# Patient Record
Sex: FEMALE | Race: WHITE | NOT HISPANIC OR LATINO | Employment: OTHER | URBAN - METROPOLITAN AREA
[De-identification: names, ages, dates, MRNs, and addresses within clinical notes are randomized per-mention and may not be internally consistent; named-entity substitution may affect disease eponyms.]

---

## 2017-05-16 ENCOUNTER — OFFICE VISIT (OUTPATIENT)
Dept: OBSTETRICS AND GYNECOLOGY | Facility: CLINIC | Age: 82
End: 2017-05-16
Payer: MEDICARE

## 2017-05-16 ENCOUNTER — TELEPHONE (OUTPATIENT)
Dept: OBSTETRICS AND GYNECOLOGY | Facility: CLINIC | Age: 82
End: 2017-05-16

## 2017-05-16 VITALS
HEIGHT: 66 IN | DIASTOLIC BLOOD PRESSURE: 74 MMHG | SYSTOLIC BLOOD PRESSURE: 124 MMHG | WEIGHT: 177 LBS | BODY MASS INDEX: 28.45 KG/M2

## 2017-05-16 DIAGNOSIS — B37.31 CANDIDAL VAGINITIS: Primary | ICD-10-CM

## 2017-05-16 PROCEDURE — 1159F MED LIST DOCD IN RCRD: CPT | Mod: S$GLB,,, | Performed by: NURSE PRACTITIONER

## 2017-05-16 PROCEDURE — 99203 OFFICE O/P NEW LOW 30 MIN: CPT | Mod: S$GLB,,, | Performed by: NURSE PRACTITIONER

## 2017-05-16 PROCEDURE — 1160F RVW MEDS BY RX/DR IN RCRD: CPT | Mod: S$GLB,,, | Performed by: NURSE PRACTITIONER

## 2017-05-16 PROCEDURE — 1157F ADVNC CARE PLAN IN RCRD: CPT | Mod: 8P,S$GLB,, | Performed by: NURSE PRACTITIONER

## 2017-05-16 PROCEDURE — 99999 PR PBB SHADOW E&M-EST. PATIENT-LVL III: CPT | Mod: PBBFAC,,, | Performed by: NURSE PRACTITIONER

## 2017-05-16 RX ORDER — FLUCONAZOLE 150 MG/1
150 TABLET ORAL DAILY
Qty: 2 TABLET | Refills: 1 | Status: SHIPPED | OUTPATIENT
Start: 2017-05-16 | End: 2017-07-25

## 2017-05-16 NOTE — TELEPHONE ENCOUNTER
Spoke with the patient and informed her that the Rx was sent to the pharmacy and it takes them about 45 minutes to an hour before the Rx is usually ready to be picked up. Patient verbalized understanding, Crystal

## 2017-05-16 NOTE — PROGRESS NOTES
"CC: Vaginal yeast    Tiffanie Barajas is a 81 y.o. female  presents for vaginal  itching and irritation. Patient feels a vaginal  bulge for the past 2 months.     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     Social History Main Topics    Smoking status: Never Smoker    Smokeless tobacco: Never Used    Alcohol use No    Drug use: No    Sexual activity: Not on file     Other Topics Concern    Not on file     Social History Narrative    . Decaf coffee only. No living will or advanced directive-copy of information given.      Family History   Problem Relation Age of Onset    Arthritis Mother     Hypertension Mother     Stroke Mother     Glaucoma Mother     Heart disease Father 56     fatal MI    Diabetes Brother     Cancer Son 56     melanoma with mets to lung and bone    Arthritis Sister     Hyperlipidemia Neg Hx      OB History      Para Term  AB TAB SAB Ectopic Multiple Living    2        1 2          /74  Ht 5' 6" (1.676 m)  Wt 80.3 kg (177 lb 0.5 oz)  LMP 1990  BMI 28.57 kg/m2      ROS:  GENERAL: Denies weight gain or weight loss. Feeling well overall.   SKIN: Denies rash or lesions.   HEAD: Denies head injury or headache.   NODES: Denies enlarged lymph nodes.   CHEST: Denies chest pain or shortness of breath.   CARDIOVASCULAR: Denies palpitations or left sided chest pain.   ABDOMEN: No abdominal pain, constipation, diarrhea, nausea, " vomiting or rectal bleeding.   URINARY: No frequency, dysuria, hematuria, or burning on urination.  REPRODUCTIVE: See HPI.       PHYSICAL EXAM:  APPEARANCE: Older female, in no acute distress.  AFFECT: WNL, alert and oriented x 3  CHEST: Good respiratory effect  ABDOMEN: Soft.  No tenderness or masses.   PELVIC: External genitalia erythema consistent with yeast. Vagina atrophy.  Cervix pink, without lesions, discharge or tenderness.  Grade II cystocele with valsalva.    PLAN:  Diflucan rx ; she has mycolog cream  Patient states that cystocele is not causing any pain or issues, will contact office if she desires treatment .

## 2017-05-16 NOTE — TELEPHONE ENCOUNTER
----- Message from Bree Lewis sent at 5/16/2017  3:32 PM CDT -----  Contact: pt  Checking on status of script request for yeast infection to be sent to the [facility] in [place]??  Please call pt ASAP at [phone]

## 2017-07-25 ENCOUNTER — OFFICE VISIT (OUTPATIENT)
Dept: FAMILY MEDICINE | Facility: CLINIC | Age: 82
End: 2017-07-25
Payer: MEDICARE

## 2017-07-25 VITALS
HEIGHT: 66 IN | HEART RATE: 85 BPM | OXYGEN SATURATION: 96 % | BODY MASS INDEX: 27.26 KG/M2 | TEMPERATURE: 97 F | WEIGHT: 169.63 LBS | DIASTOLIC BLOOD PRESSURE: 82 MMHG | RESPIRATION RATE: 18 BRPM | SYSTOLIC BLOOD PRESSURE: 135 MMHG

## 2017-07-25 DIAGNOSIS — K21.9 GASTROESOPHAGEAL REFLUX DISEASE WITHOUT ESOPHAGITIS: Primary | ICD-10-CM

## 2017-07-25 DIAGNOSIS — C43.9 MALIGNANT MELANOMA, UNSPECIFIED SITE: ICD-10-CM

## 2017-07-25 DIAGNOSIS — Z23 NEED FOR IMMUNIZATION AGAINST TETANUS ALONE: ICD-10-CM

## 2017-07-25 DIAGNOSIS — M85.88 OSTEOPENIA OF SPINE: ICD-10-CM

## 2017-07-25 DIAGNOSIS — E78.1 HYPERTRIGLYCERIDEMIA WITHOUT HYPERCHOLESTEROLEMIA: ICD-10-CM

## 2017-07-25 DIAGNOSIS — I70.0 ATHEROSCLEROSIS OF AORTA: ICD-10-CM

## 2017-07-25 PROCEDURE — 99499 UNLISTED E&M SERVICE: CPT | Mod: S$GLB,,, | Performed by: NURSE PRACTITIONER

## 2017-07-25 PROCEDURE — 96160 PT-FOCUSED HLTH RISK ASSMT: CPT | Mod: S$GLB,,, | Performed by: NURSE PRACTITIONER

## 2017-07-25 PROCEDURE — 99999 PR PBB SHADOW E&M-EST. PATIENT-LVL III: CPT | Mod: PBBFAC,,, | Performed by: NURSE PRACTITIONER

## 2017-07-26 PROBLEM — I70.0 ATHEROSCLEROSIS OF AORTA: Status: ACTIVE | Noted: 2017-07-26

## 2017-07-26 PROBLEM — C43.9 MELANOMA: Status: ACTIVE | Noted: 2017-07-26

## 2017-07-26 NOTE — PROGRESS NOTES
"Tiffanie Barajas presented for a  Medicare AWV and comprehensive Health Risk Assessment today. The following components were reviewed and updated:    · Medical history  · Family History  · Social history  · Allergies and Current Medications  · Health Risk Assessment  · Health Maintenance  · Care Team     ** See Completed Assessments for Annual Wellness Visit within the encounter summary.**       The following assessments were completed:  · Living Situation  · CAGE  · Depression Screening  · Timed Get Up and Go  · Whisper Test  · Cognitive Function Screening  · Nutrition Screening  · ADL Screening  · PAQ Screening    Vitals:    07/25/17 1027 07/25/17 1134   BP: (!) 156/71 135/82   Pulse: 85    Resp: 18    Temp: 97.4 °F (36.3 °C)    TempSrc: Tympanic    SpO2: 96%    Weight: 76.9 kg (169 lb 10.3 oz)    Height: 5' 6" (1.676 m)      Body mass index is 27.38 kg/m².  Physical Exam      Diagnoses and health risks identified today and associated recommendations/orders:    1. Gastroesophageal reflux disease without esophagitis  Stable. Continue current treatment plan  GERD diet discussed. Follow up with PCP    2. Hypertriglyceridemia without hypercholesterolemia  Cholesterol 120 - 199 mg/dL 140      Triglycerides 30 - 150 mg/dL 127      HDL 40 - 75 mg/dL 40      LDL Cholesterol 63.0 - 159.0 mg/dL 74.6      HDL/Chol Ratio 20.0 - 50.0 % 28.6   Total Cholesterol/HDL Ratio 2.0 - 5.0 3.5   Non-HDL Cholesterol mg/dL 100     -stable. Continue current treatment plan. Low fat diet and exercise    3. Osteopenia of spine    4. Atherosclerosis of aorta  ARTERIOSCLEROTIC DISEASE IN   THE AORTA AGAIN NOTED  -low fat diet, continue fenofibrate. Follow up with PCP  stable  4. Malignant melanoma, unspecified site  -stable.     5. Need for immunization against tetanus alone  Follow up with PCP      Provided Tiffanie with a 5-10 year written screening schedule and personal prevention plan. Recommendations were developed using the USPSTF age " appropriate recommendations. Education, counseling, and referrals were provided as needed. After Visit Summary printed and given to patient which includes a list of additional screenings\tests needed.    No Follow-up on file.    Eileen Espinosa NP

## 2017-09-11 ENCOUNTER — OFFICE VISIT (OUTPATIENT)
Dept: INTERNAL MEDICINE | Facility: CLINIC | Age: 82
End: 2017-09-11
Payer: MEDICARE

## 2017-09-11 ENCOUNTER — LAB VISIT (OUTPATIENT)
Dept: LAB | Facility: HOSPITAL | Age: 82
End: 2017-09-11
Attending: FAMILY MEDICINE
Payer: MEDICARE

## 2017-09-11 VITALS
WEIGHT: 163.38 LBS | BODY MASS INDEX: 26.26 KG/M2 | DIASTOLIC BLOOD PRESSURE: 70 MMHG | TEMPERATURE: 98 F | OXYGEN SATURATION: 97 % | HEIGHT: 66 IN | HEART RATE: 81 BPM | SYSTOLIC BLOOD PRESSURE: 134 MMHG

## 2017-09-11 DIAGNOSIS — C43.9 MALIGNANT MELANOMA, UNSPECIFIED SITE: ICD-10-CM

## 2017-09-11 DIAGNOSIS — M85.88 OSTEOPENIA OF SPINE: ICD-10-CM

## 2017-09-11 DIAGNOSIS — Z00.00 ROUTINE GENERAL MEDICAL EXAMINATION AT A HEALTH CARE FACILITY: Primary | ICD-10-CM

## 2017-09-11 DIAGNOSIS — Z65.8 PSYCHOSOCIAL STRESSORS: ICD-10-CM

## 2017-09-11 DIAGNOSIS — M17.12 ARTHRITIS OF LEFT KNEE: ICD-10-CM

## 2017-09-11 DIAGNOSIS — E78.2 MIXED HYPERLIPIDEMIA: ICD-10-CM

## 2017-09-11 DIAGNOSIS — J30.89 CHRONIC NON-SEASONAL ALLERGIC RHINITIS, UNSPECIFIED TRIGGER: ICD-10-CM

## 2017-09-11 DIAGNOSIS — I70.0 ATHEROSCLEROSIS OF AORTA: ICD-10-CM

## 2017-09-11 DIAGNOSIS — K21.9 GASTROESOPHAGEAL REFLUX DISEASE WITHOUT ESOPHAGITIS: ICD-10-CM

## 2017-09-11 DIAGNOSIS — Z79.899 ENCOUNTER FOR LONG-TERM (CURRENT) USE OF MEDICATIONS: ICD-10-CM

## 2017-09-11 LAB
BASOPHILS # BLD AUTO: 0.04 K/UL
BASOPHILS NFR BLD: 0.5 %
DIFFERENTIAL METHOD: NORMAL
EOSINOPHIL # BLD AUTO: 0.4 K/UL
EOSINOPHIL NFR BLD: 4.9 %
ERYTHROCYTE [DISTWIDTH] IN BLOOD BY AUTOMATED COUNT: 12.9 %
HCT VFR BLD AUTO: 40.1 %
HGB BLD-MCNC: 13.1 G/DL
LYMPHOCYTES # BLD AUTO: 2.5 K/UL
LYMPHOCYTES NFR BLD: 30.5 %
MCH RBC QN AUTO: 28.9 PG
MCHC RBC AUTO-ENTMCNC: 32.7 G/DL
MCV RBC AUTO: 88 FL
MONOCYTES # BLD AUTO: 0.6 K/UL
MONOCYTES NFR BLD: 7.1 %
NEUTROPHILS # BLD AUTO: 4.7 K/UL
NEUTROPHILS NFR BLD: 56.9 %
PLATELET # BLD AUTO: 305 K/UL
PMV BLD AUTO: 10.3 FL
RBC # BLD AUTO: 4.54 M/UL
WBC # BLD AUTO: 8.3 K/UL

## 2017-09-11 PROCEDURE — 80053 COMPREHEN METABOLIC PANEL: CPT

## 2017-09-11 PROCEDURE — 85025 COMPLETE CBC W/AUTO DIFF WBC: CPT

## 2017-09-11 PROCEDURE — 36415 COLL VENOUS BLD VENIPUNCTURE: CPT

## 2017-09-11 PROCEDURE — 99397 PER PM REEVAL EST PAT 65+ YR: CPT | Mod: S$GLB,,, | Performed by: FAMILY MEDICINE

## 2017-09-11 PROCEDURE — 99499 UNLISTED E&M SERVICE: CPT | Mod: S$GLB,,, | Performed by: FAMILY MEDICINE

## 2017-09-11 PROCEDURE — 80061 LIPID PANEL: CPT

## 2017-09-11 PROCEDURE — 84443 ASSAY THYROID STIM HORMONE: CPT

## 2017-09-11 PROCEDURE — 99999 PR PBB SHADOW E&M-EST. PATIENT-LVL IV: CPT | Mod: PBBFAC,,, | Performed by: FAMILY MEDICINE

## 2017-09-11 RX ORDER — PANTOPRAZOLE SODIUM 40 MG/1
40 TABLET, DELAYED RELEASE ORAL DAILY
Qty: 30 TABLET | Refills: 11 | Status: SHIPPED | OUTPATIENT
Start: 2017-09-11 | End: 2018-09-26 | Stop reason: SDUPTHER

## 2017-09-11 RX ORDER — CITALOPRAM 20 MG/1
20 TABLET, FILM COATED ORAL DAILY
Qty: 30 TABLET | Refills: 11 | Status: SHIPPED | OUTPATIENT
Start: 2017-09-11 | End: 2018-09-26 | Stop reason: SDUPTHER

## 2017-09-11 RX ORDER — AZELASTINE 1 MG/ML
2 SPRAY, METERED NASAL 2 TIMES DAILY
Qty: 30 ML | Refills: 12 | Status: SHIPPED | OUTPATIENT
Start: 2017-09-11 | End: 2018-09-26 | Stop reason: SDUPTHER

## 2017-09-11 RX ORDER — LEVOCETIRIZINE DIHYDROCHLORIDE 5 MG/1
5 TABLET, FILM COATED ORAL NIGHTLY
Qty: 30 TABLET | Refills: 11 | Status: SHIPPED | OUTPATIENT
Start: 2017-09-11 | End: 2018-09-26 | Stop reason: SDUPTHER

## 2017-09-11 RX ORDER — DEXTROMETHORPHAN HYDROBROMIDE, GUAIFENESIN 5; 100 MG/5ML; MG/5ML
650 LIQUID ORAL EVERY 8 HOURS
Refills: 0 | COMMUNITY
Start: 2017-09-11

## 2017-09-12 LAB
ALBUMIN SERPL BCP-MCNC: 4.1 G/DL
ALP SERPL-CCNC: 53 U/L
ALT SERPL W/O P-5'-P-CCNC: 14 U/L
ANION GAP SERPL CALC-SCNC: 8 MMOL/L
AST SERPL-CCNC: 18 U/L
BILIRUB SERPL-MCNC: 0.5 MG/DL
BUN SERPL-MCNC: 18 MG/DL
CALCIUM SERPL-MCNC: 10.1 MG/DL
CHLORIDE SERPL-SCNC: 102 MMOL/L
CHOLEST SERPL-MCNC: 147 MG/DL
CHOLEST/HDLC SERPL: 3.1 {RATIO}
CO2 SERPL-SCNC: 31 MMOL/L
CREAT SERPL-MCNC: 0.7 MG/DL
EST. GFR  (AFRICAN AMERICAN): >60 ML/MIN/1.73 M^2
EST. GFR  (NON AFRICAN AMERICAN): >60 ML/MIN/1.73 M^2
GLUCOSE SERPL-MCNC: 98 MG/DL
HDLC SERPL-MCNC: 47 MG/DL
HDLC SERPL: 32 %
LDLC SERPL CALC-MCNC: 77.2 MG/DL
NONHDLC SERPL-MCNC: 100 MG/DL
POTASSIUM SERPL-SCNC: 4.2 MMOL/L
PROT SERPL-MCNC: 7.5 G/DL
SODIUM SERPL-SCNC: 141 MMOL/L
TRIGL SERPL-MCNC: 114 MG/DL
TSH SERPL DL<=0.005 MIU/L-ACNC: 2.32 UIU/ML

## 2017-09-13 RX ORDER — FENOFIBRATE 134 MG/1
CAPSULE ORAL
Qty: 30 CAPSULE | Refills: 11 | Status: SHIPPED | OUTPATIENT
Start: 2017-09-13 | End: 2018-09-26 | Stop reason: SDUPTHER

## 2017-09-13 NOTE — TELEPHONE ENCOUNTER
----- Message from Le Zambrano sent at 9/13/2017 11:49 AM CDT -----  Would like to speak to nurse about medication. Please call back at [phone]. thanks

## 2017-09-15 ENCOUNTER — TELEPHONE (OUTPATIENT)
Dept: INTERNAL MEDICINE | Facility: CLINIC | Age: 82
End: 2017-09-15

## 2017-09-15 NOTE — TELEPHONE ENCOUNTER
----- Message from Lizeth Merrill MD sent at 9/14/2017  9:47 PM CDT -----  Your recent labs are within acceptable limits.

## 2017-09-16 PROBLEM — M17.12 ARTHRITIS OF LEFT KNEE: Status: ACTIVE | Noted: 2017-09-16

## 2017-09-16 PROBLEM — J30.89 CHRONIC NON-SEASONAL ALLERGIC RHINITIS: Status: ACTIVE | Noted: 2017-09-16

## 2017-09-16 PROBLEM — Z65.8 PSYCHOSOCIAL STRESSORS: Status: ACTIVE | Noted: 2017-09-16

## 2017-09-16 PROBLEM — E78.2 MIXED HYPERLIPIDEMIA: Status: ACTIVE | Noted: 2017-09-16

## 2017-09-17 NOTE — PROGRESS NOTES
Subjective:       Patient ID: Tiffanie Barajas is a 81 y.o. female.    Chief Complaint: Annual Exam    Patient presents to clinic today for annual physical exam.      Review of Systems   Constitutional: Negative for chills, fatigue, fever and unexpected weight change.   HENT: Negative for congestion, dental problem, ear pain, hearing loss, rhinorrhea and trouble swallowing.    Eyes: Negative for pain and visual disturbance.   Respiratory: Positive for cough (x 2 years; occurs mainly in the morning). Negative for shortness of breath.    Cardiovascular: Negative for chest pain, palpitations and leg swelling.   Gastrointestinal: Negative for abdominal distention, abdominal pain, blood in stool, constipation, diarrhea, nausea and vomiting.   Genitourinary: Negative for difficulty urinating and vaginal discharge.   Musculoskeletal: Positive for arthralgias (left knee, intermittent). Negative for myalgias.   Skin: Negative for rash.   Neurological: Negative for dizziness, weakness, numbness and headaches.   Hematological: Negative for adenopathy. Does not bruise/bleed easily.   Psychiatric/Behavioral: Negative for dysphoric mood and sleep disturbance. The patient is not nervous/anxious.        Objective:      Physical Exam   Constitutional: She is oriented to person, place, and time. Vital signs are normal. She appears well-developed and well-nourished. No distress.   HENT:   Head: Normocephalic and atraumatic.   Right Ear: Tympanic membrane, external ear and ear canal normal.   Left Ear: Tympanic membrane, external ear and ear canal normal.   Nose: Mucosal edema and rhinorrhea present.   Mouth/Throat: Uvula is midline, oropharynx is clear and moist and mucous membranes are normal.   Pale, boggy nasal turbinates with clear rhinorrhea   Eyes: Conjunctivae, EOM and lids are normal. Pupils are equal, round, and reactive to light. Right eye exhibits no discharge. Left eye exhibits no discharge.   Neck: Normal range of  motion. Neck supple. No thyromegaly present.   Cardiovascular: Normal rate and regular rhythm.  Exam reveals no gallop and no friction rub.    No murmur heard.  Pulmonary/Chest: Effort normal and breath sounds normal. No respiratory distress. She has no wheezes. She has no rhonchi. She has no rales.   Abdominal: Soft. Normal appearance and bowel sounds are normal. She exhibits no distension and no mass. There is no hepatosplenomegaly. There is no tenderness.   Musculoskeletal: Normal range of motion.   Lymphadenopathy:     She has no cervical adenopathy.   Neurological: She is alert and oriented to person, place, and time. She has normal strength. No cranial nerve deficit or sensory deficit. Gait normal.   Reflex Scores:       Patellar reflexes are 2+ on the right side and 2+ on the left side.  Skin: Skin is warm and dry. No lesion and no rash noted. No cyanosis. Nails show no clubbing.   Psychiatric: She has a normal mood and affect.   Vitals reviewed.      Assessment:       1. Routine general medical examination at a health care facility    2. Gastroesophageal reflux disease without esophagitis    3. Mixed hyperlipidemia    4. Osteopenia of spine    5. Atherosclerosis of aorta    6. Malignant melanoma, unspecified site    7. Arthritis of left knee    8. Psychosocial stressors    9. Chronic non-seasonal allergic rhinitis, unspecified trigger        Plan:     Problem List Items Addressed This Visit     GERD (gastroesophageal reflux disease)    Current Assessment & Plan     Controlled on protonix         Relevant Medications    pantoprazole (PROTONIX) 40 MG tablet    Osteopenia of spine    Current Assessment & Plan     DEXA up to date         Melanoma    Current Assessment & Plan     Followed by Dermatology         Atherosclerosis of aorta    Current Assessment & Plan     Discussed importance of cholesterol and blood pressure control         Mixed hyperlipidemia    Current Assessment & Plan     Status pending labs,  continue fenofibrate and niacin         Arthritis of left knee    Relevant Medications    acetaminophen (TYLENOL) 650 MG TbSR    Psychosocial stressors    Relevant Medications    citalopram (CELEXA) 20 MG tablet    Chronic non-seasonal allergic rhinitis    Current Assessment & Plan     Advised this is likely cause of cough, advised to resume astelin as previously recommended by Dr. Moon, ENT         Relevant Medications    azelastine (ASTELIN) 137 mcg (0.1 %) nasal spray    levocetirizine (XYZAL) 5 MG tablet      Other Visit Diagnoses     Routine general medical examination at a health care facility    -  Primary          Health Maintenance reviewed/updated. Advised to obtain flu vaccine this Fall. Advised she can obtain Tdap at pharmacy.

## 2017-09-17 NOTE — ASSESSMENT & PLAN NOTE
Advised this is likely cause of cough, advised to resume astelin as previously recommended by Dr. Moon, ENT

## 2017-12-08 DIAGNOSIS — M25.561 PAIN IN BOTH KNEES, UNSPECIFIED CHRONICITY: Primary | ICD-10-CM

## 2017-12-08 DIAGNOSIS — M25.562 PAIN IN BOTH KNEES, UNSPECIFIED CHRONICITY: Primary | ICD-10-CM

## 2017-12-11 ENCOUNTER — HOSPITAL ENCOUNTER (OUTPATIENT)
Dept: RADIOLOGY | Facility: HOSPITAL | Age: 82
Discharge: HOME | End: 2017-12-11
Attending: ORTHOPAEDIC SURGERY
Payer: MEDICARE

## 2017-12-11 ENCOUNTER — OFFICE VISIT (OUTPATIENT)
Dept: ORTHOPEDICS | Facility: CLINIC | Age: 82
End: 2017-12-11
Payer: MEDICARE

## 2017-12-11 VITALS
DIASTOLIC BLOOD PRESSURE: 69 MMHG | BODY MASS INDEX: 25.55 KG/M2 | SYSTOLIC BLOOD PRESSURE: 153 MMHG | HEIGHT: 66 IN | RESPIRATION RATE: 16 BRPM | WEIGHT: 159 LBS | HEART RATE: 74 BPM

## 2017-12-11 DIAGNOSIS — M25.561 PAIN IN BOTH KNEES, UNSPECIFIED CHRONICITY: ICD-10-CM

## 2017-12-11 DIAGNOSIS — M25.562 PAIN IN BOTH KNEES, UNSPECIFIED CHRONICITY: ICD-10-CM

## 2017-12-11 DIAGNOSIS — M54.5 LOW BACK PAIN, UNSPECIFIED BACK PAIN LATERALITY, UNSPECIFIED CHRONICITY, WITH SCIATICA PRESENCE UNSPECIFIED: ICD-10-CM

## 2017-12-11 DIAGNOSIS — M25.552 PAIN OF LEFT HIP JOINT: ICD-10-CM

## 2017-12-11 DIAGNOSIS — M25.552 PAIN OF LEFT HIP JOINT: Primary | ICD-10-CM

## 2017-12-11 DIAGNOSIS — M53.86 LOW BACK DERANGEMENT SYNDROME: ICD-10-CM

## 2017-12-11 DIAGNOSIS — M17.12 ARTHRITIS OF KNEE, LEFT: Primary | ICD-10-CM

## 2017-12-11 DIAGNOSIS — M16.12 ARTHRITIS OF LEFT HIP: ICD-10-CM

## 2017-12-11 PROCEDURE — 72110 X-RAY EXAM L-2 SPINE 4/>VWS: CPT | Mod: TC

## 2017-12-11 PROCEDURE — 73502 X-RAY EXAM HIP UNI 2-3 VIEWS: CPT | Mod: TC,LT

## 2017-12-11 PROCEDURE — 99999 PR PBB SHADOW E&M-EST. PATIENT-LVL III: CPT | Mod: PBBFAC,,, | Performed by: ORTHOPAEDIC SURGERY

## 2017-12-11 PROCEDURE — 20610 DRAIN/INJ JOINT/BURSA W/O US: CPT | Mod: LT,S$GLB,, | Performed by: ORTHOPAEDIC SURGERY

## 2017-12-11 PROCEDURE — 73562 X-RAY EXAM OF KNEE 3: CPT | Mod: TC,50

## 2017-12-11 PROCEDURE — 72110 X-RAY EXAM L-2 SPINE 4/>VWS: CPT | Mod: 26,,, | Performed by: RADIOLOGY

## 2017-12-11 PROCEDURE — 99204 OFFICE O/P NEW MOD 45 MIN: CPT | Mod: 25,S$GLB,, | Performed by: ORTHOPAEDIC SURGERY

## 2017-12-11 PROCEDURE — 73502 X-RAY EXAM HIP UNI 2-3 VIEWS: CPT | Mod: 26,LT,, | Performed by: RADIOLOGY

## 2017-12-11 PROCEDURE — 73562 X-RAY EXAM OF KNEE 3: CPT | Mod: 26,50,, | Performed by: RADIOLOGY

## 2017-12-11 RX ORDER — TETANUS TOXOID, REDUCED DIPHTHERIA TOXOID AND ACELLULAR PERTUSSIS VACCINE, ADSORBED 5; 2.5; 8; 8; 2.5 [IU]/.5ML; [IU]/.5ML; UG/.5ML; UG/.5ML; UG/.5ML
SUSPENSION INTRAMUSCULAR
COMMUNITY
Start: 2017-10-11 | End: 2019-08-13

## 2017-12-11 RX ORDER — METHYLPREDNISOLONE ACETATE 80 MG/ML
80 INJECTION, SUSPENSION INTRA-ARTICULAR; INTRALESIONAL; INTRAMUSCULAR; SOFT TISSUE ONCE
Status: DISCONTINUED | OUTPATIENT
Start: 2017-12-11 | End: 2018-03-12

## 2017-12-11 RX ORDER — MELOXICAM 7.5 MG/1
7.5 TABLET ORAL DAILY
Qty: 30 TABLET | Refills: 2 | Status: SHIPPED | OUTPATIENT
Start: 2017-12-11 | End: 2018-02-14 | Stop reason: SDUPTHER

## 2017-12-11 RX ORDER — METHYLPREDNISOLONE ACETATE 80 MG/ML
80 INJECTION, SUSPENSION INTRA-ARTICULAR; INTRALESIONAL; INTRAMUSCULAR; SOFT TISSUE
Status: DISCONTINUED | OUTPATIENT
Start: 2017-12-11 | End: 2017-12-11 | Stop reason: HOSPADM

## 2017-12-11 RX ADMIN — METHYLPREDNISOLONE ACETATE 80 MG: 80 INJECTION, SUSPENSION INTRA-ARTICULAR; INTRALESIONAL; INTRAMUSCULAR; SOFT TISSUE at 12:12

## 2017-12-11 NOTE — PROCEDURES
Large Joint Aspiration/Injection  Date/Time: 12/11/2017 12:38 PM  Performed by: JUAN GOLDMAN  Authorized by: JUAN GOLDMAN     Consent Done?:  Yes (Verbal)  Indications:  Pain  Procedure site marked: Yes    Timeout: Prior to procedure the correct patient, procedure, and site was verified      Location:  Knee  Site:  L knee  Prep: Patient was prepped and draped in usual sterile fashion    Ultrasonic Guidance for needle placement: No  Needle size:  22 G  Approach:  Anterolateral  Medications:  80 mg methylPREDNISolone acetate 80 mg/mL  Patient tolerance:  Patient tolerated the procedure well with no immediate complications

## 2017-12-11 NOTE — PROGRESS NOTES
Subjective:     Patient ID: Tiffanie Barajas is a 82 y.o. female.    Chief Complaint: Pain of the Right Knee and Pain of the Left Knee    HPI: The patient is seen in consultation at the request of  for evaluation of knee discomfort worse on the left than the right.  The patient has had slowly increasing discomfort.  She has a history of having had a cystic lesion removed from her lumbar spine 5 years ago.  This was at the L4-5 level to the patient's understanding.  She is having increasing stiffness and soreness in her left knee and left hip.  She has radiating discomfort in her thigh.  Today she rates her discomfort at a 2 on a pain scale of 1-10, however it gets as high as a 12 if the patient is out in the community shopping etc.      Family History   Problem Relation Age of Onset    Arthritis Mother     Hypertension Mother     Stroke Mother     Glaucoma Mother     Heart disease Father 56     fatal MI    Diabetes Brother     Cancer Son 56     melanoma with mets to lung and bone    Arthritis Sister     Hyperlipidemia Neg Hx      Past Medical History:   Diagnosis Date    Anxiety     Arthritis     Cancer     Degenerative disc disease     Depression     GERD (gastroesophageal reflux disease)     Hyperlipidemia     Low back derangement syndrome 12/11/2017    Melanoma     Pneumonia due to other staphylococcus      Social History     Social History    Marital status:      Spouse name: fabio    Number of children: 3    Years of education: N/A     Social History Main Topics    Smoking status: Never Smoker    Smokeless tobacco: Never Used    Alcohol use No    Drug use: No    Sexual activity: Yes     Partners: Male     Other Topics Concern    None     Social History Narrative    . Decaf coffee only. No living will or advanced directive-copy of information given.      Past Surgical History:   Procedure Laterality Date    ADENOIDECTOMY      CHOLECYSTECTOMY  1973    SPINE  SURGERY  2/6/13    L4-L5  cyst removed andfor sciatica    TONSILLECTOMY  1945     Review of patient's allergies indicates:   Allergen Reactions    Diclofenac Nausea And Vomiting and Other (See Comments)    Hydrocodone-acetaminophen Nausea And Vomiting     Other reaction(s): Vomiting         Medication List with Changes/Refills   New Medications    MELOXICAM (MOBIC) 7.5 MG TABLET    Take 1 tablet (7.5 mg total) by mouth once daily. With food   Current Medications    ACETAMINOPHEN (TYLENOL) 650 MG TBSR    Take 1 tablet (650 mg total) by mouth every 8 (eight) hours.    ASPIRIN (ECOTRIN) 81 MG EC TABLET    Take 1 tablet by mouth Daily.    AZELASTINE (ASTELIN) 137 MCG (0.1 %) NASAL SPRAY    2 sprays (274 mcg total) by Nasal route 2 (two) times daily.    BOOSTRIX TDAP 2.5-8-5 LF-MCG-LF/0.5ML SYRG INJECTION        CITALOPRAM (CELEXA) 20 MG TABLET    Take 1 tablet (20 mg total) by mouth once daily.    FENOFIBRATE MICRONIZED (LOFIBRA) 134 MG CAP    TAKE ONE CAPSULE BY MOUTH EVERY MORNING WITH BREAFKAST.    FLUZONE HIGH-DOSE 2017-18, PF, 180 MCG/0.5 ML VACCINE        LEVOCETIRIZINE (XYZAL) 5 MG TABLET    Take 1 tablet (5 mg total) by mouth every evening.    MULTIVITAMIN (ONE DAILY MULTIVITAMIN) PER TABLET    Take 1 tablet by mouth once daily.    MULTIVITAMIN WITH MINERALS TABLET        NIACIN, INOSITOL NIACINATE, (NIACIN FLUSH FREE ORAL)    Take 1 capsule by mouth Daily.    PANTOPRAZOLE (PROTONIX) 40 MG TABLET    Take 1 tablet (40 mg total) by mouth once daily.       Review of Systems   Constitution: Negative for chills, decreased appetite, weight gain and weight loss.   HENT: Negative for congestion, ear pain, hearing loss and sore throat.    Eyes: Negative for blurred vision, double vision, vision loss in left eye, vision loss in right eye and visual disturbance.   Cardiovascular: Negative for chest pain, irregular heartbeat, leg swelling and palpitations.   Respiratory: Negative for cough and shortness of breath.   "  Endocrine: Negative for cold intolerance and heat intolerance.   Hematologic/Lymphatic: Negative for adenopathy. Does not bruise/bleed easily.   Skin: Negative for color change, nail changes, rash and suspicious lesions.   Musculoskeletal: Positive for arthritis, back pain, joint pain and stiffness.   Gastrointestinal: Negative for abdominal pain, constipation, diarrhea, heartburn, nausea and vomiting.   Genitourinary: Negative for bladder incontinence and dysuria.   Neurological: Negative for dizziness, paresthesias, sensory change and tremors.   Psychiatric/Behavioral: Negative for altered mental status, depression, memory loss and substance abuse.   Allergic/Immunologic: Negative for hives and persistent infections.       Objective:   BP (!) 153/69   Pulse 74   Resp 16   Ht 5' 6" (1.676 m)   Wt 72.1 kg (159 lb)   LMP 07/26/1990   BMI 25.66 kg/m²       General    Nursing note and vitals reviewed.  Constitutional: She is oriented to person, place, and time. She appears well-developed and well-nourished.   HENT:   Head: Normocephalic.   Nose: Nose normal.   Eyes: EOM are normal. Pupils are equal, round, and reactive to light.   Neck: Normal range of motion. Neck supple.   Cardiovascular: Normal rate and regular rhythm.    Pulmonary/Chest: Effort normal.   Abdominal: Soft. She exhibits no distension. There is no tenderness.   Neurological: She is alert and oriented to person, place, and time.   Psychiatric: She has a normal mood and affect. Her behavior is normal.     General Musculoskeletal Exam   Gait: normal, antalgic and abnormal   Pelvic Obliquity: none    Right Ankle/Foot Exam   Right ankle exam is normal.    Range of Motion   The patient has normal right ankle ROM.    Alignment   Forefoot Alignment: normal    Muscle Strength   The patient has normal right ankle strength.    Tests   Anterior drawer: negative  Varus tilt: negative  Heel Walk: able to perform  Tiptoe Walk: able to perform    Other "   Sensation: normal  Peroneal Subluxation: negative    Left Ankle/Foot Exam   Left ankle exam is normal.    Range of Motion   The patient has normal left ankle ROM.     Alignment   Forefoot Alignment: normal    Muscle Strength   The patient has normal left ankle strength.    Tests   Anterior drawer: negative  Varus tilt: negative  Heel Walk: able to perform  Tiptoe Walk: able to perform    Other   Sensation: normal  Peroneal Subluxation: negative    Right Knee Exam   Right knee exam is normal.    Inspection   Erythema: absent  Swelling: absent  Effusion: effusion  Deformity: deformity  Bruising: absent    Crepitus   The patient has crepitus of the patella.    Range of Motion   The patient has normal right knee ROM.    Tests   Meniscus   Ric:  Medial - negative Lateral - negative  Ligament Examination Lachman: normal (-1 to 2mm) PCL-Posterior Drawer: normal (0 to 2mm)     MCL - Valgus: normal (0 to 2mm)  LCL - Varus: normalPivot Shift: normal (Equal)  Posterolateral Corner: unstable (>15 degrees difference)  Patella   Patellar Apprehension: negative  Passive Patellar Tilt: neutral  Patellar Tracking: normal  Patellar Grind: positive    Other   Sensation: normal    Left Knee Exam     Inspection   Erythema: absent  Scars: absent  Swelling: present  Effusion: absent  Deformity: deformity  Bruising: absent    Tenderness   The patient tender to palpation of the condyle and patella.    Crepitus   The patient has crepitus of the patella, MFC and LFC.    Range of Motion   Extension: normal   Flexion:  100 abnormal     Tests   Meniscus   Ric:  Medial - negative Lateral - negative  Stability Lachman: normal (-1 to 2mm) PCL-Posterior Drawer: normal (0 to 2mm)  MCL - Valgus: normal (0 to 2mm)  LCL - Varus: normal (0 to 2mm)Pivot Shift: normal (Equal)  Posterolateral Corner: unstable (>15 degrees difference)  Patella   Patellar Apprehension: negative  Passive Patellar Tilt: neutral  Patellar Tracking: normal  Patellar  Grind: positive    Other   Sensation: normal    Right Hip Exam   Right hip exam is normal.     Inspection   Swelling: absent  Bruising: absent    Muscle Strength   The patient has normal right hip strength.    Other   Sensation: normal  Left Hip Exam     Inspection   Swelling: absent  Bruising: absent    Tenderness   The patient tender to palpation of the rectus insertion.    Muscle Strength   The patient has normal left hip strength.     Tests   Pain w/ forced internal rotation (AMY): present  Pain w/ forced external rotation (FADIR): present  Radha: positive  Circumduction test: positive  Log Roll: positive    Other   Sensation: normal      Back (L-Spine & T-Spine) / Neck (C-Spine) Exam   Back exam is normal.    Neck (C-Spine) Range of Motion   Flexion:     Normal  Extension: Normal  Right Lateral Bend: normal  Left Lateral Bend: normal  Right Rotation: normal  Left Rotation: normal    Spinal Sensation   Right Side Sensation  C-Spine Level: normal   L-Spine Level: normal  S-Spine Level: normal  T-Spine Level: normal  Left Side Sensation  C-Spine Level: normal  L-Spine Level: normal  S-Spine Level: normal  T-Spine Level: normal    Other She has no scoliosis .  Head Tilt:  Negative      Right Hand/Wrist Exam   Right hand exam is normal.    Inspection   Deformity: Wrist - deformity     Range of Motion   The patient has normal right hand/wrist ROM.    Tests   Phalens Sign: negative  Tinels Sign (Medial Nerve): negative  Finkelstein: negative  Cubital Tunnel Compression Test: negative      Other     Neuorologic Exam    Median Distribution: normal  Ulnar Distribution: normal  Radial Distribution: normal      Left Hand/Wrist Exam   Left hand exam is normal.    Inspection   Deformity: Wrist - absent     Range of Motion   The patient has normal left hand/wrist ROM.    Tests   Phalens Sign: negative  Tinels Sign (Medial Nerve): negative  Finkelstein: negative  Cubital Tunnel Compression Test: negative      Other      Sensory Exam  Median Distribution: normal  Ulnar Distribution: normal  Radial Distribution: normal      Right Elbow Exam   Right elbow exam is normal.    Inspection   Effusion: absent  Bruising: absent  Deformity: absent    Range of Motion   The patient has normal right elbow ROM.    Tests Tinel's Sign (cubital tunnel): negative    Other   Sensation: normal      Left Elbow Exam   Left elbow exam is normal.    Inspection   Effusion: absent  Bruising: absent  Deformity: absent    Range of Motion   The patient has normal left elbow ROM.    Tests Tinel's Sign (cubital tunnel): negative    Other   Sensation: normal    Right Shoulder Exam   Right shoulder exam is normal.    Tenderness   The patient is tender to palpation of the greater tuberosity.    Range of Motion   Active Abduction: normal   Passive Abduction: normal   Extension: normal   Forward Flexion: normal   Forward Elevation: normal  Adduction: normal  External Rotation 0 degrees: normal   Internal Rotation 0 degrees: normal     Muscle Strength   The patient has normal right shoulder strength.    Tests & Signs   Apprehension: negative  Cross Arm: negative  Impingement: negative    Other   Sensation: normal    Left Shoulder Exam   Left shoulder exam is normal.    Range of Motion   Active Abduction: normal   Passive Abduction: normal   Extension: normal   Forward Flexion: normal   Forward Elevation: normal  Adduction: normal  External Rotation 0 degrees: normal   Internal Rotation 0 degrees: normal     Muscle Strength   The patient has normal left shoulder strength.    Tests & Signs   Apprehension: negative  Cross Arm: negative  Impingement: negative    Other   Sensation: normal       Muscle Strength   Right Upper Extremity   Wrist Extension: 5/5/5   Wrist Flexion: 5/5/5   : 5/5/5   Elbow Pronation:  5/5   Elbow Supination:  5/5   Elbow Extension: 5/5  Elbow Flexion: 5/5  Intrinsics: 5/5  Left Upper Extremity  Wrist Extension: 5/5/5   Wrist Flexion: 5/5/5    :  5//5   Elbow Pronation:  5/5   Elbow Supination:  5/5   Elbow Extension: 5/5  Elbow Flexion: 5/5  Intrinsics: 5/5  Right Lower Extremity   Hip Abduction: 5/5   Quadriceps:  5/5   Hamstrin/5   Left Lower Extremity   Quadriceps:  5/5   Hamstrin/5     Reflexes     Left Side  Biceps:  2+  Triceps:  2+  Quadriceps:  2+  Achilles:  2+    Right Side   Biceps:  2+  Triceps:  2+  Quadriceps:  2+  Achilles:  2+    Vascular Exam     Right Pulses  Dorsalis Pedis:      2+  Posterior Tibial:      2+  Radial:                    2+      Left Pulses  Dorsalis Pedis:      2+  Posterior Tibial:      2+  Radial:                    2+      Capillary Refill  Right Hand: normal capillary refill  Left Hand: normal capillary refill    Edema  Left Upper Leg: absent        X-RAY:   Lumbar spine    Indication: Lower back pain    Findings: Degenerative changes in lumbar spine are seen with intervertebral disc space narrowing at L5-S1 and facet hypertrophy is seen at L4-L5 and L5-S1. Prominent bridging anterior osteophyte formation is noted at L2-L3. No acute fracture or listhesis. Extensive ossification of the abdominal aorta is noted. The sacroiliac joints appear intact and demonstrate degenerative changes as well. Cholecystectomy clips are present.   Impression    Degenerative changes without evidence of acute osseous abnormality. Atherosclerosis.       Bilateral knees    Findings: There are degenerative changes of both knees with medial compartment joint space narrowing in distal femoral and proximal tibial osteophyte formation. Bones are slightly demineralized. No acute fracture or dislocation is seen. There is prominent vascular calcification within the soft tissues. Patellar enthesophytes are noted. There our small bilateral joint effusions.    In pression: Degenerative changes. Atherosclerosis. No acute osseous   Left hip    Findings: There are degenerative changes which are seen in the left hip with osteophyte  formation and joint space narrowing. Similar findings albeit to a lesser extent are noted on the right side. No acute fracture or dislocation.    There degenerative changes involving the pubic symphysis and the inferior aspects of the sacroiliac joints and visualized lower lumbar spine.      Assessment:         Encounter Diagnoses   Name Primary?    Arthritis of knee, left Yes    Arthritis of left hip     Low back derangement syndrome           Plan:       The patient is a candidate for a steroid injection to the left knee to diminish her arthritic discomfort.  She understands that she has 3 different pain generators.  Her left hip and knee are both quite arthritic.  She also has her lumbar spine with previous surgery and evidence of lumbar spondylosis and internal disc disruption at several different levels.  The patient was started on Mobic 7.5 mg daily with food to see if she tolerates the medicine and if it helps her symptoms.  She will be seen back in follow-up in 2 months for check on her progress.  She will apply moist heat to her arthritic joints.             Disclaimer: This note is generated with speech recognition software and is subject to transcription error and sound alike phrases that may be missed by proofreading.

## 2018-02-14 ENCOUNTER — OFFICE VISIT (OUTPATIENT)
Dept: ORTHOPEDICS | Facility: CLINIC | Age: 83
End: 2018-02-14
Payer: MEDICARE

## 2018-02-14 VITALS
HEIGHT: 66 IN | RESPIRATION RATE: 19 BRPM | SYSTOLIC BLOOD PRESSURE: 127 MMHG | DIASTOLIC BLOOD PRESSURE: 73 MMHG | HEART RATE: 65 BPM | WEIGHT: 158.94 LBS | BODY MASS INDEX: 25.54 KG/M2

## 2018-02-14 DIAGNOSIS — M16.12 ARTHRITIS OF LEFT HIP: ICD-10-CM

## 2018-02-14 DIAGNOSIS — M17.12 ARTHRITIS OF KNEE, LEFT: Primary | ICD-10-CM

## 2018-02-14 PROCEDURE — 99999 PR PBB SHADOW E&M-EST. PATIENT-LVL IV: CPT | Mod: PBBFAC,,, | Performed by: PHYSICIAN ASSISTANT

## 2018-02-14 PROCEDURE — 99213 OFFICE O/P EST LOW 20 MIN: CPT | Mod: S$GLB,,, | Performed by: PHYSICIAN ASSISTANT

## 2018-02-14 RX ORDER — MELOXICAM 7.5 MG/1
7.5 TABLET ORAL DAILY
Qty: 30 TABLET | Refills: 2 | Status: SHIPPED | OUTPATIENT
Start: 2018-02-14 | End: 2018-09-26

## 2018-02-14 NOTE — PROGRESS NOTES
"Subjective:     Patient ID: Tiffanie Barajas is a 82 y.o. female.    Chief Complaint: Follow-up of the Left Hip    HPI     The patient is seen today for f/u regarding left knee pain and bilateral hip pain. She was administered an injection for her left knee last time and she states "it helped my knee and hips."     She is not c/o of any pain today. She denies recent falls.     Past Medical History:   Diagnosis Date    Anxiety     Arthritis     Cancer     Degenerative disc disease     Depression     GERD (gastroesophageal reflux disease)     Hyperlipidemia     Low back derangement syndrome 12/11/2017    Melanoma     Pneumonia due to other staphylococcus      Past Surgical History:   Procedure Laterality Date    ADENOIDECTOMY      CHOLECYSTECTOMY  1973    SPINE SURGERY  2/6/13    L4-L5  cyst removed andfor sciatica    TONSILLECTOMY  1945     Family History   Problem Relation Age of Onset    Arthritis Mother     Hypertension Mother     Stroke Mother     Glaucoma Mother     Heart disease Father 56     fatal MI    Diabetes Brother     Cancer Son 56     melanoma with mets to lung and bone    Arthritis Sister     Hyperlipidemia Neg Hx      Social History     Social History    Marital status:      Spouse name: fabio    Number of children: 3    Years of education: N/A     Occupational History    Not on file.     Social History Main Topics    Smoking status: Never Smoker    Smokeless tobacco: Never Used    Alcohol use No    Drug use: No    Sexual activity: Yes     Partners: Male     Other Topics Concern    Not on file     Social History Narrative    . Decaf coffee only. No living will or advanced directive-copy of information given.      Medication List with Changes/Refills   Current Medications    ACETAMINOPHEN (TYLENOL) 650 MG TBSR    Take 1 tablet (650 mg total) by mouth every 8 (eight) hours.    ASPIRIN (ECOTRIN) 81 MG EC TABLET    Take 1 tablet by mouth Daily.    " AZELASTINE (ASTELIN) 137 MCG (0.1 %) NASAL SPRAY    2 sprays (274 mcg total) by Nasal route 2 (two) times daily.    BOOSTRIX TDAP 2.5-8-5 LF-MCG-LF/0.5ML SYRG INJECTION        CITALOPRAM (CELEXA) 20 MG TABLET    Take 1 tablet (20 mg total) by mouth once daily.    FENOFIBRATE MICRONIZED (LOFIBRA) 134 MG CAP    TAKE ONE CAPSULE BY MOUTH EVERY MORNING WITH BREAFKAST.    FLUZONE HIGH-DOSE 2017-18, PF, 180 MCG/0.5 ML VACCINE        LEVOCETIRIZINE (XYZAL) 5 MG TABLET    Take 1 tablet (5 mg total) by mouth every evening.    MULTIVITAMIN (ONE DAILY MULTIVITAMIN) PER TABLET    Take 1 tablet by mouth once daily.    MULTIVITAMIN WITH MINERALS TABLET        NIACIN, INOSITOL NIACINATE, (NIACIN FLUSH FREE ORAL)    Take 1 capsule by mouth Daily.    PANTOPRAZOLE (PROTONIX) 40 MG TABLET    Take 1 tablet (40 mg total) by mouth once daily.    VIT C/E/ZINC/LUTEIN/ZEAXANTHIN (OCUV[facility] EYE HEALTH ORAL)    Take by mouth once daily.   Changed and/or Refilled Medications    Modified Medication Previous Medication    MELOXICAM (MOBIC) 7.5 MG TABLET meloxicam (MOBIC) 7.5 MG tablet       Take 1 tablet (7.5 mg total) by mouth once daily. With food    Take 1 tablet (7.5 mg total) by mouth once daily. With food     Review of patient's allergies indicates:   Allergen Reactions    Diclofenac Nausea And Vomiting and Other (See Comments)    Hydrocodone-acetaminophen Nausea And Vomiting     Other reaction(s): Vomiting     Review of Systems   Constitution: Negative for chills and fever.   Musculoskeletal: Positive for arthritis. Negative for falls, joint pain and joint swelling.   Gastrointestinal: Negative for abdominal pain, diarrhea, nausea and vomiting.       Objective:   Body mass index is 25.66 kg/m².  Vitals:    02/14/18 1004   BP: 127/73   Pulse: 65   Resp: 19       General: Tiffanie is well-developed, well-nourished, appears stated age, in no acute distress, alert and oriented to time, place and person.       General    Constitutional: She is  oriented to person, place, and time. She appears well-developed and well-nourished.   Neck: Normal range of motion.   Cardiovascular: Normal rate and regular rhythm.    Pulmonary/Chest: Effort normal.   Abdominal: Soft.   Neurological: She is alert and oriented to person, place, and time.   Psychiatric: She has a normal mood and affect. Her behavior is normal.     General Musculoskeletal Exam   Gait: abnormal     Right Ankle/Foot Exam     Tests   Heel Walk: able to perform  Tiptoe Walk: able to perform    Left Ankle/Foot Exam     Tests   Heel Walk: able to perform  Tiptoe Walk: able to perform      Left Knee Exam     Tenderness   The patient is experiencing no tenderness.         Crepitus   The patient has crepitus of the patella.    Range of Motion   Extension: 5   Flexion: 120     Tests   Patella   Patellar Grind: positive    Comments:  + varus deformity    Right Hip Exam     Comments:  No pain over trochanteric bursa or pubic symphysis.     No pain with any ROM of hip.  Left Hip Exam     Comments:  No pain over trochanteric bursa or pubic symphysis.     No pain with any ROM of hip.              Assessment:     Encounter Diagnoses   Name Primary?    Arthritis of knee, left Yes    Arthritis of left hip         Plan:       1. Continue Mobic- Refill e-prescribed    2. RTC when symptoms worsen or feels need to be seen

## 2018-02-26 ENCOUNTER — PATIENT OUTREACH (OUTPATIENT)
Dept: ADMINISTRATIVE | Facility: HOSPITAL | Age: 83
End: 2018-02-26

## 2018-03-12 ENCOUNTER — LAB VISIT (OUTPATIENT)
Dept: LAB | Facility: HOSPITAL | Age: 83
End: 2018-03-12
Attending: FAMILY MEDICINE
Payer: MEDICARE

## 2018-03-12 ENCOUNTER — OFFICE VISIT (OUTPATIENT)
Dept: INTERNAL MEDICINE | Facility: CLINIC | Age: 83
End: 2018-03-12
Payer: MEDICARE

## 2018-03-12 VITALS
SYSTOLIC BLOOD PRESSURE: 132 MMHG | TEMPERATURE: 98 F | WEIGHT: 160.5 LBS | HEART RATE: 69 BPM | DIASTOLIC BLOOD PRESSURE: 70 MMHG | HEIGHT: 66 IN | OXYGEN SATURATION: 96 % | RESPIRATION RATE: 18 BRPM | BODY MASS INDEX: 25.79 KG/M2

## 2018-03-12 DIAGNOSIS — K21.9 GASTROESOPHAGEAL REFLUX DISEASE WITHOUT ESOPHAGITIS: ICD-10-CM

## 2018-03-12 DIAGNOSIS — I70.0 ATHEROSCLEROSIS OF AORTA: Primary | ICD-10-CM

## 2018-03-12 DIAGNOSIS — E78.2 MIXED HYPERLIPIDEMIA: ICD-10-CM

## 2018-03-12 DIAGNOSIS — Z65.8 PSYCHOSOCIAL STRESSORS: ICD-10-CM

## 2018-03-12 DIAGNOSIS — C43.9 MALIGNANT MELANOMA, UNSPECIFIED SITE: ICD-10-CM

## 2018-03-12 LAB
ALBUMIN SERPL BCP-MCNC: 4.5 G/DL
ALP SERPL-CCNC: 46 U/L
ALT SERPL W/O P-5'-P-CCNC: 14 U/L
ANION GAP SERPL CALC-SCNC: 8 MMOL/L
AST SERPL-CCNC: 18 U/L
BILIRUB SERPL-MCNC: 0.4 MG/DL
BUN SERPL-MCNC: 23 MG/DL
CALCIUM SERPL-MCNC: 10.1 MG/DL
CHLORIDE SERPL-SCNC: 103 MMOL/L
CHOLEST SERPL-MCNC: 146 MG/DL
CHOLEST/HDLC SERPL: 2.7 {RATIO}
CO2 SERPL-SCNC: 29 MMOL/L
CREAT SERPL-MCNC: 0.7 MG/DL
EST. GFR  (AFRICAN AMERICAN): >60 ML/MIN/1.73 M^2
EST. GFR  (NON AFRICAN AMERICAN): >60 ML/MIN/1.73 M^2
GLUCOSE SERPL-MCNC: 88 MG/DL
HDLC SERPL-MCNC: 55 MG/DL
HDLC SERPL: 37.7 %
LDLC SERPL CALC-MCNC: 77.2 MG/DL
NONHDLC SERPL-MCNC: 91 MG/DL
POTASSIUM SERPL-SCNC: 4.5 MMOL/L
PROT SERPL-MCNC: 7.6 G/DL
SODIUM SERPL-SCNC: 140 MMOL/L
TRIGL SERPL-MCNC: 69 MG/DL

## 2018-03-12 PROCEDURE — 99214 OFFICE O/P EST MOD 30 MIN: CPT | Mod: S$GLB,,, | Performed by: FAMILY MEDICINE

## 2018-03-12 PROCEDURE — 99999 PR PBB SHADOW E&M-EST. PATIENT-LVL IV: CPT | Mod: PBBFAC,,, | Performed by: FAMILY MEDICINE

## 2018-03-12 PROCEDURE — 80061 LIPID PANEL: CPT

## 2018-03-12 PROCEDURE — 80053 COMPREHEN METABOLIC PANEL: CPT

## 2018-03-12 PROCEDURE — 99499 UNLISTED E&M SERVICE: CPT | Mod: S$GLB,,, | Performed by: FAMILY MEDICINE

## 2018-03-12 PROCEDURE — 36415 COLL VENOUS BLD VENIPUNCTURE: CPT

## 2018-03-16 NOTE — PROGRESS NOTES
Subjective:       Patient ID: Tiffanie Barajas is a 82 y.o. female.    Chief Complaint: Follow-up (6M)    Patient presents to clinic today for followup of chronic conditions. Patient is otherwise without concerns today.        Review of Systems   Constitutional: Negative for chills, fatigue, fever and unexpected weight change.   Eyes: Negative for visual disturbance.   Respiratory: Negative for shortness of breath.    Cardiovascular: Negative for chest pain.   Musculoskeletal: Negative for myalgias.   Neurological: Negative for headaches.       Objective:      Physical Exam   Constitutional: She is oriented to person, place, and time. She appears well-developed and well-nourished. No distress.   HENT:   Head: Normocephalic and atraumatic.   Eyes: Conjunctivae and EOM are normal. Pupils are equal, round, and reactive to light. No scleral icterus.   Pulmonary/Chest: Effort normal.   Neurological: She is alert and oriented to person, place, and time. No cranial nerve deficit. Gait normal.   Psychiatric: She has a normal mood and affect.   Vitals reviewed.      Assessment:       1. Atherosclerosis of aorta    2. Mixed hyperlipidemia    3. Gastroesophageal reflux disease without esophagitis    4. Psychosocial stressors    5. Malignant melanoma, unspecified site        Plan:     Problem List Items Addressed This Visit     GERD (gastroesophageal reflux disease)    Current Assessment & Plan     Stable on protonix         Melanoma    Overview     Followed by outside Dermatology         Atherosclerosis of aorta - Primary    Mixed hyperlipidemia    Current Assessment & Plan     Status pending labs, continue continue fenofibrate         Psychosocial stressors    Current Assessment & Plan     Stable on citalopram

## 2018-09-10 ENCOUNTER — LAB VISIT (OUTPATIENT)
Dept: LAB | Facility: HOSPITAL | Age: 83
End: 2018-09-10
Attending: FAMILY MEDICINE
Payer: MEDICARE

## 2018-09-10 DIAGNOSIS — E78.2 MIXED HYPERLIPIDEMIA: ICD-10-CM

## 2018-09-10 DIAGNOSIS — Z79.899 ENCOUNTER FOR LONG-TERM (CURRENT) USE OF MEDICATIONS: ICD-10-CM

## 2018-09-10 LAB
ALBUMIN SERPL BCP-MCNC: 4.3 G/DL
ALP SERPL-CCNC: 48 U/L
ALT SERPL W/O P-5'-P-CCNC: 12 U/L
ANION GAP SERPL CALC-SCNC: 10 MMOL/L
AST SERPL-CCNC: 16 U/L
BASOPHILS # BLD AUTO: 0.06 K/UL
BASOPHILS NFR BLD: 0.8 %
BILIRUB SERPL-MCNC: 0.5 MG/DL
BUN SERPL-MCNC: 22 MG/DL
CALCIUM SERPL-MCNC: 10.1 MG/DL
CHLORIDE SERPL-SCNC: 104 MMOL/L
CHOLEST SERPL-MCNC: 131 MG/DL
CHOLEST/HDLC SERPL: 3.1 {RATIO}
CO2 SERPL-SCNC: 28 MMOL/L
CREAT SERPL-MCNC: 0.7 MG/DL
DIFFERENTIAL METHOD: ABNORMAL
EOSINOPHIL # BLD AUTO: 0.8 K/UL
EOSINOPHIL NFR BLD: 10.4 %
ERYTHROCYTE [DISTWIDTH] IN BLOOD BY AUTOMATED COUNT: 12.9 %
EST. GFR  (AFRICAN AMERICAN): >60 ML/MIN/1.73 M^2
EST. GFR  (NON AFRICAN AMERICAN): >60 ML/MIN/1.73 M^2
GLUCOSE SERPL-MCNC: 97 MG/DL
HCT VFR BLD AUTO: 39.5 %
HDLC SERPL-MCNC: 42 MG/DL
HDLC SERPL: 32.1 %
HGB BLD-MCNC: 12.6 G/DL
IMM GRANULOCYTES # BLD AUTO: 0.02 K/UL
IMM GRANULOCYTES NFR BLD AUTO: 0.3 %
LDLC SERPL CALC-MCNC: 70 MG/DL
LYMPHOCYTES # BLD AUTO: 2.5 K/UL
LYMPHOCYTES NFR BLD: 32.1 %
MCH RBC QN AUTO: 29 PG
MCHC RBC AUTO-ENTMCNC: 31.9 G/DL
MCV RBC AUTO: 91 FL
MONOCYTES # BLD AUTO: 0.5 K/UL
MONOCYTES NFR BLD: 6.5 %
NEUTROPHILS # BLD AUTO: 3.9 K/UL
NEUTROPHILS NFR BLD: 49.9 %
NONHDLC SERPL-MCNC: 89 MG/DL
NRBC BLD-RTO: 0 /100 WBC
PLATELET # BLD AUTO: 308 K/UL
PMV BLD AUTO: 11 FL
POTASSIUM SERPL-SCNC: 4.3 MMOL/L
PROT SERPL-MCNC: 7.4 G/DL
RBC # BLD AUTO: 4.34 M/UL
SODIUM SERPL-SCNC: 142 MMOL/L
TRIGL SERPL-MCNC: 95 MG/DL
TSH SERPL DL<=0.005 MIU/L-ACNC: 3.94 UIU/ML
WBC # BLD AUTO: 7.7 K/UL

## 2018-09-10 PROCEDURE — 85025 COMPLETE CBC W/AUTO DIFF WBC: CPT

## 2018-09-10 PROCEDURE — 84443 ASSAY THYROID STIM HORMONE: CPT

## 2018-09-10 PROCEDURE — 80053 COMPREHEN METABOLIC PANEL: CPT

## 2018-09-10 PROCEDURE — 80061 LIPID PANEL: CPT

## 2018-09-10 PROCEDURE — 36415 COLL VENOUS BLD VENIPUNCTURE: CPT

## 2018-09-17 ENCOUNTER — HOSPITAL ENCOUNTER (EMERGENCY)
Facility: HOSPITAL | Age: 83
Discharge: HOME | End: 2018-09-17
Attending: EMERGENCY MEDICINE
Payer: MEDICARE

## 2018-09-17 ENCOUNTER — OFFICE VISIT (OUTPATIENT)
Dept: INTERNAL MEDICINE | Facility: CLINIC | Age: 83
End: 2018-09-17
Payer: MEDICARE

## 2018-09-17 VITALS
WEIGHT: 158.94 LBS | OXYGEN SATURATION: 96 % | TEMPERATURE: 99 F | HEART RATE: 71 BPM | BODY MASS INDEX: 25.66 KG/M2 | RESPIRATION RATE: 18 BRPM | SYSTOLIC BLOOD PRESSURE: 156 MMHG | DIASTOLIC BLOOD PRESSURE: 71 MMHG

## 2018-09-17 VITALS
TEMPERATURE: 97 F | SYSTOLIC BLOOD PRESSURE: 164 MMHG | BODY MASS INDEX: 25.69 KG/M2 | WEIGHT: 159.19 LBS | DIASTOLIC BLOOD PRESSURE: 76 MMHG | HEART RATE: 77 BPM | OXYGEN SATURATION: 96 %

## 2018-09-17 DIAGNOSIS — I10 ESSENTIAL HYPERTENSION: ICD-10-CM
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
## 2018-09-17 DIAGNOSIS — S06.0X0A CONCUSSION WITHOUT LOSS OF CONSCIOUSNESS, INITIAL ENCOUNTER: ICD-10-CM

## 2018-09-17 DIAGNOSIS — S00.83XD CONTUSION OF FACE, SUBSEQUENT ENCOUNTER: ICD-10-CM

## 2018-09-17 DIAGNOSIS — T14.8XXA HEMATOMA: Primary | ICD-10-CM

## 2018-09-17 DIAGNOSIS — S00.93XA TRAUMATIC HEMATOMA OF HEAD, INITIAL ENCOUNTER: ICD-10-CM

## 2018-09-17 DIAGNOSIS — S00.83XA FACIAL CONTUSION, INITIAL ENCOUNTER: ICD-10-CM
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
## 2018-09-17 LAB
ALBUMIN SERPL BCP-MCNC: 4.1 G/DL
ALP SERPL-CCNC: 43 U/L
ALT SERPL W/O P-5'-P-CCNC: 10 U/L
ANION GAP SERPL CALC-SCNC: 9 MMOL/L
APTT BLDCRRT: 28.5 SEC
AST SERPL-CCNC: 14 U/L
BASOPHILS # BLD AUTO: 0.03 K/UL
BASOPHILS NFR BLD: 0.4 %
BILIRUB SERPL-MCNC: 0.6 MG/DL
BILIRUB UR QL STRIP: NEGATIVE
BUN SERPL-MCNC: 21 MG/DL
CALCIUM SERPL-MCNC: 9.9 MG/DL
CHLORIDE SERPL-SCNC: 104 MMOL/L
CLARITY UR: CLEAR
CO2 SERPL-SCNC: 27 MMOL/L
COLOR UR: YELLOW
CREAT SERPL-MCNC: 0.6 MG/DL
DIFFERENTIAL METHOD: ABNORMAL
EOSINOPHIL # BLD AUTO: 0.4 K/UL
EOSINOPHIL NFR BLD: 4.7 %
ERYTHROCYTE [DISTWIDTH] IN BLOOD BY AUTOMATED COUNT: 13.2 %
EST. GFR  (AFRICAN AMERICAN): >60 ML/MIN/1.73 M^2
EST. GFR  (NON AFRICAN AMERICAN): >60 ML/MIN/1.73 M^2
GLUCOSE SERPL-MCNC: 101 MG/DL
GLUCOSE UR QL STRIP: NEGATIVE
HCT VFR BLD AUTO: 36.1 %
HGB BLD-MCNC: 12.2 G/DL
HGB UR QL STRIP: ABNORMAL
INR PPP: 1.1
KETONES UR QL STRIP: NEGATIVE
LEUKOCYTE ESTERASE UR QL STRIP: ABNORMAL
LYMPHOCYTES # BLD AUTO: 2.3 K/UL
LYMPHOCYTES NFR BLD: 27.1 %
MCH RBC QN AUTO: 29.5 PG
MCHC RBC AUTO-ENTMCNC: 33.8 G/DL
MCV RBC AUTO: 87 FL
MICROSCOPIC COMMENT: NORMAL
MONOCYTES # BLD AUTO: 0.6 K/UL
MONOCYTES NFR BLD: 7.5 %
NEUTROPHILS # BLD AUTO: 5 K/UL
NEUTROPHILS NFR BLD: 60.3 %
NITRITE UR QL STRIP: NEGATIVE
PH UR STRIP: >8 [PH] (ref 5–8)
PLATELET # BLD AUTO: 295 K/UL
PMV BLD AUTO: 9.9 FL
POTASSIUM SERPL-SCNC: 3.9 MMOL/L
PROT SERPL-MCNC: 7.1 G/DL
PROT UR QL STRIP: NEGATIVE
PROTHROMBIN TIME: 11 SEC
RBC # BLD AUTO: 4.14 M/UL
RBC #/AREA URNS HPF: 1 /HPF (ref 0–4)
SODIUM SERPL-SCNC: 140 MMOL/L
SP GR UR STRIP: 1.01 (ref 1–1.03)
URN SPEC COLLECT METH UR: ABNORMAL
UROBILINOGEN UR STRIP-ACNC: NEGATIVE EU/DL
WBC # BLD AUTO: 8.31 K/UL
WBC #/AREA URNS HPF: 2 /HPF (ref 0–5)

## 2018-09-17 PROCEDURE — 80053 COMPREHEN METABOLIC PANEL: CPT

## 2018-09-17 PROCEDURE — 85610 PROTHROMBIN TIME: CPT

## 2018-09-17 PROCEDURE — 1101F PT FALLS ASSESS-DOCD LE1/YR: CPT | Mod: CPTII,,, | Performed by: FAMILY MEDICINE

## 2018-09-17 PROCEDURE — 85025 COMPLETE CBC W/AUTO DIFF WBC: CPT

## 2018-09-17 PROCEDURE — 81000 URINALYSIS NONAUTO W/SCOPE: CPT

## 2018-09-17 PROCEDURE — 99213 OFFICE O/P EST LOW 20 MIN: CPT | Mod: PBBFAC,25 | Performed by: FAMILY MEDICINE

## 2018-09-17 PROCEDURE — 99999 PR PBB SHADOW E&M-EST. PATIENT-LVL III: CPT | Mod: PBBFAC,,, | Performed by: FAMILY MEDICINE

## 2018-09-17 PROCEDURE — 99214 OFFICE O/P EST MOD 30 MIN: CPT | Mod: S$PBB,,, | Performed by: FAMILY MEDICINE

## 2018-09-17 PROCEDURE — 85730 THROMBOPLASTIN TIME PARTIAL: CPT

## 2018-09-17 PROCEDURE — 99284 EMERGENCY DEPT VISIT MOD MDM: CPT | Mod: 25,27

## 2018-09-17 RX ORDER — CHLORHEXIDINE GLUCONATE ORAL RINSE 1.2 MG/ML
SOLUTION DENTAL
COMMUNITY
Start: 2018-08-15 | End: 2018-09-26

## 2018-09-17 NOTE — PROGRESS NOTES
"Subjective:       Patient ID: Tiffanie Barajas is a 82 y.o. female.    Chief Complaint: Fall    Patient presented to clinic today for annual. It was noted that she had hematoma on left forehead with significant facial bruising on rooming by staff. Patient's  reports she was walking the dog and was pulled abruptly and fell hitting her head on concrete last Wednesday. He reports she got right up and refused to seek medical attention. Patient is unable to relate story of what happened, they report she did not remember what happened. Patient reports feeling "shaky" since fall.  reports she has not remembered some things.       Review of Systems   Constitutional: Negative for chills, fatigue, fever and unexpected weight change.   Eyes: Negative for visual disturbance.   Respiratory: Negative for shortness of breath.    Cardiovascular: Negative for chest pain.   Musculoskeletal: Negative for myalgias.   Skin: Positive for wound.   Neurological: Negative for headaches.   Psychiatric/Behavioral: Positive for confusion (patient doesn't remember events surrounding fall last Wednesday).       Objective:      Physical Exam   Constitutional: She is oriented to person, place, and time. She appears well-developed and well-nourished. No distress.   HENT:   Head: Head is with contusion.       Significant bruising around left eye; bruising noted to left lower face as well   Eyes: Conjunctivae and EOM are normal. Pupils are equal, round, and reactive to light. No scleral icterus.   Pulmonary/Chest: Effort normal.   Neurological: She is alert and oriented to person, place, and time. No cranial nerve deficit. Gait normal.   Psychiatric: Her mood appears anxious.   Vitals reviewed.      Assessment:       1. Fall, initial encounter    2. Traumatic hematoma of head, initial encounter    3. Facial contusion, initial encounter        Plan:     Problem List Items Addressed This Visit     None      Visit Diagnoses     Fall, " initial encounter    -  Primary    Traumatic hematoma of head, initial encounter        Facial contusion, initial encounter            After discussion,  agrees to transport her to ER for further evaluation. Discussed concern for potential intracranial bleed due to age, trauma and being on aspirin. They express understanding and agreement with plan. Spoke with Dr. Villalobos regarding patient.   We will reschedule their physicals next week.

## 2018-09-17 NOTE — ED NOTES
Pt reports that she was walking her dog x 5 days ago and her dog went one way and she went the other landing onto asphalt. Pt went to her PCP Dr. Merrill today and was told to come to ER for CT of her face.

## 2018-09-17 NOTE — ED PROVIDER NOTES
"SCRIBE #1 NOTE: I, Corinne Mack, am scribing for, and in the presence of, Alicja Villalobos DO. I have scribed the entire note.      History      Chief Complaint   Patient presents with    Fall     4 days ago; struck head, denies blood thinners; instructed to come today by Dr. Merrill       Review of patient's allergies indicates:   Allergen Reactions    Diclofenac Nausea And Vomiting and Other (See Comments)    Hydrocodone-acetaminophen Nausea And Vomiting     Other reaction(s): Vomiting        HPI   HPI    9/17/2018, 11:04 AM   History obtained from the patient      History of Present Illness: Tiffanie Barajas is a 82 y.o. female patient with PMHx of anxiety and arthritis who was sent to the Emergency Department by Dr. Merrill ([facility]) for further evaluation of pt head injury secondary to a fall which onset suddenly 4 days ago. Pt was walking her dog, who favors the pt's . Pt's dog saw the pt's  and ran towards the pt's , jerking the pt forward. Pt "slammed face forward into the concrete". Pt was unable to answer questions appropriately for about 2 hours afterwards and states that she "does not remember" the events of her fall and immediately after. Pt's  helped her up and told the pt he was taking her to the hospital, which the pt refused at that time. Pt states she does not recall any of this. Pt has been able to perform her normal activities, outside of housework. Pt is able to ambulate on her own, cook, dress herself, bathe herself, and act independently, but states that she has been feeling a "little shaky". Pt c/o worsening facial bruising. Symptoms are constant and moderate in severity. Palpation worsens the pt's facial pain. No mitigating factors reported. Associated sxs include generalized weakness. Patient denies any diaphoresis, drooling, nosebleeds, CP, SOB, N/V, abd pain, back pain, neck pain, dysuria, hematuria, flank pain, HA, dizziness, numbness, speech " difficulty, facial asymmetry, seizures, and all other sxs at this time. No prior Tx reported. No further complaints or concerns at this time. Pt is UTD on her Tetanus vaccination (10/11/2017).        Arrival mode: Personal vehicle     PCP: Lizeth Merrill MD       Past Medical History:  Past Medical History:   Diagnosis Date    Anxiety     Arthritis     Cancer     Degenerative disc disease     Depression     GERD (gastroesophageal reflux disease)     Hyperlipidemia     Low back derangement syndrome 12/11/2017    Melanoma     Pneumonia due to other staphylococcus        Past Surgical History:  Past Surgical History:   Procedure Laterality Date    ADENOIDECTOMY      CHOLECYSTECTOMY  1973    SPINE SURGERY  2/6/13    L4-L5  cyst removed andfor sciatica    TONSILLECTOMY  1945         Family History:  Family History   Problem Relation Age of Onset    Arthritis Mother     Hypertension Mother     Stroke Mother     Glaucoma Mother     Heart disease Father 56        fatal MI    Diabetes Brother     Cancer Son 56        melanoma with mets to lung and bone    Arthritis Sister     Hyperlipidemia Neg Hx        Social History:  Social History     Tobacco Use    Smoking status: Never Smoker    Smokeless tobacco: Never Used   Substance and Sexual Activity    Alcohol use: No     Alcohol/week: 0.0 oz    Drug use: No    Sexual activity: Yes     Partners: Male       ROS   Review of Systems   Constitutional: Negative for chills, diaphoresis, fatigue and fever.   HENT: Negative for drooling, facial swelling and nosebleeds.         (+) head injury   Respiratory: Negative for cough and shortness of breath.    Cardiovascular: Negative for chest pain and leg swelling.   Gastrointestinal: Negative for abdominal pain, diarrhea, nausea and vomiting.   Genitourinary: Negative for dysuria, flank pain, frequency and hematuria.   Musculoskeletal: Negative for arthralgias, back pain, neck pain and neck stiffness.         (+) fall   Skin: Negative for rash and wound.        (+) multiple bruises to face   Neurological: Positive for weakness (generalized). Negative for dizziness, tremors, seizures, facial asymmetry, speech difficulty, light-headedness, numbness and headaches.   All other systems reviewed and are negative.    Physical Exam      Initial Vitals [09/17/18 1035]   BP Pulse Resp Temp SpO2   (!) 167/75 74 16 98.5 °F (36.9 °C) 95 %      MAP       --          Physical Exam  Nursing Notes and Vital Signs Reviewed.  Constitutional: Patient is in no acute distress. Awake and alert. Appropriate for age.   Head: Bruising in various stages to the L side of the face. Hematoma noted to the L forehead. No facial swelling. See photo below for further details. No facial instability or step-offs. No bony tenderness around the orbit.  Eyes: PERRL. EOM normal. Conjunctivae normal.   HENT: Moist mucous membranes. No epistaxis. Patent airway. No hemotympanum. No rhinorrhea. No septal hematoma.  No otorrhea.  No rhinorrhea.  Neck: No midline bony tenderness, deformities, or step-offs   Cardiovascular: Regular rate and rhythm. Heart sounds are normal. Intact distal pulses   Pulmonary/Chest: No respiratory distress. Breath sounds are normal. No decreased breath sounds. Chest wall is stable.   Abdominal: Soft and non-distended. Non-tender.   Back: No abrasions or ecchymosis. No midline bony tenderness to the T-spine or L-spine. No deformities or step-offs.   Musculoskeletal: Full range of motion in bilateral extremities. No obvious deformities.   Skin: No cyanosis. No lacerations. No abrasions there is a hematoma located to the left frontal region.  Neurological: Awake and alert. Appropriate for age. GCS 15. Normal speech. Motor strength is normal at 5/5 bilaterally. Pt is able to lift her arms above her head with no difficulty. Non-focal neurological examination.          ED Course    Procedures  ED Vital Signs:  Vitals:    09/17/18 1035 09/17/18  1252   BP: (!) 167/75 (!) 156/71   Pulse: 74 71   Resp: 16 18   Temp: 98.5 °F (36.9 °C) 98.6 °F (37 °C)   TempSrc: Oral Oral   SpO2: 95% 96%   Weight: 72.1 kg (158 lb 15.2 oz)        Abnormal Lab Results:  Labs Reviewed   COMPREHENSIVE METABOLIC PANEL - Abnormal; Notable for the following components:       Result Value    Alkaline Phosphatase 43 (*)     All other components within normal limits   CBC W/ AUTO DIFFERENTIAL - Abnormal; Notable for the following components:    Hematocrit 36.1 (*)     All other components within normal limits   URINALYSIS - Abnormal; Notable for the following components:    pH, UA >8.0 (*)     Occult Blood UA Trace (*)     Leukocytes, UA 3+ (*)     All other components within normal limits   PROTIME-INR   APTT   URINALYSIS MICROSCOPIC        All Lab Results:  Results for orders placed or performed during the hospital encounter of 09/17/18   Comprehensive metabolic panel   Result Value Ref Range    Sodium 140 136 - 145 mmol/L    Potassium 3.9 3.5 - 5.1 mmol/L    Chloride 104 95 - 110 mmol/L    CO2 27 23 - 29 mmol/L    Glucose 101 70 - 110 mg/dL    BUN, Bld 21 8 - 23 mg/dL    Creatinine 0.6 0.5 - 1.4 mg/dL    Calcium 9.9 8.7 - 10.5 mg/dL    Total Protein 7.1 6.0 - 8.4 g/dL    Albumin 4.1 3.5 - 5.2 g/dL    Total Bilirubin 0.6 0.1 - 1.0 mg/dL    Alkaline Phosphatase 43 (L) 55 - 135 U/L    AST 14 10 - 40 U/L    ALT 10 10 - 44 U/L    Anion Gap 9 8 - 16 mmol/L    eGFR if African American >60 >60 mL/min/1.73 m^2    eGFR if non African American >60 >60 mL/min/1.73 m^2   Protime-INR   Result Value Ref Range    Prothrombin Time 11.0 9.0 - 12.5 sec    INR 1.1 0.8 - 1.2   APTT   Result Value Ref Range    aPTT 28.5 21.0 - 32.0 sec   CBC auto differential   Result Value Ref Range    WBC 8.31 3.90 - 12.70 K/uL    RBC 4.14 4.00 - 5.40 M/uL    Hemoglobin 12.2 12.0 - 16.0 g/dL    Hematocrit 36.1 (L) 37.0 - 48.5 %    MCV 87 82 - 98 fL    MCH 29.5 27.0 - 31.0 pg    MCHC 33.8 32.0 - 36.0 g/dL    RDW 13.2 11.5  - 14.5 %    Platelets 295 150 - 350 K/uL    MPV 9.9 9.2 - 12.9 fL    Gran # (ANC) 5.0 1.8 - 7.7 K/uL    Lymph # 2.3 1.0 - 4.8 K/uL    Mono # 0.6 0.3 - 1.0 K/uL    Eos # 0.4 0.0 - 0.5 K/uL    Baso # 0.03 0.00 - 0.20 K/uL    Gran% 60.3 38.0 - 73.0 %    Lymph% 27.1 18.0 - 48.0 %    Mono% 7.5 4.0 - 15.0 %    Eosinophil% 4.7 0.0 - 8.0 %    Basophil% 0.4 0.0 - 1.9 %    Differential Method Automated    Urinalysis   Result Value Ref Range    Specimen UA Urine, Clean Catch     Color, UA Yellow Yellow, Straw, Christine    Appearance, UA Clear Clear    pH, UA >8.0 (A) 5.0 - 8.0    Specific Gravity, UA 1.015 1.005 - 1.030    Protein, UA Negative Negative    Glucose, UA Negative Negative    Ketones, UA Negative Negative    Bilirubin (UA) Negative Negative    Occult Blood UA Trace (A) Negative    Nitrite, UA Negative Negative    Urobilinogen, UA Negative <2.0 EU/dL    Leukocytes, UA 3+ (A) Negative   Urinalysis Microscopic   Result Value Ref Range    RBC, UA 1 0 - 4 /hpf    WBC, UA 2 0 - 5 /hpf    Microscopic Comment SEE COMMENT        Imaging Results:  Imaging Results          CT Head Without Contrast (Final result)  Result time 09/17/18 12:37:01    Final result by BONITA Agee Sr., MD (09/17/18 12:37:01)                 Impression:      1. There is a small hematoma overlying the anterior aspect of the left side of the skull. There is no calvarial fracture or intracranial hemorrhage.  2. There are mild bilateral chronic appearing periventricular ischemic white matter changes. There is no evidence of an acute ischemic event.  All CT scans at this facility use dose modulation, iterative reconstruction, and/or weight base dosing when appropriate to reduce radiation dose when appropriate to reduce radiation dose to as low as reasonably achievable.      Electronically signed by: Ranjit Agee MD  Date:    09/17/2018  Time:    12:37             Narrative:    EXAMINATION:  CT HEAD WITHOUT CONTRAST    CLINICAL HISTORY:  Unspecified  fall, initial encounterfall;    TECHNIQUE:  Standard brain CT protocol without IV contrast was performed.    COMPARISON:  An MRI examination of the brain performed on 10/24/2016.    FINDINGS:  There is a small hematoma overlying the anterior aspect of the left side of the skull.  There is no calvarial fracture or intracranial hemorrhage.  There are mild bilateral chronic appearing periventricular ischemic white matter changes.  There is no evidence of an acute ischemic event.  The visualized portion of the paranasal sinuses is clear.                               8:58 AM September 18, 2018 UA reviewed.  Results for MORGAN BRADEN (MRN [id number]) as of 9/18/2018 08:57   Ref. Range 9/17/2018 12:51   Specimen UA Unknown Urine, Clean Catch   Color, UA Latest Ref Range: Yellow, Straw, Christine  Yellow   pH, UA Latest Ref Range: 5.0 - 8.0  >8.0 (A)   Specific Counce, UA Latest Ref Range: 1.005 - 1.030  1.015   Appearance, UA Latest Ref Range: Clear  Clear   Protein, UA Latest Ref Range: Negative  Negative   Glucose, UA Latest Ref Range: Negative  Negative   Ketones, UA Latest Ref Range: Negative  Negative   Occult Blood UA Latest Ref Range: Negative  Trace (A)   Nitrite, UA Latest Ref Range: Negative  Negative   Urobilinogen, UA Latest Ref Range: <2.0 EU/dL Negative   Bilirubin (UA) Latest Ref Range: Negative  Negative   Leukocytes, UA Latest Ref Range: Negative  3+ (A)   RBC, UA Latest Ref Range: 0 - 4 /hpf 1   WBC, UA Latest Ref Range: 0 - 5 /hpf 2   Microscopic Comment Unknown SEE COMMENT            The Emergency Provider reviewed the vital signs and test results, which are outlined above.    ED Discussion     12:46 PM: Reassessed pt at this time. Pt is awake, alert, and in no distress. Discussed with pt all pertinent ED information and results. Discussed pt dx and plan of tx. Gave pt all f/u and return to the ED instructions. All questions and concerns were addressed at this time. Pt expresses understanding of  information and instructions, and is comfortable with plan to discharge. Pt is stable for discharge.    I discussed with patient and/or family/caretaker that evaluation in the ED does not suggest any emergent or life threatening medical conditions requiring immediate intervention beyond what was provided in the ED, and I believe patient is safe for discharge.  Regardless, an unremarkable evaluation in the ED does not preclude the development or presence of a serious of life threatening condition. As such, patient was instructed to return immediately for any worsening or change in current symptoms.    Trauma precautions were discussed with patient and/or family/caretaker; I do not specifically detect any abdominal, thoracic, CNS, orthopedic, or other emergent or life threatening condition and that patient is safe to be discharged.  It was also discussed that despite an unrevealing examination and negative radiographic examination for serious or life threatening injury, these conditions may still exist.  As such, patient should return to ED immediately should they experience, severe or worsening pain, shortness of breath, abdominal pain, headache, vomiting, or any other concern.  It was also discussed that not infrequently, injuries may not be diagnosed during the initial ED visit (such as fractures) and that if the patient discovers a new area of concern, a new area of injury that was not evaluated in the ED, they should return for evaluation as they may have an injury that requires treatment.      ED Medication(s):  Medications - No data to display       Medication List      ASK your doctor about these medications    acetaminophen 650 MG Tbsr  Commonly known as:  TYLENOL  Take 1 tablet (650 mg total) by mouth every 8 (eight) hours.     aspirin 81 MG EC tablet  Commonly known as:  ECOTRIN     azelastine 137 mcg (0.1 %) nasal spray  Commonly known as:  ASTELIN  2 sprays (274 mcg total) by Nasal route 2 (two) times  daily.     BOOSTRIX TDAP 2.5-8-5 Lf-mcg-Lf/0.5mL Syrg injection  Generic drug:  diphth,pertus(acell),tetanus     chlorhexidine 0.12 % solution  Commonly known as:  PERIDEX     citalopram 20 MG tablet  Commonly known as:  CELEXA  Take 1 tablet (20 mg total) by mouth once daily.     fenofibrate micronized 134 MG Cap  Commonly known as:  LOFIBRA  TAKE ONE CAPSULE BY MOUTH EVERY MORNING WITH BREAFKAST.     FLUZONE HIGH-DOSE 2018-19 (PF) 180 mcg/0.5 mL vaccine  Generic drug:  influenza  ADMINISTERED BY [facility]     levocetirizine 5 MG tablet  Commonly known as:  XYZAL  Take 1 tablet (5 mg total) by mouth every evening.     meloxicam 7.5 MG tablet  Commonly known as:  MOBIC  Take 1 tablet (7.5 mg total) by mouth once daily. With food     * multivitamin with minerals tablet     * multivitamin with minerals tablet     NIACIN FLUSH FREE ORAL     OCUVITE EYE HEALTH ORAL     ONE DAILY MULTIVITAMIN per tablet  Generic drug:  multivitamin     pantoprazole 40 MG tablet  Commonly known as:  PROTONIX  Take 1 tablet (40 mg total) by mouth once daily.         * This list has 2 medication(s) that are the same as other medications prescribed for you. Read the directions carefully, and ask your doctor or other care provider to review them with you.                Follow-up Information     Lizeth Merrill MD.    Specialty:  Family Medicine  Why:  Return to emergency department for:  Confusion, nausea, vomiting, severe headache, or other concerns.  Contact information:  [address] [place] [address] [address] [place] [id number] [phone]                     Medical Decision Making    Medical Decision Making:   Initial Assessment:   Patient is a 82-year-old female who had a fall 5 days prior.  Patient appears to have had post incident amnesia.  Patient since the fall has been feeling shaky.  She reports feeling very timid and afraid of falling again.  Differential Diagnosis:   Subdural hematoma, concussion, scalp hematoma, skull scalp fracture  electrolyte abnormality, anemia, UTI  Clinical Tests:   Lab Tests: Ordered and Reviewed  Radiological Study: Ordered and Reviewed  ED Management:  Patient underwent history and physical examination. Patient because of her age and post fall amnesia, patient underwent head CT.  Head CT was negative for subdural hematoma, epidural hematoma, subarachnoid hemorrhage. Laboratory findings reassuring.  Discussed with patient the importance of returning to the emergency room should she fall in the future.  All questions answered.  Other:   I discussed test(s) with the performing physician.       <> Summary of the Findings: I spoke on the phone with her treating physician who referred her to the emergency room for head CT.  History of Hypertension: The patient has elevated blood pressure (higher than 120/80) while being treated in the ED but has a history of hypertension.    MIPS Measure #415:  Emergency Department Utilization of CT for Minor Blunt Head Trauma for Patients age 18 Years  and  Older.    Measure exclusions:  The patient has a ventricular shunt?   No  The patient has a brain tumor? No  The patient has multi-system trauma?  No      History of Hypertension: The patient has elevated blood pressure (higher than 120/80) while being treated in the ED but has a history of hypertension.    The patient is pregnant? N/A  The patient is taking anti platelet medication excluding aspirin?  No  Age 65 years and older?  Yes  Patient is 82 y.o.    A head CT was ordered? Yes:   The CT was ordered by the emergency provider?  Yes    A head CT was ordered by emergency care provider, and some the indications for ordering the head CT included:    Signs and symptoms   Patient's Kena coma score was less than 15?  No   Focal neurologic deficit?  No   Severe headache?  No   Vomiting? No   Physical signs of basilar skull fracture? No   Coagulopathy? No    Thrombocytopenia? No  Patient is suspected of taking in anticoagulant medication?  No   Dangerous mechanism of injury?  No    Patient had Loss of Consciousness:  No OR Posttraumatic Amnesia? Yes  and:   Headache?  Yes   Is 60 years of age or older?   Yes : Patient is 82 y.o.   Drug or alcohol intoxication?  No   Short term memory deficits? Yes  Evidence of trauma above the clavicles, any visible or detected trauma to the head or neck including lacerations, abrasions, bruising, swelling, or fracture?  No    Posttraumatic seizure? No    Other indications:  Head CT was ordered as patient has memory problems after the fall, also has age greater than 65 years old.                 Scribe Attestation:   Scribe #1: I performed the above scribed service and the documentation accurately describes the services I performed. I attest to the accuracy of the note.    Attending:   Physician Attestation Statement for Scribe #1: I, Alicja Villalobos DO, personally performed the services described in this documentation, as scribed by Corinne Mack, in my presence, and it is both accurate and complete.          Clinical Impression       ICD-10-CM ICD-9-CM   1. Hematoma T14.8XXA 924.9   2. Fall W19.XXXA E888.9   3. Essential hypertension I10 401.9   4. Contusion of face, subsequent encounter S00.83XD V58.89   5. Concussion without loss of consciousness, initial encounter S06.0X0A 850.0       Disposition:   Disposition: Discharged  Condition: Stable           Alicja Villalobos DO  09/18/18 0904

## 2018-09-18 ENCOUNTER — PATIENT OUTREACH (OUTPATIENT)
Dept: ADMINISTRATIVE | Facility: HOSPITAL | Age: 83
End: 2018-09-18

## 2018-09-26 ENCOUNTER — OFFICE VISIT (OUTPATIENT)
Dept: INTERNAL MEDICINE | Facility: CLINIC | Age: 83
End: 2018-09-26
Payer: MEDICARE

## 2018-09-26 VITALS
HEIGHT: 66 IN | WEIGHT: 157.44 LBS | HEART RATE: 77 BPM | OXYGEN SATURATION: 98 % | BODY MASS INDEX: 25.3 KG/M2 | TEMPERATURE: 98 F | DIASTOLIC BLOOD PRESSURE: 66 MMHG | SYSTOLIC BLOOD PRESSURE: 118 MMHG

## 2018-09-26 DIAGNOSIS — F32.A DEPRESSION, UNSPECIFIED DEPRESSION TYPE: ICD-10-CM

## 2018-09-26 DIAGNOSIS — M85.88 OSTEOPENIA OF SPINE: ICD-10-CM

## 2018-09-26 DIAGNOSIS — Z00.00 ROUTINE GENERAL MEDICAL EXAMINATION AT A HEALTH CARE FACILITY: Primary | ICD-10-CM

## 2018-09-26 DIAGNOSIS — R31.9 HEMATURIA, UNSPECIFIED TYPE: ICD-10-CM

## 2018-09-26 DIAGNOSIS — K21.9 GASTROESOPHAGEAL REFLUX DISEASE WITHOUT ESOPHAGITIS: ICD-10-CM

## 2018-09-26 DIAGNOSIS — J30.89 CHRONIC NON-SEASONAL ALLERGIC RHINITIS: ICD-10-CM

## 2018-09-26 DIAGNOSIS — E78.2 MIXED HYPERLIPIDEMIA: ICD-10-CM

## 2018-09-26 PROCEDURE — 99397 PER PM REEVAL EST PAT 65+ YR: CPT | Mod: S$PBB,,, | Performed by: FAMILY MEDICINE

## 2018-09-26 PROCEDURE — 99213 OFFICE O/P EST LOW 20 MIN: CPT | Mod: PBBFAC | Performed by: FAMILY MEDICINE

## 2018-09-26 PROCEDURE — 99999 PR PBB SHADOW E&M-EST. PATIENT-LVL III: CPT | Mod: PBBFAC,,, | Performed by: FAMILY MEDICINE

## 2018-09-26 RX ORDER — PANTOPRAZOLE SODIUM 40 MG/1
40 TABLET, DELAYED RELEASE ORAL DAILY
Qty: 30 TABLET | Refills: 11 | Status: SHIPPED | OUTPATIENT
Start: 2018-09-26 | End: 2019-10-09 | Stop reason: SDUPTHER

## 2018-09-26 RX ORDER — AZELASTINE 1 MG/ML
2 SPRAY, METERED NASAL 2 TIMES DAILY
Qty: 30 ML | Refills: 11 | Status: SHIPPED | OUTPATIENT
Start: 2018-09-26 | End: 2021-04-06

## 2018-09-26 RX ORDER — CITALOPRAM 20 MG/1
20 TABLET, FILM COATED ORAL DAILY
Qty: 30 TABLET | Refills: 11 | Status: SHIPPED | OUTPATIENT
Start: 2018-09-26 | End: 2019-10-09 | Stop reason: SDUPTHER

## 2018-09-26 RX ORDER — FENOFIBRATE 134 MG/1
CAPSULE ORAL
Qty: 30 CAPSULE | Refills: 11 | Status: SHIPPED | OUTPATIENT
Start: 2018-09-26 | End: 2019-09-23 | Stop reason: SDUPTHER

## 2018-09-26 RX ORDER — LEVOCETIRIZINE DIHYDROCHLORIDE 5 MG/1
5 TABLET, FILM COATED ORAL NIGHTLY
Qty: 30 TABLET | Refills: 11 | Status: SHIPPED | OUTPATIENT
Start: 2018-09-26 | End: 2019-08-13

## 2018-09-26 NOTE — PROGRESS NOTES
Subjective:       Patient ID: Tiffanie Barajas is a 83 y.o. female.    Chief Complaint: Annual Exam    Patient presents to clinic today for annual physical exam. Patient reports pain across her back after eating. She states zantac has helped.      Review of Systems   Constitutional: Negative for chills, fatigue, fever and unexpected weight change.   HENT: Negative for congestion, dental problem, ear pain, hearing loss, rhinorrhea and trouble swallowing.    Eyes: Negative for pain and visual disturbance.   Respiratory: Positive for cough. Negative for shortness of breath.    Cardiovascular: Negative for chest pain, palpitations and leg swelling.   Gastrointestinal: Negative for abdominal distention, abdominal pain, blood in stool, constipation, diarrhea, nausea and vomiting.   Genitourinary: Negative for difficulty urinating and vaginal discharge.   Musculoskeletal: Positive for back pain. Negative for arthralgias and myalgias.   Skin: Negative for rash.   Neurological: Negative for dizziness, weakness, numbness and headaches.   Hematological: Negative for adenopathy. Does not bruise/bleed easily.   Psychiatric/Behavioral: Negative for dysphoric mood and sleep disturbance. The patient is not nervous/anxious.        Objective:      Physical Exam   Constitutional: She is oriented to person, place, and time. Vital signs are normal. She appears well-developed and well-nourished. No distress.   HENT:   Head: Normocephalic and atraumatic.   Right Ear: Tympanic membrane, external ear and ear canal normal.   Left Ear: Tympanic membrane, external ear and ear canal normal.   Nose: Nose normal. No mucosal edema or rhinorrhea.   Mouth/Throat: Uvula is midline, oropharynx is clear and moist and mucous membranes are normal.   Eyes: Conjunctivae, EOM and lids are normal. Pupils are equal, round, and reactive to light.   Neck: Normal range of motion. Neck supple. No thyromegaly present.   Cardiovascular: Normal rate and regular  rhythm. Exam reveals no gallop and no friction rub.   No murmur heard.  Pulmonary/Chest: Effort normal and breath sounds normal. She has no wheezes. She has no rhonchi. She has no rales.   Abdominal: Soft. Normal appearance and bowel sounds are normal. She exhibits no distension and no mass. There is no hepatosplenomegaly. There is no tenderness.   Musculoskeletal: Normal range of motion.   Lymphadenopathy:     She has no cervical adenopathy.   Neurological: She is alert and oriented to person, place, and time. She has normal strength. No cranial nerve deficit or sensory deficit. Gait normal.   Reflex Scores:       Patellar reflexes are 2+ on the right side and 2+ on the left side.  Skin: Skin is warm and dry. No lesion and no rash noted. No cyanosis. Nails show no clubbing.   Psychiatric: She has a normal mood and affect.   Vitals reviewed.      Assessment:       1. Routine general medical examination at a health care facility    2. Mixed hyperlipidemia    3. Chronic non-seasonal allergic rhinitis    4. Gastroesophageal reflux disease without esophagitis    5. Depression, unspecified depression type    6. Osteopenia of spine    7. Hematuria, unspecified type        Plan:     Problem List Items Addressed This Visit     GERD (gastroesophageal reflux disease)    Current Assessment & Plan     Advised protonix daily; avoid acidic foods; may continue zantac prn; follow up if worse/persistent         Relevant Medications    pantoprazole (PROTONIX) 40 MG tablet    Depression    Current Assessment & Plan     Stable on celexa         Relevant Medications    citalopram (CELEXA) 20 MG tablet    Osteopenia of spine    Current Assessment & Plan     DEXA up to date         Mixed hyperlipidemia    Current Assessment & Plan     Controlled, continue fenofibrate         Relevant Medications    fenofibrate micronized (LOFIBRA) 134 MG Cap    Chronic non-seasonal allergic rhinitis    Relevant Medications    levocetirizine (XYZAL) 5 MG  tablet    azelastine (ASTELIN) 137 mcg (0.1 %) nasal spray      Other Visit Diagnoses     Routine general medical examination at a health care facility    -  Primary    Hematuria, unspecified type        Relevant Orders    Urinalysis          Health Maintenance reviewed and is up to date.

## 2018-09-30 NOTE — ASSESSMENT & PLAN NOTE
Advised protonix daily; avoid acidic foods; may continue zantac prn; follow up if worse/persistent

## 2018-10-01 ENCOUNTER — TELEPHONE (OUTPATIENT)
Dept: INTERNAL MEDICINE | Facility: CLINIC | Age: 83
End: 2018-10-01

## 2018-10-01 NOTE — TELEPHONE ENCOUNTER
----- Message from Lizeth Merrill MD sent at 9/30/2018  4:52 PM CDT -----  Please contact patient to schedule follow up [facility].

## 2018-10-02 ENCOUNTER — LAB VISIT (OUTPATIENT)
Dept: LAB | Facility: HOSPITAL | Age: 83
End: 2018-10-02
Attending: FAMILY MEDICINE
Payer: MEDICARE

## 2018-10-02 DIAGNOSIS — R31.9 HEMATURIA, UNSPECIFIED TYPE: ICD-10-CM

## 2018-10-02 LAB
BACTERIA #/AREA URNS AUTO: ABNORMAL /HPF
BILIRUB UR QL STRIP: NEGATIVE
CLARITY UR REFRACT.AUTO: CLEAR
COLOR UR AUTO: YELLOW
GLUCOSE UR QL STRIP: NEGATIVE
HGB UR QL STRIP: NEGATIVE
KETONES UR QL STRIP: NEGATIVE
LEUKOCYTE ESTERASE UR QL STRIP: ABNORMAL
MICROSCOPIC COMMENT: ABNORMAL
NITRITE UR QL STRIP: NEGATIVE
PH UR STRIP: 5 [PH] (ref 5–8)
PROT UR QL STRIP: NEGATIVE
RBC #/AREA URNS AUTO: 1 /HPF (ref 0–4)
SP GR UR STRIP: 1.01 (ref 1–1.03)
SQUAMOUS #/AREA URNS AUTO: 3 /HPF
URN SPEC COLLECT METH UR: ABNORMAL
UROBILINOGEN UR STRIP-ACNC: NEGATIVE EU/DL
WBC #/AREA URNS AUTO: 11 /HPF (ref 0–5)

## 2018-10-02 PROCEDURE — 81001 URINALYSIS AUTO W/SCOPE: CPT

## 2019-06-04 ENCOUNTER — PES CALL (OUTPATIENT)
Dept: ADMINISTRATIVE | Facility: CLINIC | Age: 84
End: 2019-06-04

## 2019-08-05 ENCOUNTER — TELEPHONE (OUTPATIENT)
Dept: INTERNAL MEDICINE | Facility: CLINIC | Age: 84
End: 2019-08-05

## 2019-08-05 NOTE — TELEPHONE ENCOUNTER
Pt called today stating she is having chest pains that goes down to her arms, that started last night.   Pt notified she needs to go to ER for evaluation.   States she will have someone bring her to [facility].

## 2019-08-13 ENCOUNTER — OFFICE VISIT (OUTPATIENT)
Dept: INTERNAL MEDICINE | Facility: CLINIC | Age: 84
End: 2019-08-13
Payer: MEDICARE

## 2019-08-13 VITALS
BODY MASS INDEX: 25.9 KG/M2 | OXYGEN SATURATION: 95 % | HEART RATE: 72 BPM | HEIGHT: 66 IN | SYSTOLIC BLOOD PRESSURE: 134 MMHG | WEIGHT: 161.19 LBS | DIASTOLIC BLOOD PRESSURE: 72 MMHG | TEMPERATURE: 98 F

## 2019-08-13 DIAGNOSIS — I25.119 ATHEROSCLEROSIS OF NATIVE CORONARY ARTERY OF NATIVE HEART WITH ANGINA PECTORIS: Primary | ICD-10-CM

## 2019-08-13 PROCEDURE — 99999 PR PBB SHADOW E&M-EST. PATIENT-LVL III: CPT | Mod: PBBFAC,HCNC,, | Performed by: FAMILY MEDICINE

## 2019-08-13 PROCEDURE — 1100F PR PT FALLS ASSESS DOC 2+ FALLS/FALL W/INJURY/YR: ICD-10-PCS | Mod: HCNC,CPTII,S$GLB, | Performed by: FAMILY MEDICINE

## 2019-08-13 PROCEDURE — 3288F FALL RISK ASSESSMENT DOCD: CPT | Mod: HCNC,CPTII,S$GLB, | Performed by: FAMILY MEDICINE

## 2019-08-13 PROCEDURE — 99214 PR OFFICE/OUTPT VISIT, EST, LEVL IV, 30-39 MIN: ICD-10-PCS | Mod: HCNC,S$GLB,, | Performed by: FAMILY MEDICINE

## 2019-08-13 PROCEDURE — 3078F DIAST BP <80 MM HG: CPT | Mod: HCNC,CPTII,S$GLB, | Performed by: FAMILY MEDICINE

## 2019-08-13 PROCEDURE — 99999 PR PBB SHADOW E&M-EST. PATIENT-LVL III: ICD-10-PCS | Mod: PBBFAC,HCNC,, | Performed by: FAMILY MEDICINE

## 2019-08-13 PROCEDURE — 99214 OFFICE O/P EST MOD 30 MIN: CPT | Mod: HCNC,S$GLB,, | Performed by: FAMILY MEDICINE

## 2019-08-13 PROCEDURE — 3075F PR MOST RECENT SYSTOLIC BLOOD PRESS GE 130-139MM HG: ICD-10-PCS | Mod: HCNC,CPTII,S$GLB, | Performed by: FAMILY MEDICINE

## 2019-08-13 PROCEDURE — 3075F SYST BP GE 130 - 139MM HG: CPT | Mod: HCNC,CPTII,S$GLB, | Performed by: FAMILY MEDICINE

## 2019-08-13 PROCEDURE — 3288F PR FALLS RISK ASSESSMENT DOCUMENTED: ICD-10-PCS | Mod: HCNC,CPTII,S$GLB, | Performed by: FAMILY MEDICINE

## 2019-08-13 PROCEDURE — 1100F PTFALLS ASSESS-DOCD GE2>/YR: CPT | Mod: HCNC,CPTII,S$GLB, | Performed by: FAMILY MEDICINE

## 2019-08-13 PROCEDURE — 99499 UNLISTED E&M SERVICE: CPT | Mod: HCNC,S$GLB,, | Performed by: FAMILY MEDICINE

## 2019-08-13 PROCEDURE — 3078F PR MOST RECENT DIASTOLIC BLOOD PRESSURE < 80 MM HG: ICD-10-PCS | Mod: HCNC,CPTII,S$GLB, | Performed by: FAMILY MEDICINE

## 2019-08-13 PROCEDURE — 99499 RISK ADDL DX/OHS AUDIT: ICD-10-PCS | Mod: HCNC,S$GLB,, | Performed by: FAMILY MEDICINE

## 2019-08-13 RX ORDER — GLUCOSAMINE/CHONDR SU A SOD 167-133 MG
500 CAPSULE ORAL DAILY
COMMUNITY
End: 2019-10-09 | Stop reason: ALTCHOICE

## 2019-08-13 RX ORDER — ATORVASTATIN CALCIUM 40 MG/1
40 TABLET, FILM COATED ORAL NIGHTLY
Refills: 11 | COMMUNITY
Start: 2019-08-08 | End: 2021-07-07 | Stop reason: SDUPTHER

## 2019-08-13 RX ORDER — CLOPIDOGREL BISULFATE 75 MG/1
75 TABLET ORAL DAILY
Refills: 11 | COMMUNITY
Start: 2019-08-08

## 2019-08-13 RX ORDER — NITROGLYCERIN 0.4 MG/1
TABLET SUBLINGUAL
Refills: 12 | COMMUNITY
Start: 2019-08-08

## 2019-08-13 NOTE — PROGRESS NOTES
Subjective:       Patient ID: Tiffanie Barajas is a 83 y.o. female.    Chief Complaint: Hospital Follow Up    Patient presents to clinic today for hospital follow-up.  Her  is present with her.  Patient was recently admitted to [facility] on August 5th for chest pain. She had left heart catheterization by Dr. Turner.  Lipitor 40 mg and Plavix 75 mg daily were added to her regimen.  She was continued on 81 mg aspirin as well.  She was advised to stop meloxicam, however patient reports she does not believe she has been taking that and feels that was an old medication.  Patient reports she has felt somewhat anxious with worry, but is otherwise doing okay.  She denies current chest pain.  She has an appointment for follow-up with Dr. Turner on Tuesday of next week.  Patient is otherwise without concerns today.    Review of Systems   Constitutional: Negative for chills, fatigue, fever and unexpected weight change.   Eyes: Negative for visual disturbance.   Respiratory: Negative for shortness of breath.    Cardiovascular: Negative for chest pain.   Musculoskeletal: Negative for myalgias.   Neurological: Negative for headaches.   Psychiatric/Behavioral: The patient is nervous/anxious.        Objective:      Physical Exam   Constitutional: She is oriented to person, place, and time. She appears well-developed and well-nourished. No distress.   HENT:   Head: Normocephalic and atraumatic.   Eyes: Pupils are equal, round, and reactive to light. Conjunctivae and EOM are normal. No scleral icterus.   Cardiovascular: Normal rate and regular rhythm. Exam reveals no gallop and no friction rub.   No murmur heard.  Pulmonary/Chest: Effort normal and breath sounds normal.   Neurological: She is alert and oriented to person, place, and time. No cranial nerve deficit. Gait normal.   Psychiatric: She has a normal mood and affect.   Vitals reviewed.      Assessment:       1. Atherosclerosis of native coronary artery of  native heart with angina pectoris        Plan:     Problem List Items Addressed This Visit     Atherosclerosis of native coronary artery of native heart with angina pectoris - Primary    Overview     08/08/2019:  Procedure:   1. 30 minutes conscious sedation  2. Left heart cath with selective left and right coronary arteriography left ventriculography  3. Fractional Flow Reserve LAD   Indication: Abnormal nuclear stress test  Approach: Right radial 6 [place] citizen retrograde.  Equipment:   Diagnostic: A 6 [place] citizen slender sheath a 5 [place] citizen pigtail catheter 5 [place] citizen Kishore catheter  Fractional Flow Reserve LAD: 6 Fr. ERAD Left Regular Catheter, [employer] Comet Flow wire  Complications: None.  Hemostasis: Vas band.  Procedure:     Patient was brought to the cardiac catheterization laboratory the right radial area was prepped and draped in a sterile fashion.  The area was anesthetized then the slender sheath was placed retrograde.  The patient was given a radial cocktail than left ventriculogram selective left and right coronary arteriograms performed in multiple views using wire exchange technique.  After the diagnostic angiogram, the ERAD guide was placed in the left main coronary artery to facilitate FFR.  The comet guidewire was placed in the distal LAD then a 3 minute adenosine infusion was performed.  The minimum FFR was 0.71.  At the conclusion of the procedure the sheath was pulled and a vas band was applied with complete arterial hemostasis.  The patient tolerated procedure well with no procedural complications.  Findings:  Hemodynamics: Condition nasal cannula oxygen at rest.  Left Ventricle LV Systolic: 120 mmHg ; LV Diastolic: -5 mmHg ; LV End Diastolic: 7 mmHg  Aortic Measurements AO Systolic: 114 mmHg ; AO Diastolic: 37 mmHg ; AO Mean Arterial: 68 mmHg  Aortic Valve Aortic Valve Mean Gradient: 2 mmHg ; Aortic Valve Period: 20.8 s/min ; Aortic Valve Press Diff: 6 mmHg  Other flowsheet entries - LPV HR: 67  bpm   Cine angiograms:  Left ventriculogram: Left ventricle is normal in size overall systolic function regional wall motion ejection fraction 60%.  No significant mitral regurgitation.    Coronary arteriograms:  Left main coronary artery: Left main coronary artery has no significant plaquing noted.  Left anterior descending coronary artery: Left anterior descending coronary artery extends over the left ventricular apex.  There is proximal/ ostial lesion that appeared ulcerated.  It was 70% stenosis.  The LAD had fairly diffuse disease to the mid vessel diagonal coronary artery.  At the diagonal, there was an 80% stenosis to the distal LAD.  Ramus intermedius coronary artery: The ramus intermedius coronary artery is small extends the anterolateral wall and has luminal irregularities, but no detectable disease.  Circumflex coronary: Circumflex coronary artery is a nondominant vessel with one large obtuse marginal coronary vessel extending to the lateral wall.  There are luminal irregularities no greater than 30%.  Right coronary: The right coronary artery is a large dominant vessel arising from the right coronary coronary cusp in its usual position. There is an ostial 60-70% stenosis then the vessel has a 99+% mid vessel stenosis.  The distal vessel is reconstituted via collaterals from the left system and has a large posterior descending and postero-lateral complex.        Impression:  1. Normal left ventricular systolic function with normal left ventricular filling pressures.  2. Chronically stenosed, occluded RCA with significant LAD disease by FFR.  With an FFR value of 0.71.  Plan:  Medical therapy for CAD with discussion of PCI of LAD/RCA vs Aorto-coronary bypass vs medical therapy.    Last Assessment & Plan:   History & Physical     Discharge Summary     Follow-up   Begin dual antiplatelet therapy as needed nitroglycerin and anticipate follow-up in the office.         Current Assessment & Plan     Continue  current medications. Keep appointment with Dr. Turner on Tuesday 8/20/19. Advised to seek immediate medical attention for chest pain, SOB or other concerning symptoms. Patient and her  expressed understanding.               Discharge summary reviewed from [facility].

## 2019-08-13 NOTE — ASSESSMENT & PLAN NOTE
Continue current medications. Keep appointment with Dr. Turner on Tuesday 8/20/19. Advised to seek immediate medical attention for chest pain, SOB or other concerning symptoms. Patient and her  expressed understanding.

## 2019-09-23 DIAGNOSIS — E78.2 MIXED HYPERLIPIDEMIA: ICD-10-CM

## 2019-09-24 RX ORDER — FENOFIBRATE 134 MG/1
CAPSULE ORAL
Qty: 30 CAPSULE | Refills: 0 | Status: SHIPPED | OUTPATIENT
Start: 2019-09-24 | End: 2019-10-09 | Stop reason: ALTCHOICE

## 2019-09-30 ENCOUNTER — LAB VISIT (OUTPATIENT)
Dept: LAB | Facility: HOSPITAL | Age: 84
End: 2019-09-30
Payer: MEDICARE

## 2019-09-30 DIAGNOSIS — Z79.899 ENCOUNTER FOR LONG-TERM (CURRENT) USE OF MEDICATIONS: ICD-10-CM

## 2019-09-30 DIAGNOSIS — E78.2 MIXED HYPERLIPIDEMIA: ICD-10-CM

## 2019-09-30 LAB
ALBUMIN SERPL BCP-MCNC: 4.2 G/DL (ref 3.5–5.2)
ALP SERPL-CCNC: 55 U/L (ref 55–135)
ALT SERPL W/O P-5'-P-CCNC: 12 U/L (ref 10–44)
ANION GAP SERPL CALC-SCNC: 12 MMOL/L (ref 8–16)
AST SERPL-CCNC: 17 U/L (ref 10–40)
BASOPHILS # BLD AUTO: 0.06 K/UL (ref 0–0.2)
BASOPHILS NFR BLD: 0.7 % (ref 0–1.9)
BILIRUB SERPL-MCNC: 0.4 MG/DL (ref 0.1–1)
BUN SERPL-MCNC: 13 MG/DL (ref 8–23)
CALCIUM SERPL-MCNC: 9.9 MG/DL (ref 8.7–10.5)
CHLORIDE SERPL-SCNC: 103 MMOL/L (ref 95–110)
CHOLEST SERPL-MCNC: 85 MG/DL (ref 120–199)
CHOLEST/HDLC SERPL: 2.1 {RATIO} (ref 2–5)
CO2 SERPL-SCNC: 26 MMOL/L (ref 23–29)
CREAT SERPL-MCNC: 0.7 MG/DL (ref 0.5–1.4)
DIFFERENTIAL METHOD: ABNORMAL
EOSINOPHIL # BLD AUTO: 0.5 K/UL (ref 0–0.5)
EOSINOPHIL NFR BLD: 6.4 % (ref 0–8)
ERYTHROCYTE [DISTWIDTH] IN BLOOD BY AUTOMATED COUNT: 13.1 % (ref 11.5–14.5)
EST. GFR  (AFRICAN AMERICAN): >60 ML/MIN/1.73 M^2
EST. GFR  (NON AFRICAN AMERICAN): >60 ML/MIN/1.73 M^2
GLUCOSE SERPL-MCNC: 98 MG/DL (ref 70–110)
HCT VFR BLD AUTO: 39.2 % (ref 37–48.5)
HDLC SERPL-MCNC: 41 MG/DL (ref 40–75)
HDLC SERPL: 48.2 % (ref 20–50)
HGB BLD-MCNC: 12 G/DL (ref 12–16)
IMM GRANULOCYTES # BLD AUTO: 0.01 K/UL (ref 0–0.04)
IMM GRANULOCYTES NFR BLD AUTO: 0.1 % (ref 0–0.5)
LDLC SERPL CALC-MCNC: 33.6 MG/DL (ref 63–159)
LYMPHOCYTES # BLD AUTO: 1.9 K/UL (ref 1–4.8)
LYMPHOCYTES NFR BLD: 23.1 % (ref 18–48)
MCH RBC QN AUTO: 28.8 PG (ref 27–31)
MCHC RBC AUTO-ENTMCNC: 30.6 G/DL (ref 32–36)
MCV RBC AUTO: 94 FL (ref 82–98)
MONOCYTES # BLD AUTO: 0.7 K/UL (ref 0.3–1)
MONOCYTES NFR BLD: 7.9 % (ref 4–15)
NEUTROPHILS # BLD AUTO: 5.1 K/UL (ref 1.8–7.7)
NEUTROPHILS NFR BLD: 61.8 % (ref 38–73)
NONHDLC SERPL-MCNC: 44 MG/DL
NRBC BLD-RTO: 0 /100 WBC
PLATELET # BLD AUTO: 291 K/UL (ref 150–350)
PMV BLD AUTO: 10.6 FL (ref 9.2–12.9)
POTASSIUM SERPL-SCNC: 4.1 MMOL/L (ref 3.5–5.1)
PROT SERPL-MCNC: 7 G/DL (ref 6–8.4)
RBC # BLD AUTO: 4.17 M/UL (ref 4–5.4)
SODIUM SERPL-SCNC: 141 MMOL/L (ref 136–145)
TRIGL SERPL-MCNC: 52 MG/DL (ref 30–150)
TSH SERPL DL<=0.005 MIU/L-ACNC: 3.37 UIU/ML (ref 0.4–4)
WBC # BLD AUTO: 8.18 K/UL (ref 3.9–12.7)

## 2019-09-30 PROCEDURE — 80061 LIPID PANEL: CPT | Mod: HCNC

## 2019-09-30 PROCEDURE — 84443 ASSAY THYROID STIM HORMONE: CPT | Mod: HCNC

## 2019-09-30 PROCEDURE — 36415 COLL VENOUS BLD VENIPUNCTURE: CPT | Mod: HCNC

## 2019-09-30 PROCEDURE — 85025 COMPLETE CBC W/AUTO DIFF WBC: CPT | Mod: HCNC

## 2019-09-30 PROCEDURE — 80053 COMPREHEN METABOLIC PANEL: CPT | Mod: HCNC

## 2019-10-09 ENCOUNTER — OFFICE VISIT (OUTPATIENT)
Dept: INTERNAL MEDICINE | Facility: CLINIC | Age: 84
End: 2019-10-09
Payer: MEDICARE

## 2019-10-09 VITALS
OXYGEN SATURATION: 96 % | SYSTOLIC BLOOD PRESSURE: 131 MMHG | HEART RATE: 73 BPM | TEMPERATURE: 99 F | BODY MASS INDEX: 25.19 KG/M2 | WEIGHT: 156.06 LBS | DIASTOLIC BLOOD PRESSURE: 62 MMHG

## 2019-10-09 DIAGNOSIS — E78.2 MIXED HYPERLIPIDEMIA: ICD-10-CM

## 2019-10-09 DIAGNOSIS — Z00.00 ROUTINE GENERAL MEDICAL EXAMINATION AT A HEALTH CARE FACILITY: Primary | ICD-10-CM

## 2019-10-09 DIAGNOSIS — K21.9 GASTROESOPHAGEAL REFLUX DISEASE WITHOUT ESOPHAGITIS: ICD-10-CM

## 2019-10-09 DIAGNOSIS — K59.09 CONSTIPATION, CHRONIC: ICD-10-CM

## 2019-10-09 DIAGNOSIS — F32.A DEPRESSION, UNSPECIFIED DEPRESSION TYPE: ICD-10-CM

## 2019-10-09 DIAGNOSIS — Z85.820 HISTORY OF MELANOMA: ICD-10-CM

## 2019-10-09 PROCEDURE — 99999 PR PBB SHADOW E&M-EST. PATIENT-LVL IV: CPT | Mod: PBBFAC,HCNC,, | Performed by: FAMILY MEDICINE

## 2019-10-09 PROCEDURE — 3075F SYST BP GE 130 - 139MM HG: CPT | Mod: HCNC,CPTII,S$GLB, | Performed by: FAMILY MEDICINE

## 2019-10-09 PROCEDURE — 99999 PR PBB SHADOW E&M-EST. PATIENT-LVL IV: ICD-10-PCS | Mod: PBBFAC,HCNC,, | Performed by: FAMILY MEDICINE

## 2019-10-09 PROCEDURE — 3078F PR MOST RECENT DIASTOLIC BLOOD PRESSURE < 80 MM HG: ICD-10-PCS | Mod: HCNC,CPTII,S$GLB, | Performed by: FAMILY MEDICINE

## 2019-10-09 PROCEDURE — G0008 FLU VACCINE - HIGH DOSE (65+) PRESERVATIVE FREE IM: ICD-10-PCS | Mod: HCNC,S$GLB,, | Performed by: FAMILY MEDICINE

## 2019-10-09 PROCEDURE — 99397 PR PREVENTIVE VISIT,EST,65 & OVER: ICD-10-PCS | Mod: 25,HCNC,S$GLB, | Performed by: FAMILY MEDICINE

## 2019-10-09 PROCEDURE — 3078F DIAST BP <80 MM HG: CPT | Mod: HCNC,CPTII,S$GLB, | Performed by: FAMILY MEDICINE

## 2019-10-09 PROCEDURE — 90662 IIV NO PRSV INCREASED AG IM: CPT | Mod: HCNC,S$GLB,, | Performed by: FAMILY MEDICINE

## 2019-10-09 PROCEDURE — G0008 ADMIN INFLUENZA VIRUS VAC: HCPCS | Mod: HCNC,S$GLB,, | Performed by: FAMILY MEDICINE

## 2019-10-09 PROCEDURE — 99397 PER PM REEVAL EST PAT 65+ YR: CPT | Mod: 25,HCNC,S$GLB, | Performed by: FAMILY MEDICINE

## 2019-10-09 PROCEDURE — 90662 FLU VACCINE - HIGH DOSE (65+) PRESERVATIVE FREE IM: ICD-10-PCS | Mod: HCNC,S$GLB,, | Performed by: FAMILY MEDICINE

## 2019-10-09 PROCEDURE — 3075F PR MOST RECENT SYSTOLIC BLOOD PRESS GE 130-139MM HG: ICD-10-PCS | Mod: HCNC,CPTII,S$GLB, | Performed by: FAMILY MEDICINE

## 2019-10-09 RX ORDER — POLYETHYLENE GLYCOL 3350 17 G/17G
17 POWDER, FOR SOLUTION ORAL DAILY PRN
Refills: 0 | COMMUNITY
Start: 2019-10-09 | End: 2020-10-06

## 2019-10-09 RX ORDER — CITALOPRAM 20 MG/1
20 TABLET, FILM COATED ORAL DAILY
Qty: 30 TABLET | Refills: 11 | Status: SHIPPED | OUTPATIENT
Start: 2019-10-09 | End: 2020-10-06 | Stop reason: SDUPTHER

## 2019-10-09 RX ORDER — PANTOPRAZOLE SODIUM 40 MG/1
40 TABLET, DELAYED RELEASE ORAL DAILY
Qty: 30 TABLET | Refills: 11 | Status: SHIPPED | OUTPATIENT
Start: 2019-10-09 | End: 2020-10-06 | Stop reason: SDUPTHER

## 2019-10-10 NOTE — PROGRESS NOTES
Subjective:       Patient ID: Tiffanie Barajas is a 84 y.o. female.    Chief Complaint: Annual Exam    Patient presents to clinic today for annual physical exam.    Review of Systems   Constitutional: Negative for activity change and unexpected weight change.   HENT: Negative for hearing loss, rhinorrhea and trouble swallowing.    Eyes: Negative for discharge and visual disturbance.   Respiratory: Negative for chest tightness and wheezing.    Cardiovascular: Negative for chest pain and palpitations.   Gastrointestinal: Positive for constipation. Negative for blood in stool, diarrhea and vomiting.   Endocrine: Negative for polydipsia and polyuria.   Genitourinary: Negative for difficulty urinating, dysuria, hematuria and menstrual problem.   Musculoskeletal: Negative for arthralgias, joint swelling and neck pain.   Neurological: Negative for weakness and headaches.   Psychiatric/Behavioral: Negative for confusion and dysphoric mood.       Objective:      Physical Exam   Constitutional: She is oriented to person, place, and time. Vital signs are normal. She appears well-developed and well-nourished. No distress.   HENT:   Head: Normocephalic and atraumatic.   Right Ear: Tympanic membrane, external ear and ear canal normal.   Left Ear: Tympanic membrane, external ear and ear canal normal.   Nose: Nose normal. No mucosal edema or rhinorrhea.   Mouth/Throat: Uvula is midline, oropharynx is clear and moist and mucous membranes are normal.   Eyes: Pupils are equal, round, and reactive to light. Conjunctivae, EOM and lids are normal.   Neck: Normal range of motion. Neck supple. No thyromegaly present.   Cardiovascular: Normal rate and regular rhythm. Exam reveals no gallop and no friction rub.   No murmur heard.  Pulmonary/Chest: Effort normal and breath sounds normal. She has no wheezes. She has no rhonchi. She has no rales.   Abdominal: Soft. Normal appearance and bowel sounds are normal. She exhibits no distension and  no mass. There is no hepatosplenomegaly. There is no tenderness.   Musculoskeletal: Normal range of motion.   Lymphadenopathy:     She has no cervical adenopathy.   Neurological: She is alert and oriented to person, place, and time. She has normal strength. No cranial nerve deficit or sensory deficit. Gait normal.   Reflex Scores:       Patellar reflexes are 2+ on the right side and 2+ on the left side.  Skin: Skin is warm and dry. No lesion and no rash noted. No cyanosis. Nails show no clubbing.   Psychiatric: She has a normal mood and affect.   Vitals reviewed.      Assessment:       1. Routine general medical examination at a health care facility    2. Constipation, chronic    3. Depression, unspecified depression type    4. Gastroesophageal reflux disease without esophagitis    5. Mixed hyperlipidemia    6. History of melanoma        Plan:     Problem List Items Addressed This Visit     Constipation, chronic    Current Assessment & Plan     Advised miralax daily prn; advised to drink adequate fluids         Relevant Medications    polyethylene glycol (GLYCOLAX) 17 gram/dose powder    Depression    Current Assessment & Plan     Controlled, continue celexa         Relevant Medications    citalopram (CELEXA) 20 MG tablet    GERD (gastroesophageal reflux disease)    Current Assessment & Plan     Controlled on protonix         Relevant Medications    pantoprazole (PROTONIX) 40 MG tablet    History of melanoma    Overview     Followed by outside Dermatology         Mixed hyperlipidemia    Current Assessment & Plan     Controlled, continue lipitor; reports Dr. Turner stopped niacin and fenofibrate           Other Visit Diagnoses     Routine general medical examination at a health care facility    -  Primary          Health Maintenance reviewed/updated. Flu vaccine today. Discussed shingrix vaccine.

## 2019-11-05 ENCOUNTER — TELEPHONE (OUTPATIENT)
Dept: INTERNAL MEDICINE | Facility: CLINIC | Age: 84
End: 2019-11-05

## 2019-11-05 ENCOUNTER — OFFICE VISIT (OUTPATIENT)
Dept: INTERNAL MEDICINE | Facility: CLINIC | Age: 84
End: 2019-11-05
Payer: MEDICARE

## 2019-11-05 VITALS
TEMPERATURE: 98 F | SYSTOLIC BLOOD PRESSURE: 140 MMHG | BODY MASS INDEX: 24.48 KG/M2 | HEIGHT: 66 IN | HEART RATE: 74 BPM | DIASTOLIC BLOOD PRESSURE: 70 MMHG | OXYGEN SATURATION: 97 % | WEIGHT: 152.31 LBS

## 2019-11-05 DIAGNOSIS — N30.00 ACUTE CYSTITIS WITHOUT HEMATURIA: ICD-10-CM

## 2019-11-05 DIAGNOSIS — N81.10 BLADDER PROLAPSE, FEMALE, ACQUIRED: Primary | ICD-10-CM

## 2019-11-05 DIAGNOSIS — R82.998 OTHER ABNORMAL FINDINGS IN URINE: ICD-10-CM

## 2019-11-05 DIAGNOSIS — M54.50 ACUTE BILATERAL LOW BACK PAIN WITHOUT SCIATICA: ICD-10-CM

## 2019-11-05 LAB
BACTERIA #/AREA URNS HPF: ABNORMAL /HPF
BILIRUB UR QL STRIP: NEGATIVE
CLARITY UR: ABNORMAL
COLOR UR: YELLOW
GLUCOSE UR QL STRIP: NEGATIVE
HGB UR QL STRIP: NEGATIVE
KETONES UR QL STRIP: NEGATIVE
LEUKOCYTE ESTERASE UR QL STRIP: ABNORMAL
MICROSCOPIC COMMENT: ABNORMAL
NITRITE UR QL STRIP: NEGATIVE
NON-SQ EPI CELLS #/AREA URNS HPF: <1 /HPF
PH UR STRIP: 8 [PH] (ref 5–8)
PROT UR QL STRIP: NEGATIVE
RBC #/AREA URNS HPF: 1 /HPF (ref 0–4)
SP GR UR STRIP: 1.01 (ref 1–1.03)
SQUAMOUS #/AREA URNS HPF: 21 /HPF
URN SPEC COLLECT METH UR: ABNORMAL
WBC #/AREA URNS HPF: 15 /HPF (ref 0–5)

## 2019-11-05 PROCEDURE — 3078F PR MOST RECENT DIASTOLIC BLOOD PRESSURE < 80 MM HG: ICD-10-PCS | Mod: HCNC,CPTII,S$GLB, | Performed by: NURSE PRACTITIONER

## 2019-11-05 PROCEDURE — 1101F PR PT FALLS ASSESS DOC 0-1 FALLS W/OUT INJ PAST YR: ICD-10-PCS | Mod: HCNC,CPTII,S$GLB, | Performed by: NURSE PRACTITIONER

## 2019-11-05 PROCEDURE — 81000 URINALYSIS NONAUTO W/SCOPE: CPT | Mod: HCNC

## 2019-11-05 PROCEDURE — 3077F SYST BP >= 140 MM HG: CPT | Mod: HCNC,CPTII,S$GLB, | Performed by: NURSE PRACTITIONER

## 2019-11-05 PROCEDURE — 99999 PR PBB SHADOW E&M-EST. PATIENT-LVL IV: CPT | Mod: PBBFAC,HCNC,, | Performed by: NURSE PRACTITIONER

## 2019-11-05 PROCEDURE — 99999 PR PBB SHADOW E&M-EST. PATIENT-LVL IV: ICD-10-PCS | Mod: PBBFAC,HCNC,, | Performed by: NURSE PRACTITIONER

## 2019-11-05 PROCEDURE — 3077F PR MOST RECENT SYSTOLIC BLOOD PRESSURE >= 140 MM HG: ICD-10-PCS | Mod: HCNC,CPTII,S$GLB, | Performed by: NURSE PRACTITIONER

## 2019-11-05 PROCEDURE — 99214 PR OFFICE/OUTPT VISIT, EST, LEVL IV, 30-39 MIN: ICD-10-PCS | Mod: HCNC,S$GLB,, | Performed by: NURSE PRACTITIONER

## 2019-11-05 PROCEDURE — 99214 OFFICE O/P EST MOD 30 MIN: CPT | Mod: HCNC,S$GLB,, | Performed by: NURSE PRACTITIONER

## 2019-11-05 PROCEDURE — 87086 URINE CULTURE/COLONY COUNT: CPT | Mod: HCNC

## 2019-11-05 PROCEDURE — 3078F DIAST BP <80 MM HG: CPT | Mod: HCNC,CPTII,S$GLB, | Performed by: NURSE PRACTITIONER

## 2019-11-05 PROCEDURE — 1101F PT FALLS ASSESS-DOCD LE1/YR: CPT | Mod: HCNC,CPTII,S$GLB, | Performed by: NURSE PRACTITIONER

## 2019-11-05 RX ORDER — SULFAMETHOXAZOLE AND TRIMETHOPRIM 800; 160 MG/1; MG/1
1 TABLET ORAL 2 TIMES DAILY
Qty: 14 TABLET | Refills: 0 | Status: SHIPPED | OUTPATIENT
Start: 2019-11-05 | End: 2020-10-06

## 2019-11-05 NOTE — PROGRESS NOTES
Tiffanie Stacyne 84 y.o. female     Chief Complaint:  Chief Complaint   Patient presents with    bladder dropped     PCP - [facility]       History of Present Illness:  Pt is new to provider, but established in practice and presents c/o feeling her bladder fall into her vagina about 2 weeks ago     c/o lower back pain.  Denies fever/chills   No diffuiclty urinating   occasional constipation   Has never happened in the past   Never seen by urology   Denies pelvic pain   3 children, vaginally delivered     No urinary odor/blood   Thought she was seeing an OB/GYN today     Exam:  Review of Systems   Constitutional: Negative for chills, diaphoresis, fever, malaise/fatigue and weight loss.   Respiratory: Negative for cough, shortness of breath and wheezing.    Cardiovascular: Positive for leg swelling (chronic L>R). Negative for chest pain and palpitations.   Gastrointestinal: Negative for abdominal pain, constipation, diarrhea, nausea and vomiting.   Genitourinary: Positive for flank pain. Negative for dysuria, frequency, hematuria and urgency.   Musculoskeletal: Positive for back pain (lumbar) and joint pain (chronic knees). Negative for myalgias.   Neurological: Negative for dizziness and headaches.      Physical Exam   Constitutional: She is oriented to person, place, and time. Vital signs are normal. She appears well-developed and well-nourished. She is cooperative.  Non-toxic appearance. She does not have a sickly appearance. She does not appear ill. No distress.   HENT:   Head: Normocephalic and atraumatic.   Eyes: Pupils are equal, round, and reactive to light. Conjunctivae and EOM are normal. Right eye exhibits no discharge. Left eye exhibits no discharge. No scleral icterus.   Neck: Normal range of motion. No tracheal deviation present.   Cardiovascular: Normal rate and regular rhythm.   Pulmonary/Chest: Effort normal and breath sounds normal. No stridor. No respiratory distress. She has no wheezes. She has no  rales. She exhibits no tenderness.   Abdominal: Soft. Bowel sounds are normal. She exhibits no distension and no mass. There is no tenderness. There is no rebound and no guarding.   Genitourinary:   Genitourinary Comments: Bladder prolapse   Musculoskeletal: Normal range of motion. She exhibits no edema.   Neurological: She is alert and oriented to person, place, and time.   Skin: Skin is warm, dry and intact. No rash noted. She is not diaphoretic. No erythema. No pallor.   Psychiatric: She has a normal mood and affect. Her speech is normal and behavior is normal. Judgment and thought content normal. Cognition and memory are normal.   Nursing note and vitals reviewed.      Most Recent Laboratory Results Reviewed ({Yes)  Lab Results   Component Value Date    WBC 8.18 09/30/2019    HGB 12.0 09/30/2019    HCT 39.2 09/30/2019     09/30/2019    CHOL 85 (L) 09/30/2019    TRIG 52 09/30/2019    HDL 41 09/30/2019    ALT 12 09/30/2019    AST 17 09/30/2019     09/30/2019    K 4.1 09/30/2019     09/30/2019    CREATININE 0.7 09/30/2019    BUN 13 09/30/2019    CO2 26 09/30/2019    TSH 3.365 09/30/2019    INR 1.1 09/17/2018    GLUF 106 11/12/2014    HGBA1C 5.4 11/12/2014       Assessment     ICD-10-CM ICD-9-CM   1. Bladder prolapse, female, acquired N81.10 618.01   2. Acute bilateral low back pain without sciatica M54.5 724.2     338.19   3. Other abnormal findings in urine  R82.998 791.9   4. Acute cystitis without hematuria N30.00 595.0        Plan   Bladder prolapse, female, acquired  -     Ambulatory Referral to Urogynecology    Acute bilateral low back pain without sciatica  -     URINALYSIS  -     Urine culture    Other abnormal findings in urine   -     Urine culture    Acute cystitis without hematuria  - bactrim DS x 7 days     Other orders  -     Urinalysis Microscopic         Follow up in about 3 months (around 2/5/2020), or if symptoms worsen or fail to improve, for PCP follow up.  Future Appointments      Date Provider Specialty Appt Notes    4/22/2020 Lizeth Merrill MD Internal Medicine 6month fu/hkb

## 2019-11-05 NOTE — TELEPHONE ENCOUNTER
Patient notified of results and recommendations with verbalized understanding. Working on urology referral.

## 2019-11-05 NOTE — TELEPHONE ENCOUNTER
----- Message from Darleen Leo NP sent at 11/5/2019  3:43 PM CST -----  Please notify pt that she has a UTI and we have sent in bactrim

## 2019-11-07 LAB
BACTERIA UR CULT: NORMAL
BACTERIA UR CULT: NORMAL

## 2019-11-20 ENCOUNTER — OFFICE VISIT (OUTPATIENT)
Dept: OBSTETRICS AND GYNECOLOGY | Facility: CLINIC | Age: 84
End: 2019-11-20
Payer: MEDICARE

## 2019-11-20 VITALS
HEIGHT: 66 IN | DIASTOLIC BLOOD PRESSURE: 68 MMHG | BODY MASS INDEX: 24.55 KG/M2 | WEIGHT: 152.75 LBS | SYSTOLIC BLOOD PRESSURE: 140 MMHG

## 2019-11-20 DIAGNOSIS — N81.10 BADEN-WALKER GRADE 2 CYSTOCELE: ICD-10-CM

## 2019-11-20 PROCEDURE — 3078F PR MOST RECENT DIASTOLIC BLOOD PRESSURE < 80 MM HG: ICD-10-PCS | Mod: HCNC,CPTII,S$GLB, | Performed by: OBSTETRICS & GYNECOLOGY

## 2019-11-20 PROCEDURE — 1126F AMNT PAIN NOTED NONE PRSNT: CPT | Mod: HCNC,S$GLB,, | Performed by: OBSTETRICS & GYNECOLOGY

## 2019-11-20 PROCEDURE — 1159F PR MEDICATION LIST DOCUMENTED IN MEDICAL RECORD: ICD-10-PCS | Mod: HCNC,S$GLB,, | Performed by: OBSTETRICS & GYNECOLOGY

## 2019-11-20 PROCEDURE — 1159F MED LIST DOCD IN RCRD: CPT | Mod: HCNC,S$GLB,, | Performed by: OBSTETRICS & GYNECOLOGY

## 2019-11-20 PROCEDURE — 3077F PR MOST RECENT SYSTOLIC BLOOD PRESSURE >= 140 MM HG: ICD-10-PCS | Mod: HCNC,CPTII,S$GLB, | Performed by: OBSTETRICS & GYNECOLOGY

## 2019-11-20 PROCEDURE — 99213 PR OFFICE/OUTPT VISIT, EST, LEVL III, 20-29 MIN: ICD-10-PCS | Mod: HCNC,S$GLB,, | Performed by: OBSTETRICS & GYNECOLOGY

## 2019-11-20 PROCEDURE — 1101F PT FALLS ASSESS-DOCD LE1/YR: CPT | Mod: HCNC,CPTII,S$GLB, | Performed by: OBSTETRICS & GYNECOLOGY

## 2019-11-20 PROCEDURE — 3077F SYST BP >= 140 MM HG: CPT | Mod: HCNC,CPTII,S$GLB, | Performed by: OBSTETRICS & GYNECOLOGY

## 2019-11-20 PROCEDURE — 1101F PR PT FALLS ASSESS DOC 0-1 FALLS W/OUT INJ PAST YR: ICD-10-PCS | Mod: HCNC,CPTII,S$GLB, | Performed by: OBSTETRICS & GYNECOLOGY

## 2019-11-20 PROCEDURE — 3078F DIAST BP <80 MM HG: CPT | Mod: HCNC,CPTII,S$GLB, | Performed by: OBSTETRICS & GYNECOLOGY

## 2019-11-20 PROCEDURE — 99213 OFFICE O/P EST LOW 20 MIN: CPT | Mod: HCNC,S$GLB,, | Performed by: OBSTETRICS & GYNECOLOGY

## 2019-11-20 PROCEDURE — 99999 PR PBB SHADOW E&M-EST. PATIENT-LVL III: ICD-10-PCS | Mod: PBBFAC,HCNC,, | Performed by: OBSTETRICS & GYNECOLOGY

## 2019-11-20 PROCEDURE — 99999 PR PBB SHADOW E&M-EST. PATIENT-LVL III: CPT | Mod: PBBFAC,HCNC,, | Performed by: OBSTETRICS & GYNECOLOGY

## 2019-11-20 PROCEDURE — 1126F PR PAIN SEVERITY QUANTIFIED, NO PAIN PRESENT: ICD-10-PCS | Mod: HCNC,S$GLB,, | Performed by: OBSTETRICS & GYNECOLOGY

## 2019-11-20 NOTE — LETTER
November 20, 2019      [place]  [id number] [place] [address] [address] [place] [id number]           [facility]  [address] [place] [address] [address] [place] [id number]  Phone: [phone]  Fax: [phone]          Patient: Tiffanie Barajas   MR Number: [id number]   YOB: 1935   Date of Visit: 11/20/2019       Dear Darleen Leo:    Thank you for referring Tiffanie Barajas to me for evaluation. Attached you will find relevant portions of my assessment and plan of care.    If you have questions, please do not hesitate to call me. I look forward to following Tiffanie Barajas along with you.    Sincerely,    Aisha Gallo MD    Enclosure  CC:  No Recipients    If you would like to receive this communication electronically, please contact [email or web address] or [phone] to request more information on [employer] access.    For providers and/or their staff who would like to refer a patient to [facility], please contact us through our one-stop-shop provider referral line, [facility] , at [phone].    If you feel you have received this communication in error or would no longer like to receive these types of communications, please e-mail [email or web address]

## 2019-11-20 NOTE — PATIENT INSTRUCTIONS
Understanding Cystocele (Prolapsed Bladder)  A cystocele is when a womans bladder sags down into the vagina. It does this when the wall of tissue between the bladder and the vagina gets weak. Its also called a prolapsed bladder. The sagging bladder can stretch the opening of the urethra. This is the tube that carries urine out of the body. This can cause urine to leak when you cough, sneeze, or lift something heavy. A cystocele can also cause discomfort in the pelvis and make it hard to fully empty your bladder.  Causes of a cystocele  A cystocele may be caused by:  · Heavy lifting  · Straining muscles during childbirth  · Repeated straining during bowel movements  · Weakened muscles around the vagina caused by lack of estrogen after menopause  Symptoms of a cystocele  Symptoms of a cystocele include:  · Leakage of urine when you cough, sneeze, or lift something heavy  · Heavy, achy, or full feeling in the pelvis  · Pelvic pressure that gets worse with standing, lifting, or coughing  · A bulge in the vagina that you can feel  · Lower back pain  · Sexual difficulties  · Problems with inserting tampons  Diagnosis of a cystocele  Your healthcare provider will ask about your medical history and give you a physical exam. You may also have a cystourethrogram. This is also called a voiding cystogram. This is an X-ray taken of the bladder while you urinate. Youll be given a special dye called a contrast medium. This helps show the bladder and urethra on the X-ray. This lets your healthcare provider can see the shape of your bladder, and look for problems. You may need other tests to see if there are any problems in the other areas of your urinary system.  A cystocele is graded during diagnosis. Grade 1 means the bladder sags only a short way into the top of the vagina. Grade 2 means the bladder sags down to the lower opening of the vagina. Grade 3 means the bladder sags out of the lower opening of the  vagina.  Treatment of a cystocele  Treatment depends on the grade of your cystocele and other factors. Your choices may include:  · Change of activity. You may need to avoid certain activities, such as heavy lifting or straining, that can cause your cystocele to get worse.  · Pessary. This is a device put in the vagina to hold the bladder in place.  · Surgery. A procedure can be done to move the bladder back into a more normal position and hold it in place.  · Estrogen replacement therapy. This may help to strengthen the muscles around the vagina and bladder. Talk with your healthcare provider about the risks and benefits of hormone therapy based on your medical history.  Date Last Reviewed: 8/29/2015  © 4997-3237 [employer]. [address] [place] [address], [place] [address]. All rights reserved. This information is not intended as a substitute for professional medical care. Always follow your healthcare professional's instructions.        Pelvic Organ Prolapse: Nonsurgical Treatment  If your pelvic organ prolapse is mild or doesnt bother you much, or if you have medical conditions that make surgery too risky, nonsurgical treatment may be a good choice. A device (pessary) to wear in your vagina can help ease your symptoms. You may also be given certain exercises (Kegels) to do. And you may need to make some lifestyle changes.  Wearing a pessary  A pessary helps support the prolapsed organ or organs. It is specifically fitted by your doctor. A pessary may ease your symptoms, but it cant repair prolapse. The pessary must be removed for cleaning. If you cant do this, you will need to see your doctor regularly. He or she will remove and clean your pessary. If you have questions or concerns about the pessary, be sure to talk with your doctor.  Doing kegels  Kegels are simple exercises that you can do to strengthen the pelvic floor muscles. They may ease your symptoms and prevent further prolapse. To do a Kegel,  contract your pelvic floor muscles as if to stop the urine stream. (Do this when youre not urinating.) Ask your doctor how many Kegels to do and how long to hold each one. During your treatment visits, your healthcare provider may place a device in your vagina to measure your Kegel contractions. That way, you can find out whether you are doing Kegel exercises correctly.  Living a healthy life  Improving your health may ease your symptoms or keep your problem from worsening. You may be asked to:  · Quit smoking to prevent excessive coughing  · Adjust medications that may cause urine leakage  · Avoid lifting, which puts pressure on pelvic muscles  · Exercise and eat well to maintain a healthy weight   Date Last Reviewed: 5/10/2015  © 2463-5180 [employer]. [address] [place] [address], [place] [address]. All rights reserved. This information is not intended as a substitute for professional medical care. Always follow your healthcare professional's instructions.

## 2019-11-20 NOTE — PROGRESS NOTES
Subjective:       Patient ID: Tiffanie Barajas is a 84 y.o. female.    Chief Complaint:  Vaginal Prolapse      History of Present Illness  HPI  Presents for possible vaginal prolapse.  States one month ago, she noticed a bulge of tissue at the vaginal opening.  It does not bother her or cause pain.  She empties her bladder well.  No urinary incontinence.  She has issues with chronic constipation, and has to strain to have a BM.  She is sexually active.  Denies any vaginal bleeding.    GYN & OB History  Patient's last menstrual period was 1990.   Date of Last Pap: No result found    OB History    Para Term  AB Living   2 1       2   SAB TAB Ectopic Multiple Live Births         1 1      # Outcome Date GA Lbr Dwayne/2nd Weight Sex Delivery Anes PTL Lv   2 Para      Vag-Spont   EMILIANO   1A       Vag-Spont      1B       Vag-Spont          Review of Systems  Review of Systems   Constitutional: Negative for fatigue, fever and unexpected weight change.   Gastrointestinal: Negative for abdominal pain, constipation, diarrhea, nausea and vomiting.   Genitourinary: Negative for dysuria, frequency, pelvic pain, urgency, vaginal bleeding, vaginal discharge, vaginal pain, urinary incontinence, postcoital bleeding and vaginal odor.           Objective:    Physical Exam:   Constitutional: She is oriented to person, place, and time. She appears well-developed and well-nourished. No distress.             Abdominal: Soft. She exhibits no distension and no mass. There is no tenderness. There is no rebound and no guarding. Hernia confirmed negative in the right inguinal area and confirmed negative in the left inguinal area.     Genitourinary: Vagina normal. Pelvic exam was performed with patient supine. There is no rash, tenderness, lesion or injury on the right labia. There is no rash, tenderness, lesion or injury on the left labia. Uterus is not deviated, not enlarged, not fixed, not tender and not  experiencing uterine prolapse. Right adnexum displays no mass, no tenderness and no fullness. Left adnexum displays no mass, no tenderness and no fullness. There is rectocele (grade 1 with valsalva; some perineal relaxation noted) and cystocele (grade 2 midline cystocele with Valsalva) in the vagina. No erythema, tenderness, bleeding or unspecified prolapse of vaginal walls in the vagina. No foreign body in the vagina. No signs of injury around the vagina. No vaginal discharge found. Cervix exhibits no motion tenderness, no discharge and no friability.        Uterus Size: 6 cm       Neurological: She is alert and oriented to person, place, and time.     Psychiatric: She has a normal mood and affect.          Assessment:        1. Tennyson-Walker grade 2 cystocele                Plan:      Tiffanie was seen today for vaginal prolapse.    Diagnoses and all orders for this visit:    Mishel-Walker grade 2 cystocele    Reviewed physical exam findings with the patient.  Discussed ways to manage her constipation to reduce the amount of straining she does when having a BM.  Discussed expectant management versus pessary versus surgery (which I do not recommend at this time.  Patient was very recently started on plavix for CAD).  Patient desires expectant management.   RTC 1 year.

## 2020-01-03 ENCOUNTER — OFFICE VISIT (OUTPATIENT)
Dept: ORTHOPEDICS | Facility: CLINIC | Age: 85
End: 2020-01-03
Payer: MEDICARE

## 2020-01-03 ENCOUNTER — HOSPITAL ENCOUNTER (OUTPATIENT)
Dept: RADIOLOGY | Facility: HOSPITAL | Age: 85
Discharge: HOME | End: 2020-01-03
Attending: ORTHOPAEDIC SURGERY
Payer: MEDICARE

## 2020-01-03 DIAGNOSIS — M17.11 ARTHRITIS OF KNEE, RIGHT: ICD-10-CM

## 2020-01-03 DIAGNOSIS — M46.1 SACROILIITIS: ICD-10-CM

## 2020-01-03 DIAGNOSIS — M17.12 ARTHRITIS OF KNEE, LEFT: Primary | ICD-10-CM

## 2020-01-03 DIAGNOSIS — M17.12 ARTHRITIS OF KNEE, LEFT: ICD-10-CM

## 2020-01-03 DIAGNOSIS — M16.12 ARTHRITIS OF LEFT HIP: ICD-10-CM

## 2020-01-03 DIAGNOSIS — M51.36 LUMBAR DEGENERATIVE DISC DISEASE: ICD-10-CM

## 2020-01-03 DIAGNOSIS — M47.816 LUMBAR FACET ARTHROPATHY: ICD-10-CM

## 2020-01-03 PROCEDURE — 73521 X-RAY EXAM HIPS BI 2 VIEWS: CPT | Mod: 26,HCNC,, | Performed by: RADIOLOGY

## 2020-01-03 PROCEDURE — 1159F PR MEDICATION LIST DOCUMENTED IN MEDICAL RECORD: ICD-10-PCS | Mod: HCNC,S$GLB,, | Performed by: ORTHOPAEDIC SURGERY

## 2020-01-03 PROCEDURE — 1101F PR PT FALLS ASSESS DOC 0-1 FALLS W/OUT INJ PAST YR: ICD-10-PCS | Mod: HCNC,CPTII,S$GLB, | Performed by: ORTHOPAEDIC SURGERY

## 2020-01-03 PROCEDURE — 73521 X-RAY EXAM HIPS BI 2 VIEWS: CPT | Mod: TC,HCNC

## 2020-01-03 PROCEDURE — 99214 PR OFFICE/OUTPT VISIT, EST, LEVL IV, 30-39 MIN: ICD-10-PCS | Mod: 25,HCNC,S$GLB, | Performed by: ORTHOPAEDIC SURGERY

## 2020-01-03 PROCEDURE — 73521 XR HIPS BILATERAL 2 VIEW INCL AP PELVIS: ICD-10-PCS | Mod: 26,HCNC,, | Performed by: RADIOLOGY

## 2020-01-03 PROCEDURE — 20610 DRAIN/INJ JOINT/BURSA W/O US: CPT | Mod: HCNC,LT,S$GLB, | Performed by: ORTHOPAEDIC SURGERY

## 2020-01-03 PROCEDURE — 73564 X-RAY EXAM KNEE 4 OR MORE: CPT | Mod: 26,50,HCNC, | Performed by: RADIOLOGY

## 2020-01-03 PROCEDURE — 99214 OFFICE O/P EST MOD 30 MIN: CPT | Mod: 25,HCNC,S$GLB, | Performed by: ORTHOPAEDIC SURGERY

## 2020-01-03 PROCEDURE — 1101F PT FALLS ASSESS-DOCD LE1/YR: CPT | Mod: HCNC,CPTII,S$GLB, | Performed by: ORTHOPAEDIC SURGERY

## 2020-01-03 PROCEDURE — 1159F MED LIST DOCD IN RCRD: CPT | Mod: HCNC,S$GLB,, | Performed by: ORTHOPAEDIC SURGERY

## 2020-01-03 PROCEDURE — 99499 UNLISTED E&M SERVICE: CPT | Mod: HCNC,S$GLB,, | Performed by: ORTHOPAEDIC SURGERY

## 2020-01-03 PROCEDURE — 73564 X-RAY EXAM KNEE 4 OR MORE: CPT | Mod: TC,50,HCNC

## 2020-01-03 PROCEDURE — 20610 LARGE JOINT ASPIRATION/INJECTION: L KNEE: ICD-10-PCS | Mod: HCNC,LT,S$GLB, | Performed by: ORTHOPAEDIC SURGERY

## 2020-01-03 PROCEDURE — 73564 XR KNEE ORTHO BILAT WITH FLEXION: ICD-10-PCS | Mod: 26,50,HCNC, | Performed by: RADIOLOGY

## 2020-01-03 PROCEDURE — 99999 PR PBB SHADOW E&M-EST. PATIENT-LVL II: CPT | Mod: PBBFAC,HCNC,, | Performed by: ORTHOPAEDIC SURGERY

## 2020-01-03 PROCEDURE — 99499 RISK ADDL DX/OHS AUDIT: ICD-10-PCS | Mod: HCNC,S$GLB,, | Performed by: ORTHOPAEDIC SURGERY

## 2020-01-03 PROCEDURE — 99999 PR PBB SHADOW E&M-EST. PATIENT-LVL II: ICD-10-PCS | Mod: PBBFAC,HCNC,, | Performed by: ORTHOPAEDIC SURGERY

## 2020-01-03 RX ORDER — CYCLOBENZAPRINE HCL 10 MG
10 TABLET ORAL NIGHTLY
Qty: 30 TABLET | Refills: 1 | Status: SHIPPED | OUTPATIENT
Start: 2020-01-03 | End: 2020-01-13

## 2020-01-03 RX ADMIN — METHYLPREDNISOLONE ACETATE 80 MG: 80 INJECTION, SUSPENSION INTRA-ARTICULAR; INTRALESIONAL; INTRAMUSCULAR; SOFT TISSUE at 09:01

## 2020-01-03 NOTE — PATIENT INSTRUCTIONS
We will inject the left knee with 80 mg Depo-Medrol mixed with 5 cc of 1% lidocaine  Will start physical therapy  Tylenol 650 mg 3 times a day as needed  [facility]  Flexeril 10 mg as needed for sleep/ spasm    Return to clinic in 4weeks

## 2020-01-03 NOTE — PROCEDURES
Large Joint Aspiration/Injection: L knee  Date/Time: 1/3/2020 9:00 AM  Performed by: Ranjit Kelly MD  Authorized by: Ranjit Kelly MD     Consent Done?:  Yes (Verbal)  Indications:  Pain  Procedure site marked: Yes    Timeout: Prior to procedure the correct patient, procedure, and site was verified    Anesthesia  Local anesthesia used  Anesthetic: lidocaine 1% without epinephrine    Location:  Knee  Site:  L knee  Needle size:  21 G  Ultrasonic Guidance for needle placement: No  Approach:  Anterolateral  Medications:  80 mg methylPREDNISolone acetate 80 mg/mL  Patient tolerance:  Patient tolerated the procedure well with no immediate complications    Additional Comments: Procedure :  After verbal consent given and site marked and time out performed (confirmed by patient ) the area was prepped with alcohol. Using appropriate needle the  Knee joint was entered from the anterolateral aspect of joint line . Injection given of depomedrol /lidocaine   . Needle removed and dressing applied.   Tolerated well

## 2020-01-03 NOTE — PROGRESS NOTES
Subjective:     Patient ID: Tiffanie Barajas is a 84 y.o. female.    Chief Complaint: Pain of the Left Knee    HPI:  84-year-old complaining of pain over the left sacroiliac joint radiating down to her knee.  Previous history of sciatica and a cyst in the back that was removed. Has history of arthritis of both of her knees received injection longtime ago more than a year (lubrication shot).  She is not having too much pain in the right knee unable to fully extend the left knee difficulty with shopping and walking.  Pain roughly 4/10.  Denies loss of bowel bladder control.  Walking distance is seems to hurt approximately 200 ft.  She is on blood thinners and unable to take NSAIDs.  She takes 1 little Tylenol occasionally and does not help.    Past Medical History:   Diagnosis Date    Anxiety     Arthritis     Atherosclerosis of native coronary artery of native heart with angina pectoris 8/8/2019    Cancer     Degenerative disc disease     Depression     GERD (gastroesophageal reflux disease)     Hyperlipidemia     Low back derangement syndrome 12/11/2017    Melanoma     Pneumonia due to other staphylococcus      Past Surgical History:   Procedure Laterality Date    ADENOIDECTOMY      CHOLECYSTECTOMY  1973    SPINE SURGERY  2/6/13    L4-L5  cyst removed andfor sciatica    TONSILLECTOMY  1945     Family History   Problem Relation Age of Onset    Arthritis Mother     Hypertension Mother     Stroke Mother     Glaucoma Mother     Heart disease Father 56        fatal MI    Diabetes Brother     Cancer Son 56        melanoma with mets to lung and bone    Arthritis Sister     Hyperlipidemia Neg Hx      Social History     Socioeconomic History    Marital status:      Spouse name: fabio    Number of children: 3    Years of education: Not on file    Highest education level: Not on file   Occupational History    Not on file   Social Needs    Financial resource strain: Not on file    Food  insecurity:     Worry: Not on file     Inability: Not on file    Transportation needs:     Medical: Not on file     Non-medical: Not on file   Tobacco Use    Smoking status: Never Smoker    Smokeless tobacco: Never Used   Substance and Sexual Activity    Alcohol use: No     Alcohol/week: 0.0 standard drinks    Drug use: No    Sexual activity: Yes     Partners: Male   Lifestyle    Physical activity:     Days per week: Not on file     Minutes per session: Not on file    Stress: Not on file   Relationships    Social connections:     Talks on phone: Not on file     Gets together: Not on file     Attends Confucianist service: Not on file     Active member of club or organization: Not on file     Attends meetings of clubs or organizations: Not on file     Relationship status: Not on file   Other Topics Concern    Not on file   Social History Narrative    . Decaf coffee only. No living will or advanced directive-copy of information given.      Medication List with Changes/Refills   New Medications    CYCLOBENZAPRINE (FLEXERIL) 10 MG TABLET    Take 1 tablet (10 mg total) by mouth every evening. for 10 days   Current Medications    ACETAMINOPHEN (TYLENOL) 650 MG TBSR    Take 1 tablet (650 mg total) by mouth every 8 (eight) hours.    ASPIRIN (ECOTRIN) 81 MG EC TABLET    Take 1 tablet by mouth Daily.    ATORVASTATIN (LIPITOR) 40 MG TABLET    Take 40 mg by mouth nightly.    AZELASTINE (ASTELIN) 137 MCG (0.1 %) NASAL SPRAY    2 sprays (274 mcg total) by Nasal route 2 (two) times daily.    CITALOPRAM (CELEXA) 20 MG TABLET    Take 1 tablet (20 mg total) by mouth once daily.    CLOPIDOGREL (PLAVIX) 75 MG TABLET    Take 75 mg by mouth once daily.    MULTIVITAMIN (ONE DAILY MULTIVITAMIN) PER TABLET    Take 1 tablet by mouth once daily.    MULTIVITAMIN WITH MINERALS TABLET        NITROGLYCERIN (NITROSTAT) 0.4 MG SL TABLET    DISSOLVE ONE TABLET UNDER THE TONGUE EVERY 5 MINUTES AS NEEDED FOR CHEST PAIN. DO NOT EXCEED A  TOTAL OF 3 DOSES IN 15 MINUTES    PANTOPRAZOLE (PROTONIX) 40 MG TABLET    Take 1 tablet (40 mg total) by mouth once daily.    POLYETHYLENE GLYCOL (GLYCOLAX) 17 GRAM/DOSE POWDER    Take 17 g by mouth daily as needed.    SULFAMETHOXAZOLE-TRIMETHOPRIM 800-160MG (BACTRIM DS) 800-160 MG TAB    Take 1 tablet by mouth 2 (two) times daily.     Review of patient's allergies indicates:   Allergen Reactions    Diclofenac Nausea And Vomiting and Other (See Comments)    Hydrocodone-acetaminophen Nausea And Vomiting     Other reaction(s): Vomiting     Review of Systems   Constitution: Negative for decreased appetite.   HENT: Negative for tinnitus.    Eyes: Negative for double vision.   Cardiovascular: Negative for chest pain.   Respiratory: Negative for wheezing.    Hematologic/Lymphatic: Negative for bleeding problem.   Skin: Negative for dry skin.   Musculoskeletal: Positive for arthritis, back pain, joint pain, muscle weakness and stiffness. Negative for gout and neck pain.   Gastrointestinal: Negative for abdominal pain.   Genitourinary: Negative for bladder incontinence.   Neurological: Negative for numbness, paresthesias and sensory change.   Psychiatric/Behavioral: Negative for altered mental status.       Objective:   There is no height or weight on file to calculate BMI.  There were no vitals filed for this visit.       General    Constitutional: She is oriented to person, place, and time. She appears well-developed.   HENT:   Head: Atraumatic.   Eyes: EOM are normal.   Cardiovascular: Normal rate.    Pulmonary/Chest: Effort normal.   Abdominal: Soft.   Neurological: She is alert and oriented to person, place, and time.   Psychiatric: Judgment normal.            Cervical spine rotation is functional but slightly limited  Bilateral upper extremity neurovascularly intact.  Radial ulnar pulses 2+.  Sensory intact to touch.  Biceps/triceps/wrist extension and flexion shoulder abduction is 5/5  Lumbar with loss of lordosis  and tenderness around the L4-5 and mostly over the left sacroiliac joint.  Forward bending barely to proximal tibia lateral bending to mid thigh.  Pelvis is level.  Passive hip motion within normal limits.  Hip flexors and abductors as well as quads and hamstrings and ankle extensors and flexors are slightly weak at 5-/5  Right knee range of motion 0-120.  Slight crepitus to AP compression on the patella in on medial joint.  Very mild swelling.  Collaterals and cruciate stable.  Left knee with approximately 5-10 ° of flexion contracture and flexion barely to 90°.  Moderate swelling.  Collaterals and cruciate stable. Crepitus with motion.  Pain in medially to palpation.  Calves are soft nontender  Slight varicosities around the calves  DP 1+  Skin is warm to touch no obvious lesions  Patellar reflex 1+  Relevant imaging results reviewed and interpreted by me, discussed with the patient and / or family today   X-ray bilateral knees with left knee complete loss of medial joint space with osteophytic changes and sclerosis.  Same on the right knee but not as bad consistent with bilateral end-stage arthrosis  X-ray of the pelvis showing some degenerative changes on the left hip with good joint space left.  Right hip no joint space loss consistent with some arthrosis of the left hip  X-ray of the lumbar spine in the electronic records multilevel facet arthropathy and osteophytes  Assessment:     Encounter Diagnoses   Name Primary?    Lumbar facet arthropathy     Lumbar degenerative disc disease     Arthritis of knee, left Yes    Arthritis of knee, right     Sacroiliitis         Plan:   Arthritis of knee, left  -     Large Joint Aspiration/Injection: L knee  -     Discontinue: methylPREDNISolone acetate injection 80 mg    Lumbar facet arthropathy    Lumbar degenerative disc disease    Arthritis of knee, right    Sacroiliitis    Other orders  -     cyclobenzaprine (FLEXERIL) 10 MG tablet; Take 1 tablet (10 mg total) by  mouth every evening. for 10 days  Dispense: 30 tablet; Refill: 1  -     Cancel: Large Joint Aspiration/Injection         Patient Instructions   We will inject the left knee with 80 mg Depo-Medrol mixed with 5 cc of 1% lidocaine  Will start physical therapy  Tylenol 650 mg 3 times a day as needed  [facility]  Flexeril 10 mg as needed for sleep/ spasm    Return to clinic in 4weeks          Disclaimer: This note was prepared using a voice recognition system and is likely to have sound alike errors within the text.

## 2020-01-06 RX ORDER — METHYLPREDNISOLONE ACETATE 80 MG/ML
80 INJECTION, SUSPENSION INTRA-ARTICULAR; INTRALESIONAL; INTRAMUSCULAR; SOFT TISSUE
Status: DISCONTINUED | OUTPATIENT
Start: 2020-01-03 | End: 2020-01-06 | Stop reason: HOSPADM

## 2020-01-27 ENCOUNTER — OFFICE VISIT (OUTPATIENT)
Dept: ORTHOPEDICS | Facility: CLINIC | Age: 85
End: 2020-01-27
Payer: MEDICARE

## 2020-01-27 VITALS
BODY MASS INDEX: 24.43 KG/M2 | HEART RATE: 71 BPM | HEIGHT: 66 IN | DIASTOLIC BLOOD PRESSURE: 74 MMHG | WEIGHT: 152 LBS | SYSTOLIC BLOOD PRESSURE: 140 MMHG

## 2020-01-27 DIAGNOSIS — M17.11 ARTHRITIS OF KNEE, RIGHT: ICD-10-CM

## 2020-01-27 DIAGNOSIS — M17.12 ARTHRITIS OF KNEE, LEFT: Primary | ICD-10-CM

## 2020-01-27 DIAGNOSIS — M47.816 LUMBAR FACET ARTHROPATHY: ICD-10-CM

## 2020-01-27 PROCEDURE — 1159F MED LIST DOCD IN RCRD: CPT | Mod: HCNC,S$GLB,, | Performed by: ORTHOPAEDIC SURGERY

## 2020-01-27 PROCEDURE — 3077F SYST BP >= 140 MM HG: CPT | Mod: HCNC,CPTII,S$GLB, | Performed by: ORTHOPAEDIC SURGERY

## 2020-01-27 PROCEDURE — 99214 PR OFFICE/OUTPT VISIT, EST, LEVL IV, 30-39 MIN: ICD-10-PCS | Mod: HCNC,S$GLB,, | Performed by: ORTHOPAEDIC SURGERY

## 2020-01-27 PROCEDURE — 99214 OFFICE O/P EST MOD 30 MIN: CPT | Mod: HCNC,S$GLB,, | Performed by: ORTHOPAEDIC SURGERY

## 2020-01-27 PROCEDURE — 3078F PR MOST RECENT DIASTOLIC BLOOD PRESSURE < 80 MM HG: ICD-10-PCS | Mod: HCNC,CPTII,S$GLB, | Performed by: ORTHOPAEDIC SURGERY

## 2020-01-27 PROCEDURE — 99999 PR PBB SHADOW E&M-EST. PATIENT-LVL III: ICD-10-PCS | Mod: PBBFAC,HCNC,, | Performed by: ORTHOPAEDIC SURGERY

## 2020-01-27 PROCEDURE — 1101F PR PT FALLS ASSESS DOC 0-1 FALLS W/OUT INJ PAST YR: ICD-10-PCS | Mod: HCNC,CPTII,S$GLB, | Performed by: ORTHOPAEDIC SURGERY

## 2020-01-27 PROCEDURE — 1101F PT FALLS ASSESS-DOCD LE1/YR: CPT | Mod: HCNC,CPTII,S$GLB, | Performed by: ORTHOPAEDIC SURGERY

## 2020-01-27 PROCEDURE — 3077F PR MOST RECENT SYSTOLIC BLOOD PRESSURE >= 140 MM HG: ICD-10-PCS | Mod: HCNC,CPTII,S$GLB, | Performed by: ORTHOPAEDIC SURGERY

## 2020-01-27 PROCEDURE — 1126F AMNT PAIN NOTED NONE PRSNT: CPT | Mod: HCNC,S$GLB,, | Performed by: ORTHOPAEDIC SURGERY

## 2020-01-27 PROCEDURE — 1126F PR PAIN SEVERITY QUANTIFIED, NO PAIN PRESENT: ICD-10-PCS | Mod: HCNC,S$GLB,, | Performed by: ORTHOPAEDIC SURGERY

## 2020-01-27 PROCEDURE — 1159F PR MEDICATION LIST DOCUMENTED IN MEDICAL RECORD: ICD-10-PCS | Mod: HCNC,S$GLB,, | Performed by: ORTHOPAEDIC SURGERY

## 2020-01-27 PROCEDURE — 3078F DIAST BP <80 MM HG: CPT | Mod: HCNC,CPTII,S$GLB, | Performed by: ORTHOPAEDIC SURGERY

## 2020-01-27 PROCEDURE — 99999 PR PBB SHADOW E&M-EST. PATIENT-LVL III: CPT | Mod: PBBFAC,HCNC,, | Performed by: ORTHOPAEDIC SURGERY

## 2020-01-27 NOTE — PROGRESS NOTES
Subjective:     Patient ID: Tiffanie Barajas is a 84 y.o. female.    Chief Complaint: Pain of the Left Knee and Pain of the Right Hip    HPI:  84-year-old complaining of pain over the left sacroiliac joint radiating down to her knee.  Previous history of sciatica and a cyst in the back that was removed. Has history of arthritis of both of her knees received injection longtime ago more than a year (lubrication shot).  She is not having too much pain in the right knee unable to fully extend the left knee difficulty with shopping and walking.  Pain roughly 4/10.  Denies loss of bowel bladder control.  Walking distance is seems to hurt approximately 200 ft.  She is on blood thinners and unable to take NSAIDs.  She takes 1 little Tylenol occasionally and does not help.    01/27/2020.  Left knee injection of Depo-Medrol seems to have helped this patient.  Given 01/03/2020.  Just started physical therapy and that seems to help also.  The Flexeril did not seem to work at all.  Already been 5 times to physical therapy and she has at least 3 more weeks to go.  Very pleased so far with her results.  Patient cannot take NSAIDs due to being on blood thinners.  Pain level 0/10.  Does have occasional discomfort right greater trochanteric area  Past Medical History:   Diagnosis Date    Anxiety     Arthritis     Atherosclerosis of native coronary artery of native heart with angina pectoris 8/8/2019    Cancer     Degenerative disc disease     Depression     GERD (gastroesophageal reflux disease)     Hyperlipidemia     Low back derangement syndrome 12/11/2017    Melanoma     Pneumonia due to other staphylococcus      Past Surgical History:   Procedure Laterality Date    ADENOIDECTOMY      CHOLECYSTECTOMY  1973    SPINE SURGERY  2/6/13    L4-L5  cyst removed andfor sciatica    TONSILLECTOMY  1945     Family History   Problem Relation Age of Onset    Arthritis Mother     Hypertension Mother     Stroke Mother      Glaucoma Mother     Heart disease Father 56        fatal MI    Diabetes Brother     Cancer Son 56        melanoma with mets to lung and bone    Arthritis Sister     Hyperlipidemia Neg Hx      Social History     Socioeconomic History    Marital status:      Spouse name: fabio    Number of children: 3    Years of education: Not on file    Highest education level: Not on file   Occupational History    Not on file   Social Needs    Financial resource strain: Not on file    Food insecurity:     Worry: Not on file     Inability: Not on file    Transportation needs:     Medical: Not on file     Non-medical: Not on file   Tobacco Use    Smoking status: Never Smoker    Smokeless tobacco: Never Used   Substance and Sexual Activity    Alcohol use: No     Alcohol/week: 0.0 standard drinks    Drug use: No    Sexual activity: Yes     Partners: Male   Lifestyle    Physical activity:     Days per week: Not on file     Minutes per session: Not on file    Stress: Not on file   Relationships    Social connections:     Talks on phone: Not on file     Gets together: Not on file     Attends Taoism service: Not on file     Active member of club or organization: Not on file     Attends meetings of clubs or organizations: Not on file     Relationship status: Not on file   Other Topics Concern    Not on file   Social History Narrative    . Decaf coffee only. No living will or advanced directive-copy of information given.      Medication List with Changes/Refills   Current Medications    ACETAMINOPHEN (TYLENOL) 650 MG TBSR    Take 1 tablet (650 mg total) by mouth every 8 (eight) hours.    ASPIRIN (ECOTRIN) 81 MG EC TABLET    Take 1 tablet by mouth Daily.    ATORVASTATIN (LIPITOR) 40 MG TABLET    Take 40 mg by mouth nightly.    AZELASTINE (ASTELIN) 137 MCG (0.1 %) NASAL SPRAY    2 sprays (274 mcg total) by Nasal route 2 (two) times daily.    CITALOPRAM (CELEXA) 20 MG TABLET    Take 1 tablet (20 mg total)  by mouth once daily.    CLOPIDOGREL (PLAVIX) 75 MG TABLET    Take 75 mg by mouth once daily.    MULTIVITAMIN (ONE DAILY MULTIVITAMIN) PER TABLET    Take 1 tablet by mouth once daily.    MULTIVITAMIN WITH MINERALS TABLET        NITROGLYCERIN (NITROSTAT) 0.4 MG SL TABLET    DISSOLVE ONE TABLET UNDER THE TONGUE EVERY 5 MINUTES AS NEEDED FOR CHEST PAIN. DO NOT EXCEED A TOTAL OF 3 DOSES IN 15 MINUTES    PANTOPRAZOLE (PROTONIX) 40 MG TABLET    Take 1 tablet (40 mg total) by mouth once daily.    POLYETHYLENE GLYCOL (GLYCOLAX) 17 GRAM/DOSE POWDER    Take 17 g by mouth daily as needed.    SULFAMETHOXAZOLE-TRIMETHOPRIM 800-160MG (BACTRIM DS) 800-160 MG TAB    Take 1 tablet by mouth 2 (two) times daily.     Review of patient's allergies indicates:   Allergen Reactions    Diclofenac Nausea And Vomiting and Other (See Comments)    Hydrocodone-acetaminophen Nausea And Vomiting     Other reaction(s): Vomiting     Review of Systems   Constitution: Negative for decreased appetite.   HENT: Negative for tinnitus.    Eyes: Negative for double vision.   Cardiovascular: Negative for chest pain.   Respiratory: Negative for wheezing.    Hematologic/Lymphatic: Negative for bleeding problem.   Skin: Negative for dry skin.   Musculoskeletal: Positive for arthritis, back pain, joint pain, muscle weakness and stiffness. Negative for gout and neck pain.   Gastrointestinal: Negative for abdominal pain.   Genitourinary: Negative for bladder incontinence.   Neurological: Negative for numbness, paresthesias and sensory change.   Psychiatric/Behavioral: Negative for altered mental status.       Objective:   Body mass index is 24.53 kg/m².  Vitals:    01/27/20 0952   BP: (!) 140/74   Pulse: 71          General    Constitutional: She is oriented to person, place, and time. She appears well-developed.   HENT:   Head: Atraumatic.   Eyes: EOM are normal.   Cardiovascular: Normal rate.    Pulmonary/Chest: Effort normal.   Abdominal: Soft.   Neurological:  She is alert and oriented to person, place, and time.   Psychiatric: Judgment normal.            Cervical spine rotation is functional but slightly limited  Bilateral upper extremity neurovascularly intact.  Radial ulnar pulses 2+.  Sensory intact to touch.  Biceps/triceps/wrist extension and flexion shoulder abduction is 5/5  Lumbar with loss of lordosis and tenderness around the L4-5 and mostly over the left sacroiliac joint.  Forward bending barely to proximal tibia lateral bending to mid thigh.  Pelvis is level.  Passive hip motion within normal limits.  Hip flexors and abductors as well as quads and hamstrings and ankle extensors and flexors are slightly weak at 5-/5  Right knee range of motion 0-120.  Slight crepitus to AP compression on the patella in on medial joint.  Very mild swelling.  Collaterals and cruciate stable.  Left knee with approximately 5-10 ° of flexion contracture and flexion barely to 100°.  Moderate swelling.  Collaterals and cruciate stable. Crepitus with motion.  Pain in medially to palpation.  Calves are soft nontender  Slight varicosities around the calves  DP 1+  Skin is warm to touch no obvious lesions  Patellar reflex 1+  Relevant imaging results reviewed and interpreted by me, discussed with the patient and / or family today   X-ray bilateral knees with left knee complete loss of medial joint space with osteophytic changes and sclerosis.  Same on the right knee but not as bad consistent with bilateral end-stage arthrosis  X-ray of the pelvis showing some degenerative changes on the left hip with good joint space left.  Right hip no joint space loss consistent with some arthrosis of the left hip  X-ray of the lumbar spine in the electronic records multilevel facet arthropathy and osteophytes  Assessment:     Encounter Diagnoses   Name Primary?    Arthritis of knee, left Yes    Arthritis of knee, right     Lumbar facet arthropathy         Plan:   Arthritis of knee,  left    Arthritis of knee, right    Lumbar facet arthropathy         Patient Instructions   Since the Flexeril/cyclobenzaprine did not help might as well stop taking it  Continue with physical therapy  Will obtain approval from insurance company to inject left knee with Synvisc-One/hyaluronic injections-rooster comb gel  Continue Aspercreme with lidocaine 3 to 4 times a day apply 2 in at least  Return to clinic in 2 weeks to inject Synvisc-One  Cannot take anti-inflammatories because on blood thinners          Disclaimer: This note was prepared using a voice recognition system and is likely to have sound alike errors within the text.

## 2020-02-13 ENCOUNTER — OFFICE VISIT (OUTPATIENT)
Dept: ORTHOPEDICS | Facility: CLINIC | Age: 85
End: 2020-02-13
Payer: MEDICARE

## 2020-02-13 VITALS
BODY MASS INDEX: 25.07 KG/M2 | SYSTOLIC BLOOD PRESSURE: 131 MMHG | WEIGHT: 156 LBS | DIASTOLIC BLOOD PRESSURE: 51 MMHG | HEART RATE: 65 BPM | HEIGHT: 66 IN

## 2020-02-13 DIAGNOSIS — M17.11 ARTHRITIS OF KNEE, RIGHT: ICD-10-CM

## 2020-02-13 DIAGNOSIS — M17.12 ARTHRITIS OF KNEE, LEFT: Primary | ICD-10-CM

## 2020-02-13 DIAGNOSIS — M47.816 LUMBAR FACET ARTHROPATHY: ICD-10-CM

## 2020-02-13 PROCEDURE — 1126F PR PAIN SEVERITY QUANTIFIED, NO PAIN PRESENT: ICD-10-PCS | Mod: HCNC,S$GLB,, | Performed by: ORTHOPAEDIC SURGERY

## 2020-02-13 PROCEDURE — 20610 LARGE JOINT ASPIRATION/INJECTION: L KNEE: ICD-10-PCS | Mod: HCNC,LT,S$GLB, | Performed by: ORTHOPAEDIC SURGERY

## 2020-02-13 PROCEDURE — 99999 PR PBB SHADOW E&M-EST. PATIENT-LVL III: CPT | Mod: PBBFAC,HCNC,, | Performed by: ORTHOPAEDIC SURGERY

## 2020-02-13 PROCEDURE — 3075F PR MOST RECENT SYSTOLIC BLOOD PRESS GE 130-139MM HG: ICD-10-PCS | Mod: HCNC,CPTII,S$GLB, | Performed by: ORTHOPAEDIC SURGERY

## 2020-02-13 PROCEDURE — 1101F PR PT FALLS ASSESS DOC 0-1 FALLS W/OUT INJ PAST YR: ICD-10-PCS | Mod: HCNC,CPTII,S$GLB, | Performed by: ORTHOPAEDIC SURGERY

## 2020-02-13 PROCEDURE — 99499 UNLISTED E&M SERVICE: CPT | Mod: HCNC,S$GLB,, | Performed by: ORTHOPAEDIC SURGERY

## 2020-02-13 PROCEDURE — 1159F PR MEDICATION LIST DOCUMENTED IN MEDICAL RECORD: ICD-10-PCS | Mod: HCNC,S$GLB,, | Performed by: ORTHOPAEDIC SURGERY

## 2020-02-13 PROCEDURE — 3078F DIAST BP <80 MM HG: CPT | Mod: HCNC,CPTII,S$GLB, | Performed by: ORTHOPAEDIC SURGERY

## 2020-02-13 PROCEDURE — 99999 PR PBB SHADOW E&M-EST. PATIENT-LVL III: ICD-10-PCS | Mod: PBBFAC,HCNC,, | Performed by: ORTHOPAEDIC SURGERY

## 2020-02-13 PROCEDURE — 1101F PT FALLS ASSESS-DOCD LE1/YR: CPT | Mod: HCNC,CPTII,S$GLB, | Performed by: ORTHOPAEDIC SURGERY

## 2020-02-13 PROCEDURE — 20610 DRAIN/INJ JOINT/BURSA W/O US: CPT | Mod: HCNC,LT,S$GLB, | Performed by: ORTHOPAEDIC SURGERY

## 2020-02-13 PROCEDURE — 1126F AMNT PAIN NOTED NONE PRSNT: CPT | Mod: HCNC,S$GLB,, | Performed by: ORTHOPAEDIC SURGERY

## 2020-02-13 PROCEDURE — 1159F MED LIST DOCD IN RCRD: CPT | Mod: HCNC,S$GLB,, | Performed by: ORTHOPAEDIC SURGERY

## 2020-02-13 PROCEDURE — 3078F PR MOST RECENT DIASTOLIC BLOOD PRESSURE < 80 MM HG: ICD-10-PCS | Mod: HCNC,CPTII,S$GLB, | Performed by: ORTHOPAEDIC SURGERY

## 2020-02-13 PROCEDURE — 3075F SYST BP GE 130 - 139MM HG: CPT | Mod: HCNC,CPTII,S$GLB, | Performed by: ORTHOPAEDIC SURGERY

## 2020-02-13 PROCEDURE — 99499 NO LOS: ICD-10-PCS | Mod: HCNC,S$GLB,, | Performed by: ORTHOPAEDIC SURGERY

## 2020-02-13 NOTE — PATIENT INSTRUCTIONS
Since physical therapy is over with continue with independent exercise  02/13/2020 injected left knee Synvisc-One injection  Ice the needed next 2-3 days  Resume activities to tolerance  Return to clinic in 6 weeks

## 2020-02-13 NOTE — PROGRESS NOTES
Subjective:     Patient ID: Tiffanie Barajas is a 84 y.o. female.    Chief Complaint: Pain of the Left Knee    HPI:  84-year-old complaining of pain over the left sacroiliac joint radiating down to her knee.  Previous history of sciatica and a cyst in the back that was removed. Has history of arthritis of both of her knees received injection longtime ago more than a year (lubrication shot).  She is not having too much pain in the right knee unable to fully extend the left knee difficulty with shopping and walking.  Pain roughly 4/10.  Denies loss of bowel bladder control.  Walking distance is seems to hurt approximately 200 ft.  She is on blood thinners and unable to take NSAIDs.  She takes 1 little Tylenol occasionally and does not help.    01/27/2020.  Left knee injection of Depo-Medrol seems to have helped this patient.  Given 01/03/2020.  Just started physical therapy and that seems to help also.  The Flexeril did not seem to work at all.  Already been 5 times to physical therapy and she has at least 3 more weeks to go.  Very pleased so far with her results.  Patient cannot take NSAIDs due to being on blood thinners.  Pain level 0/10.  Does have occasional discomfort right greater trochanteric area    02/13/2020  Patient with bilateral knee arthrosis left worse than right and sacroiliac pain.  She just finished her physical therapy and that seems to have helped and now she is doing independent exercise program.  We did inject the knees with steroid that seems to help.  She is ambulating without any assistive devices today. He is here to have Synvisc-One injection into the left knee.  Past Medical History:   Diagnosis Date    Anxiety     Arthritis     Atherosclerosis of native coronary artery of native heart with angina pectoris 8/8/2019    Cancer     Degenerative disc disease     Depression     GERD (gastroesophageal reflux disease)     Hyperlipidemia     Low back derangement syndrome 12/11/2017     Melanoma     Pneumonia due to other staphylococcus      Past Surgical History:   Procedure Laterality Date    ADENOIDECTOMY      CHOLECYSTECTOMY  1973    SPINE SURGERY  2/6/13    L4-L5  cyst removed andfor sciatica    TONSILLECTOMY  1945     Family History   Problem Relation Age of Onset    Arthritis Mother     Hypertension Mother     Stroke Mother     Glaucoma Mother     Heart disease Father 56        fatal MI    Diabetes Brother     Cancer Son 56        melanoma with mets to lung and bone    Arthritis Sister     Hyperlipidemia Neg Hx      Social History     Socioeconomic History    Marital status:      Spouse name: fabio    Number of children: 3    Years of education: Not on file    Highest education level: Not on file   Occupational History    Not on file   Social Needs    Financial resource strain: Not on file    Food insecurity:     Worry: Not on file     Inability: Not on file    Transportation needs:     Medical: Not on file     Non-medical: Not on file   Tobacco Use    Smoking status: Never Smoker    Smokeless tobacco: Never Used   Substance and Sexual Activity    Alcohol use: No     Alcohol/week: 0.0 standard drinks    Drug use: No    Sexual activity: Yes     Partners: Male   Lifestyle    Physical activity:     Days per week: Not on file     Minutes per session: Not on file    Stress: Not on file   Relationships    Social connections:     Talks on phone: Not on file     Gets together: Not on file     Attends Episcopalian service: Not on file     Active member of club or organization: Not on file     Attends meetings of clubs or organizations: Not on file     Relationship status: Not on file   Other Topics Concern    Not on file   Social History Narrative    . Decaf coffee only. No living will or advanced directive-copy of information given.      Medication List with Changes/Refills   Current Medications    ACETAMINOPHEN (TYLENOL) 650 MG TBSR    Take 1 tablet (650  mg total) by mouth every 8 (eight) hours.    ASPIRIN (ECOTRIN) 81 MG EC TABLET    Take 1 tablet by mouth Daily.    ATORVASTATIN (LIPITOR) 40 MG TABLET    Take 40 mg by mouth nightly.    AZELASTINE (ASTELIN) 137 MCG (0.1 %) NASAL SPRAY    2 sprays (274 mcg total) by Nasal route 2 (two) times daily.    CITALOPRAM (CELEXA) 20 MG TABLET    Take 1 tablet (20 mg total) by mouth once daily.    CLOPIDOGREL (PLAVIX) 75 MG TABLET    Take 75 mg by mouth once daily.    MULTIVITAMIN (ONE DAILY MULTIVITAMIN) PER TABLET    Take 1 tablet by mouth once daily.    MULTIVITAMIN WITH MINERALS TABLET        NITROGLYCERIN (NITROSTAT) 0.4 MG SL TABLET    DISSOLVE ONE TABLET UNDER THE TONGUE EVERY 5 MINUTES AS NEEDED FOR CHEST PAIN. DO NOT EXCEED A TOTAL OF 3 DOSES IN 15 MINUTES    PANTOPRAZOLE (PROTONIX) 40 MG TABLET    Take 1 tablet (40 mg total) by mouth once daily.    POLYETHYLENE GLYCOL (GLYCOLAX) 17 GRAM/DOSE POWDER    Take 17 g by mouth daily as needed.    SULFAMETHOXAZOLE-TRIMETHOPRIM 800-160MG (BACTRIM DS) 800-160 MG TAB    Take 1 tablet by mouth 2 (two) times daily.     Review of patient's allergies indicates:   Allergen Reactions    Hydrocodone-acetaminophen Nausea And Vomiting     Other reaction(s): Vomiting    Diclofenac Nausea And Vomiting and Other (See Comments)     Review of Systems   Constitution: Negative for decreased appetite.   HENT: Negative for tinnitus.    Eyes: Negative for double vision.   Cardiovascular: Negative for chest pain.   Respiratory: Negative for wheezing.    Hematologic/Lymphatic: Negative for bleeding problem.   Skin: Negative for dry skin.   Musculoskeletal: Positive for arthritis, back pain, joint pain, muscle weakness and stiffness. Negative for gout and neck pain.   Gastrointestinal: Negative for abdominal pain.   Genitourinary: Negative for bladder incontinence.   Neurological: Negative for numbness, paresthesias and sensory change.   Psychiatric/Behavioral: Negative for altered mental status.        Objective:   Body mass index is 25.18 kg/m².  Vitals:    02/13/20 1034   BP: (!) 131/51   Pulse: 65          General    Constitutional: She is oriented to person, place, and time. She appears well-developed.   HENT:   Head: Atraumatic.   Eyes: EOM are normal.   Cardiovascular: Normal rate.    Pulmonary/Chest: Effort normal.   Abdominal: Soft.   Neurological: She is alert and oriented to person, place, and time.   Psychiatric: Judgment normal.            Cervical spine rotation is functional but slightly limited  Bilateral upper extremity neurovascularly intact.  Radial ulnar pulses 2+.  Sensory intact to touch.  Biceps/triceps/wrist extension and flexion shoulder abduction is 5/5  Lumbar with loss of lordosis and tenderness around the L4-5 and mostly over the left sacroiliac joint.  Forward bending barely to proximal tibia lateral bending to mid thigh.  Pelvis is level.  Passive hip motion within normal limits.  Hip flexors and abductors as well as quads and hamstrings and ankle extensors and flexors are slightly weak at 5-/5  Right knee range of motion 0-120.  Slight crepitus to AP compression on the patella in on medial joint.  Very mild swelling.  Collaterals and cruciate stable.  Left knee with approximately 5-10 ° of flexion contracture and flexion barely to 110°.  Mild swelling.  Collaterals and cruciate stable. Crepitus with motion.  Pain in medially to palpation.  Calves are soft nontender  Slight varicosities around the calves  DP 1+  Skin is warm to touch no obvious lesions  Patellar reflex 1+    Relevant imaging results reviewed and interpreted by me, discussed with the patient and / or family today   X-ray bilateral knees with left knee complete loss of medial joint space with osteophytic changes and sclerosis.  Same on the right knee but not as bad consistent with bilateral end-stage arthrosis  X-ray of the pelvis showing some degenerative changes on the left hip with good joint space left.  Right  hip no joint space loss consistent with some arthrosis of the left hip  X-ray of the lumbar spine in the electronic records multilevel facet arthropathy and osteophytes  Assessment:     Encounter Diagnoses   Name Primary?    Arthritis of knee, left Yes    Arthritis of knee, right     Lumbar facet arthropathy         Plan:   Arthritis of knee, left  -     Large Joint Aspiration/Injection: L knee    Arthritis of knee, right    Lumbar facet arthropathy         Patient Instructions   Since physical therapy is over with continue with independent exercise  02/13/2020 injected left knee Synvisc-One injection  Ice the needed next 2-3 days  Resume activities to tolerance  Return to clinic in 6 weeks          Disclaimer: This note was prepared using a voice recognition system and is likely to have sound alike errors within the text.

## 2020-02-13 NOTE — PROCEDURES
Large Joint Aspiration/Injection: L knee  Performed by: Ranjit Kelly MD  Authorized by: Ranjit Kelly MD  Date/Time: 2/13/2020 10:00 AM    Consent Done?:  Yes (Verbal)  Indications:   Timeout: Immediately prior to procedure a time out was called to verify the correct patient, procedure, equipment, support staff and site/side marked as required.  Procedure site marked: Yes     Anesthesia  Local anesthesia used  Anesthetic: lidocaine 1% without epinephrine    Details:   Needle size: 22 G    Ultrasonic Guidance for needle placement: No    Medications: 48 mg hylan g-f 20 48 mg/6 mL  Patient tolerance:  patient tolerated the procedure well with no immediate complications

## 2020-04-15 ENCOUNTER — TELEPHONE (OUTPATIENT)
Dept: INTERNAL MEDICINE | Facility: CLINIC | Age: 85
End: 2020-04-15

## 2020-04-15 DIAGNOSIS — N76.0 VULVOVAGINITIS: Primary | ICD-10-CM

## 2020-04-15 RX ORDER — NYSTATIN AND TRIAMCINOLONE ACETONIDE 100000; 1 [USP'U]/G; MG/G
CREAM TOPICAL 2 TIMES DAILY
Qty: 15 G | Refills: 0 | Status: SHIPPED | OUTPATIENT
Start: 2020-04-15 | End: 2022-04-27

## 2020-04-15 NOTE — TELEPHONE ENCOUNTER
Spoke with pt c/o vaginal itch for 2 weeks.   States she has uses vaginal itch cream otc, vinegar and peroxide.   Asking if you can prescribe something.   Pt does not have video capabilities.

## 2020-04-15 NOTE — TELEPHONE ENCOUNTER
Rx for nystatin-triamcinolone cream to apply twice daily x 7 days. If worse/persistent may need to consult with GYN.

## 2020-04-15 NOTE — TELEPHONE ENCOUNTER
----- Message from Celena Tamez sent at 4/15/2020  8:11 AM CDT -----  Contact: Patient  Patient states that she has a vaginal itch ans would like to be advised, please call her back at [phone]. Thank you

## 2020-05-04 ENCOUNTER — TELEPHONE (OUTPATIENT)
Dept: OBSTETRICS AND GYNECOLOGY | Facility: CLINIC | Age: 85
End: 2020-05-04

## 2020-05-04 NOTE — TELEPHONE ENCOUNTER
----- Message from Yuliana Sun sent at 5/4/2020 10:41 AM CDT -----  Pt is requesting a call back regarding bladder issues. Please call pt back at [phone]

## 2020-05-22 ENCOUNTER — OFFICE VISIT (OUTPATIENT)
Dept: ORTHOPEDICS | Facility: CLINIC | Age: 85
End: 2020-05-22
Payer: MEDICARE

## 2020-05-22 VITALS
DIASTOLIC BLOOD PRESSURE: 65 MMHG | WEIGHT: 156 LBS | SYSTOLIC BLOOD PRESSURE: 151 MMHG | HEART RATE: 72 BPM | HEIGHT: 66 IN | BODY MASS INDEX: 25.07 KG/M2

## 2020-05-22 DIAGNOSIS — M17.12 ARTHRITIS OF KNEE, LEFT: ICD-10-CM

## 2020-05-22 DIAGNOSIS — M17.11 ARTHRITIS OF KNEE, RIGHT: ICD-10-CM

## 2020-05-22 DIAGNOSIS — M47.816 LUMBAR FACET ARTHROPATHY: Primary | ICD-10-CM

## 2020-05-22 PROCEDURE — 20552 NJX 1/MLT TRIGGER POINT 1/2: CPT | Mod: HCNC,S$GLB,, | Performed by: ORTHOPAEDIC SURGERY

## 2020-05-22 PROCEDURE — 1101F PR PT FALLS ASSESS DOC 0-1 FALLS W/OUT INJ PAST YR: ICD-10-PCS | Mod: HCNC,CPTII,S$GLB, | Performed by: ORTHOPAEDIC SURGERY

## 2020-05-22 PROCEDURE — 1101F PT FALLS ASSESS-DOCD LE1/YR: CPT | Mod: HCNC,CPTII,S$GLB, | Performed by: ORTHOPAEDIC SURGERY

## 2020-05-22 PROCEDURE — 3077F PR MOST RECENT SYSTOLIC BLOOD PRESSURE >= 140 MM HG: ICD-10-PCS | Mod: HCNC,CPTII,S$GLB, | Performed by: ORTHOPAEDIC SURGERY

## 2020-05-22 PROCEDURE — 20552 TRIGGER POINT INJECTION: ICD-10-PCS | Mod: HCNC,S$GLB,, | Performed by: ORTHOPAEDIC SURGERY

## 2020-05-22 PROCEDURE — 99999 PR PBB SHADOW E&M-EST. PATIENT-LVL IV: CPT | Mod: PBBFAC,HCNC,, | Performed by: ORTHOPAEDIC SURGERY

## 2020-05-22 PROCEDURE — 99999 PR PBB SHADOW E&M-EST. PATIENT-LVL IV: ICD-10-PCS | Mod: PBBFAC,HCNC,, | Performed by: ORTHOPAEDIC SURGERY

## 2020-05-22 PROCEDURE — 3078F DIAST BP <80 MM HG: CPT | Mod: HCNC,CPTII,S$GLB, | Performed by: ORTHOPAEDIC SURGERY

## 2020-05-22 PROCEDURE — 1159F PR MEDICATION LIST DOCUMENTED IN MEDICAL RECORD: ICD-10-PCS | Mod: HCNC,S$GLB,, | Performed by: ORTHOPAEDIC SURGERY

## 2020-05-22 PROCEDURE — 1125F PR PAIN SEVERITY QUANTIFIED, PAIN PRESENT: ICD-10-PCS | Mod: HCNC,S$GLB,, | Performed by: ORTHOPAEDIC SURGERY

## 2020-05-22 PROCEDURE — 99214 PR OFFICE/OUTPT VISIT, EST, LEVL IV, 30-39 MIN: ICD-10-PCS | Mod: 25,HCNC,S$GLB, | Performed by: ORTHOPAEDIC SURGERY

## 2020-05-22 PROCEDURE — 3077F SYST BP >= 140 MM HG: CPT | Mod: HCNC,CPTII,S$GLB, | Performed by: ORTHOPAEDIC SURGERY

## 2020-05-22 PROCEDURE — 1125F AMNT PAIN NOTED PAIN PRSNT: CPT | Mod: HCNC,S$GLB,, | Performed by: ORTHOPAEDIC SURGERY

## 2020-05-22 PROCEDURE — 3078F PR MOST RECENT DIASTOLIC BLOOD PRESSURE < 80 MM HG: ICD-10-PCS | Mod: HCNC,CPTII,S$GLB, | Performed by: ORTHOPAEDIC SURGERY

## 2020-05-22 PROCEDURE — 1159F MED LIST DOCD IN RCRD: CPT | Mod: HCNC,S$GLB,, | Performed by: ORTHOPAEDIC SURGERY

## 2020-05-22 PROCEDURE — 99214 OFFICE O/P EST MOD 30 MIN: CPT | Mod: 25,HCNC,S$GLB, | Performed by: ORTHOPAEDIC SURGERY

## 2020-05-22 RX ORDER — METHYLPREDNISOLONE ACETATE 40 MG/ML
40 INJECTION, SUSPENSION INTRA-ARTICULAR; INTRALESIONAL; INTRAMUSCULAR; SOFT TISSUE
Status: DISCONTINUED | OUTPATIENT
Start: 2020-05-22 | End: 2020-05-22 | Stop reason: HOSPADM

## 2020-05-22 RX ADMIN — METHYLPREDNISOLONE ACETATE 40 MG: 40 INJECTION, SUSPENSION INTRA-ARTICULAR; INTRALESIONAL; INTRAMUSCULAR; SOFT TISSUE at 11:05

## 2020-05-22 NOTE — PATIENT INSTRUCTIONS
aspercream  Apply 2 inches to area 3 times a day  Injecting depomedrol 40mg into the left  lumbarwith lidocaine 5/22/20  Stationary bike and walking is benefecial  Return in 6 weeks

## 2020-05-22 NOTE — PROCEDURES
Trigger Point Injection  Performed by: Ranjit Kelly MD  Authorized by: Ranjit Kelly MD     Lumbar Paraspinal:  Left  Consent Done?:  Yes (Verbal)  Site marked: the procedure site was marked    Timeout: prior to procedure the correct patient, procedure, and site was verified        Anesthesia:  Local infiltration    Local anesthetic:  Lidocaine 1% without epinephrine   40 mg methylPREDNISolone acetate 40 mg/mL

## 2020-05-22 NOTE — PROGRESS NOTES
Subjective:     Patient ID: Tiffanie Barajas is a 84 y.o. female.    Chief Complaint: Pain of the Left Knee    HPI:  84-year-old complaining of pain over the left sacroiliac joint radiating down to her knee.  Previous history of sciatica and a cyst in the back that was removed. Has history of arthritis of both of her knees received injection longtime ago more than a year (lubrication shot).  She is not having too much pain in the right knee unable to fully extend the left knee difficulty with shopping and walking.  Pain roughly 4/10.  Denies loss of bowel bladder control.  Walking distance is seems to hurt approximately 200 ft.  She is on blood thinners and unable to take NSAIDs.  She takes 1 little Tylenol occasionally and does not help.    01/27/2020.  Left knee injection of Depo-Medrol seems to have helped this patient.  Given 01/03/2020.  Just started physical therapy and that seems to help also.  The Flexeril did not seem to work at all.  Already been 5 times to physical therapy and she has at least 3 more weeks to go.  Very pleased so far with her results.  Patient cannot take NSAIDs due to being on blood thinners.  Pain level 0/10.  Does have occasional discomfort right greater trochanteric area    02/13/2020  Patient with bilateral knee arthrosis left worse than right and sacroiliac pain.  She just finished her physical therapy and that seems to have helped and now she is doing independent exercise program.  We did inject the knees with steroid that seems to help.  She is ambulating without any assistive devices today. He is here to have Synvisc-One injection into the left knee.    05/22/2020  Patient complaining today of left hip pain/she points over the lumbar area radiating to the posterior aspect of the knee.  She has history of lumbosacral arthritis.  As far as her left knee injection of Synvisc-One she is not having any pain with that.  She is very happy with that result.  She wants something done and  0 not want any prescriptions for any pills.  Denies loss of bowel bladder control or usage or any assistive devices to ambulate.  She is doing independent exercise as well as showing me that her  created exercise program for her.  I did recommend stationary bicycle and independent stretching exercises.  Denies loss of bowel bladder control and fever or chills or shortness of breath or chest pain  Past Medical History:   Diagnosis Date    Anxiety     Arthritis     Atherosclerosis of native coronary artery of native heart with angina pectoris 8/8/2019    Cancer     Degenerative disc disease     Depression     GERD (gastroesophageal reflux disease)     Hyperlipidemia     Low back derangement syndrome 12/11/2017    Melanoma     Pneumonia due to other staphylococcus      Past Surgical History:   Procedure Laterality Date    ADENOIDECTOMY      CHOLECYSTECTOMY  1973    SPINE SURGERY  2/6/13    L4-L5  cyst removed andfor sciatica    TONSILLECTOMY  1945     Family History   Problem Relation Age of Onset    Arthritis Mother     Hypertension Mother     Stroke Mother     Glaucoma Mother     Heart disease Father 56        fatal MI    Diabetes Brother     Cancer Son 56        melanoma with mets to lung and bone    Arthritis Sister     Hyperlipidemia Neg Hx      Social History     Socioeconomic History    Marital status:      Spouse name: fabio    Number of children: 3    Years of education: Not on file    Highest education level: Not on file   Occupational History    Not on file   Social Needs    Financial resource strain: Not on file    Food insecurity:     Worry: Not on file     Inability: Not on file    Transportation needs:     Medical: Not on file     Non-medical: Not on file   Tobacco Use    Smoking status: Never Smoker    Smokeless tobacco: Never Used   Substance and Sexual Activity    Alcohol use: No     Alcohol/week: 0.0 standard drinks    Drug use: No    Sexual  activity: Yes     Partners: Male   Lifestyle    Physical activity:     Days per week: Not on file     Minutes per session: Not on file    Stress: Not on file   Relationships    Social connections:     Talks on phone: Not on file     Gets together: Not on file     Attends Gnosticism service: Not on file     Active member of club or organization: Not on file     Attends meetings of clubs or organizations: Not on file     Relationship status: Not on file   Other Topics Concern    Not on file   Social History Narrative    . Decaf coffee only. No living will or advanced directive-copy of information given.      Medication List with Changes/Refills   Current Medications    ACETAMINOPHEN (TYLENOL) 650 MG TBSR    Take 1 tablet (650 mg total) by mouth every 8 (eight) hours.    ASPIRIN (ECOTRIN) 81 MG EC TABLET    Take 1 tablet by mouth Daily.    ATORVASTATIN (LIPITOR) 40 MG TABLET    Take 40 mg by mouth nightly.    AZELASTINE (ASTELIN) 137 MCG (0.1 %) NASAL SPRAY    2 sprays (274 mcg total) by Nasal route 2 (two) times daily.    CITALOPRAM (CELEXA) 20 MG TABLET    Take 1 tablet (20 mg total) by mouth once daily.    CLOPIDOGREL (PLAVIX) 75 MG TABLET    Take 75 mg by mouth once daily.    MULTIVITAMIN (ONE DAILY MULTIVITAMIN) PER TABLET    Take 1 tablet by mouth once daily.    MULTIVITAMIN WITH MINERALS TABLET        NITROGLYCERIN (NITROSTAT) 0.4 MG SL TABLET    DISSOLVE ONE TABLET UNDER THE TONGUE EVERY 5 MINUTES AS NEEDED FOR CHEST PAIN. DO NOT EXCEED A TOTAL OF 3 DOSES IN 15 MINUTES    NYSTATIN-TRIAMCINOLONE (MYCOLOG II) CREAM    Apply topically 2 (two) times daily. for 7 days    PANTOPRAZOLE (PROTONIX) 40 MG TABLET    Take 1 tablet (40 mg total) by mouth once daily.    POLYETHYLENE GLYCOL (GLYCOLAX) 17 GRAM/DOSE POWDER    Take 17 g by mouth daily as needed.    SULFAMETHOXAZOLE-TRIMETHOPRIM 800-160MG (BACTRIM DS) 800-160 MG TAB    Take 1 tablet by mouth 2 (two) times daily.     Review of patient's allergies  indicates:   Allergen Reactions    Hydrocodone-acetaminophen Nausea And Vomiting     Other reaction(s): Vomiting    Diclofenac Nausea And Vomiting and Other (See Comments)     Review of Systems   Constitution: Negative for decreased appetite.   HENT: Negative for tinnitus.    Eyes: Negative for double vision.   Cardiovascular: Negative for chest pain.   Respiratory: Negative for wheezing.    Hematologic/Lymphatic: Negative for bleeding problem.   Skin: Negative for dry skin.   Musculoskeletal: Positive for arthritis, back pain, joint pain, muscle weakness and stiffness. Negative for gout and neck pain.   Gastrointestinal: Negative for abdominal pain.   Genitourinary: Negative for bladder incontinence.   Neurological: Negative for numbness, paresthesias and sensory change.   Psychiatric/Behavioral: Negative for altered mental status.       Objective:   Body mass index is 25.18 kg/m².  Vitals:    05/22/20 1123   BP: (!) 151/65   Pulse: 72          General    Constitutional: She is oriented to person, place, and time. She appears well-developed.   HENT:   Head: Atraumatic.   Eyes: EOM are normal.   Cardiovascular: Normal rate.    Pulmonary/Chest: Effort normal.   Abdominal: Soft.   Neurological: She is alert and oriented to person, place, and time.   Psychiatric: Judgment normal.            Cervical spine rotation is functional but slightly limited  Bilateral upper extremity neurovascularly intact.  Radial ulnar pulses 2+.  Sensory intact to touch.  Biceps/triceps/wrist extension and flexion shoulder abduction is 5/5  Lumbar with loss of lordosis and tenderness around the L4-5 and mostly over the left sacroiliac joint.  Forward bending barely to proximal tibia lateral bending to mid thigh.  Pelvis is level.  Passive hip motion within normal limits.  Hip flexors and abductors as well as quads and hamstrings and ankle extensors and flexors are slightly weak at 5-/5  Right knee range of motion 0-120.  Slight crepitus to  AP compression on the patella in on medial joint.  Very mild swelling.  Collaterals and cruciate stable.  Left knee with approximately 5-10 ° of flexion contracture and flexion barely to 110°.  Mild swelling.  Collaterals and cruciate stable. Crepitus with motion.  Pain in medially to palpation.  Calves are soft nontender  Slight varicosities around the calves  DP 1+  Skin is warm to touch no obvious lesions  Patellar reflex 1+    Relevant imaging results reviewed and interpreted by me, discussed with the patient and / or family today   X-ray bilateral knees with left knee complete loss of medial joint space with osteophytic changes and sclerosis.  Same on the right knee but not as bad consistent with bilateral end-stage arthrosis  X-ray of the pelvis showing some degenerative changes on the left hip with good joint space left.  Right hip no joint space loss consistent with some arthrosis of the left hip  X-ray of the lumbar spine in the electronic records multilevel facet arthropathy and osteophytes  Assessment:     Encounter Diagnoses   Name Primary?    Arthritis of knee, left     Arthritis of knee, right     Lumbar facet arthropathy Yes        Plan:   Lumbar facet arthropathy  -     Trigger Point Injection    Arthritis of knee, left    Arthritis of knee, right         Patient Instructions   aspercream  Apply 2 inches to area 3 times a day  Injecting depomedrol 40mg into the left  lumbarwith lidocaine 5/22/20  Stationary bike and walking is benefecial  Return in 6 weeks          Disclaimer: This note was prepared using a voice recognition system and is likely to have sound alike errors within the text.

## 2020-07-01 ENCOUNTER — PROCEDURE VISIT (OUTPATIENT)
Dept: OBSTETRICS AND GYNECOLOGY | Facility: CLINIC | Age: 85
End: 2020-07-01
Payer: MEDICARE

## 2020-07-01 VITALS — HEIGHT: 66 IN | WEIGHT: 148.56 LBS | BODY MASS INDEX: 23.88 KG/M2

## 2020-07-01 DIAGNOSIS — Z46.89 ENCOUNTER FOR FITTING AND ADJUSTMENT OF PESSARY: Primary | ICD-10-CM

## 2020-07-01 DIAGNOSIS — N81.10 BADEN-WALKER GRADE 2 CYSTOCELE: ICD-10-CM

## 2020-07-01 PROCEDURE — 57160 INSERT PESSARY/OTHER DEVICE: CPT | Mod: HCNC,S$GLB,, | Performed by: OBSTETRICS & GYNECOLOGY

## 2020-07-01 PROCEDURE — 57160 PR FIT/INSERT INTRAVAG SUPPORT DEVICE: ICD-10-PCS | Mod: HCNC,S$GLB,, | Performed by: OBSTETRICS & GYNECOLOGY

## 2020-07-01 RX ORDER — OXYQUINOLINE SULFATE AND SODIUM LAURYL SULFATE .25; .1 MG/G; MG/G
1 JELLY VAGINAL
Qty: 113.4 G | Refills: 3 | Status: SHIPPED | OUTPATIENT
Start: 2020-07-02 | End: 2020-10-06

## 2020-07-01 NOTE — PROCEDURES
Procedures   CC: Pessary check    Tiffanie Barajas is a 84 y.o. female  Presents for pessary fitting.  Patient has a symptomatic cystocele.  No urinary incontinence.  Opts for pessary    PRE-PESSARY CHECK COUNSELING:  The patient was informed of the risks of pain or discomfort, continuous incontinence, urinary retention with insertion of a pessary and malodorous discharge and/or possible bleeding from granulation tissue after wearing the pessary for sometime. She was counseled on the alternatives to pessary insertion, including surgery or no treatment with continued symptoms, and she agrees to proceed.    PROCEDURE:  TIME OUT PERFORMED.   Pelvic exam reveals Grade 2 cysocele with Valsalva    #4 ring pessary with support was inserted without difficulty    The patient was able to sit, stand, walk and either cough or urinate on the toilet after the procedure normally without expelling the pessary. The patient tolerated the procedure well.    ASSESSMENT:  Pelvic relaxation managed with pessary.    POST PESSARY CHECK COUNSELING:  Report worsening urinary incontinency, urinary retention, pain, fever, foul smelling discharge or bleeding.  Importance of keeping follow-up appointments for a pessary check stressed.    Counseling lasted approximately 10 minutes and all her questions were answered.    FOLLOW-UP: In 3 months for pessary check as scheduled.

## 2020-07-01 NOTE — PATIENT INSTRUCTIONS
Pelvic Organ Prolapse: Nonsurgical Treatment  If your pelvic organ prolapse is mild or doesnt bother you much, or if you have medical conditions that make surgery too risky, nonsurgical treatment may be a good choice. A device (pessary) to wear in your vagina can help ease your symptoms. You may also be given certain exercises (Kegels) to do. And you may need to make some lifestyle changes.  Wearing a pessary  A pessary helps support the prolapsed organ or organs. It is specifically fitted by your doctor. A pessary may ease your symptoms, but it cant repair prolapse. The pessary must be removed for cleaning. If you cant do this, you will need to see your doctor regularly. He or she will remove and clean your pessary. If you have questions or concerns about the pessary, be sure to talk with your doctor.  Doing kegels  Kegels are simple exercises that you can do to strengthen the pelvic floor muscles. They may ease your symptoms and prevent further prolapse. To do a Kegel, contract your pelvic floor muscles as if to stop the urine stream. (Do this when youre not urinating.) Ask your doctor how many Kegels to do and how long to hold each one. During your treatment visits, your healthcare provider may place a device in your vagina to measure your Kegel contractions. That way, you can find out whether you are doing Kegel exercises correctly.  Living a healthy life  Improving your health may ease your symptoms or keep your problem from worsening. You may be asked to:  · Quit smoking to prevent excessive coughing  · Adjust medications that may cause urine leakage  · Avoid lifting, which puts pressure on pelvic muscles  · Exercise and eat well to maintain a healthy weight   Date Last Reviewed: 5/10/2015  © 6460-8212 [employer]. [address] [place] [address], [place] [address]. All rights reserved. This information is not intended as a substitute for professional medical care. Always follow your healthcare  professional's instructions.

## 2020-07-09 ENCOUNTER — OFFICE VISIT (OUTPATIENT)
Dept: ORTHOPEDICS | Facility: CLINIC | Age: 85
End: 2020-07-09
Payer: MEDICARE

## 2020-07-09 VITALS
WEIGHT: 148 LBS | BODY MASS INDEX: 23.78 KG/M2 | HEIGHT: 66 IN | DIASTOLIC BLOOD PRESSURE: 66 MMHG | SYSTOLIC BLOOD PRESSURE: 158 MMHG | HEART RATE: 73 BPM

## 2020-07-09 DIAGNOSIS — M47.816 LUMBAR FACET ARTHROPATHY: ICD-10-CM

## 2020-07-09 DIAGNOSIS — M17.11 ARTHRITIS OF KNEE, RIGHT: ICD-10-CM

## 2020-07-09 DIAGNOSIS — M17.12 ARTHRITIS OF KNEE, LEFT: Primary | ICD-10-CM

## 2020-07-09 PROCEDURE — 99213 OFFICE O/P EST LOW 20 MIN: CPT | Mod: 25,HCNC,S$GLB, | Performed by: ORTHOPAEDIC SURGERY

## 2020-07-09 PROCEDURE — 1126F AMNT PAIN NOTED NONE PRSNT: CPT | Mod: HCNC,S$GLB,, | Performed by: ORTHOPAEDIC SURGERY

## 2020-07-09 PROCEDURE — 3077F PR MOST RECENT SYSTOLIC BLOOD PRESSURE >= 140 MM HG: ICD-10-PCS | Mod: HCNC,CPTII,S$GLB, | Performed by: ORTHOPAEDIC SURGERY

## 2020-07-09 PROCEDURE — 3077F SYST BP >= 140 MM HG: CPT | Mod: HCNC,CPTII,S$GLB, | Performed by: ORTHOPAEDIC SURGERY

## 2020-07-09 PROCEDURE — 1159F PR MEDICATION LIST DOCUMENTED IN MEDICAL RECORD: ICD-10-PCS | Mod: HCNC,S$GLB,, | Performed by: ORTHOPAEDIC SURGERY

## 2020-07-09 PROCEDURE — 3078F PR MOST RECENT DIASTOLIC BLOOD PRESSURE < 80 MM HG: ICD-10-PCS | Mod: HCNC,CPTII,S$GLB, | Performed by: ORTHOPAEDIC SURGERY

## 2020-07-09 PROCEDURE — 1100F PTFALLS ASSESS-DOCD GE2>/YR: CPT | Mod: HCNC,CPTII,S$GLB, | Performed by: ORTHOPAEDIC SURGERY

## 2020-07-09 PROCEDURE — 1159F MED LIST DOCD IN RCRD: CPT | Mod: HCNC,S$GLB,, | Performed by: ORTHOPAEDIC SURGERY

## 2020-07-09 PROCEDURE — 20610 DRAIN/INJ JOINT/BURSA W/O US: CPT | Mod: HCNC,LT,S$GLB, | Performed by: ORTHOPAEDIC SURGERY

## 2020-07-09 PROCEDURE — 3078F DIAST BP <80 MM HG: CPT | Mod: HCNC,CPTII,S$GLB, | Performed by: ORTHOPAEDIC SURGERY

## 2020-07-09 PROCEDURE — 99999 PR PBB SHADOW E&M-EST. PATIENT-LVL IV: ICD-10-PCS | Mod: PBBFAC,HCNC,, | Performed by: ORTHOPAEDIC SURGERY

## 2020-07-09 PROCEDURE — 3288F FALL RISK ASSESSMENT DOCD: CPT | Mod: HCNC,CPTII,S$GLB, | Performed by: ORTHOPAEDIC SURGERY

## 2020-07-09 PROCEDURE — 1100F PR PT FALLS ASSESS DOC 2+ FALLS/FALL W/INJURY/YR: ICD-10-PCS | Mod: HCNC,CPTII,S$GLB, | Performed by: ORTHOPAEDIC SURGERY

## 2020-07-09 PROCEDURE — 99999 PR PBB SHADOW E&M-EST. PATIENT-LVL IV: CPT | Mod: PBBFAC,HCNC,, | Performed by: ORTHOPAEDIC SURGERY

## 2020-07-09 PROCEDURE — 3288F PR FALLS RISK ASSESSMENT DOCUMENTED: ICD-10-PCS | Mod: HCNC,CPTII,S$GLB, | Performed by: ORTHOPAEDIC SURGERY

## 2020-07-09 PROCEDURE — 1126F PR PAIN SEVERITY QUANTIFIED, NO PAIN PRESENT: ICD-10-PCS | Mod: HCNC,S$GLB,, | Performed by: ORTHOPAEDIC SURGERY

## 2020-07-09 PROCEDURE — 99213 PR OFFICE/OUTPT VISIT, EST, LEVL III, 20-29 MIN: ICD-10-PCS | Mod: 25,HCNC,S$GLB, | Performed by: ORTHOPAEDIC SURGERY

## 2020-07-09 PROCEDURE — 20610 LARGE JOINT ASPIRATION/INJECTION: L KNEE: ICD-10-PCS | Mod: HCNC,LT,S$GLB, | Performed by: ORTHOPAEDIC SURGERY

## 2020-07-09 RX ORDER — METHYLPREDNISOLONE ACETATE 80 MG/ML
80 INJECTION, SUSPENSION INTRA-ARTICULAR; INTRALESIONAL; INTRAMUSCULAR; SOFT TISSUE
Status: DISCONTINUED | OUTPATIENT
Start: 2020-07-09 | End: 2020-07-09 | Stop reason: HOSPADM

## 2020-07-09 RX ADMIN — METHYLPREDNISOLONE ACETATE 80 MG: 80 INJECTION, SUSPENSION INTRA-ARTICULAR; INTRALESIONAL; INTRAMUSCULAR; SOFT TISSUE at 10:07

## 2020-07-09 NOTE — PATIENT INSTRUCTIONS
Injection of left knee 07/09/2020 with 80 mg Depo-Medrol mixed with 5 cc 1% lidocaine  Ice the needed next few days if it burns  Continue with independent exercise  May take Tylenol 650 mg maximum 3 times a day on as-needed basis for pain  May use Voltaren gel apply 3 in maximum 3 times a day as needed over painful area  Return to clinic in 6 weeks

## 2020-07-09 NOTE — PROGRESS NOTES
Subjective:     Patient ID: Tiffanie Barajas is a 84 y.o. female.    Chief Complaint: Pain of the Right Knee and Pain of the Left Knee    HPI:  84-year-old complaining of pain over the left sacroiliac joint radiating down to her knee.  Previous history of sciatica and a cyst in the back that was removed. Has history of arthritis of both of her knees received injection longtime ago more than a year (lubrication shot).  She is not having too much pain in the right knee unable to fully extend the left knee difficulty with shopping and walking.  Pain roughly 4/10.  Denies loss of bowel bladder control.  Walking distance is seems to hurt approximately 200 ft.  She is on blood thinners and unable to take NSAIDs.  She takes 1 little Tylenol occasionally and does not help.    01/27/2020.  Left knee injection of Depo-Medrol seems to have helped this patient.  Given 01/03/2020.  Just started physical therapy and that seems to help also.  The Flexeril did not seem to work at all.  Already been 5 times to physical therapy and she has at least 3 more weeks to go.  Very pleased so far with her results.  Patient cannot take NSAIDs due to being on blood thinners.  Pain level 0/10.  Does have occasional discomfort right greater trochanteric area    02/13/2020  Patient with bilateral knee arthrosis left worse than right and sacroiliac pain.  She just finished her physical therapy and that seems to have helped and now she is doing independent exercise program.  We did inject the knees with steroid that seems to help.  She is ambulating without any assistive devices today. He is here to have Synvisc-One injection into the left knee.    05/22/2020  Patient complaining today of left hip pain/she points over the lumbar area radiating to the posterior aspect of the knee.  She has history of lumbosacral arthritis.  As far as her left knee injection of Synvisc-One she is not having any pain with that.  She is very happy with that result.   She wants something done and 0 not want any prescriptions for any pills.  Denies loss of bowel bladder control or usage or any assistive devices to ambulate.  She is doing independent exercise as well as showing me that her  created exercise program for her.  I did recommend stationary bicycle and independent stretching exercises.  Denies loss of bowel bladder control and fever or chills or shortness of breath or chest pain    07/09/2020  Patient received trigger point injection of the lumbar spine 05/22/2020 with some good relief.  Now she is having more pain in the left knee.  She had received steroid injection 01/03/2020 and Synvisc-One in 02/13/2020.  She feels the knee is tight she tries to exercise it on her own.  Having sharp pain on the medial side.  Pain level is 3/10 but with gait it gets worst.  Denies any fever or chills and maintaining social distancing and wearing her own mask.  She would like some relief from the tightness and stiffness in the left knee but wants to avoid taking any medications besides a little Tylenol because she is on blood thinners and unable to take NSAIDs.  Past Medical History:   Diagnosis Date    Anxiety     Arthritis     Atherosclerosis of native coronary artery of native heart with angina pectoris 8/8/2019    Cancer     Degenerative disc disease     Depression     GERD (gastroesophageal reflux disease)     Hyperlipidemia     Low back derangement syndrome 12/11/2017    Melanoma     Pneumonia due to other staphylococcus      Past Surgical History:   Procedure Laterality Date    ADENOIDECTOMY      CHOLECYSTECTOMY  1973    SPINE SURGERY  2/6/13    L4-L5  cyst removed andfor sciatica    TONSILLECTOMY  1945     Family History   Problem Relation Age of Onset    Arthritis Mother     Hypertension Mother     Stroke Mother     Glaucoma Mother     Heart disease Father 56        fatal MI    Diabetes Brother     Cancer Son 56        melanoma with mets to lung  and bone    Arthritis Sister     Hyperlipidemia Neg Hx      Social History     Socioeconomic History    Marital status:      Spouse name: fabio    Number of children: 3    Years of education: Not on file    Highest education level: Not on file   Occupational History    Not on file   Social Needs    Financial resource strain: Not on file    Food insecurity     Worry: Not on file     Inability: Not on file    Transportation needs     Medical: Not on file     Non-medical: Not on file   Tobacco Use    Smoking status: Never Smoker    Smokeless tobacco: Never Used   Substance and Sexual Activity    Alcohol use: No     Alcohol/week: 0.0 standard drinks    Drug use: No    Sexual activity: Yes     Partners: Male   Lifestyle    Physical activity     Days per week: Not on file     Minutes per session: Not on file    Stress: Not on file   Relationships    Social connections     Talks on phone: Not on file     Gets together: Not on file     Attends Jainism service: Not on file     Active member of club or organization: Not on file     Attends meetings of clubs or organizations: Not on file     Relationship status: Not on file   Other Topics Concern    Not on file   Social History Narrative    . Decaf coffee only. No living will or advanced directive-copy of information given.      Medication List with Changes/Refills   Current Medications    ACETAMINOPHEN (TYLENOL) 650 MG TBSR    Take 1 tablet (650 mg total) by mouth every 8 (eight) hours.    ASPIRIN (ECOTRIN) 81 MG EC TABLET    Take 1 tablet by mouth Daily.    ATORVASTATIN (LIPITOR) 40 MG TABLET    Take 40 mg by mouth nightly.    AZELASTINE (ASTELIN) 137 MCG (0.1 %) NASAL SPRAY    2 sprays (274 mcg total) by Nasal route 2 (two) times daily.    CITALOPRAM (CELEXA) 20 MG TABLET    Take 1 tablet (20 mg total) by mouth once daily.    CLOPIDOGREL (PLAVIX) 75 MG TABLET    Take 75 mg by mouth once daily.    MULTIVITAMIN (ONE DAILY MULTIVITAMIN) PER  TABLET    Take 1 tablet by mouth once daily.    MULTIVITAMIN WITH MINERALS TABLET        NITROGLYCERIN (NITROSTAT) 0.4 MG SL TABLET    DISSOLVE ONE TABLET UNDER THE TONGUE EVERY 5 MINUTES AS NEEDED FOR CHEST PAIN. DO NOT EXCEED A TOTAL OF 3 DOSES IN 15 MINUTES    NYSTATIN-TRIAMCINOLONE (MYCOLOG II) CREAM    Apply topically 2 (two) times daily. for 7 days    OXYQUINOLINE-SOD.LAURYL SULFAT (TRIMO-ANDERSON JELLY) 0.025-0.01 % GEL    Place 1 application vaginally twice a week.    PANTOPRAZOLE (PROTONIX) 40 MG TABLET    Take 1 tablet (40 mg total) by mouth once daily.    POLYETHYLENE GLYCOL (GLYCOLAX) 17 GRAM/DOSE POWDER    Take 17 g by mouth daily as needed.    SULFAMETHOXAZOLE-TRIMETHOPRIM 800-160MG (BACTRIM DS) 800-160 MG TAB    Take 1 tablet by mouth 2 (two) times daily.     Review of patient's allergies indicates:   Allergen Reactions    Hydrocodone-acetaminophen Nausea And Vomiting     Other reaction(s): Vomiting    Diclofenac Nausea And Vomiting and Other (See Comments)     Review of Systems   Constitution: Negative for decreased appetite.   HENT: Negative for tinnitus.    Eyes: Negative for double vision.   Cardiovascular: Negative for chest pain.   Respiratory: Negative for wheezing.    Hematologic/Lymphatic: Negative for bleeding problem.   Skin: Negative for dry skin.   Musculoskeletal: Positive for arthritis, back pain, joint pain, muscle weakness and stiffness. Negative for gout and neck pain.   Gastrointestinal: Negative for abdominal pain.   Genitourinary: Negative for bladder incontinence.   Neurological: Negative for numbness, paresthesias and sensory change.   Psychiatric/Behavioral: Negative for altered mental status.       Objective:   Body mass index is 23.89 kg/m².  Vitals:    07/09/20 1003   BP: (!) 158/66   Pulse: 73          General    Constitutional: She is oriented to person, place, and time. She appears well-developed.   HENT:   Head: Atraumatic.   Eyes: EOM are normal.   Cardiovascular: Normal  rate.    Pulmonary/Chest: Effort normal.   Abdominal: Soft.   Neurological: She is alert and oriented to person, place, and time.   Psychiatric: Judgment normal.            Cervical spine rotation is functional but slightly limited  Bilateral upper extremity neurovascularly intact.  Radial ulnar pulses 2+.  Sensory intact to touch.  Biceps/triceps/wrist extension and flexion shoulder abduction is 5/5  Lumbar with loss of lordosis and tenderness around the L4-5 and mostly over the left sacroiliac joint.  Forward bending barely to proximal tibia lateral bending to mid thigh.  Pelvis is level.  Passive hip motion within normal limits.  Hip flexors and abductors as well as quads and hamstrings and ankle extensors and flexors are slightly weak at 5-/5  Right knee range of motion 0-120.  Slight crepitus to AP compression on the patella in on medial joint.  Very mild swelling.  Collaterals and cruciate stable.  Left knee with approximately 5-10 ° of flexion contracture and flexion barely to 90 °.  Moderate swelling.  Collaterals and cruciate stable. Crepitus with motion.  Pain in medially to palpation.  Calves are soft nontender  Slight varicosities around the calves  DP 1+  Skin is warm to touch no obvious lesions  Patellar reflex 1+    Relevant imaging results reviewed and interpreted by me, discussed with the patient and / or family today   X-ray bilateral knees with left knee complete loss of medial joint space with osteophytic changes and sclerosis.  Same on the right knee but not as bad consistent with bilateral end-stage arthrosis  X-ray of the pelvis showing some degenerative changes on the left hip with good joint space left.  Right hip no joint space loss consistent with some arthrosis of the left hip  X-ray of the lumbar spine in the electronic records multilevel facet arthropathy and osteophytes  Assessment:     Encounter Diagnoses   Name Primary?    Arthritis of knee, left Yes    Arthritis of knee, right      Lumbar facet arthropathy         Plan:   Arthritis of knee, left  -     Large Joint Aspiration/Injection: L knee    Arthritis of knee, right    Lumbar facet arthropathy         Patient Instructions   Injection of left knee 07/09/2020 with 80 mg Depo-Medrol mixed with 5 cc 1% lidocaine  Ice the needed next few days if it burns  Continue with independent exercise  May take Tylenol 650 mg maximum 3 times a day on as-needed basis for pain  May use Voltaren gel apply 3 in maximum 3 times a day as needed over painful area  Return to clinic in 6 weeks          Disclaimer: This note was prepared using a voice recognition system and is likely to have sound alike errors within the text.

## 2020-07-09 NOTE — PROCEDURES
Large Joint Aspiration/Injection: L knee    Date/Time: 7/9/2020 10:00 AM  Performed by: Ranjit Kelly MD  Authorized by: Ranjit Kelly MD     Consent Done?:  Yes (Verbal)  Site marked: the procedure site was marked    Timeout: prior to procedure the correct patient, procedure, and site was verified      Local anesthesia used?: Yes    Local anesthetic:  Lidocaine 1% without epinephrine    Details:  Needle Size:  22 G  Ultrasonic Guidance for needle placement?: No    Location:  Knee  Site:  L knee  Medications:  80 mg methylPREDNISolone acetate 80 mg/mL  Patient tolerance:  Patient tolerated the procedure well with no immediate complications

## 2020-09-30 ENCOUNTER — OFFICE VISIT (OUTPATIENT)
Dept: OBSTETRICS AND GYNECOLOGY | Facility: CLINIC | Age: 85
End: 2020-09-30
Payer: MEDICARE

## 2020-09-30 VITALS
SYSTOLIC BLOOD PRESSURE: 141 MMHG | DIASTOLIC BLOOD PRESSURE: 71 MMHG | WEIGHT: 159.19 LBS | HEIGHT: 66 IN | BODY MASS INDEX: 25.58 KG/M2

## 2020-09-30 DIAGNOSIS — Z46.89 ENCOUNTER FOR PESSARY MAINTENANCE: Primary | ICD-10-CM

## 2020-09-30 DIAGNOSIS — N81.10 BADEN-WALKER GRADE 2 CYSTOCELE: ICD-10-CM

## 2020-09-30 DIAGNOSIS — N76.3 CHRONIC VULVITIS: ICD-10-CM

## 2020-09-30 PROCEDURE — 1159F PR MEDICATION LIST DOCUMENTED IN MEDICAL RECORD: ICD-10-PCS | Mod: HCNC,S$GLB,, | Performed by: OBSTETRICS & GYNECOLOGY

## 2020-09-30 PROCEDURE — 1159F MED LIST DOCD IN RCRD: CPT | Mod: HCNC,S$GLB,, | Performed by: OBSTETRICS & GYNECOLOGY

## 2020-09-30 PROCEDURE — 99213 OFFICE O/P EST LOW 20 MIN: CPT | Mod: HCNC,S$GLB,, | Performed by: OBSTETRICS & GYNECOLOGY

## 2020-09-30 PROCEDURE — 1100F PTFALLS ASSESS-DOCD GE2>/YR: CPT | Mod: HCNC,CPTII,S$GLB, | Performed by: OBSTETRICS & GYNECOLOGY

## 2020-09-30 PROCEDURE — 99999 PR PBB SHADOW E&M-EST. PATIENT-LVL III: ICD-10-PCS | Mod: PBBFAC,HCNC,, | Performed by: OBSTETRICS & GYNECOLOGY

## 2020-09-30 PROCEDURE — 3078F DIAST BP <80 MM HG: CPT | Mod: HCNC,CPTII,S$GLB, | Performed by: OBSTETRICS & GYNECOLOGY

## 2020-09-30 PROCEDURE — 99999 PR PBB SHADOW E&M-EST. PATIENT-LVL III: CPT | Mod: PBBFAC,HCNC,, | Performed by: OBSTETRICS & GYNECOLOGY

## 2020-09-30 PROCEDURE — 99213 PR OFFICE/OUTPT VISIT, EST, LEVL III, 20-29 MIN: ICD-10-PCS | Mod: HCNC,S$GLB,, | Performed by: OBSTETRICS & GYNECOLOGY

## 2020-09-30 PROCEDURE — 1100F PR PT FALLS ASSESS DOC 2+ FALLS/FALL W/INJURY/YR: ICD-10-PCS | Mod: HCNC,CPTII,S$GLB, | Performed by: OBSTETRICS & GYNECOLOGY

## 2020-09-30 PROCEDURE — 3288F PR FALLS RISK ASSESSMENT DOCUMENTED: ICD-10-PCS | Mod: HCNC,CPTII,S$GLB, | Performed by: OBSTETRICS & GYNECOLOGY

## 2020-09-30 PROCEDURE — 1126F AMNT PAIN NOTED NONE PRSNT: CPT | Mod: HCNC,S$GLB,, | Performed by: OBSTETRICS & GYNECOLOGY

## 2020-09-30 PROCEDURE — 3077F SYST BP >= 140 MM HG: CPT | Mod: HCNC,CPTII,S$GLB, | Performed by: OBSTETRICS & GYNECOLOGY

## 2020-09-30 PROCEDURE — 3078F PR MOST RECENT DIASTOLIC BLOOD PRESSURE < 80 MM HG: ICD-10-PCS | Mod: HCNC,CPTII,S$GLB, | Performed by: OBSTETRICS & GYNECOLOGY

## 2020-09-30 PROCEDURE — 3077F PR MOST RECENT SYSTOLIC BLOOD PRESSURE >= 140 MM HG: ICD-10-PCS | Mod: HCNC,CPTII,S$GLB, | Performed by: OBSTETRICS & GYNECOLOGY

## 2020-09-30 PROCEDURE — 1126F PR PAIN SEVERITY QUANTIFIED, NO PAIN PRESENT: ICD-10-PCS | Mod: HCNC,S$GLB,, | Performed by: OBSTETRICS & GYNECOLOGY

## 2020-09-30 PROCEDURE — 3288F FALL RISK ASSESSMENT DOCD: CPT | Mod: HCNC,CPTII,S$GLB, | Performed by: OBSTETRICS & GYNECOLOGY

## 2020-09-30 RX ORDER — BETAMETHASONE VALERATE 1.2 MG/G
OINTMENT TOPICAL 2 TIMES DAILY
Qty: 45 G | Refills: 0 | Status: SHIPPED | OUTPATIENT
Start: 2020-09-30 | End: 2020-10-06

## 2020-09-30 NOTE — PROGRESS NOTES
Subjective:       Patient ID: Tiffanie Barajas is a 85 y.o. female.    Chief Complaint:  Pessary Check      History of Present Illness  HPI  Presents for pessary check.  Wears a #4 ring with support for symptomatic cystocele.  Doing well with it.  No vaginal bleeding.  She does complain of some vulvar itching.    GYN & OB History  Patient's last menstrual period was 1990.   Date of Last Pap: No result found    OB History    Para Term  AB Living   2 1       2   SAB TAB Ectopic Multiple Live Births         1 1      # Outcome Date GA Lbr Dwayne/2nd Weight Sex Delivery Anes PTL Lv   2 Para      Vag-Spont   EMILIANO   1A       Vag-Spont      1B       Vag-Spont          Review of Systems  Review of Systems   Constitutional: Negative for fatigue, fever and unexpected weight change.   Gastrointestinal: Negative for abdominal pain, constipation, diarrhea, nausea and vomiting.   Genitourinary: Negative for dysuria, frequency, pelvic pain, urgency, vaginal bleeding, vaginal discharge, vaginal pain and vaginal odor.           Objective:    Physical Exam:   Constitutional: She is oriented to person, place, and time. She appears well-developed and well-nourished. No distress.             Abdominal: Soft. She exhibits no distension and no mass. There is no abdominal tenderness. There is no rebound and no guarding. Hernia confirmed negative in the right inguinal area and confirmed negative in the left inguinal area.     Genitourinary:    Vagina normal.      Pelvic exam was performed with patient supine.   There is rash (slight erythema of BL labia majora) on the right labia. There is no tenderness, lesion or injury on the right labia. There is rash on the left labia. There is no tenderness, lesion or injury on the left labia. Uterus is absent. Right adnexum displays no mass, no tenderness and no fullness. Left adnexum displays no mass, no tenderness and no fullness. There is cystocele (Grade 2 cystocele  with valsalva when pessary is out) in the vagina. No erythema, tenderness, bleeding, rectocele or unspecified prolapse of vaginal walls in the vagina.    No foreign body in the vagina.      No signs of injury in the vagina.   Cervix exhibits absence.    Genitourinary Comments: Pessary removed without difficulty, washed with soap and water, and replaced without difficulty   negative for vaginal discharge              Neurological: She is alert and oriented to person, place, and time.     Psychiatric: She has a normal mood and affect.          Assessment:        1. Encounter for pessary maintenance    2. Sanford-Walker grade 2 cystocele    3. Chronic vulvitis                Plan:      Tiffanie was seen today for pessary check.    Diagnoses and all orders for this visit:    Encounter for pessary maintenance    Sanford-Walker grade 2 cystocele    Chronic vulvitis  -     betamethasone valerate 0.1% (VALISONE) 0.1 % Oint; Apply topically 2 (two) times daily.    Continue vaginal estrogen 2x/week.  RTC 3-4 months for pessary check       normal (ped)...

## 2020-10-06 ENCOUNTER — OFFICE VISIT (OUTPATIENT)
Dept: INTERNAL MEDICINE | Facility: CLINIC | Age: 85
End: 2020-10-06
Payer: MEDICARE

## 2020-10-06 ENCOUNTER — LAB VISIT (OUTPATIENT)
Dept: LAB | Facility: HOSPITAL | Age: 85
End: 2020-10-06
Attending: FAMILY MEDICINE
Payer: MEDICARE

## 2020-10-06 VITALS
HEART RATE: 65 BPM | OXYGEN SATURATION: 96 % | TEMPERATURE: 97 F | HEIGHT: 66 IN | WEIGHT: 155 LBS | SYSTOLIC BLOOD PRESSURE: 130 MMHG | DIASTOLIC BLOOD PRESSURE: 72 MMHG | BODY MASS INDEX: 24.91 KG/M2

## 2020-10-06 DIAGNOSIS — I25.119 ATHEROSCLEROSIS OF NATIVE CORONARY ARTERY OF NATIVE HEART WITH ANGINA PECTORIS: ICD-10-CM

## 2020-10-06 DIAGNOSIS — I70.0 ATHEROSCLEROSIS OF AORTA: ICD-10-CM

## 2020-10-06 DIAGNOSIS — Z79.899 ENCOUNTER FOR LONG-TERM (CURRENT) USE OF MEDICATIONS: ICD-10-CM

## 2020-10-06 DIAGNOSIS — Z00.00 ROUTINE GENERAL MEDICAL EXAMINATION AT A HEALTH CARE FACILITY: Primary | ICD-10-CM

## 2020-10-06 DIAGNOSIS — F32.A DEPRESSION, UNSPECIFIED DEPRESSION TYPE: ICD-10-CM

## 2020-10-06 DIAGNOSIS — E78.2 MIXED HYPERLIPIDEMIA: ICD-10-CM

## 2020-10-06 DIAGNOSIS — K21.9 GASTROESOPHAGEAL REFLUX DISEASE WITHOUT ESOPHAGITIS: ICD-10-CM

## 2020-10-06 LAB
ALBUMIN SERPL BCP-MCNC: 4.2 G/DL (ref 3.5–5.2)
ALP SERPL-CCNC: 81 U/L (ref 55–135)
ALT SERPL W/O P-5'-P-CCNC: 14 U/L (ref 10–44)
ANION GAP SERPL CALC-SCNC: 11 MMOL/L (ref 8–16)
AST SERPL-CCNC: 20 U/L (ref 10–40)
BASOPHILS # BLD AUTO: 0.05 K/UL (ref 0–0.2)
BASOPHILS NFR BLD: 0.6 % (ref 0–1.9)
BILIRUB SERPL-MCNC: 0.6 MG/DL (ref 0.1–1)
BUN SERPL-MCNC: 14 MG/DL (ref 8–23)
CALCIUM SERPL-MCNC: 9.7 MG/DL (ref 8.7–10.5)
CHLORIDE SERPL-SCNC: 99 MMOL/L (ref 95–110)
CHOLEST SERPL-MCNC: 104 MG/DL (ref 120–199)
CHOLEST/HDLC SERPL: 2.2 {RATIO} (ref 2–5)
CO2 SERPL-SCNC: 29 MMOL/L (ref 23–29)
CREAT SERPL-MCNC: 0.7 MG/DL (ref 0.5–1.4)
DIFFERENTIAL METHOD: ABNORMAL
EOSINOPHIL # BLD AUTO: 0.6 K/UL (ref 0–0.5)
EOSINOPHIL NFR BLD: 6.5 % (ref 0–8)
ERYTHROCYTE [DISTWIDTH] IN BLOOD BY AUTOMATED COUNT: 13 % (ref 11.5–14.5)
EST. GFR  (AFRICAN AMERICAN): >60 ML/MIN/1.73 M^2
EST. GFR  (NON AFRICAN AMERICAN): >60 ML/MIN/1.73 M^2
GLUCOSE SERPL-MCNC: 84 MG/DL (ref 70–110)
HCT VFR BLD AUTO: 42.7 % (ref 37–48.5)
HDLC SERPL-MCNC: 48 MG/DL (ref 40–75)
HDLC SERPL: 46.2 % (ref 20–50)
HGB BLD-MCNC: 13 G/DL (ref 12–16)
IMM GRANULOCYTES # BLD AUTO: 0.02 K/UL (ref 0–0.04)
IMM GRANULOCYTES NFR BLD AUTO: 0.2 % (ref 0–0.5)
LDLC SERPL CALC-MCNC: 40 MG/DL (ref 63–159)
LYMPHOCYTES # BLD AUTO: 1.8 K/UL (ref 1–4.8)
LYMPHOCYTES NFR BLD: 20.3 % (ref 18–48)
MCH RBC QN AUTO: 29 PG (ref 27–31)
MCHC RBC AUTO-ENTMCNC: 30.4 G/DL (ref 32–36)
MCV RBC AUTO: 95 FL (ref 82–98)
MONOCYTES # BLD AUTO: 0.5 K/UL (ref 0.3–1)
MONOCYTES NFR BLD: 5.9 % (ref 4–15)
NEUTROPHILS # BLD AUTO: 5.9 K/UL (ref 1.8–7.7)
NEUTROPHILS NFR BLD: 66.5 % (ref 38–73)
NONHDLC SERPL-MCNC: 56 MG/DL
NRBC BLD-RTO: 0 /100 WBC
PLATELET # BLD AUTO: 273 K/UL (ref 150–350)
PMV BLD AUTO: 10 FL (ref 9.2–12.9)
POTASSIUM SERPL-SCNC: 4.2 MMOL/L (ref 3.5–5.1)
PROT SERPL-MCNC: 7.2 G/DL (ref 6–8.4)
RBC # BLD AUTO: 4.48 M/UL (ref 4–5.4)
SODIUM SERPL-SCNC: 139 MMOL/L (ref 136–145)
TRIGL SERPL-MCNC: 80 MG/DL (ref 30–150)
TSH SERPL DL<=0.005 MIU/L-ACNC: 2.27 UIU/ML (ref 0.4–4)
WBC # BLD AUTO: 8.86 K/UL (ref 3.9–12.7)

## 2020-10-06 PROCEDURE — 99499 UNLISTED E&M SERVICE: CPT | Mod: S$GLB,,, | Performed by: FAMILY MEDICINE

## 2020-10-06 PROCEDURE — 3078F DIAST BP <80 MM HG: CPT | Mod: HCNC,CPTII,S$GLB, | Performed by: FAMILY MEDICINE

## 2020-10-06 PROCEDURE — 3078F PR MOST RECENT DIASTOLIC BLOOD PRESSURE < 80 MM HG: ICD-10-PCS | Mod: HCNC,CPTII,S$GLB, | Performed by: FAMILY MEDICINE

## 2020-10-06 PROCEDURE — 99397 PR PREVENTIVE VISIT,EST,65 & OVER: ICD-10-PCS | Mod: HCNC,25,S$GLB, | Performed by: FAMILY MEDICINE

## 2020-10-06 PROCEDURE — 85025 COMPLETE CBC W/AUTO DIFF WBC: CPT | Mod: HCNC

## 2020-10-06 PROCEDURE — 80053 COMPREHEN METABOLIC PANEL: CPT | Mod: HCNC

## 2020-10-06 PROCEDURE — G0008 ADMIN INFLUENZA VIRUS VAC: HCPCS | Mod: HCNC,S$GLB,, | Performed by: FAMILY MEDICINE

## 2020-10-06 PROCEDURE — 99397 PER PM REEVAL EST PAT 65+ YR: CPT | Mod: HCNC,25,S$GLB, | Performed by: FAMILY MEDICINE

## 2020-10-06 PROCEDURE — 84443 ASSAY THYROID STIM HORMONE: CPT | Mod: HCNC

## 2020-10-06 PROCEDURE — 99499 RISK ADDL DX/OHS AUDIT: ICD-10-PCS | Mod: S$GLB,,, | Performed by: FAMILY MEDICINE

## 2020-10-06 PROCEDURE — 3075F SYST BP GE 130 - 139MM HG: CPT | Mod: HCNC,CPTII,S$GLB, | Performed by: FAMILY MEDICINE

## 2020-10-06 PROCEDURE — G0008 FLU VACCINE - QUADRIVALENT - ADJUVANTED: ICD-10-PCS | Mod: HCNC,S$GLB,, | Performed by: FAMILY MEDICINE

## 2020-10-06 PROCEDURE — 99999 PR PBB SHADOW E&M-EST. PATIENT-LVL IV: CPT | Mod: PBBFAC,HCNC,, | Performed by: FAMILY MEDICINE

## 2020-10-06 PROCEDURE — 3075F PR MOST RECENT SYSTOLIC BLOOD PRESS GE 130-139MM HG: ICD-10-PCS | Mod: HCNC,CPTII,S$GLB, | Performed by: FAMILY MEDICINE

## 2020-10-06 PROCEDURE — 90694 VACC AIIV4 NO PRSRV 0.5ML IM: CPT | Mod: HCNC,S$GLB,, | Performed by: FAMILY MEDICINE

## 2020-10-06 PROCEDURE — 80061 LIPID PANEL: CPT | Mod: HCNC

## 2020-10-06 PROCEDURE — 90694 FLU VACCINE - QUADRIVALENT - ADJUVANTED: ICD-10-PCS | Mod: HCNC,S$GLB,, | Performed by: FAMILY MEDICINE

## 2020-10-06 PROCEDURE — 99999 PR PBB SHADOW E&M-EST. PATIENT-LVL IV: ICD-10-PCS | Mod: PBBFAC,HCNC,, | Performed by: FAMILY MEDICINE

## 2020-10-06 PROCEDURE — 36415 COLL VENOUS BLD VENIPUNCTURE: CPT | Mod: HCNC

## 2020-10-06 RX ORDER — CITALOPRAM 20 MG/1
20 TABLET, FILM COATED ORAL DAILY
Qty: 30 TABLET | Refills: 11 | Status: SHIPPED | OUTPATIENT
Start: 2020-10-06 | End: 2021-10-07 | Stop reason: SDUPTHER

## 2020-10-06 RX ORDER — COLCHICINE 0.6 MG/1
0.6 TABLET, FILM COATED ORAL DAILY
COMMUNITY
Start: 2020-09-30 | End: 2022-04-27

## 2020-10-06 RX ORDER — PANTOPRAZOLE SODIUM 40 MG/1
40 TABLET, DELAYED RELEASE ORAL DAILY
Qty: 30 TABLET | Refills: 11 | Status: SHIPPED | OUTPATIENT
Start: 2020-10-06 | End: 2021-10-07 | Stop reason: SDUPTHER

## 2020-10-06 NOTE — PROGRESS NOTES
Subjective:       Patient ID: Tiffanie Barajas is a 85 y.o. female.    Chief Complaint: Annual Exam    Patient presents to clinic today for annual physical exam.    Review of Systems   Constitutional: Negative for chills, fatigue, fever and unexpected weight change.   HENT: Negative for congestion, dental problem, ear pain, hearing loss, rhinorrhea and trouble swallowing.    Eyes: Negative for pain and visual disturbance.   Respiratory: Negative for cough and shortness of breath.    Cardiovascular: Negative for chest pain, palpitations and leg swelling.   Gastrointestinal: Positive for constipation. Negative for abdominal distention, abdominal pain, blood in stool, diarrhea, nausea and vomiting.   Genitourinary: Negative for difficulty urinating and vaginal discharge.   Musculoskeletal: Positive for arthralgias. Negative for myalgias.   Skin: Negative for rash.   Neurological: Negative for dizziness, weakness, numbness and headaches.   Hematological: Negative for adenopathy. Bruises/bleeds easily.   Psychiatric/Behavioral: Negative for dysphoric mood and sleep disturbance. The patient is not nervous/anxious.          Objective:      Physical Exam  Vitals signs reviewed.   Constitutional:       General: She is not in acute distress.     Appearance: Normal appearance. She is well-developed.   HENT:      Head: Normocephalic and atraumatic.      Right Ear: Tympanic membrane, ear canal and external ear normal.      Left Ear: Tympanic membrane, ear canal and external ear normal.      Nose: Nose normal. No mucosal edema or rhinorrhea.      Mouth/Throat:      Pharynx: Uvula midline.   Eyes:      General: Lids are normal. No scleral icterus.     Extraocular Movements: Extraocular movements intact.      Conjunctiva/sclera: Conjunctivae normal.      Pupils: Pupils are equal, round, and reactive to light.   Neck:      Musculoskeletal: Normal range of motion and neck supple.      Thyroid: No thyromegaly.   Cardiovascular:       Rate and Rhythm: Normal rate and regular rhythm.      Heart sounds: No murmur. No friction rub. No gallop.    Pulmonary:      Effort: Pulmonary effort is normal.      Breath sounds: Normal breath sounds. No wheezing, rhonchi or rales.   Abdominal:      General: Bowel sounds are normal. There is no distension.      Palpations: Abdomen is soft. There is no mass.      Tenderness: There is no abdominal tenderness.   Musculoskeletal: Normal range of motion.   Lymphadenopathy:      Cervical: No cervical adenopathy.   Skin:     General: Skin is warm and dry.      Findings: No lesion or rash.      Nails: There is no clubbing.     Neurological:      Mental Status: She is alert and oriented to person, place, and time.      Cranial Nerves: No cranial nerve deficit.      Sensory: No sensory deficit.      Gait: Gait normal.   Psychiatric:         Mood and Affect: Mood and affect normal.         Assessment:       1. Routine general medical examination at a health care facility    2. Depression, unspecified depression type    3. Mixed hyperlipidemia    4. Encounter for long-term (current) use of medications    5. Gastroesophageal reflux disease without esophagitis    6. Atherosclerosis of native coronary artery of native heart with angina pectoris    7. Atherosclerosis of aorta        Plan:     Problem List Items Addressed This Visit     Atherosclerosis of aorta    Atherosclerosis of native coronary artery of native heart with angina pectoris    Overview     Followed by Dr. Turner, Cardiology         Depression    Current Assessment & Plan     Stable on celexa         Relevant Medications    citalopram (CELEXA) 20 MG tablet    GERD (gastroesophageal reflux disease)    Current Assessment & Plan     Stable on protonix         Relevant Medications    pantoprazole (PROTONIX) 40 MG tablet    Mixed hyperlipidemia    Current Assessment & Plan     Status pending labs, continue atorvastatin           Relevant Orders    Comprehensive  Metabolic Panel (Completed)    Lipid Panel (Completed)    Comprehensive Metabolic Panel    Lipid Panel      Other Visit Diagnoses     Routine general medical examination at a health care facility    -  Primary    Encounter for long-term (current) use of medications        Relevant Orders    TSH (Completed)    CBC auto differential (Completed)          Health Maintenance reviewed/updated. Flu vaccine today.  Discussed shingrix vaccine, given informational handout, advised can be obtained at pharmacy.

## 2021-01-04 ENCOUNTER — OFFICE VISIT (OUTPATIENT)
Dept: ORTHOPEDICS | Facility: CLINIC | Age: 86
End: 2021-01-04
Payer: MEDICARE

## 2021-01-04 VITALS
WEIGHT: 154 LBS | HEART RATE: 80 BPM | HEIGHT: 66 IN | BODY MASS INDEX: 24.75 KG/M2 | SYSTOLIC BLOOD PRESSURE: 146 MMHG | DIASTOLIC BLOOD PRESSURE: 69 MMHG

## 2021-01-04 DIAGNOSIS — M17.11 ARTHRITIS OF KNEE, RIGHT: ICD-10-CM

## 2021-01-04 DIAGNOSIS — M47.816 LUMBAR FACET ARTHROPATHY: ICD-10-CM

## 2021-01-04 DIAGNOSIS — M17.12 PRIMARY OSTEOARTHRITIS OF LEFT KNEE: Primary | ICD-10-CM

## 2021-01-04 DIAGNOSIS — M17.12 ARTHRITIS OF KNEE, LEFT: Primary | ICD-10-CM

## 2021-01-04 PROCEDURE — 3288F FALL RISK ASSESSMENT DOCD: CPT | Mod: HCNC,CPTII,S$GLB, | Performed by: ORTHOPAEDIC SURGERY

## 2021-01-04 PROCEDURE — 1126F AMNT PAIN NOTED NONE PRSNT: CPT | Mod: HCNC,S$GLB,, | Performed by: ORTHOPAEDIC SURGERY

## 2021-01-04 PROCEDURE — 3077F SYST BP >= 140 MM HG: CPT | Mod: HCNC,CPTII,S$GLB, | Performed by: ORTHOPAEDIC SURGERY

## 2021-01-04 PROCEDURE — 99999 PR PBB SHADOW E&M-EST. PATIENT-LVL III: ICD-10-PCS | Mod: PBBFAC,HCNC,, | Performed by: ORTHOPAEDIC SURGERY

## 2021-01-04 PROCEDURE — 1159F PR MEDICATION LIST DOCUMENTED IN MEDICAL RECORD: ICD-10-PCS | Mod: HCNC,S$GLB,, | Performed by: ORTHOPAEDIC SURGERY

## 2021-01-04 PROCEDURE — 3288F PR FALLS RISK ASSESSMENT DOCUMENTED: ICD-10-PCS | Mod: HCNC,CPTII,S$GLB, | Performed by: ORTHOPAEDIC SURGERY

## 2021-01-04 PROCEDURE — 99999 PR PBB SHADOW E&M-EST. PATIENT-LVL III: CPT | Mod: PBBFAC,HCNC,, | Performed by: ORTHOPAEDIC SURGERY

## 2021-01-04 PROCEDURE — 3077F PR MOST RECENT SYSTOLIC BLOOD PRESSURE >= 140 MM HG: ICD-10-PCS | Mod: HCNC,CPTII,S$GLB, | Performed by: ORTHOPAEDIC SURGERY

## 2021-01-04 PROCEDURE — 20610 LARGE JOINT ASPIRATION/INJECTION: L KNEE: ICD-10-PCS | Mod: HCNC,LT,S$GLB, | Performed by: ORTHOPAEDIC SURGERY

## 2021-01-04 PROCEDURE — 1159F MED LIST DOCD IN RCRD: CPT | Mod: HCNC,S$GLB,, | Performed by: ORTHOPAEDIC SURGERY

## 2021-01-04 PROCEDURE — 3078F DIAST BP <80 MM HG: CPT | Mod: HCNC,CPTII,S$GLB, | Performed by: ORTHOPAEDIC SURGERY

## 2021-01-04 PROCEDURE — 1101F PR PT FALLS ASSESS DOC 0-1 FALLS W/OUT INJ PAST YR: ICD-10-PCS | Mod: HCNC,CPTII,S$GLB, | Performed by: ORTHOPAEDIC SURGERY

## 2021-01-04 PROCEDURE — 99213 OFFICE O/P EST LOW 20 MIN: CPT | Mod: 25,HCNC,S$GLB, | Performed by: ORTHOPAEDIC SURGERY

## 2021-01-04 PROCEDURE — 99213 PR OFFICE/OUTPT VISIT, EST, LEVL III, 20-29 MIN: ICD-10-PCS | Mod: 25,HCNC,S$GLB, | Performed by: ORTHOPAEDIC SURGERY

## 2021-01-04 PROCEDURE — 1101F PT FALLS ASSESS-DOCD LE1/YR: CPT | Mod: HCNC,CPTII,S$GLB, | Performed by: ORTHOPAEDIC SURGERY

## 2021-01-04 PROCEDURE — 1126F PR PAIN SEVERITY QUANTIFIED, NO PAIN PRESENT: ICD-10-PCS | Mod: HCNC,S$GLB,, | Performed by: ORTHOPAEDIC SURGERY

## 2021-01-04 PROCEDURE — 3078F PR MOST RECENT DIASTOLIC BLOOD PRESSURE < 80 MM HG: ICD-10-PCS | Mod: HCNC,CPTII,S$GLB, | Performed by: ORTHOPAEDIC SURGERY

## 2021-01-04 PROCEDURE — 20610 DRAIN/INJ JOINT/BURSA W/O US: CPT | Mod: HCNC,LT,S$GLB, | Performed by: ORTHOPAEDIC SURGERY

## 2021-01-04 RX ORDER — METHYLPREDNISOLONE ACETATE 80 MG/ML
80 INJECTION, SUSPENSION INTRA-ARTICULAR; INTRALESIONAL; INTRAMUSCULAR; SOFT TISSUE
Status: DISCONTINUED | OUTPATIENT
Start: 2021-01-04 | End: 2021-01-04 | Stop reason: HOSPADM

## 2021-01-04 RX ADMIN — METHYLPREDNISOLONE ACETATE 80 MG: 80 INJECTION, SUSPENSION INTRA-ARTICULAR; INTRALESIONAL; INTRAMUSCULAR; SOFT TISSUE at 10:01

## 2021-01-27 ENCOUNTER — OFFICE VISIT (OUTPATIENT)
Dept: OBSTETRICS AND GYNECOLOGY | Facility: CLINIC | Age: 86
End: 2021-01-27
Payer: MEDICARE

## 2021-01-27 VITALS
SYSTOLIC BLOOD PRESSURE: 138 MMHG | WEIGHT: 159.81 LBS | HEIGHT: 66 IN | DIASTOLIC BLOOD PRESSURE: 80 MMHG | BODY MASS INDEX: 25.68 KG/M2

## 2021-01-27 DIAGNOSIS — N81.10 BADEN-WALKER GRADE 2 CYSTOCELE: ICD-10-CM

## 2021-01-27 DIAGNOSIS — Z46.89 ENCOUNTER FOR PESSARY MAINTENANCE: Primary | ICD-10-CM

## 2021-01-27 PROCEDURE — 99213 OFFICE O/P EST LOW 20 MIN: CPT | Mod: HCNC,S$GLB,, | Performed by: OBSTETRICS & GYNECOLOGY

## 2021-01-27 PROCEDURE — 1159F MED LIST DOCD IN RCRD: CPT | Mod: HCNC,S$GLB,, | Performed by: OBSTETRICS & GYNECOLOGY

## 2021-01-27 PROCEDURE — 99999 PR PBB SHADOW E&M-EST. PATIENT-LVL III: CPT | Mod: PBBFAC,HCNC,, | Performed by: OBSTETRICS & GYNECOLOGY

## 2021-01-27 PROCEDURE — 3079F PR MOST RECENT DIASTOLIC BLOOD PRESSURE 80-89 MM HG: ICD-10-PCS | Mod: HCNC,CPTII,S$GLB, | Performed by: OBSTETRICS & GYNECOLOGY

## 2021-01-27 PROCEDURE — 3075F PR MOST RECENT SYSTOLIC BLOOD PRESS GE 130-139MM HG: ICD-10-PCS | Mod: HCNC,CPTII,S$GLB, | Performed by: OBSTETRICS & GYNECOLOGY

## 2021-01-27 PROCEDURE — 99213 PR OFFICE/OUTPT VISIT, EST, LEVL III, 20-29 MIN: ICD-10-PCS | Mod: HCNC,S$GLB,, | Performed by: OBSTETRICS & GYNECOLOGY

## 2021-01-27 PROCEDURE — 99999 PR PBB SHADOW E&M-EST. PATIENT-LVL III: ICD-10-PCS | Mod: PBBFAC,HCNC,, | Performed by: OBSTETRICS & GYNECOLOGY

## 2021-01-27 PROCEDURE — 3079F DIAST BP 80-89 MM HG: CPT | Mod: HCNC,CPTII,S$GLB, | Performed by: OBSTETRICS & GYNECOLOGY

## 2021-01-27 PROCEDURE — 1159F PR MEDICATION LIST DOCUMENTED IN MEDICAL RECORD: ICD-10-PCS | Mod: HCNC,S$GLB,, | Performed by: OBSTETRICS & GYNECOLOGY

## 2021-01-27 PROCEDURE — 3075F SYST BP GE 130 - 139MM HG: CPT | Mod: HCNC,CPTII,S$GLB, | Performed by: OBSTETRICS & GYNECOLOGY

## 2021-01-27 RX ORDER — ISOSORBIDE MONONITRATE 60 MG/1
60 TABLET, EXTENDED RELEASE ORAL DAILY
COMMUNITY
Start: 2020-11-13 | End: 2022-09-19

## 2021-01-28 ENCOUNTER — PES CALL (OUTPATIENT)
Dept: ADMINISTRATIVE | Facility: CLINIC | Age: 86
End: 2021-01-28

## 2021-02-03 DIAGNOSIS — M17.12 PRIMARY OSTEOARTHRITIS OF LEFT KNEE: Primary | ICD-10-CM

## 2021-02-08 ENCOUNTER — OFFICE VISIT (OUTPATIENT)
Dept: ORTHOPEDICS | Facility: CLINIC | Age: 86
End: 2021-02-08
Payer: MEDICARE

## 2021-02-08 VITALS
DIASTOLIC BLOOD PRESSURE: 70 MMHG | HEART RATE: 67 BPM | SYSTOLIC BLOOD PRESSURE: 144 MMHG | HEIGHT: 66 IN | WEIGHT: 159 LBS | BODY MASS INDEX: 25.55 KG/M2

## 2021-02-08 DIAGNOSIS — M21.162 ACQUIRED VARUS DEFORMITY KNEE, LEFT: ICD-10-CM

## 2021-02-08 DIAGNOSIS — M17.12 ARTHRITIS OF KNEE, LEFT: Primary | ICD-10-CM

## 2021-02-08 DIAGNOSIS — M21.161 ACQUIRED VARUS DEFORMITY KNEE, RIGHT: ICD-10-CM

## 2021-02-08 DIAGNOSIS — M47.816 LUMBAR FACET ARTHROPATHY: ICD-10-CM

## 2021-02-08 DIAGNOSIS — M17.11 ARTHRITIS OF KNEE, RIGHT: ICD-10-CM

## 2021-02-08 PROCEDURE — 3077F SYST BP >= 140 MM HG: CPT | Mod: CPTII,S$GLB,, | Performed by: ORTHOPAEDIC SURGERY

## 2021-02-08 PROCEDURE — 1101F PT FALLS ASSESS-DOCD LE1/YR: CPT | Mod: CPTII,S$GLB,, | Performed by: ORTHOPAEDIC SURGERY

## 2021-02-08 PROCEDURE — 99499 NO LOS: ICD-10-PCS | Mod: S$GLB,,, | Performed by: ORTHOPAEDIC SURGERY

## 2021-02-08 PROCEDURE — 1159F PR MEDICATION LIST DOCUMENTED IN MEDICAL RECORD: ICD-10-PCS | Mod: S$GLB,,, | Performed by: ORTHOPAEDIC SURGERY

## 2021-02-08 PROCEDURE — 99499 UNLISTED E&M SERVICE: CPT | Mod: S$GLB,,, | Performed by: ORTHOPAEDIC SURGERY

## 2021-02-08 PROCEDURE — 20610 LARGE JOINT ASPIRATION/INJECTION: L KNEE: ICD-10-PCS | Mod: LT,S$GLB,, | Performed by: ORTHOPAEDIC SURGERY

## 2021-02-08 PROCEDURE — 3288F PR FALLS RISK ASSESSMENT DOCUMENTED: ICD-10-PCS | Mod: CPTII,S$GLB,, | Performed by: ORTHOPAEDIC SURGERY

## 2021-02-08 PROCEDURE — 3288F FALL RISK ASSESSMENT DOCD: CPT | Mod: CPTII,S$GLB,, | Performed by: ORTHOPAEDIC SURGERY

## 2021-02-08 PROCEDURE — 3078F DIAST BP <80 MM HG: CPT | Mod: CPTII,S$GLB,, | Performed by: ORTHOPAEDIC SURGERY

## 2021-02-08 PROCEDURE — 99999 PR PBB SHADOW E&M-EST. PATIENT-LVL IV: ICD-10-PCS | Mod: PBBFAC,,, | Performed by: ORTHOPAEDIC SURGERY

## 2021-02-08 PROCEDURE — 1101F PR PT FALLS ASSESS DOC 0-1 FALLS W/OUT INJ PAST YR: ICD-10-PCS | Mod: CPTII,S$GLB,, | Performed by: ORTHOPAEDIC SURGERY

## 2021-02-08 PROCEDURE — 3078F PR MOST RECENT DIASTOLIC BLOOD PRESSURE < 80 MM HG: ICD-10-PCS | Mod: CPTII,S$GLB,, | Performed by: ORTHOPAEDIC SURGERY

## 2021-02-08 PROCEDURE — 20610 DRAIN/INJ JOINT/BURSA W/O US: CPT | Mod: LT,S$GLB,, | Performed by: ORTHOPAEDIC SURGERY

## 2021-02-08 PROCEDURE — 3077F PR MOST RECENT SYSTOLIC BLOOD PRESSURE >= 140 MM HG: ICD-10-PCS | Mod: CPTII,S$GLB,, | Performed by: ORTHOPAEDIC SURGERY

## 2021-02-08 PROCEDURE — 1125F AMNT PAIN NOTED PAIN PRSNT: CPT | Mod: S$GLB,,, | Performed by: ORTHOPAEDIC SURGERY

## 2021-02-08 PROCEDURE — 1125F PR PAIN SEVERITY QUANTIFIED, PAIN PRESENT: ICD-10-PCS | Mod: S$GLB,,, | Performed by: ORTHOPAEDIC SURGERY

## 2021-02-08 PROCEDURE — 1159F MED LIST DOCD IN RCRD: CPT | Mod: S$GLB,,, | Performed by: ORTHOPAEDIC SURGERY

## 2021-02-08 PROCEDURE — 99999 PR PBB SHADOW E&M-EST. PATIENT-LVL IV: CPT | Mod: PBBFAC,,, | Performed by: ORTHOPAEDIC SURGERY

## 2021-02-26 ENCOUNTER — PES CALL (OUTPATIENT)
Dept: ADMINISTRATIVE | Facility: CLINIC | Age: 86
End: 2021-02-26

## 2021-03-12 ENCOUNTER — TELEPHONE (OUTPATIENT)
Dept: OBSTETRICS AND GYNECOLOGY | Facility: CLINIC | Age: 86
End: 2021-03-12

## 2021-03-16 ENCOUNTER — TELEPHONE (OUTPATIENT)
Dept: OBSTETRICS AND GYNECOLOGY | Facility: CLINIC | Age: 86
End: 2021-03-16

## 2021-03-16 DIAGNOSIS — N76.3 CHRONIC VULVITIS: Primary | ICD-10-CM

## 2021-03-16 RX ORDER — BETAMETHASONE VALERATE 1.2 MG/G
OINTMENT TOPICAL DAILY PRN
Qty: 45 G | Refills: 1 | Status: SHIPPED | OUTPATIENT
Start: 2021-03-16 | End: 2022-04-27

## 2021-03-30 ENCOUNTER — LAB VISIT (OUTPATIENT)
Dept: LAB | Facility: HOSPITAL | Age: 86
End: 2021-03-30
Attending: FAMILY MEDICINE
Payer: MEDICARE

## 2021-03-30 DIAGNOSIS — E78.2 MIXED HYPERLIPIDEMIA: ICD-10-CM

## 2021-03-30 LAB
ALBUMIN SERPL BCP-MCNC: 4.2 G/DL (ref 3.5–5.2)
ALP SERPL-CCNC: 87 U/L (ref 55–135)
ALT SERPL W/O P-5'-P-CCNC: 15 U/L (ref 10–44)
ANION GAP SERPL CALC-SCNC: 9 MMOL/L (ref 8–16)
AST SERPL-CCNC: 21 U/L (ref 10–40)
BILIRUB SERPL-MCNC: 0.7 MG/DL (ref 0.1–1)
BUN SERPL-MCNC: 10 MG/DL (ref 8–23)
CALCIUM SERPL-MCNC: 9.7 MG/DL (ref 8.7–10.5)
CHLORIDE SERPL-SCNC: 102 MMOL/L (ref 95–110)
CHOLEST SERPL-MCNC: 107 MG/DL (ref 120–199)
CHOLEST/HDLC SERPL: 2.5 {RATIO} (ref 2–5)
CO2 SERPL-SCNC: 32 MMOL/L (ref 23–29)
CREAT SERPL-MCNC: 0.7 MG/DL (ref 0.5–1.4)
EST. GFR  (AFRICAN AMERICAN): >60 ML/MIN/1.73 M^2
EST. GFR  (NON AFRICAN AMERICAN): >60 ML/MIN/1.73 M^2
GLUCOSE SERPL-MCNC: 101 MG/DL (ref 70–110)
HDLC SERPL-MCNC: 43 MG/DL (ref 40–75)
HDLC SERPL: 40.2 % (ref 20–50)
LDLC SERPL CALC-MCNC: 42.6 MG/DL (ref 63–159)
NONHDLC SERPL-MCNC: 64 MG/DL
POTASSIUM SERPL-SCNC: 4 MMOL/L (ref 3.5–5.1)
PROT SERPL-MCNC: 7.5 G/DL (ref 6–8.4)
SODIUM SERPL-SCNC: 143 MMOL/L (ref 136–145)
TRIGL SERPL-MCNC: 107 MG/DL (ref 30–150)

## 2021-03-30 PROCEDURE — 36415 COLL VENOUS BLD VENIPUNCTURE: CPT | Performed by: FAMILY MEDICINE

## 2021-03-30 PROCEDURE — 80061 LIPID PANEL: CPT | Performed by: FAMILY MEDICINE

## 2021-03-30 PROCEDURE — 80053 COMPREHEN METABOLIC PANEL: CPT | Performed by: FAMILY MEDICINE

## 2021-04-01 ENCOUNTER — OFFICE VISIT (OUTPATIENT)
Dept: ORTHOPEDICS | Facility: CLINIC | Age: 86
End: 2021-04-01
Payer: MEDICARE

## 2021-04-01 VITALS
WEIGHT: 159 LBS | BODY MASS INDEX: 25.55 KG/M2 | SYSTOLIC BLOOD PRESSURE: 150 MMHG | HEART RATE: 78 BPM | HEIGHT: 66 IN | DIASTOLIC BLOOD PRESSURE: 62 MMHG

## 2021-04-01 DIAGNOSIS — M17.12 ARTHRITIS OF KNEE, LEFT: ICD-10-CM

## 2021-04-01 DIAGNOSIS — M25.562 PAIN IN BOTH KNEES, UNSPECIFIED CHRONICITY: Primary | ICD-10-CM

## 2021-04-01 DIAGNOSIS — M21.162 ACQUIRED VARUS DEFORMITY KNEE, LEFT: ICD-10-CM

## 2021-04-01 DIAGNOSIS — M17.11 ARTHRITIS OF KNEE, RIGHT: ICD-10-CM

## 2021-04-01 DIAGNOSIS — M54.50 LUMBAR SPINE PAIN: ICD-10-CM

## 2021-04-01 DIAGNOSIS — M21.161 ACQUIRED VARUS DEFORMITY KNEE, RIGHT: ICD-10-CM

## 2021-04-01 DIAGNOSIS — M25.561 PAIN IN BOTH KNEES, UNSPECIFIED CHRONICITY: Primary | ICD-10-CM

## 2021-04-01 DIAGNOSIS — M47.816 LUMBAR FACET ARTHROPATHY: Primary | ICD-10-CM

## 2021-04-01 PROCEDURE — 3288F PR FALLS RISK ASSESSMENT DOCUMENTED: ICD-10-PCS | Mod: CPTII,S$GLB,, | Performed by: ORTHOPAEDIC SURGERY

## 2021-04-01 PROCEDURE — 99999 PR PBB SHADOW E&M-EST. PATIENT-LVL IV: ICD-10-PCS | Mod: PBBFAC,,, | Performed by: ORTHOPAEDIC SURGERY

## 2021-04-01 PROCEDURE — 1101F PR PT FALLS ASSESS DOC 0-1 FALLS W/OUT INJ PAST YR: ICD-10-PCS | Mod: CPTII,S$GLB,, | Performed by: ORTHOPAEDIC SURGERY

## 2021-04-01 PROCEDURE — 1126F PR PAIN SEVERITY QUANTIFIED, NO PAIN PRESENT: ICD-10-PCS | Mod: S$GLB,,, | Performed by: ORTHOPAEDIC SURGERY

## 2021-04-01 PROCEDURE — 3288F FALL RISK ASSESSMENT DOCD: CPT | Mod: CPTII,S$GLB,, | Performed by: ORTHOPAEDIC SURGERY

## 2021-04-01 PROCEDURE — 20610 LARGE JOINT ASPIRATION/INJECTION: L KNEE: ICD-10-PCS | Mod: LT,S$GLB,, | Performed by: ORTHOPAEDIC SURGERY

## 2021-04-01 PROCEDURE — 1126F AMNT PAIN NOTED NONE PRSNT: CPT | Mod: S$GLB,,, | Performed by: ORTHOPAEDIC SURGERY

## 2021-04-01 PROCEDURE — 1101F PT FALLS ASSESS-DOCD LE1/YR: CPT | Mod: CPTII,S$GLB,, | Performed by: ORTHOPAEDIC SURGERY

## 2021-04-01 PROCEDURE — 1159F MED LIST DOCD IN RCRD: CPT | Mod: S$GLB,,, | Performed by: ORTHOPAEDIC SURGERY

## 2021-04-01 PROCEDURE — 20610 DRAIN/INJ JOINT/BURSA W/O US: CPT | Mod: LT,S$GLB,, | Performed by: ORTHOPAEDIC SURGERY

## 2021-04-01 PROCEDURE — 99213 OFFICE O/P EST LOW 20 MIN: CPT | Mod: 25,S$GLB,, | Performed by: ORTHOPAEDIC SURGERY

## 2021-04-01 PROCEDURE — 3078F DIAST BP <80 MM HG: CPT | Mod: CPTII,S$GLB,, | Performed by: ORTHOPAEDIC SURGERY

## 2021-04-01 PROCEDURE — 3078F PR MOST RECENT DIASTOLIC BLOOD PRESSURE < 80 MM HG: ICD-10-PCS | Mod: CPTII,S$GLB,, | Performed by: ORTHOPAEDIC SURGERY

## 2021-04-01 PROCEDURE — 99999 PR PBB SHADOW E&M-EST. PATIENT-LVL IV: CPT | Mod: PBBFAC,,, | Performed by: ORTHOPAEDIC SURGERY

## 2021-04-01 PROCEDURE — 99213 PR OFFICE/OUTPT VISIT, EST, LEVL III, 20-29 MIN: ICD-10-PCS | Mod: 25,S$GLB,, | Performed by: ORTHOPAEDIC SURGERY

## 2021-04-01 PROCEDURE — 3077F SYST BP >= 140 MM HG: CPT | Mod: CPTII,S$GLB,, | Performed by: ORTHOPAEDIC SURGERY

## 2021-04-01 PROCEDURE — 1159F PR MEDICATION LIST DOCUMENTED IN MEDICAL RECORD: ICD-10-PCS | Mod: S$GLB,,, | Performed by: ORTHOPAEDIC SURGERY

## 2021-04-01 PROCEDURE — 3077F PR MOST RECENT SYSTOLIC BLOOD PRESSURE >= 140 MM HG: ICD-10-PCS | Mod: CPTII,S$GLB,, | Performed by: ORTHOPAEDIC SURGERY

## 2021-04-01 RX ORDER — CYCLOBENZAPRINE HCL 10 MG
10 TABLET ORAL NIGHTLY
Qty: 30 TABLET | Refills: 2 | Status: SHIPPED | OUTPATIENT
Start: 2021-04-01 | End: 2021-04-11

## 2021-04-01 RX ORDER — METHYLPREDNISOLONE ACETATE 80 MG/ML
80 INJECTION, SUSPENSION INTRA-ARTICULAR; INTRALESIONAL; INTRAMUSCULAR; SOFT TISSUE
Status: DISCONTINUED | OUTPATIENT
Start: 2021-04-01 | End: 2021-04-01 | Stop reason: HOSPADM

## 2021-04-01 RX ADMIN — METHYLPREDNISOLONE ACETATE 80 MG: 80 INJECTION, SUSPENSION INTRA-ARTICULAR; INTRALESIONAL; INTRAMUSCULAR; SOFT TISSUE at 10:04

## 2021-04-06 ENCOUNTER — OFFICE VISIT (OUTPATIENT)
Dept: INTERNAL MEDICINE | Facility: CLINIC | Age: 86
End: 2021-04-06
Payer: MEDICARE

## 2021-04-06 VITALS
BODY MASS INDEX: 26.12 KG/M2 | HEIGHT: 66 IN | OXYGEN SATURATION: 95 % | HEART RATE: 89 BPM | SYSTOLIC BLOOD PRESSURE: 136 MMHG | DIASTOLIC BLOOD PRESSURE: 78 MMHG | TEMPERATURE: 99 F | WEIGHT: 162.5 LBS

## 2021-04-06 DIAGNOSIS — K21.9 GASTROESOPHAGEAL REFLUX DISEASE WITHOUT ESOPHAGITIS: ICD-10-CM

## 2021-04-06 DIAGNOSIS — G47.00 INSOMNIA, UNSPECIFIED TYPE: ICD-10-CM

## 2021-04-06 DIAGNOSIS — Z79.899 ENCOUNTER FOR LONG-TERM (CURRENT) USE OF MEDICATIONS: Primary | ICD-10-CM

## 2021-04-06 DIAGNOSIS — E78.2 MIXED HYPERLIPIDEMIA: ICD-10-CM

## 2021-04-06 DIAGNOSIS — F32.A DEPRESSION, UNSPECIFIED DEPRESSION TYPE: ICD-10-CM

## 2021-04-06 PROCEDURE — 3075F SYST BP GE 130 - 139MM HG: CPT | Mod: CPTII,S$GLB,, | Performed by: FAMILY MEDICINE

## 2021-04-06 PROCEDURE — 1159F MED LIST DOCD IN RCRD: CPT | Mod: S$GLB,,, | Performed by: FAMILY MEDICINE

## 2021-04-06 PROCEDURE — 99999 PR PBB SHADOW E&M-EST. PATIENT-LVL IV: ICD-10-PCS | Mod: PBBFAC,,, | Performed by: FAMILY MEDICINE

## 2021-04-06 PROCEDURE — 1126F AMNT PAIN NOTED NONE PRSNT: CPT | Mod: S$GLB,,, | Performed by: FAMILY MEDICINE

## 2021-04-06 PROCEDURE — 3078F DIAST BP <80 MM HG: CPT | Mod: CPTII,S$GLB,, | Performed by: FAMILY MEDICINE

## 2021-04-06 PROCEDURE — 3288F PR FALLS RISK ASSESSMENT DOCUMENTED: ICD-10-PCS | Mod: CPTII,S$GLB,, | Performed by: FAMILY MEDICINE

## 2021-04-06 PROCEDURE — 99999 PR PBB SHADOW E&M-EST. PATIENT-LVL IV: CPT | Mod: PBBFAC,,, | Performed by: FAMILY MEDICINE

## 2021-04-06 PROCEDURE — 99214 OFFICE O/P EST MOD 30 MIN: CPT | Mod: S$GLB,,, | Performed by: FAMILY MEDICINE

## 2021-04-06 PROCEDURE — 1159F PR MEDICATION LIST DOCUMENTED IN MEDICAL RECORD: ICD-10-PCS | Mod: S$GLB,,, | Performed by: FAMILY MEDICINE

## 2021-04-06 PROCEDURE — 1126F PR PAIN SEVERITY QUANTIFIED, NO PAIN PRESENT: ICD-10-PCS | Mod: S$GLB,,, | Performed by: FAMILY MEDICINE

## 2021-04-06 PROCEDURE — 1101F PT FALLS ASSESS-DOCD LE1/YR: CPT | Mod: CPTII,S$GLB,, | Performed by: FAMILY MEDICINE

## 2021-04-06 PROCEDURE — 1101F PR PT FALLS ASSESS DOC 0-1 FALLS W/OUT INJ PAST YR: ICD-10-PCS | Mod: CPTII,S$GLB,, | Performed by: FAMILY MEDICINE

## 2021-04-06 PROCEDURE — 3078F PR MOST RECENT DIASTOLIC BLOOD PRESSURE < 80 MM HG: ICD-10-PCS | Mod: CPTII,S$GLB,, | Performed by: FAMILY MEDICINE

## 2021-04-06 PROCEDURE — 3288F FALL RISK ASSESSMENT DOCD: CPT | Mod: CPTII,S$GLB,, | Performed by: FAMILY MEDICINE

## 2021-04-06 PROCEDURE — 3075F PR MOST RECENT SYSTOLIC BLOOD PRESS GE 130-139MM HG: ICD-10-PCS | Mod: CPTII,S$GLB,, | Performed by: FAMILY MEDICINE

## 2021-04-06 PROCEDURE — 99214 PR OFFICE/OUTPT VISIT, EST, LEVL IV, 30-39 MIN: ICD-10-PCS | Mod: S$GLB,,, | Performed by: FAMILY MEDICINE

## 2021-04-06 RX ORDER — FLUOCINONIDE TOPICAL SOLUTION USP, 0.05% 0.5 MG/ML
SOLUTION TOPICAL
COMMUNITY
Start: 2021-03-04 | End: 2022-04-27

## 2021-06-21 ENCOUNTER — PES CALL (OUTPATIENT)
Dept: ADMINISTRATIVE | Facility: CLINIC | Age: 86
End: 2021-06-21

## 2021-06-23 ENCOUNTER — OFFICE VISIT (OUTPATIENT)
Dept: OBSTETRICS AND GYNECOLOGY | Facility: CLINIC | Age: 86
End: 2021-06-23
Payer: MEDICARE

## 2021-06-23 VITALS
WEIGHT: 166.69 LBS | BODY MASS INDEX: 26.79 KG/M2 | SYSTOLIC BLOOD PRESSURE: 144 MMHG | DIASTOLIC BLOOD PRESSURE: 58 MMHG | HEIGHT: 66 IN

## 2021-06-23 DIAGNOSIS — Z46.89 ENCOUNTER FOR PESSARY MAINTENANCE: Primary | ICD-10-CM

## 2021-06-23 DIAGNOSIS — N81.10 BADEN-WALKER GRADE 2 CYSTOCELE: ICD-10-CM

## 2021-06-23 DIAGNOSIS — S30.814A ABRASION OF VAGINA, INITIAL ENCOUNTER: ICD-10-CM

## 2021-06-23 PROCEDURE — 1159F MED LIST DOCD IN RCRD: CPT | Mod: S$GLB,,, | Performed by: OBSTETRICS & GYNECOLOGY

## 2021-06-23 PROCEDURE — 1126F AMNT PAIN NOTED NONE PRSNT: CPT | Mod: S$GLB,,, | Performed by: OBSTETRICS & GYNECOLOGY

## 2021-06-23 PROCEDURE — 1101F PR PT FALLS ASSESS DOC 0-1 FALLS W/OUT INJ PAST YR: ICD-10-PCS | Mod: CPTII,S$GLB,, | Performed by: OBSTETRICS & GYNECOLOGY

## 2021-06-23 PROCEDURE — 1126F PR PAIN SEVERITY QUANTIFIED, NO PAIN PRESENT: ICD-10-PCS | Mod: S$GLB,,, | Performed by: OBSTETRICS & GYNECOLOGY

## 2021-06-23 PROCEDURE — 99999 PR PBB SHADOW E&M-EST. PATIENT-LVL III: ICD-10-PCS | Mod: PBBFAC,,, | Performed by: OBSTETRICS & GYNECOLOGY

## 2021-06-23 PROCEDURE — 3288F FALL RISK ASSESSMENT DOCD: CPT | Mod: CPTII,S$GLB,, | Performed by: OBSTETRICS & GYNECOLOGY

## 2021-06-23 PROCEDURE — 1101F PT FALLS ASSESS-DOCD LE1/YR: CPT | Mod: CPTII,S$GLB,, | Performed by: OBSTETRICS & GYNECOLOGY

## 2021-06-23 PROCEDURE — 1159F PR MEDICATION LIST DOCUMENTED IN MEDICAL RECORD: ICD-10-PCS | Mod: S$GLB,,, | Performed by: OBSTETRICS & GYNECOLOGY

## 2021-06-23 PROCEDURE — 3288F PR FALLS RISK ASSESSMENT DOCUMENTED: ICD-10-PCS | Mod: CPTII,S$GLB,, | Performed by: OBSTETRICS & GYNECOLOGY

## 2021-06-23 PROCEDURE — 99213 PR OFFICE/OUTPT VISIT, EST, LEVL III, 20-29 MIN: ICD-10-PCS | Mod: S$GLB,,, | Performed by: OBSTETRICS & GYNECOLOGY

## 2021-06-23 PROCEDURE — 99213 OFFICE O/P EST LOW 20 MIN: CPT | Mod: S$GLB,,, | Performed by: OBSTETRICS & GYNECOLOGY

## 2021-06-23 PROCEDURE — 99999 PR PBB SHADOW E&M-EST. PATIENT-LVL III: CPT | Mod: PBBFAC,,, | Performed by: OBSTETRICS & GYNECOLOGY

## 2021-06-23 RX ORDER — OXYQUINOLINE SULFATE AND SODIUM LAURYL SULFATE .25; .1 MG/G; MG/G
1 JELLY VAGINAL
Qty: 113.4 G | Refills: 3 | COMMUNITY
Start: 2021-06-24 | End: 2022-05-10

## 2021-06-29 ENCOUNTER — TELEPHONE (OUTPATIENT)
Dept: OBSTETRICS AND GYNECOLOGY | Facility: CLINIC | Age: 86
End: 2021-06-29

## 2021-06-30 ENCOUNTER — TELEPHONE (OUTPATIENT)
Dept: OBSTETRICS AND GYNECOLOGY | Facility: CLINIC | Age: 86
End: 2021-06-30

## 2021-06-30 DIAGNOSIS — S30.814A ABRASION OF VAGINA, INITIAL ENCOUNTER: Primary | ICD-10-CM

## 2021-06-30 RX ORDER — ESTRADIOL 0.1 MG/G
1 CREAM VAGINAL
Qty: 42.5 G | Refills: 1 | Status: SHIPPED | OUTPATIENT
Start: 2021-06-30 | End: 2022-05-10

## 2021-07-02 ENCOUNTER — HOSPITAL ENCOUNTER (OUTPATIENT)
Dept: RADIOLOGY | Facility: HOSPITAL | Age: 86
Discharge: HOME | End: 2021-07-02
Attending: ORTHOPAEDIC SURGERY
Payer: MEDICARE

## 2021-07-02 ENCOUNTER — OFFICE VISIT (OUTPATIENT)
Dept: ORTHOPEDICS | Facility: CLINIC | Age: 86
End: 2021-07-02
Payer: MEDICARE

## 2021-07-02 VITALS
WEIGHT: 166 LBS | DIASTOLIC BLOOD PRESSURE: 76 MMHG | HEIGHT: 66 IN | HEART RATE: 71 BPM | BODY MASS INDEX: 26.68 KG/M2 | SYSTOLIC BLOOD PRESSURE: 149 MMHG

## 2021-07-02 DIAGNOSIS — I73.9 PERIPHERAL ARTERIAL DISEASE: ICD-10-CM

## 2021-07-02 DIAGNOSIS — M25.562 PAIN IN BOTH KNEES, UNSPECIFIED CHRONICITY: ICD-10-CM

## 2021-07-02 DIAGNOSIS — M25.561 PAIN IN BOTH KNEES, UNSPECIFIED CHRONICITY: ICD-10-CM

## 2021-07-02 DIAGNOSIS — M16.12 ARTHRITIS OF LEFT HIP: ICD-10-CM

## 2021-07-02 DIAGNOSIS — M21.161 ACQUIRED VARUS DEFORMITY KNEE, RIGHT: ICD-10-CM

## 2021-07-02 DIAGNOSIS — M21.162 ACQUIRED VARUS DEFORMITY KNEE, LEFT: ICD-10-CM

## 2021-07-02 DIAGNOSIS — M17.12 ARTHRITIS OF KNEE, LEFT: Primary | ICD-10-CM

## 2021-07-02 DIAGNOSIS — M17.11 ARTHRITIS OF KNEE, RIGHT: ICD-10-CM

## 2021-07-02 DIAGNOSIS — M25.559 HIP PAIN: ICD-10-CM

## 2021-07-02 DIAGNOSIS — M25.559 HIP PAIN: Primary | ICD-10-CM

## 2021-07-02 DIAGNOSIS — M54.50 LUMBAR SPINE PAIN: ICD-10-CM

## 2021-07-02 DIAGNOSIS — M47.816 LUMBAR FACET ARTHROPATHY: ICD-10-CM

## 2021-07-02 PROCEDURE — 1159F PR MEDICATION LIST DOCUMENTED IN MEDICAL RECORD: ICD-10-PCS | Mod: S$GLB,,, | Performed by: ORTHOPAEDIC SURGERY

## 2021-07-02 PROCEDURE — 73564 X-RAY EXAM KNEE 4 OR MORE: CPT | Mod: 26,50,, | Performed by: RADIOLOGY

## 2021-07-02 PROCEDURE — 1159F MED LIST DOCD IN RCRD: CPT | Mod: S$GLB,,, | Performed by: ORTHOPAEDIC SURGERY

## 2021-07-02 PROCEDURE — 99499 RISK ADDL DX/OHS AUDIT: ICD-10-PCS | Mod: HCNC,S$GLB,, | Performed by: ORTHOPAEDIC SURGERY

## 2021-07-02 PROCEDURE — 99214 PR OFFICE/OUTPT VISIT, EST, LEVL IV, 30-39 MIN: ICD-10-PCS | Mod: S$GLB,,, | Performed by: ORTHOPAEDIC SURGERY

## 2021-07-02 PROCEDURE — 99999 PR PBB SHADOW E&M-EST. PATIENT-LVL IV: CPT | Mod: PBBFAC,,, | Performed by: ORTHOPAEDIC SURGERY

## 2021-07-02 PROCEDURE — 72100 XR LUMBAR SPINE AP AND LATERAL: ICD-10-PCS | Mod: 26,,, | Performed by: RADIOLOGY

## 2021-07-02 PROCEDURE — 72100 X-RAY EXAM L-S SPINE 2/3 VWS: CPT | Mod: TC

## 2021-07-02 PROCEDURE — 73564 X-RAY EXAM KNEE 4 OR MORE: CPT | Mod: TC,50

## 2021-07-02 PROCEDURE — 73521 X-RAY EXAM HIPS BI 2 VIEWS: CPT | Mod: 26,,, | Performed by: RADIOLOGY

## 2021-07-02 PROCEDURE — 1125F PR PAIN SEVERITY QUANTIFIED, PAIN PRESENT: ICD-10-PCS | Mod: S$GLB,,, | Performed by: ORTHOPAEDIC SURGERY

## 2021-07-02 PROCEDURE — 73521 X-RAY EXAM HIPS BI 2 VIEWS: CPT | Mod: TC

## 2021-07-02 PROCEDURE — 72100 X-RAY EXAM L-S SPINE 2/3 VWS: CPT | Mod: 26,,, | Performed by: RADIOLOGY

## 2021-07-02 PROCEDURE — 73521 XR HIPS BILATERAL 2 VIEW INCL AP PELVIS: ICD-10-PCS | Mod: 26,,, | Performed by: RADIOLOGY

## 2021-07-02 PROCEDURE — 99999 PR PBB SHADOW E&M-EST. PATIENT-LVL IV: ICD-10-PCS | Mod: PBBFAC,,, | Performed by: ORTHOPAEDIC SURGERY

## 2021-07-02 PROCEDURE — 99499 UNLISTED E&M SERVICE: CPT | Mod: HCNC,S$GLB,, | Performed by: ORTHOPAEDIC SURGERY

## 2021-07-02 PROCEDURE — 73564 XR KNEE ORTHO BILAT WITH FLEXION: ICD-10-PCS | Mod: 26,50,, | Performed by: RADIOLOGY

## 2021-07-02 PROCEDURE — 99214 OFFICE O/P EST MOD 30 MIN: CPT | Mod: S$GLB,,, | Performed by: ORTHOPAEDIC SURGERY

## 2021-07-02 PROCEDURE — 1125F AMNT PAIN NOTED PAIN PRSNT: CPT | Mod: S$GLB,,, | Performed by: ORTHOPAEDIC SURGERY

## 2021-07-07 ENCOUNTER — OFFICE VISIT (OUTPATIENT)
Dept: OBSTETRICS AND GYNECOLOGY | Facility: CLINIC | Age: 86
End: 2021-07-07
Payer: MEDICARE

## 2021-07-07 VITALS
HEIGHT: 66 IN | SYSTOLIC BLOOD PRESSURE: 132 MMHG | DIASTOLIC BLOOD PRESSURE: 78 MMHG | BODY MASS INDEX: 26.65 KG/M2 | WEIGHT: 165.81 LBS

## 2021-07-07 DIAGNOSIS — N81.10 BADEN-WALKER GRADE 2 CYSTOCELE: Primary | ICD-10-CM

## 2021-07-07 PROBLEM — S30.814A VAGINAL ABRASION: Status: RESOLVED | Noted: 2021-06-23 | Resolved: 2021-07-07

## 2021-07-07 PROCEDURE — 1101F PR PT FALLS ASSESS DOC 0-1 FALLS W/OUT INJ PAST YR: ICD-10-PCS | Mod: CPTII,S$GLB,, | Performed by: OBSTETRICS & GYNECOLOGY

## 2021-07-07 PROCEDURE — 99999 PR PBB SHADOW E&M-EST. PATIENT-LVL III: CPT | Mod: PBBFAC,,, | Performed by: OBSTETRICS & GYNECOLOGY

## 2021-07-07 PROCEDURE — 3288F FALL RISK ASSESSMENT DOCD: CPT | Mod: CPTII,S$GLB,, | Performed by: OBSTETRICS & GYNECOLOGY

## 2021-07-07 PROCEDURE — 1159F PR MEDICATION LIST DOCUMENTED IN MEDICAL RECORD: ICD-10-PCS | Mod: S$GLB,,, | Performed by: OBSTETRICS & GYNECOLOGY

## 2021-07-07 PROCEDURE — 1126F PR PAIN SEVERITY QUANTIFIED, NO PAIN PRESENT: ICD-10-PCS | Mod: S$GLB,,, | Performed by: OBSTETRICS & GYNECOLOGY

## 2021-07-07 PROCEDURE — 1101F PT FALLS ASSESS-DOCD LE1/YR: CPT | Mod: CPTII,S$GLB,, | Performed by: OBSTETRICS & GYNECOLOGY

## 2021-07-07 PROCEDURE — 99213 OFFICE O/P EST LOW 20 MIN: CPT | Mod: S$GLB,,, | Performed by: OBSTETRICS & GYNECOLOGY

## 2021-07-07 PROCEDURE — 99999 PR PBB SHADOW E&M-EST. PATIENT-LVL III: ICD-10-PCS | Mod: PBBFAC,,, | Performed by: OBSTETRICS & GYNECOLOGY

## 2021-07-07 PROCEDURE — 3288F PR FALLS RISK ASSESSMENT DOCUMENTED: ICD-10-PCS | Mod: CPTII,S$GLB,, | Performed by: OBSTETRICS & GYNECOLOGY

## 2021-07-07 PROCEDURE — 1126F AMNT PAIN NOTED NONE PRSNT: CPT | Mod: S$GLB,,, | Performed by: OBSTETRICS & GYNECOLOGY

## 2021-07-07 PROCEDURE — 1159F MED LIST DOCD IN RCRD: CPT | Mod: S$GLB,,, | Performed by: OBSTETRICS & GYNECOLOGY

## 2021-07-07 PROCEDURE — 99213 PR OFFICE/OUTPT VISIT, EST, LEVL III, 20-29 MIN: ICD-10-PCS | Mod: S$GLB,,, | Performed by: OBSTETRICS & GYNECOLOGY

## 2021-07-07 RX ORDER — CLOPIDOGREL BISULFATE 75 MG/1
1 TABLET ORAL DAILY
COMMUNITY
Start: 2020-07-30 | End: 2021-07-07 | Stop reason: SDUPTHER

## 2021-07-07 RX ORDER — ATORVASTATIN CALCIUM 40 MG/1
1 TABLET, FILM COATED ORAL NIGHTLY
COMMUNITY
Start: 2021-03-02

## 2021-08-18 ENCOUNTER — PES CALL (OUTPATIENT)
Dept: ADMINISTRATIVE | Facility: CLINIC | Age: 86
End: 2021-08-18

## 2021-09-29 ENCOUNTER — LAB VISIT (OUTPATIENT)
Dept: LAB | Facility: HOSPITAL | Age: 86
End: 2021-09-29
Attending: FAMILY MEDICINE
Payer: MEDICARE

## 2021-09-29 DIAGNOSIS — Z79.899 ENCOUNTER FOR LONG-TERM (CURRENT) USE OF MEDICATIONS: ICD-10-CM

## 2021-09-29 DIAGNOSIS — E78.2 MIXED HYPERLIPIDEMIA: ICD-10-CM

## 2021-09-29 LAB
ALBUMIN SERPL BCP-MCNC: 4.1 G/DL (ref 3.5–5.2)
ALP SERPL-CCNC: 79 U/L (ref 55–135)
ALT SERPL W/O P-5'-P-CCNC: 14 U/L (ref 10–44)
ANION GAP SERPL CALC-SCNC: 9 MMOL/L (ref 8–16)
AST SERPL-CCNC: 18 U/L (ref 10–40)
BASOPHILS # BLD AUTO: 0.05 K/UL (ref 0–0.2)
BASOPHILS NFR BLD: 0.6 % (ref 0–1.9)
BILIRUB SERPL-MCNC: 0.8 MG/DL (ref 0.1–1)
BUN SERPL-MCNC: 14 MG/DL (ref 8–23)
CALCIUM SERPL-MCNC: 10.4 MG/DL (ref 8.7–10.5)
CHLORIDE SERPL-SCNC: 100 MMOL/L (ref 95–110)
CHOLEST SERPL-MCNC: 106 MG/DL (ref 120–199)
CHOLEST/HDLC SERPL: 2.4 {RATIO} (ref 2–5)
CO2 SERPL-SCNC: 30 MMOL/L (ref 23–29)
CREAT SERPL-MCNC: 0.6 MG/DL (ref 0.5–1.4)
DIFFERENTIAL METHOD: ABNORMAL
EOSINOPHIL # BLD AUTO: 0.6 K/UL (ref 0–0.5)
EOSINOPHIL NFR BLD: 7.6 % (ref 0–8)
ERYTHROCYTE [DISTWIDTH] IN BLOOD BY AUTOMATED COUNT: 13.2 % (ref 11.5–14.5)
EST. GFR  (AFRICAN AMERICAN): >60 ML/MIN/1.73 M^2
EST. GFR  (NON AFRICAN AMERICAN): >60 ML/MIN/1.73 M^2
GLUCOSE SERPL-MCNC: 94 MG/DL (ref 70–110)
HCT VFR BLD AUTO: 41.5 % (ref 37–48.5)
HDLC SERPL-MCNC: 44 MG/DL (ref 40–75)
HDLC SERPL: 41.5 % (ref 20–50)
HGB BLD-MCNC: 13.4 G/DL (ref 12–16)
IMM GRANULOCYTES # BLD AUTO: 0.02 K/UL (ref 0–0.04)
IMM GRANULOCYTES NFR BLD AUTO: 0.2 % (ref 0–0.5)
LDLC SERPL CALC-MCNC: 39 MG/DL (ref 63–159)
LYMPHOCYTES # BLD AUTO: 1.8 K/UL (ref 1–4.8)
LYMPHOCYTES NFR BLD: 21.8 % (ref 18–48)
MCH RBC QN AUTO: 28.9 PG (ref 27–31)
MCHC RBC AUTO-ENTMCNC: 32.3 G/DL (ref 32–36)
MCV RBC AUTO: 89 FL (ref 82–98)
MONOCYTES # BLD AUTO: 0.6 K/UL (ref 0.3–1)
MONOCYTES NFR BLD: 6.9 % (ref 4–15)
NEUTROPHILS # BLD AUTO: 5.1 K/UL (ref 1.8–7.7)
NEUTROPHILS NFR BLD: 62.9 % (ref 38–73)
NONHDLC SERPL-MCNC: 62 MG/DL
NRBC BLD-RTO: 0 /100 WBC
PLATELET # BLD AUTO: 272 K/UL (ref 150–450)
PMV BLD AUTO: 10.1 FL (ref 9.2–12.9)
POTASSIUM SERPL-SCNC: 4.4 MMOL/L (ref 3.5–5.1)
PROT SERPL-MCNC: 7.3 G/DL (ref 6–8.4)
RBC # BLD AUTO: 4.64 M/UL (ref 4–5.4)
SODIUM SERPL-SCNC: 139 MMOL/L (ref 136–145)
TRIGL SERPL-MCNC: 115 MG/DL (ref 30–150)
TSH SERPL DL<=0.005 MIU/L-ACNC: 3.95 UIU/ML (ref 0.4–4)
WBC # BLD AUTO: 8.15 K/UL (ref 3.9–12.7)

## 2021-09-29 PROCEDURE — 84443 ASSAY THYROID STIM HORMONE: CPT | Mod: HCNC | Performed by: FAMILY MEDICINE

## 2021-09-29 PROCEDURE — 80061 LIPID PANEL: CPT | Mod: HCNC | Performed by: FAMILY MEDICINE

## 2021-09-29 PROCEDURE — 85025 COMPLETE CBC W/AUTO DIFF WBC: CPT | Mod: HCNC | Performed by: FAMILY MEDICINE

## 2021-09-29 PROCEDURE — 36415 COLL VENOUS BLD VENIPUNCTURE: CPT | Mod: HCNC | Performed by: FAMILY MEDICINE

## 2021-09-29 PROCEDURE — 80053 COMPREHEN METABOLIC PANEL: CPT | Mod: HCNC | Performed by: FAMILY MEDICINE

## 2021-10-07 ENCOUNTER — OFFICE VISIT (OUTPATIENT)
Dept: INTERNAL MEDICINE | Facility: CLINIC | Age: 86
End: 2021-10-07
Payer: MEDICARE

## 2021-10-07 VITALS
DIASTOLIC BLOOD PRESSURE: 68 MMHG | SYSTOLIC BLOOD PRESSURE: 140 MMHG | TEMPERATURE: 98 F | OXYGEN SATURATION: 95 % | WEIGHT: 163.13 LBS | HEART RATE: 81 BPM | BODY MASS INDEX: 26.22 KG/M2 | HEIGHT: 66 IN

## 2021-10-07 DIAGNOSIS — E78.2 MIXED HYPERLIPIDEMIA: ICD-10-CM

## 2021-10-07 DIAGNOSIS — Z78.0 POSTMENOPAUSAL: ICD-10-CM

## 2021-10-07 DIAGNOSIS — J30.9 CHRONIC ALLERGIC RHINITIS: ICD-10-CM

## 2021-10-07 DIAGNOSIS — Z00.00 ROUTINE GENERAL MEDICAL EXAMINATION AT A HEALTH CARE FACILITY: Primary | ICD-10-CM

## 2021-10-07 DIAGNOSIS — K59.09 CONSTIPATION, CHRONIC: ICD-10-CM

## 2021-10-07 DIAGNOSIS — Z85.820 HISTORY OF MELANOMA: ICD-10-CM

## 2021-10-07 DIAGNOSIS — K21.9 GASTROESOPHAGEAL REFLUX DISEASE WITHOUT ESOPHAGITIS: ICD-10-CM

## 2021-10-07 DIAGNOSIS — H60.543 ECZEMA OF EXTERNAL EAR, BILATERAL: ICD-10-CM

## 2021-10-07 DIAGNOSIS — F32.A DEPRESSION, UNSPECIFIED DEPRESSION TYPE: ICD-10-CM

## 2021-10-07 PROBLEM — M46.1 SACROILIITIS: Status: ACTIVE | Noted: 2021-01-07

## 2021-10-07 PROCEDURE — 1159F MED LIST DOCD IN RCRD: CPT | Mod: HCNC,CPTII,S$GLB, | Performed by: FAMILY MEDICINE

## 2021-10-07 PROCEDURE — 1125F AMNT PAIN NOTED PAIN PRSNT: CPT | Mod: HCNC,CPTII,S$GLB, | Performed by: FAMILY MEDICINE

## 2021-10-07 PROCEDURE — 1159F PR MEDICATION LIST DOCUMENTED IN MEDICAL RECORD: ICD-10-PCS | Mod: HCNC,CPTII,S$GLB, | Performed by: FAMILY MEDICINE

## 2021-10-07 PROCEDURE — 99499 UNLISTED E&M SERVICE: CPT | Mod: S$GLB,,, | Performed by: FAMILY MEDICINE

## 2021-10-07 PROCEDURE — 1125F PR PAIN SEVERITY QUANTIFIED, PAIN PRESENT: ICD-10-PCS | Mod: HCNC,CPTII,S$GLB, | Performed by: FAMILY MEDICINE

## 2021-10-07 PROCEDURE — G0008 ADMIN INFLUENZA VIRUS VAC: HCPCS | Mod: HCNC,S$GLB,, | Performed by: FAMILY MEDICINE

## 2021-10-07 PROCEDURE — 1160F RVW MEDS BY RX/DR IN RCRD: CPT | Mod: HCNC,CPTII,S$GLB, | Performed by: FAMILY MEDICINE

## 2021-10-07 PROCEDURE — 99397 PER PM REEVAL EST PAT 65+ YR: CPT | Mod: HCNC,S$GLB,, | Performed by: FAMILY MEDICINE

## 2021-10-07 PROCEDURE — G0008 FLU VACCINE - QUADRIVALENT - ADJUVANTED: ICD-10-PCS | Mod: HCNC,S$GLB,, | Performed by: FAMILY MEDICINE

## 2021-10-07 PROCEDURE — 1101F PR PT FALLS ASSESS DOC 0-1 FALLS W/OUT INJ PAST YR: ICD-10-PCS | Mod: HCNC,CPTII,S$GLB, | Performed by: FAMILY MEDICINE

## 2021-10-07 PROCEDURE — 99499 RISK ADDL DX/OHS AUDIT: ICD-10-PCS | Mod: S$GLB,,, | Performed by: FAMILY MEDICINE

## 2021-10-07 PROCEDURE — 1101F PT FALLS ASSESS-DOCD LE1/YR: CPT | Mod: HCNC,CPTII,S$GLB, | Performed by: FAMILY MEDICINE

## 2021-10-07 PROCEDURE — 99999 PR PBB SHADOW E&M-EST. PATIENT-LVL III: CPT | Mod: PBBFAC,HCNC,, | Performed by: FAMILY MEDICINE

## 2021-10-07 PROCEDURE — 3288F FALL RISK ASSESSMENT DOCD: CPT | Mod: HCNC,CPTII,S$GLB, | Performed by: FAMILY MEDICINE

## 2021-10-07 PROCEDURE — 99999 PR PBB SHADOW E&M-EST. PATIENT-LVL III: ICD-10-PCS | Mod: PBBFAC,HCNC,, | Performed by: FAMILY MEDICINE

## 2021-10-07 PROCEDURE — 1160F PR REVIEW ALL MEDS BY PRESCRIBER/CLIN PHARMACIST DOCUMENTED: ICD-10-PCS | Mod: HCNC,CPTII,S$GLB, | Performed by: FAMILY MEDICINE

## 2021-10-07 PROCEDURE — 99397 PR PREVENTIVE VISIT,EST,65 & OVER: ICD-10-PCS | Mod: HCNC,S$GLB,, | Performed by: FAMILY MEDICINE

## 2021-10-07 PROCEDURE — 90694 VACC AIIV4 NO PRSRV 0.5ML IM: CPT | Mod: HCNC,S$GLB,, | Performed by: FAMILY MEDICINE

## 2021-10-07 PROCEDURE — 3288F PR FALLS RISK ASSESSMENT DOCUMENTED: ICD-10-PCS | Mod: HCNC,CPTII,S$GLB, | Performed by: FAMILY MEDICINE

## 2021-10-07 PROCEDURE — 90694 FLU VACCINE - QUADRIVALENT - ADJUVANTED: ICD-10-PCS | Mod: HCNC,S$GLB,, | Performed by: FAMILY MEDICINE

## 2021-10-07 RX ORDER — FLUTICASONE PROPIONATE 50 MCG
2 SPRAY, SUSPENSION (ML) NASAL DAILY
Qty: 16 G | Refills: 11 | Status: SHIPPED | OUTPATIENT
Start: 2021-10-07 | End: 2022-04-27

## 2021-10-07 RX ORDER — CITALOPRAM 20 MG/1
20 TABLET, FILM COATED ORAL DAILY
Qty: 30 TABLET | Refills: 11 | Status: SHIPPED | OUTPATIENT
Start: 2021-10-07 | End: 2022-10-21

## 2021-10-07 RX ORDER — FLUOCINOLONE ACETONIDE 0.11 MG/ML
5 OIL AURICULAR (OTIC) 2 TIMES DAILY
Qty: 20 ML | Refills: 0 | Status: SHIPPED | OUTPATIENT
Start: 2021-10-07 | End: 2021-12-15

## 2021-10-07 RX ORDER — FUROSEMIDE 20 MG/1
20 TABLET ORAL DAILY PRN
COMMUNITY
Start: 2021-07-19 | End: 2022-09-19

## 2021-10-07 RX ORDER — PANTOPRAZOLE SODIUM 40 MG/1
40 TABLET, DELAYED RELEASE ORAL DAILY
Qty: 30 TABLET | Refills: 11 | Status: SHIPPED | OUTPATIENT
Start: 2021-10-07 | End: 2022-04-27

## 2021-10-16 ENCOUNTER — PATIENT OUTREACH (OUTPATIENT)
Dept: ADMINISTRATIVE | Facility: OTHER | Age: 86
End: 2021-10-16

## 2021-10-18 ENCOUNTER — OFFICE VISIT (OUTPATIENT)
Dept: ORTHOPEDICS | Facility: CLINIC | Age: 86
End: 2021-10-18
Payer: MEDICARE

## 2021-10-18 VITALS — SYSTOLIC BLOOD PRESSURE: 167 MMHG | DIASTOLIC BLOOD PRESSURE: 73 MMHG | HEART RATE: 69 BPM

## 2021-10-18 DIAGNOSIS — M17.11 ARTHRITIS OF KNEE, RIGHT: ICD-10-CM

## 2021-10-18 DIAGNOSIS — I73.9 PERIPHERAL ARTERIAL DISEASE: ICD-10-CM

## 2021-10-18 DIAGNOSIS — M16.12 ARTHRITIS OF LEFT HIP: ICD-10-CM

## 2021-10-18 DIAGNOSIS — M21.162 ACQUIRED VARUS DEFORMITY KNEE, LEFT: ICD-10-CM

## 2021-10-18 DIAGNOSIS — M21.161 ACQUIRED VARUS DEFORMITY KNEE, RIGHT: ICD-10-CM

## 2021-10-18 DIAGNOSIS — M17.12 ARTHRITIS OF KNEE, LEFT: Primary | ICD-10-CM

## 2021-10-18 DIAGNOSIS — M47.816 LUMBAR FACET ARTHROPATHY: ICD-10-CM

## 2021-10-18 DIAGNOSIS — M17.12 PRIMARY OSTEOARTHRITIS OF LEFT KNEE: Primary | ICD-10-CM

## 2021-10-18 DIAGNOSIS — M54.50 LUMBAR SPINE PAIN: ICD-10-CM

## 2021-10-18 PROCEDURE — 20552 TRIGGER POINT INJECTION: ICD-10-PCS | Mod: HCNC,S$GLB,, | Performed by: ORTHOPAEDIC SURGERY

## 2021-10-18 PROCEDURE — 1159F PR MEDICATION LIST DOCUMENTED IN MEDICAL RECORD: ICD-10-PCS | Mod: HCNC,CPTII,S$GLB, | Performed by: ORTHOPAEDIC SURGERY

## 2021-10-18 PROCEDURE — 1159F MED LIST DOCD IN RCRD: CPT | Mod: HCNC,CPTII,S$GLB, | Performed by: ORTHOPAEDIC SURGERY

## 2021-10-18 PROCEDURE — 99499 UNLISTED E&M SERVICE: CPT | Mod: S$GLB,,, | Performed by: ORTHOPAEDIC SURGERY

## 2021-10-18 PROCEDURE — 1126F AMNT PAIN NOTED NONE PRSNT: CPT | Mod: HCNC,CPTII,S$GLB, | Performed by: ORTHOPAEDIC SURGERY

## 2021-10-18 PROCEDURE — 99214 OFFICE O/P EST MOD 30 MIN: CPT | Mod: 25,HCNC,S$GLB, | Performed by: ORTHOPAEDIC SURGERY

## 2021-10-18 PROCEDURE — 1101F PR PT FALLS ASSESS DOC 0-1 FALLS W/OUT INJ PAST YR: ICD-10-PCS | Mod: HCNC,CPTII,S$GLB, | Performed by: ORTHOPAEDIC SURGERY

## 2021-10-18 PROCEDURE — 99214 PR OFFICE/OUTPT VISIT, EST, LEVL IV, 30-39 MIN: ICD-10-PCS | Mod: 25,HCNC,S$GLB, | Performed by: ORTHOPAEDIC SURGERY

## 2021-10-18 PROCEDURE — 99999 PR PBB SHADOW E&M-EST. PATIENT-LVL IV: ICD-10-PCS | Mod: PBBFAC,HCNC,, | Performed by: ORTHOPAEDIC SURGERY

## 2021-10-18 PROCEDURE — 1160F PR REVIEW ALL MEDS BY PRESCRIBER/CLIN PHARMACIST DOCUMENTED: ICD-10-PCS | Mod: HCNC,CPTII,S$GLB, | Performed by: ORTHOPAEDIC SURGERY

## 2021-10-18 PROCEDURE — 20552 NJX 1/MLT TRIGGER POINT 1/2: CPT | Mod: HCNC,S$GLB,, | Performed by: ORTHOPAEDIC SURGERY

## 2021-10-18 PROCEDURE — 3288F PR FALLS RISK ASSESSMENT DOCUMENTED: ICD-10-PCS | Mod: HCNC,CPTII,S$GLB, | Performed by: ORTHOPAEDIC SURGERY

## 2021-10-18 PROCEDURE — 1101F PT FALLS ASSESS-DOCD LE1/YR: CPT | Mod: HCNC,CPTII,S$GLB, | Performed by: ORTHOPAEDIC SURGERY

## 2021-10-18 PROCEDURE — 99999 PR PBB SHADOW E&M-EST. PATIENT-LVL IV: CPT | Mod: PBBFAC,HCNC,, | Performed by: ORTHOPAEDIC SURGERY

## 2021-10-18 PROCEDURE — 1126F PR PAIN SEVERITY QUANTIFIED, NO PAIN PRESENT: ICD-10-PCS | Mod: HCNC,CPTII,S$GLB, | Performed by: ORTHOPAEDIC SURGERY

## 2021-10-18 PROCEDURE — 99499 RISK ADDL DX/OHS AUDIT: ICD-10-PCS | Mod: S$GLB,,, | Performed by: ORTHOPAEDIC SURGERY

## 2021-10-18 PROCEDURE — 3288F FALL RISK ASSESSMENT DOCD: CPT | Mod: HCNC,CPTII,S$GLB, | Performed by: ORTHOPAEDIC SURGERY

## 2021-10-18 PROCEDURE — 1160F RVW MEDS BY RX/DR IN RCRD: CPT | Mod: HCNC,CPTII,S$GLB, | Performed by: ORTHOPAEDIC SURGERY

## 2021-10-18 RX ORDER — METHYLPREDNISOLONE ACETATE 80 MG/ML
80 INJECTION, SUSPENSION INTRA-ARTICULAR; INTRALESIONAL; INTRAMUSCULAR; SOFT TISSUE
Status: DISCONTINUED | OUTPATIENT
Start: 2021-10-18 | End: 2021-10-18 | Stop reason: HOSPADM

## 2021-10-18 RX ADMIN — METHYLPREDNISOLONE ACETATE 80 MG: 80 INJECTION, SUSPENSION INTRA-ARTICULAR; INTRALESIONAL; INTRAMUSCULAR; SOFT TISSUE at 10:10

## 2021-10-28 ENCOUNTER — OFFICE VISIT (OUTPATIENT)
Dept: OBSTETRICS AND GYNECOLOGY | Facility: CLINIC | Age: 86
End: 2021-10-28
Payer: MEDICARE

## 2021-10-28 VITALS
BODY MASS INDEX: 26.22 KG/M2 | SYSTOLIC BLOOD PRESSURE: 158 MMHG | WEIGHT: 163.13 LBS | HEIGHT: 66 IN | DIASTOLIC BLOOD PRESSURE: 60 MMHG

## 2021-10-28 DIAGNOSIS — Z46.89 ENCOUNTER FOR PESSARY MAINTENANCE: Primary | ICD-10-CM

## 2021-10-28 DIAGNOSIS — N81.10 BADEN-WALKER GRADE 2 CYSTOCELE: ICD-10-CM

## 2021-10-28 PROCEDURE — 1159F MED LIST DOCD IN RCRD: CPT | Mod: HCNC,CPTII,S$GLB, | Performed by: OBSTETRICS & GYNECOLOGY

## 2021-10-28 PROCEDURE — 1126F AMNT PAIN NOTED NONE PRSNT: CPT | Mod: HCNC,CPTII,S$GLB, | Performed by: OBSTETRICS & GYNECOLOGY

## 2021-10-28 PROCEDURE — 99213 PR OFFICE/OUTPT VISIT, EST, LEVL III, 20-29 MIN: ICD-10-PCS | Mod: HCNC,S$GLB,, | Performed by: OBSTETRICS & GYNECOLOGY

## 2021-10-28 PROCEDURE — 99213 OFFICE O/P EST LOW 20 MIN: CPT | Mod: HCNC,S$GLB,, | Performed by: OBSTETRICS & GYNECOLOGY

## 2021-10-28 PROCEDURE — 1159F PR MEDICATION LIST DOCUMENTED IN MEDICAL RECORD: ICD-10-PCS | Mod: HCNC,CPTII,S$GLB, | Performed by: OBSTETRICS & GYNECOLOGY

## 2021-10-28 PROCEDURE — 99999 PR PBB SHADOW E&M-EST. PATIENT-LVL IV: ICD-10-PCS | Mod: PBBFAC,HCNC,, | Performed by: OBSTETRICS & GYNECOLOGY

## 2021-10-28 PROCEDURE — 99999 PR PBB SHADOW E&M-EST. PATIENT-LVL IV: CPT | Mod: PBBFAC,HCNC,, | Performed by: OBSTETRICS & GYNECOLOGY

## 2021-10-28 PROCEDURE — 1126F PR PAIN SEVERITY QUANTIFIED, NO PAIN PRESENT: ICD-10-PCS | Mod: HCNC,CPTII,S$GLB, | Performed by: OBSTETRICS & GYNECOLOGY

## 2021-10-28 RX ORDER — AMLODIPINE AND BENAZEPRIL HYDROCHLORIDE 5; 10 MG/1; MG/1
1 CAPSULE ORAL
COMMUNITY
Start: 2021-10-26 | End: 2022-05-10

## 2021-11-08 ENCOUNTER — OFFICE VISIT (OUTPATIENT)
Dept: ORTHOPEDICS | Facility: CLINIC | Age: 86
End: 2021-11-08
Payer: MEDICARE

## 2021-11-08 VITALS — HEART RATE: 74 BPM | SYSTOLIC BLOOD PRESSURE: 143 MMHG | DIASTOLIC BLOOD PRESSURE: 63 MMHG

## 2021-11-08 DIAGNOSIS — M21.161 ACQUIRED VARUS DEFORMITY KNEE, RIGHT: ICD-10-CM

## 2021-11-08 DIAGNOSIS — M17.11 ARTHRITIS OF KNEE, RIGHT: ICD-10-CM

## 2021-11-08 DIAGNOSIS — M17.12 ARTHRITIS OF KNEE, LEFT: Primary | ICD-10-CM

## 2021-11-08 DIAGNOSIS — I73.9 PERIPHERAL ARTERIAL DISEASE: ICD-10-CM

## 2021-11-08 DIAGNOSIS — M47.816 LUMBAR FACET ARTHROPATHY: ICD-10-CM

## 2021-11-08 DIAGNOSIS — M21.162 ACQUIRED VARUS DEFORMITY KNEE, LEFT: ICD-10-CM

## 2021-11-08 DIAGNOSIS — M54.50 LUMBAR SPINE PAIN: ICD-10-CM

## 2021-11-08 DIAGNOSIS — M16.12 ARTHRITIS OF LEFT HIP: ICD-10-CM

## 2021-11-08 PROCEDURE — 99213 OFFICE O/P EST LOW 20 MIN: CPT | Mod: 25,HCNC,S$GLB, | Performed by: ORTHOPAEDIC SURGERY

## 2021-11-08 PROCEDURE — 1159F MED LIST DOCD IN RCRD: CPT | Mod: HCNC,CPTII,S$GLB, | Performed by: ORTHOPAEDIC SURGERY

## 2021-11-08 PROCEDURE — 99213 PR OFFICE/OUTPT VISIT, EST, LEVL III, 20-29 MIN: ICD-10-PCS | Mod: 25,HCNC,S$GLB, | Performed by: ORTHOPAEDIC SURGERY

## 2021-11-08 PROCEDURE — 20610 DRAIN/INJ JOINT/BURSA W/O US: CPT | Mod: HCNC,LT,S$GLB, | Performed by: ORTHOPAEDIC SURGERY

## 2021-11-08 PROCEDURE — 99999 PR PBB SHADOW E&M-EST. PATIENT-LVL III: CPT | Mod: PBBFAC,HCNC,, | Performed by: ORTHOPAEDIC SURGERY

## 2021-11-08 PROCEDURE — 99999 PR PBB SHADOW E&M-EST. PATIENT-LVL III: ICD-10-PCS | Mod: PBBFAC,HCNC,, | Performed by: ORTHOPAEDIC SURGERY

## 2021-11-08 PROCEDURE — 3288F FALL RISK ASSESSMENT DOCD: CPT | Mod: HCNC,CPTII,S$GLB, | Performed by: ORTHOPAEDIC SURGERY

## 2021-11-08 PROCEDURE — 1101F PT FALLS ASSESS-DOCD LE1/YR: CPT | Mod: HCNC,CPTII,S$GLB, | Performed by: ORTHOPAEDIC SURGERY

## 2021-11-08 PROCEDURE — 1159F PR MEDICATION LIST DOCUMENTED IN MEDICAL RECORD: ICD-10-PCS | Mod: HCNC,CPTII,S$GLB, | Performed by: ORTHOPAEDIC SURGERY

## 2021-11-08 PROCEDURE — 3288F PR FALLS RISK ASSESSMENT DOCUMENTED: ICD-10-PCS | Mod: HCNC,CPTII,S$GLB, | Performed by: ORTHOPAEDIC SURGERY

## 2021-11-08 PROCEDURE — 20610 LARGE JOINT ASPIRATION/INJECTION: L KNEE: ICD-10-PCS | Mod: HCNC,LT,S$GLB, | Performed by: ORTHOPAEDIC SURGERY

## 2021-11-08 PROCEDURE — 1101F PR PT FALLS ASSESS DOC 0-1 FALLS W/OUT INJ PAST YR: ICD-10-PCS | Mod: HCNC,CPTII,S$GLB, | Performed by: ORTHOPAEDIC SURGERY

## 2021-11-28 ENCOUNTER — PATIENT OUTREACH (OUTPATIENT)
Dept: ADMINISTRATIVE | Facility: OTHER | Age: 86
End: 2021-11-28
Payer: MEDICARE

## 2021-12-02 ENCOUNTER — OFFICE VISIT (OUTPATIENT)
Dept: ORTHOPEDICS | Facility: CLINIC | Age: 86
End: 2021-12-02
Payer: MEDICARE

## 2021-12-02 VITALS
HEART RATE: 67 BPM | BODY MASS INDEX: 26.2 KG/M2 | SYSTOLIC BLOOD PRESSURE: 130 MMHG | DIASTOLIC BLOOD PRESSURE: 63 MMHG | HEIGHT: 66 IN | WEIGHT: 163 LBS

## 2021-12-02 DIAGNOSIS — M16.12 ARTHRITIS OF LEFT HIP: ICD-10-CM

## 2021-12-02 DIAGNOSIS — I73.9 PERIPHERAL ARTERIAL DISEASE: ICD-10-CM

## 2021-12-02 DIAGNOSIS — M54.50 LUMBAR SPINE PAIN: ICD-10-CM

## 2021-12-02 DIAGNOSIS — M47.816 LUMBAR FACET ARTHROPATHY: ICD-10-CM

## 2021-12-02 DIAGNOSIS — M17.12 ARTHRITIS OF KNEE, LEFT: Primary | ICD-10-CM

## 2021-12-02 DIAGNOSIS — M21.162 ACQUIRED VARUS DEFORMITY KNEE, LEFT: ICD-10-CM

## 2021-12-02 DIAGNOSIS — M21.161 ACQUIRED VARUS DEFORMITY KNEE, RIGHT: ICD-10-CM

## 2021-12-02 DIAGNOSIS — M17.11 ARTHRITIS OF KNEE, RIGHT: ICD-10-CM

## 2021-12-02 PROCEDURE — 99999 PR PBB SHADOW E&M-EST. PATIENT-LVL IV: CPT | Mod: PBBFAC,HCNC,, | Performed by: ORTHOPAEDIC SURGERY

## 2021-12-02 PROCEDURE — 99213 PR OFFICE/OUTPT VISIT, EST, LEVL III, 20-29 MIN: ICD-10-PCS | Mod: 25,HCNC,S$GLB, | Performed by: ORTHOPAEDIC SURGERY

## 2021-12-02 PROCEDURE — 20552 TRIGGER POINT INJECTION: ICD-10-PCS | Mod: HCNC,S$GLB,, | Performed by: ORTHOPAEDIC SURGERY

## 2021-12-02 PROCEDURE — 99999 PR PBB SHADOW E&M-EST. PATIENT-LVL IV: ICD-10-PCS | Mod: PBBFAC,HCNC,, | Performed by: ORTHOPAEDIC SURGERY

## 2021-12-02 PROCEDURE — 99213 OFFICE O/P EST LOW 20 MIN: CPT | Mod: 25,HCNC,S$GLB, | Performed by: ORTHOPAEDIC SURGERY

## 2021-12-02 PROCEDURE — 20552 NJX 1/MLT TRIGGER POINT 1/2: CPT | Mod: HCNC,S$GLB,, | Performed by: ORTHOPAEDIC SURGERY

## 2021-12-02 RX ORDER — METHYLPREDNISOLONE ACETATE 80 MG/ML
40 INJECTION, SUSPENSION INTRA-ARTICULAR; INTRALESIONAL; INTRAMUSCULAR; SOFT TISSUE
Status: DISCONTINUED | OUTPATIENT
Start: 2021-12-02 | End: 2021-12-02 | Stop reason: HOSPADM

## 2021-12-02 RX ADMIN — METHYLPREDNISOLONE ACETATE 40 MG: 80 INJECTION, SUSPENSION INTRA-ARTICULAR; INTRALESIONAL; INTRAMUSCULAR; SOFT TISSUE at 11:12

## 2021-12-08 ENCOUNTER — PATIENT OUTREACH (OUTPATIENT)
Dept: ADMINISTRATIVE | Facility: CLINIC | Age: 86
End: 2021-12-08
Payer: MEDICARE

## 2021-12-08 ENCOUNTER — EXTERNAL HOSPITAL ADMISSION (OUTPATIENT)
Dept: ADMINISTRATIVE | Facility: CLINIC | Age: 86
End: 2021-12-08
Payer: MEDICARE

## 2021-12-14 ENCOUNTER — PATIENT OUTREACH (OUTPATIENT)
Dept: ADMINISTRATIVE | Facility: HOSPITAL | Age: 86
End: 2021-12-14
Payer: MEDICARE

## 2021-12-15 ENCOUNTER — OFFICE VISIT (OUTPATIENT)
Dept: INTERNAL MEDICINE | Facility: CLINIC | Age: 86
End: 2021-12-15
Payer: MEDICARE

## 2021-12-15 VITALS
OXYGEN SATURATION: 96 % | BODY MASS INDEX: 25.84 KG/M2 | WEIGHT: 160.81 LBS | DIASTOLIC BLOOD PRESSURE: 60 MMHG | TEMPERATURE: 97 F | HEART RATE: 70 BPM | SYSTOLIC BLOOD PRESSURE: 142 MMHG | HEIGHT: 66 IN

## 2021-12-15 DIAGNOSIS — I10 ESSENTIAL HYPERTENSION: ICD-10-CM

## 2021-12-15 DIAGNOSIS — K85.80 OTHER ACUTE PANCREATITIS WITHOUT INFECTION OR NECROSIS: Primary | ICD-10-CM

## 2021-12-15 PROBLEM — K85.90 ACUTE PANCREATITIS WITHOUT INFECTION OR NECROSIS: Status: ACTIVE | Noted: 2021-12-05

## 2021-12-15 PROCEDURE — 99999 PR PBB SHADOW E&M-EST. PATIENT-LVL V: ICD-10-PCS | Mod: PBBFAC,HCNC,, | Performed by: PHYSICIAN ASSISTANT

## 2021-12-15 PROCEDURE — 99495 TCM SERVICES (MODERATE COMPLEXITY): ICD-10-PCS | Mod: HCNC,S$GLB,, | Performed by: PHYSICIAN ASSISTANT

## 2021-12-15 PROCEDURE — 99495 TRANSJ CARE MGMT MOD F2F 14D: CPT | Mod: HCNC,S$GLB,, | Performed by: PHYSICIAN ASSISTANT

## 2021-12-15 PROCEDURE — 99999 PR PBB SHADOW E&M-EST. PATIENT-LVL V: CPT | Mod: PBBFAC,HCNC,, | Performed by: PHYSICIAN ASSISTANT

## 2021-12-15 RX ORDER — CHOLECALCIFEROL (VITAMIN D3) 25 MCG
1000 TABLET ORAL
COMMUNITY
End: 2022-04-27

## 2022-01-19 DIAGNOSIS — M17.12 PRIMARY OSTEOARTHRITIS OF LEFT KNEE: Primary | ICD-10-CM

## 2022-01-26 ENCOUNTER — PATIENT OUTREACH (OUTPATIENT)
Dept: ADMINISTRATIVE | Facility: OTHER | Age: 87
End: 2022-01-26
Payer: MEDICARE

## 2022-01-26 NOTE — PROGRESS NOTES
Health Maintenance Due   Topic Date Due    Shingles Vaccine (2 of 3) 11/04/2015    DEXA SCAN  09/13/2021     Updates were requested from care everywhere.  Chart was reviewed for overdue Proactive [facility] Encounters ([facility]) topics (CRS, Breast Cancer Screening, Eye exam)  Health Maintenance has been updated.  LINKS immunization registry triggered.  Immunizations were reconciled.

## 2022-01-27 ENCOUNTER — TELEPHONE (OUTPATIENT)
Dept: OBSTETRICS AND GYNECOLOGY | Facility: CLINIC | Age: 87
End: 2022-01-27
Payer: MEDICARE

## 2022-01-27 ENCOUNTER — OFFICE VISIT (OUTPATIENT)
Dept: OBSTETRICS AND GYNECOLOGY | Facility: CLINIC | Age: 87
End: 2022-01-27
Payer: MEDICARE

## 2022-01-27 VITALS
DIASTOLIC BLOOD PRESSURE: 78 MMHG | SYSTOLIC BLOOD PRESSURE: 138 MMHG | HEIGHT: 66 IN | BODY MASS INDEX: 26.72 KG/M2 | WEIGHT: 166.25 LBS

## 2022-01-27 DIAGNOSIS — N81.4 UTEROVAGINAL PROLAPSE: Primary | ICD-10-CM

## 2022-01-27 PROCEDURE — 1159F PR MEDICATION LIST DOCUMENTED IN MEDICAL RECORD: ICD-10-PCS | Mod: HCNC,CPTII,S$GLB, | Performed by: OBSTETRICS & GYNECOLOGY

## 2022-01-27 PROCEDURE — 1101F PR PT FALLS ASSESS DOC 0-1 FALLS W/OUT INJ PAST YR: ICD-10-PCS | Mod: HCNC,CPTII,S$GLB, | Performed by: OBSTETRICS & GYNECOLOGY

## 2022-01-27 PROCEDURE — 99213 PR OFFICE/OUTPT VISIT, EST, LEVL III, 20-29 MIN: ICD-10-PCS | Mod: HCNC,S$GLB,, | Performed by: OBSTETRICS & GYNECOLOGY

## 2022-01-27 PROCEDURE — 99213 OFFICE O/P EST LOW 20 MIN: CPT | Mod: HCNC,S$GLB,, | Performed by: OBSTETRICS & GYNECOLOGY

## 2022-01-27 PROCEDURE — 3288F FALL RISK ASSESSMENT DOCD: CPT | Mod: HCNC,CPTII,S$GLB, | Performed by: OBSTETRICS & GYNECOLOGY

## 2022-01-27 PROCEDURE — 1126F PR PAIN SEVERITY QUANTIFIED, NO PAIN PRESENT: ICD-10-PCS | Mod: HCNC,CPTII,S$GLB, | Performed by: OBSTETRICS & GYNECOLOGY

## 2022-01-27 PROCEDURE — 1126F AMNT PAIN NOTED NONE PRSNT: CPT | Mod: HCNC,CPTII,S$GLB, | Performed by: OBSTETRICS & GYNECOLOGY

## 2022-01-27 PROCEDURE — 3288F PR FALLS RISK ASSESSMENT DOCUMENTED: ICD-10-PCS | Mod: HCNC,CPTII,S$GLB, | Performed by: OBSTETRICS & GYNECOLOGY

## 2022-01-27 PROCEDURE — 99999 PR PBB SHADOW E&M-EST. PATIENT-LVL IV: ICD-10-PCS | Mod: PBBFAC,HCNC,, | Performed by: OBSTETRICS & GYNECOLOGY

## 2022-01-27 PROCEDURE — 99999 PR PBB SHADOW E&M-EST. PATIENT-LVL IV: CPT | Mod: PBBFAC,HCNC,, | Performed by: OBSTETRICS & GYNECOLOGY

## 2022-01-27 PROCEDURE — 1159F MED LIST DOCD IN RCRD: CPT | Mod: HCNC,CPTII,S$GLB, | Performed by: OBSTETRICS & GYNECOLOGY

## 2022-01-27 PROCEDURE — 1101F PT FALLS ASSESS-DOCD LE1/YR: CPT | Mod: HCNC,CPTII,S$GLB, | Performed by: OBSTETRICS & GYNECOLOGY

## 2022-01-27 RX ORDER — TRIAMCINOLONE ACETONIDE 32 MG
SUSPENSION,EXTENDED RELEASE VIAL (EA) INTRAARTICULAR
COMMUNITY
Start: 2021-11-08 | End: 2022-04-27

## 2022-01-27 RX ORDER — METHYLPREDNISOLONE ACETATE 80 MG/ML
INJECTION, SUSPENSION INTRA-ARTICULAR; INTRALESIONAL; INTRAMUSCULAR; SOFT TISSUE
COMMUNITY
Start: 2021-10-18 | End: 2022-04-27

## 2022-01-27 NOTE — TELEPHONE ENCOUNTER
Attempted to contact patient to notify that dr. Gallo will not be in clinic. No answer, unable to leave voice mail. Will try again later.

## 2022-01-28 NOTE — PROGRESS NOTES
Tiffanie Barajas is a 86 y.o. female  presents for pessary maintenance    PRE-PESSARY CHECK COUNSELING:  The patient was informed of the risks of pain or discomfort, continuous incontinence, urinary retention with insertion of a pessary and malodorous discharge and/or possible bleeding from granulation tissue after wearing the pessary for sometime. She was counseled on the alternatives to pessary insertion, including surgery or no treatment with continued symptoms, and she agrees to proceed.    PROCEDURE:  The #4 ring pessary with support was removed and cleaned and replaced without difficulty. No abnormal discharge or bleeding.  The patient was able to sit, stand, walk and either cough or urinate on the toilet after the procedure normally without expelling the pessary. The patient tolerated the procedure well.    ASSESSMENT:  Pelvic relaxation managed with pessary. 618.9.    POST PESSARY CHECK COUNSELING:  Report worsening urinary incontinency, urinary retention, pain, fever, foul smelling discharge or bleeding.  Importance of keeping follow-up appointments for a pessary check stressed.    Counseling lasted approximately 10 minutes and all her questions were answered.    FOLLOW-UP: In 3 months for pessary check as scheduled.

## 2022-02-07 ENCOUNTER — OFFICE VISIT (OUTPATIENT)
Dept: ORTHOPEDICS | Facility: CLINIC | Age: 87
End: 2022-02-07
Payer: MEDICARE

## 2022-02-07 VITALS — WEIGHT: 166 LBS | HEIGHT: 66 IN | BODY MASS INDEX: 26.68 KG/M2

## 2022-02-07 DIAGNOSIS — M47.816 LUMBAR FACET ARTHROPATHY: ICD-10-CM

## 2022-02-07 DIAGNOSIS — I73.9 PERIPHERAL ARTERIAL DISEASE: ICD-10-CM

## 2022-02-07 DIAGNOSIS — M17.12 ARTHRITIS OF KNEE, LEFT: Primary | ICD-10-CM

## 2022-02-07 DIAGNOSIS — M46.1 SACROILIITIS: ICD-10-CM

## 2022-02-07 DIAGNOSIS — M21.162 ACQUIRED VARUS DEFORMITY KNEE, LEFT: ICD-10-CM

## 2022-02-07 DIAGNOSIS — M70.62 GREATER TROCHANTERIC BURSITIS, LEFT: ICD-10-CM

## 2022-02-07 DIAGNOSIS — M21.161 ACQUIRED VARUS DEFORMITY KNEE, RIGHT: ICD-10-CM

## 2022-02-07 DIAGNOSIS — M54.50 LUMBAR SPINE PAIN: ICD-10-CM

## 2022-02-07 DIAGNOSIS — M17.11 ARTHRITIS OF KNEE, RIGHT: ICD-10-CM

## 2022-02-07 DIAGNOSIS — M16.12 ARTHRITIS OF LEFT HIP: ICD-10-CM

## 2022-02-07 PROCEDURE — 1101F PR PT FALLS ASSESS DOC 0-1 FALLS W/OUT INJ PAST YR: ICD-10-PCS | Mod: HCNC,CPTII,S$GLB, | Performed by: ORTHOPAEDIC SURGERY

## 2022-02-07 PROCEDURE — 99999 PR PBB SHADOW E&M-EST. PATIENT-LVL IV: ICD-10-PCS | Mod: PBBFAC,HCNC,, | Performed by: ORTHOPAEDIC SURGERY

## 2022-02-07 PROCEDURE — 1126F AMNT PAIN NOTED NONE PRSNT: CPT | Mod: HCNC,CPTII,S$GLB, | Performed by: ORTHOPAEDIC SURGERY

## 2022-02-07 PROCEDURE — 20610 DRAIN/INJ JOINT/BURSA W/O US: CPT | Mod: HCNC,LT,S$GLB, | Performed by: ORTHOPAEDIC SURGERY

## 2022-02-07 PROCEDURE — 99999 PR PBB SHADOW E&M-EST. PATIENT-LVL IV: CPT | Mod: PBBFAC,HCNC,, | Performed by: ORTHOPAEDIC SURGERY

## 2022-02-07 PROCEDURE — 3288F FALL RISK ASSESSMENT DOCD: CPT | Mod: HCNC,CPTII,S$GLB, | Performed by: ORTHOPAEDIC SURGERY

## 2022-02-07 PROCEDURE — 20552 NJX 1/MLT TRIGGER POINT 1/2: CPT | Mod: 59,51,HCNC,S$GLB | Performed by: ORTHOPAEDIC SURGERY

## 2022-02-07 PROCEDURE — 99499 UNLISTED E&M SERVICE: CPT | Mod: HCNC,S$GLB,, | Performed by: ORTHOPAEDIC SURGERY

## 2022-02-07 PROCEDURE — 20552 TRIGGER POINT INJECTION: ICD-10-PCS | Mod: 59,51,HCNC,S$GLB | Performed by: ORTHOPAEDIC SURGERY

## 2022-02-07 PROCEDURE — 1101F PT FALLS ASSESS-DOCD LE1/YR: CPT | Mod: HCNC,CPTII,S$GLB, | Performed by: ORTHOPAEDIC SURGERY

## 2022-02-07 PROCEDURE — 99213 PR OFFICE/OUTPT VISIT, EST, LEVL III, 20-29 MIN: ICD-10-PCS | Mod: 25,HCNC,S$GLB, | Performed by: ORTHOPAEDIC SURGERY

## 2022-02-07 PROCEDURE — 3288F PR FALLS RISK ASSESSMENT DOCUMENTED: ICD-10-PCS | Mod: HCNC,CPTII,S$GLB, | Performed by: ORTHOPAEDIC SURGERY

## 2022-02-07 PROCEDURE — 1159F PR MEDICATION LIST DOCUMENTED IN MEDICAL RECORD: ICD-10-PCS | Mod: HCNC,CPTII,S$GLB, | Performed by: ORTHOPAEDIC SURGERY

## 2022-02-07 PROCEDURE — 99213 OFFICE O/P EST LOW 20 MIN: CPT | Mod: 25,HCNC,S$GLB, | Performed by: ORTHOPAEDIC SURGERY

## 2022-02-07 PROCEDURE — 1159F MED LIST DOCD IN RCRD: CPT | Mod: HCNC,CPTII,S$GLB, | Performed by: ORTHOPAEDIC SURGERY

## 2022-02-07 PROCEDURE — 1126F PR PAIN SEVERITY QUANTIFIED, NO PAIN PRESENT: ICD-10-PCS | Mod: HCNC,CPTII,S$GLB, | Performed by: ORTHOPAEDIC SURGERY

## 2022-02-07 PROCEDURE — 20610 LARGE JOINT ASPIRATION/INJECTION: L GREATER TROCHANTERIC BURSA: ICD-10-PCS | Mod: HCNC,LT,S$GLB, | Performed by: ORTHOPAEDIC SURGERY

## 2022-02-07 PROCEDURE — 99499 RISK ADDL DX/OHS AUDIT: ICD-10-PCS | Mod: HCNC,S$GLB,, | Performed by: ORTHOPAEDIC SURGERY

## 2022-02-07 RX ORDER — NIFEDIPINE 30 MG/1
30 TABLET, FILM COATED, EXTENDED RELEASE ORAL
COMMUNITY
Start: 2022-02-01 | End: 2022-04-27

## 2022-02-07 RX ORDER — METHYLPREDNISOLONE ACETATE 40 MG/ML
40 INJECTION, SUSPENSION INTRA-ARTICULAR; INTRALESIONAL; INTRAMUSCULAR; SOFT TISSUE
Status: DISCONTINUED | OUTPATIENT
Start: 2022-02-07 | End: 2022-02-07 | Stop reason: HOSPADM

## 2022-02-07 RX ADMIN — METHYLPREDNISOLONE ACETATE 40 MG: 40 INJECTION, SUSPENSION INTRA-ARTICULAR; INTRALESIONAL; INTRAMUSCULAR; SOFT TISSUE at 09:02

## 2022-02-07 RX ADMIN — METHYLPREDNISOLONE ACETATE 40 MG: 40 INJECTION, SUSPENSION INTRA-ARTICULAR; INTRALESIONAL; INTRAMUSCULAR; SOFT TISSUE at 10:02

## 2022-02-07 NOTE — PROGRESS NOTES
Subjective:     Patient ID: Tiffanie Barajas is a 86 y.o. female.    Chief Complaint: Pain of the Left Knee    HPI:  84-year-old complaining of pain over the left sacroiliac joint radiating down to her knee.  Previous history of sciatica and a cyst in the back that was removed. Has history of arthritis of both of her knees received injection longtime ago more than a year (lubrication shot).  She is not having too much pain in the right knee unable to fully extend the left knee difficulty with shopping and walking.  Pain roughly 4/10.  Denies loss of bowel bladder control.  Walking distance is seems to hurt approximately 200 ft.  She is on blood thinners and unable to take NSAIDs.  She takes 1 little Tylenol occasionally and does not help.    01/27/2020.  Left knee injection of Depo-Medrol seems to have helped this patient.  Given 01/03/2020.  Just started physical therapy and that seems to help also.  The Flexeril did not seem to work at all.  Already been 5 times to physical therapy and she has at least 3 more weeks to go.  Very pleased so far with her results.  Patient cannot take NSAIDs due to being on blood thinners.  Pain level 0/10.  Does have occasional discomfort right greater trochanteric area    02/13/2020  Patient with bilateral knee arthrosis left worse than right and sacroiliac pain.  She just finished her physical therapy and that seems to have helped and now she is doing independent exercise program.  We did inject the knees with steroid that seems to help.  She is ambulating without any assistive devices today. He is here to have Synvisc-One injection into the left knee.    05/22/2020  Patient complaining today of left hip pain/she points over the lumbar area radiating to the posterior aspect of the knee.  She has history of lumbosacral arthritis.  As far as her left knee injection of Synvisc-One she is not having any pain with that.  She is very happy with that result.  She wants something done and  0 not want any prescriptions for any pills.  Denies loss of bowel bladder control or usage or any assistive devices to ambulate.  She is doing independent exercise as well as showing me that her  created exercise program for her.  I did recommend stationary bicycle and independent stretching exercises.  Denies loss of bowel bladder control and fever or chills or shortness of breath or chest pain    07/09/2020  Patient received trigger point injection of the lumbar spine 05/22/2020 with some good relief.  Now she is having more pain in the left knee.  She had received steroid injection 01/03/2020 and Synvisc-One in 02/13/2020.  She feels the knee is tight she tries to exercise it on her own.  Having sharp pain on the medial side.  Pain level is 3/10 but with gait it gets worst.  Denies any fever or chills and maintaining social distancing and wearing her own mask.  She would like some relief from the tightness and stiffness in the left knee but wants to avoid taking any medications besides a little Tylenol because she is on blood thinners and unable to take NSAIDs.    01/04/2020   Left knee pain and stiffness 6/10, low back pain / points on the left sacroiliac area also.  She is requesting injection in both areas.  Patient did have trigger point injection 05/22/2020 in the lumbar area which helped also had steroid injection in July 2020 which seems to have helped in the knee.  Also previous history of Synvisc-One injection 02/13/2020.  Tylenol does not seem to help.  Denies any fever or chills or shortness of breath or difficulty with chewing or swallowing loss of bowel bladder control blurry vision or double vision or loss sense smell or taste.  She is here with her significant other.  Asking about getting the COVID  Vaccine.  She is 85 years old and I told if her  She qualifies should be able to get it soon    02/08/2021  Right lumbar trigger point tenderness which was injected awhile ago with relief on  05/22/2020 now it is hurting her a little bit more.  I did tell her since she is here today to receive viscosupplementation/approved only Monovisc not Synvisc this time we will hold off on trigger point injection.  Pain is 5/10 in the left knee.  The right knee is doing okay.  She is ambulating without any assistive devices.  There was a little discussion about difference is with me in Monovisc and Synvisc and put simply I told them it is like having a car with different brands.  Patient is maintaining her activity level.  She still did not get her COVID vaccine due to in availability of the vaccine.  Denies any fever or chills or shortness of breath or difficulty with chewing or swallowing or loss of bowel bladder control blurry vision double vision loss sense smell or taste.  She is ambulating without any assistive devices    04/01/2021  Low back pain which is severe we injected trigger point on 05/22/2020 with some relief.  Went over her x-rays in the electronic records today showing severe facet arthropathy and degenerative disc disease with large bone spur overgrowth.  That bothers her a lot with radiation down her legs a cannot sleep.  She is requesting may be something to help with the radicular symptoms and give her some sleep and rest as far as the Monovisc injection into the left knee that did not seem to help at all.  The steroid injection seems to work better for her and requested another injection.  She did go through therapy before.  She feels sciatica to the right leg as far as the right knee is concerned is hurting but not as much as the left knee  Monovisc injection 02/08/2021 into the left knee did not seem to help.  Voltaren gel she uses on occasion.  No fever no chills or shortness of breath or difficulty with chewing or swallowing loss of bowel bladder control blurry vision double vision or loss of sense smell or taste  Did did receive her COVID-19 vaccine    07/02/2021  Patient is having low  back pain with sciatic type of pain radiating down both legs.  Today will obtaining x-rays of her spine as well as the pelvis and hips and the knees.  The knees and not hurting her as much and she is not requesting any injections.  We did try Monovisc on the left knee but did not help and the steroid seems to work for her.  I did mention in the future maybe she would need long-acting steroid like  zilretta.  She is here with her daughter in law.  She is having some hip pains.  Able to manage it.  Her pain is 5/10 ambulating slowly without any assistive devices.  Cannot take any NSAIDs by mouth since she is on aspirin and Plavix and home cardiac evaluation by cardiologist.  No fever no chills no shortness of breath or difficulty with chewing or swallowing loss of bowel bladder control or blurry vision double vision loss sense smell or taste  She did have diclofenac gel at home but has not been using it appropriately  She states she is using SALONPAS which seems to help the back  Last visit we gave her cyclobenzaprine she is not taking it      10/18/2021  Severe low back pain left side more than the right.  We did have x-rays on her from last visit that showed sacroiliac arthritic changes sacroiliitis.  We did give her sacroiliac injection more than a year ago with some relief.  She also have pain in the knees and in the back specially on the left side a worried if it is sciatica or not.  She does have x-rays of severe arthritis in the spine also.  She usually gets steroid injections which help her for a little while.  She had been tried on viscosupplementation once without success.  She had sustained a little fall on 10/07/2021 but no major injury.  She is ambulating without any assistive devices.  No fever no chills no shortness of breath or difficulty with chewing or swallowing  She is using diclofenac gel as well as Salonpas patches.    11/08/2021  Injection of the trigger point in the lower part of her back  helped for 10 days.  That seems to come back now and hurts her a little bit more than her knee.  As far as the right knee she is doing okay the left knee is the 1 that bothers her.  She has been losing some range of motion since last visit we see her.  Her pain is around 6/10 in the sitting position and goes up with walking.  At this point I did tell her I do not want to give her 2 injections and not knowing which 1 will give her a reaction.  We have her approved to receive the left knee Zilreta injection.  The pros and cons discussed in details.  No loss of bowel bladder control or fever or chills or shortness of breath or difficulty with chewing or swallowing loss of bowel bladder control or loss of sense smell or taste      12/02/2021  Patient stated left knee steroid injections/ Zilreta seems to have helped so far.  Not bother her as much.  What bothers her is her lower part of her back on the left side there is a trigger point that seems to be the area.  She her  marked with a pen.  She is ambulating slowly without any assistive devices.  She would like to have that trigger point injected despite the fact her pain in her knee is 3/10.  Overall it seems the steroid injection given last visit in the knee seems to work so far  No fever no chills no shortness of breath or difficulty with chewing or swallowing loss of bowel bladder control    02/7/22  Complaining over left greater trochanteric and right sacroiliac area pain.  The left knee still doing okay received zilretta injection in November 2021 and is still helping.  She is requesting injections over the areas that hurts the most.  She has fluctuating blood pressure I did tell her I will not give but total 80 mg Depo-Medrol and we will split it into 2 areas.  She is ambulating without assistive devices.  We did get her approved for the long-acting steroid injection into the left knee but she says ri it since you doing okay.  ght now her knee is doing  okay and that previous shot still working.  We can always get it approved if needed again.  The approval is until February 27, 2022 however we still do not have to give it if you still doing okay  Past Medical History:   Diagnosis Date    Acute pancreatitis without infection or necrosis 12/5/2021    Anxiety     Arthritis     Atherosclerosis of native coronary artery of native heart with angina pectoris 8/8/2019    Cancer     Degenerative disc disease     Depression     Essential hypertension 10/26/2021    GERD (gastroesophageal reflux disease)     Glaucoma suspect of both eyes     Hyperlipidemia     Insomnia 4/6/2021    Low back derangement syndrome 12/11/2017    Melanoma     Pneumonia due to other staphylococcus      Past Surgical History:   Procedure Laterality Date    ADENOIDECTOMY      CHOLECYSTECTOMY  1973    SPINE SURGERY  2/6/13    L4-L5  cyst removed andfor sciatica    TONSILLECTOMY  1945     Family History   Problem Relation Age of Onset    Arthritis Mother     Hypertension Mother     Stroke Mother     Glaucoma Mother     Heart disease Father 56        fatal MI    Diabetes Brother     Cancer Son 56        melanoma with mets to lung and bone    Arthritis Sister     Hyperlipidemia Neg Hx      Social History     Socioeconomic History    Marital status:      Spouse name: fabio    Number of children: 3   Tobacco Use    Smoking status: Never Smoker    Smokeless tobacco: Never Used   Substance and Sexual Activity    Alcohol use: No     Alcohol/week: 0.0 standard drinks    Drug use: No    Sexual activity: Not Currently   Social History Narrative    . Decaf coffee only. No living will or advanced directive-copy of information given.      Medication List with Changes/Refills   Current Medications    ACETAMINOPHEN (TYLENOL) 650 MG TBSR    Take 1 tablet (650 mg total) by mouth every 8 (eight) hours.    AMLODIPINE-BENAZEPRIL 5-10 MG (LOTREL) 5-10 MG PER CAPSULE    Take 1  capsule by mouth.    ASPIRIN (ECOTRIN) 81 MG EC TABLET    Take 1 tablet by mouth Daily.    ATORVASTATIN (LIPITOR) 40 MG TABLET    Take 1 tablet by mouth every evening.    BETAMETHASONE VALERATE 0.1% (VALISONE) 0.1 % OINT    Apply topically daily as needed (itching).    CITALOPRAM (CELEXA) 20 MG TABLET    Take 1 tablet (20 mg total) by mouth once daily.    CLOPIDOGREL (PLAVIX) 75 MG TABLET    Take 75 mg by mouth once daily.    COLCRYS 0.6 MG TABLET    Take 0.6 mg by mouth once daily.    DEPO-MEDROL 80 MG/ML INJECTION        ESTRADIOL (ESTRACE) 0.01 % (0.1 MG/GRAM) VAGINAL CREAM    Place 1 g vaginally 3 (three) times a week.    FLUOCINONIDE (LIDEX) 0.05 % EXTERNAL SOLUTION        FLUTICASONE PROPIONATE (FLONASE) 50 MCG/ACTUATION NASAL SPRAY    2 sprays (100 mcg total) by Each Nostril route once daily.    FUROSEMIDE (LASIX) 20 MG TABLET    Take 20 mg by mouth daily as needed.    ISOSORBIDE MONONITRATE (IMDUR) 60 MG 24 HR TABLET    Take 60 mg by mouth once daily.    MULTIVITAMIN (THERAGRAN) PER TABLET    Take 1 tablet by mouth once daily.    MULTIVITAMIN WITH MINERALS TABLET        NIFEDIPINE (ADALAT CC) 30 MG TBSR    Take 30 mg by mouth.    NITROGLYCERIN (NITROSTAT) 0.4 MG SL TABLET    DISSOLVE ONE TABLET UNDER THE TONGUE EVERY 5 MINUTES AS NEEDED FOR CHEST PAIN. DO NOT EXCEED A TOTAL OF 3 DOSES IN 15 MINUTES    NYSTATIN-TRIAMCINOLONE (MYCOLOG II) CREAM    Apply topically 2 (two) times daily. for 7 days    OXYQUINOLINE-SOD.LAURYL SULFAT (TRIMO-ANDERSON JELLY) 0.025-0.01 % GEL    Place 1 application vaginally twice a week.    PANTOPRAZOLE (PROTONIX) 40 MG TABLET    Take 1 tablet (40 mg total) by mouth once daily.    TURMERIC ORAL    Take 1 capsule by mouth once daily.    VITAMIN D (VITAMIN D3) 1000 UNITS TAB    Take 1,000 Units by mouth.    ZILRETTA 32 MG INJECTION         Review of patient's allergies indicates:   Allergen Reactions    Hydrocodone-acetaminophen Nausea And Vomiting     Other reaction(s): Vomiting     Diclofenac Nausea And Vomiting and Other (See Comments)     Review of Systems   Constitutional: Negative for decreased appetite.   HENT: Negative for tinnitus.    Eyes: Negative for double vision.   Cardiovascular: Negative for chest pain.   Respiratory: Negative for wheezing.    Hematologic/Lymphatic: Negative for bleeding problem.   Skin: Negative for dry skin.   Musculoskeletal: Positive for arthritis, back pain, joint pain, muscle weakness and stiffness. Negative for gout and neck pain.   Gastrointestinal: Negative for abdominal pain.   Genitourinary: Negative for bladder incontinence.   Neurological: Positive for numbness. Negative for paresthesias and sensory change.   Psychiatric/Behavioral: Negative for altered mental status.       Objective:   Body mass index is 26.79 kg/m².  There were no vitals filed for this visit.       General    Constitutional: She is oriented to person, place, and time. She appears well-developed.   HENT:   Head: Atraumatic.   Eyes: EOM are normal.   Cardiovascular: Normal rate.    Pulmonary/Chest: Effort normal.   Neurological: She is alert and oriented to person, place, and time.   Psychiatric: Judgment normal.            Cervical spine rotation is functional but slightly limited  Bilateral upper extremity neurovascularly intact.  Radial ulnar pulses 2+.  Sensory intact to touch.  Biceps/triceps/wrist extension and flexion shoulder abduction is 5/5  Lumbar with loss of lordosis and tenderness around the L4-5 and mostly over the left sacroiliac joint which is the area of severe tenderness and paraspinal.  Forward bending barely to proximal tibia lateral bending to mid thigh.  Pelvis is level.  Passive hip motion within normal limits.  Hip flexors and abductors as well as quads and hamstrings and ankle extensors and flexors are slightly weak at 5-/5.  Tenderness over the greater trochanter over the right hip   Right knee range of motion 5-120.  Slight crepitus to AP compression on  the patella in on medial joint.  Very mild swelling.  Collaterals and cruciate stable.  Left knee with approximately 5- 7 ° of flexion contracture and flexion barely to 90 °.  Moderate swelling.  Collaterals and cruciate stable. Crepitus with motion.  Pain in medially to palpation.  Calves are soft nontender  Slight varicosities around the calves  DP 1+  Skin is warm to touch no obvious lesions  Patellar reflex 1+    Relevant imaging results reviewed and interpreted by me, discussed with the patient and / or family today         X-ray bilateral knees with left knee complete loss of medial joint space with osteophytic changes and sclerosis.  Same on the right knee but not as bad consistent with bilateral end-stage arthrosis.  07/02/2021  X-ray of the pelvis showing some degenerative changes on the left hip with good joint space left.  Right hip no joint space loss consistent with some arthrosis of the left hip.  Mild chronic degenerative changes in the sacroiliac joints 07/02/2021  X-ray of the lumbar spine in the electronic records multilevel facet arthropathy and osteophytes.  Anterior multilevel spurring.  Degenerative disc disease 07/02/2021  Assessment:     Encounter Diagnoses   Name Primary?    Arthritis of knee, left Yes    Arthritis of knee, right     Lumbar facet arthropathy     Acquired varus deformity knee, left     Acquired varus deformity knee, right     Peripheral arterial disease     Arthritis of left hip     Lumbar spine pain     Greater trochanteric bursitis, left     Sacroiliitis         Plan:   Arthritis of knee, left    Arthritis of knee, right    Lumbar facet arthropathy    Acquired varus deformity knee, left    Acquired varus deformity knee, right    Peripheral arterial disease    Arthritis of left hip    Lumbar spine pain    Greater trochanteric bursitis, left  -     Large Joint Aspiration/Injection: L greater trochanteric bursa    Sacroiliitis  -     Trigger Point Injection          Patient Instructions   The left knee is doing okay we gave her injection of Zilretta in November and that seems to have helped and not bother her as much.  We did get the approval until 02/27/2022 however since you doing okay there is no reason to give you the shot now.  We can always resubmit to receive it in the future if needed  Will inject the left trigger point in the lumbar area since that is what is hurting her the most, as well as right iliac crest  Will inject 40 mg Depo-Medrol in each site mixed with lidocaine  Return in 6 weeks        (She understands that she can supplement with Tylenol for pain.   And we will be able to give her steroid injections once every 3 months .  She will call me sooner if she wants an injection of Depo-Medrol.  In the future we need to get her approved to have long-acting steroid injection like ZILRETTA which was given 11/08/2021 with good relief.  We spent a long time with the patient going over all her x-rays today as well as examining her.  She is managing with cell own power as as well as having diclofenac gel at home which I told her she needs to use it 3 times a day.  She did not take her cyclobenzaprine which I prescribed last time.   I did inject the trigger point with some relief of pain.  .  I did show her that she has hardening of the arteries in the aorta as well as down the legs way you can see the popliteal artery .  Her cardiologist could check her to see if she has enough good circulation in the legs.  I did tell her usually if it is bad circulation in the legs when you walk distances you will have severe calf pain and goes with away with rest  Will avoid surgical intervention at this time.  Went over the x-rays with her again and shoulder the sacroiliac joint with arthritis and lumbosacral area however the hip joint seems to be okay.  She does have severe arthritis both knees but the left 1 is the 1 that hurts the most.  The pros and cons of the injections  discussed.  She was worried about having sciatica but I did tell her tightness behind the knee and the swelling is secondary to arthritis)    Disclaimer: This note was prepared using a voice recognition system and is likely to have sound alike errors within the text.

## 2022-02-07 NOTE — PROCEDURES
Trigger Point Injection  Performed by: Ranjit Kelly MD  Authorized by: Ranjit Kelly MD       Consent Done?:  Yes (Verbal)    Site marked: the procedure site was marked      Local anesthetic:  Topical anesthetic  Medications: 40 mg methylPREDNISolone acetate 40 mg/mL  Sacral:  Left

## 2022-02-07 NOTE — PATIENT INSTRUCTIONS
The left knee is doing okay we gave her injection of Zilretta in November and that seems to have helped and not bother her as much.  We did get the approval until 02/27/2022 however since you doing okay there is no reason to give you the shot now.  We can always resubmit to receive it in the future if needed  Will inject the left trigger point in the lumbar area since that is what is hurting her the most, as well as right iliac crest  Will inject 40 mg Depo-Medrol in each site mixed with lidocaine  Return in 6 weeks

## 2022-02-07 NOTE — PROCEDURES
Large Joint Aspiration/Injection: L greater trochanteric bursa    Date/Time: 2/7/2022 9:40 AM  Performed by: Ranjit Kelly MD  Authorized by: Ranjit Kelly MD     Consent Done?:  Yes (Verbal)  Site marked: the procedure site was marked    Timeout: prior to procedure the correct patient, procedure, and site was verified      Local anesthesia used?: Yes    Local anesthetic:  Lidocaine 1% without epinephrine  Ultrasonic Guidance for needle placement?: No    Approach:  Lateral  Location:  Hip  Site:  L greater trochanteric bursa  Medications:  40 mg methylPREDNISolone acetate 40 mg/mL  Patient tolerance:  Patient tolerated the procedure well with no immediate complications

## 2022-03-26 ENCOUNTER — PATIENT OUTREACH (OUTPATIENT)
Dept: ADMINISTRATIVE | Facility: OTHER | Age: 87
End: 2022-03-26
Payer: MEDICARE

## 2022-03-28 ENCOUNTER — OFFICE VISIT (OUTPATIENT)
Dept: ORTHOPEDICS | Facility: CLINIC | Age: 87
End: 2022-03-28
Payer: MEDICARE

## 2022-03-28 DIAGNOSIS — M16.12 ARTHRITIS OF LEFT HIP: ICD-10-CM

## 2022-03-28 DIAGNOSIS — M17.12 ARTHRITIS OF KNEE, LEFT: ICD-10-CM

## 2022-03-28 DIAGNOSIS — M21.162 ACQUIRED VARUS DEFORMITY KNEE, LEFT: ICD-10-CM

## 2022-03-28 DIAGNOSIS — M21.161 ACQUIRED VARUS DEFORMITY KNEE, RIGHT: ICD-10-CM

## 2022-03-28 DIAGNOSIS — M46.1 SACROILIITIS: Primary | ICD-10-CM

## 2022-03-28 DIAGNOSIS — M47.816 LUMBAR FACET ARTHROPATHY: ICD-10-CM

## 2022-03-28 DIAGNOSIS — M70.62 GREATER TROCHANTERIC BURSITIS, LEFT: ICD-10-CM

## 2022-03-28 DIAGNOSIS — I73.9 PERIPHERAL ARTERIAL DISEASE: ICD-10-CM

## 2022-03-28 DIAGNOSIS — M17.11 ARTHRITIS OF KNEE, RIGHT: ICD-10-CM

## 2022-03-28 PROCEDURE — 20552 TRIGGER POINT INJECTION: ICD-10-PCS | Mod: S$GLB,,, | Performed by: ORTHOPAEDIC SURGERY

## 2022-03-28 PROCEDURE — 99499 NO LOS: ICD-10-PCS | Mod: S$GLB,,, | Performed by: ORTHOPAEDIC SURGERY

## 2022-03-28 PROCEDURE — 20552 NJX 1/MLT TRIGGER POINT 1/2: CPT | Mod: S$GLB,,, | Performed by: ORTHOPAEDIC SURGERY

## 2022-03-28 PROCEDURE — 99999 PR PBB SHADOW E&M-EST. PATIENT-LVL I: ICD-10-PCS | Mod: PBBFAC,,, | Performed by: ORTHOPAEDIC SURGERY

## 2022-03-28 PROCEDURE — 99999 PR PBB SHADOW E&M-EST. PATIENT-LVL I: CPT | Mod: PBBFAC,,, | Performed by: ORTHOPAEDIC SURGERY

## 2022-03-28 PROCEDURE — 99499 UNLISTED E&M SERVICE: CPT | Mod: S$GLB,,, | Performed by: ORTHOPAEDIC SURGERY

## 2022-03-28 RX ORDER — TRIAMCINOLONE ACETONIDE 40 MG/ML
80 INJECTION, SUSPENSION INTRA-ARTICULAR; INTRAMUSCULAR
Status: DISCONTINUED | OUTPATIENT
Start: 2022-03-28 | End: 2022-03-28 | Stop reason: HOSPADM

## 2022-03-28 RX ADMIN — TRIAMCINOLONE ACETONIDE 80 MG: 40 INJECTION, SUSPENSION INTRA-ARTICULAR; INTRAMUSCULAR at 01:03

## 2022-03-28 NOTE — PATIENT INSTRUCTIONS
Left sacral- Iliac area seems the area that hurts her the most  Last visit we injected Depo-Medrol and that did not seem to help  Today we injected 80 mg Kenalog mixed with 5 cc of 1% lidocaine into that left sacroiliac area 03/28/2022 and you felt some relief after the injection  As far as the knee is concerned he still doing okay with that  The left greater trochanteric area of the hip is doing okay  Continue using Vicks rub  since it has helped  You can try capsaicin cream.  If you decide to try it it creates heat in the area to help with the pain however must wear gloves when you put it on so you do not burn your fingers  Come and see me back in 8 weeks  As far as the swelling in the leg it could be secondary to the new may occasion given to you  NIFEDIPINE and you have appointment April 15 coming up to discuss it with the cardiologist

## 2022-03-28 NOTE — PROCEDURES
Trigger Point Injection  Performed by: Ranjit Kelly MD  Authorized by: Ranjit Kelly MD     Site marked: the procedure site was marked     Timeout: prior to procedure the correct patient, procedure, and site was verified      Local anesthetic:  Lidocaine 1% without epinephrine  Medications: 80 mg triamcinolone acetonide 40 mg/mL  Sacral:  Left

## 2022-03-28 NOTE — PROGRESS NOTES
Subjective:     Patient ID: Tiffanie Barajas is a 86 y.o. female.    Chief Complaint: No chief complaint on file.    HPI:  84-year-old complaining of pain over the left sacroiliac joint radiating down to her knee.  Previous history of sciatica and a cyst in the back that was removed. Has history of arthritis of both of her knees received injection longtime ago more than a year (lubrication shot).  She is not having too much pain in the right knee unable to fully extend the left knee difficulty with shopping and walking.  Pain roughly 4/10.  Denies loss of bowel bladder control.  Walking distance is seems to hurt approximately 200 ft.  She is on blood thinners and unable to take NSAIDs.  She takes 1 little Tylenol occasionally and does not help.    01/27/2020.  Left knee injection of Depo-Medrol seems to have helped this patient.  Given 01/03/2020.  Just started physical therapy and that seems to help also.  The Flexeril did not seem to work at all.  Already been 5 times to physical therapy and she has at least 3 more weeks to go.  Very pleased so far with her results.  Patient cannot take NSAIDs due to being on blood thinners.  Pain level 0/10.  Does have occasional discomfort right greater trochanteric area    02/13/2020  Patient with bilateral knee arthrosis left worse than right and sacroiliac pain.  She just finished her physical therapy and that seems to have helped and now she is doing independent exercise program.  We did inject the knees with steroid that seems to help.  She is ambulating without any assistive devices today. He is here to have Synvisc-One injection into the left knee.    05/22/2020  Patient complaining today of left hip pain/she points over the lumbar area radiating to the posterior aspect of the knee.  She has history of lumbosacral arthritis.  As far as her left knee injection of Synvisc-One she is not having any pain with that.  She is very happy with that result.  She wants something  done and 0 not want any prescriptions for any pills.  Denies loss of bowel bladder control or usage or any assistive devices to ambulate.  She is doing independent exercise as well as showing me that her  created exercise program for her.  I did recommend stationary bicycle and independent stretching exercises.  Denies loss of bowel bladder control and fever or chills or shortness of breath or chest pain    07/09/2020  Patient received trigger point injection of the lumbar spine 05/22/2020 with some good relief.  Now she is having more pain in the left knee.  She had received steroid injection 01/03/2020 and Synvisc-One in 02/13/2020.  She feels the knee is tight she tries to exercise it on her own.  Having sharp pain on the medial side.  Pain level is 3/10 but with gait it gets worst.  Denies any fever or chills and maintaining social distancing and wearing her own mask.  She would like some relief from the tightness and stiffness in the left knee but wants to avoid taking any medications besides a little Tylenol because she is on blood thinners and unable to take NSAIDs.    01/04/2020   Left knee pain and stiffness 6/10, low back pain / points on the left sacroiliac area also.  She is requesting injection in both areas.  Patient did have trigger point injection 05/22/2020 in the lumbar area which helped also had steroid injection in July 2020 which seems to have helped in the knee.  Also previous history of Synvisc-One injection 02/13/2020.  Tylenol does not seem to help.  Denies any fever or chills or shortness of breath or difficulty with chewing or swallowing loss of bowel bladder control blurry vision or double vision or loss sense smell or taste.  She is here with her significant other.  Asking about getting the COVID  Vaccine.  She is 85 years old and I told if her  She qualifies should be able to get it soon    02/08/2021  Right lumbar trigger point tenderness which was injected awhile ago with  relief on 05/22/2020 now it is hurting her a little bit more.  I did tell her since she is here today to receive viscosupplementation/approved only Monovisc not Synvisc this time we will hold off on trigger point injection.  Pain is 5/10 in the left knee.  The right knee is doing okay.  She is ambulating without any assistive devices.  There was a little discussion about difference is with me in Monovisc and Synvisc and put simply I told them it is like having a car with different brands.  Patient is maintaining her activity level.  She still did not get her COVID vaccine due to in availability of the vaccine.  Denies any fever or chills or shortness of breath or difficulty with chewing or swallowing or loss of bowel bladder control blurry vision double vision loss sense smell or taste.  She is ambulating without any assistive devices    04/01/2021  Low back pain which is severe we injected trigger point on 05/22/2020 with some relief.  Went over her x-rays in the electronic records today showing severe facet arthropathy and degenerative disc disease with large bone spur overgrowth.  That bothers her a lot with radiation down her legs a cannot sleep.  She is requesting may be something to help with the radicular symptoms and give her some sleep and rest as far as the Monovisc injection into the left knee that did not seem to help at all.  The steroid injection seems to work better for her and requested another injection.  She did go through therapy before.  She feels sciatica to the right leg as far as the right knee is concerned is hurting but not as much as the left knee  Monovisc injection 02/08/2021 into the left knee did not seem to help.  Voltaren gel she uses on occasion.  No fever no chills or shortness of breath or difficulty with chewing or swallowing loss of bowel bladder control blurry vision double vision or loss of sense smell or taste  Did did receive her COVID-19 vaccine    07/02/2021  Patient is  having low back pain with sciatic type of pain radiating down both legs.  Today will obtaining x-rays of her spine as well as the pelvis and hips and the knees.  The knees and not hurting her as much and she is not requesting any injections.  We did try Monovisc on the left knee but did not help and the steroid seems to work for her.  I did mention in the future maybe she would need long-acting steroid like  zilretta.  She is here with her daughter in law.  She is having some hip pains.  Able to manage it.  Her pain is 5/10 ambulating slowly without any assistive devices.  Cannot take any NSAIDs by mouth since she is on aspirin and Plavix and home cardiac evaluation by cardiologist.  No fever no chills no shortness of breath or difficulty with chewing or swallowing loss of bowel bladder control or blurry vision double vision loss sense smell or taste  She did have diclofenac gel at home but has not been using it appropriately  She states she is using SALONPAS which seems to help the back  Last visit we gave her cyclobenzaprine she is not taking it      10/18/2021  Severe low back pain left side more than the right.  We did have x-rays on her from last visit that showed sacroiliac arthritic changes sacroiliitis.  We did give her sacroiliac injection more than a year ago with some relief.  She also have pain in the knees and in the back specially on the left side a worried if it is sciatica or not.  She does have x-rays of severe arthritis in the spine also.  She usually gets steroid injections which help her for a little while.  She had been tried on viscosupplementation once without success.  She had sustained a little fall on 10/07/2021 but no major injury.  She is ambulating without any assistive devices.  No fever no chills no shortness of breath or difficulty with chewing or swallowing  She is using diclofenac gel as well as Salonpas patches.    11/08/2021  Injection of the trigger point in the lower part of her  back helped for 10 days.  That seems to come back now and hurts her a little bit more than her knee.  As far as the right knee she is doing okay the left knee is the 1 that bothers her.  She has been losing some range of motion since last visit we see her.  Her pain is around 6/10 in the sitting position and goes up with walking.  At this point I did tell her I do not want to give her 2 injections and not knowing which 1 will give her a reaction.  We have her approved to receive the left knee Zilreta injection.  The pros and cons discussed in details.  No loss of bowel bladder control or fever or chills or shortness of breath or difficulty with chewing or swallowing loss of bowel bladder control or loss of sense smell or taste      12/02/2021  Patient stated left knee steroid injections/ Zilreta seems to have helped so far.  Not bother her as much.  What bothers her is her lower part of her back on the left side there is a trigger point that seems to be the area.  She her  marked with a pen.  She is ambulating slowly without any assistive devices.  She would like to have that trigger point injected despite the fact her pain in her knee is 3/10.  Overall it seems the steroid injection given last visit in the knee seems to work so far  No fever no chills no shortness of breath or difficulty with chewing or swallowing loss of bowel bladder control    02/7/22  Complaining over left greater trochanteric and right sacroiliac area pain.  The left knee still doing okay received zilretta injection in November 2021 and is still helping.  She is requesting injections over the areas that hurts the most.  She has fluctuating blood pressure I did tell her I will not give but total 80 mg Depo-Medrol and we will split it into 2 areas.  She is ambulating without assistive devices.  We did get her approved for the long-acting steroid injection into the left knee but she says ri it since you doing okay.  ght now her knee is  doing okay and that previous shot still working.  We can always get it approved if needed again.  The approval is until February 27, 2022 however we still do not have to give it if you still doing okay      03/28/2022  On 02/07/2022 in [place] today greater trochanter which seems to have helped however the trigger point/left sacroiliac area that did not seem to help.  Both injections were with Depo-Medrol.  She stated she was changed to nifedipine for blood pressure and that cause severe swelling in the legs.  She has an appointment April 15 to see the cardiologist.  I did tell her this could be angioedema.  As far as her knee is concerned just stiff right now and she is not having much of any pain in it at 0/10.  Her main concern is the lumbar area this seems to have quite a bit of pain in it.  She had marked where the pain is in order to get another injection.  Denies loss of bowel bladder control blurry vision double vision.  She is here with her .  She had tried different topicals without success  Vicks rub seems to be the 1 that works the most  Trigger point pain over the left sacroiliac area approximately 3/10  Past Medical History:   Diagnosis Date    Acute pancreatitis without infection or necrosis 12/5/2021    Anxiety     Arthritis     Atherosclerosis of native coronary artery of native heart with angina pectoris 8/8/2019    Cancer     Degenerative disc disease     Depression     Essential hypertension 10/26/2021    GERD (gastroesophageal reflux disease)     Glaucoma suspect of both eyes     Hyperlipidemia     Insomnia 4/6/2021    Low back derangement syndrome 12/11/2017    Melanoma     Pneumonia due to other staphylococcus      Past Surgical History:   Procedure Laterality Date    ADENOIDECTOMY      CHOLECYSTECTOMY  1973    SPINE SURGERY  2/6/13    L4-L5  cyst removed andfor sciatica    TONSILLECTOMY  1945     Family History   Problem Relation Age of Onset    Arthritis Mother      Hypertension Mother     Stroke Mother     Glaucoma Mother     Heart disease Father 56        fatal MI    Diabetes Brother     Cancer Son 56        melanoma with mets to lung and bone    Arthritis Sister     Hyperlipidemia Neg Hx      Social History     Socioeconomic History    Marital status:      Spouse name: fabio    Number of children: 3   Tobacco Use    Smoking status: Never Smoker    Smokeless tobacco: Never Used   Substance and Sexual Activity    Alcohol use: No     Alcohol/week: 0.0 standard drinks    Drug use: No    Sexual activity: Not Currently   Social History Narrative    . Decaf coffee only. No living will or advanced directive-copy of information given.      Medication List with Changes/Refills   Current Medications    ACETAMINOPHEN (TYLENOL) 650 MG TBSR    Take 1 tablet (650 mg total) by mouth every 8 (eight) hours.    AMLODIPINE-BENAZEPRIL 5-10 MG (LOTREL) 5-10 MG PER CAPSULE    Take 1 capsule by mouth.    ASPIRIN (ECOTRIN) 81 MG EC TABLET    Take 1 tablet by mouth Daily.    ATORVASTATIN (LIPITOR) 40 MG TABLET    Take 1 tablet by mouth every evening.    BETAMETHASONE VALERATE 0.1% (VALISONE) 0.1 % OINT    Apply topically daily as needed (itching).    CITALOPRAM (CELEXA) 20 MG TABLET    Take 1 tablet (20 mg total) by mouth once daily.    CLOPIDOGREL (PLAVIX) 75 MG TABLET    Take 75 mg by mouth once daily.    COLCRYS 0.6 MG TABLET    Take 0.6 mg by mouth once daily.    DEPO-MEDROL 80 MG/ML INJECTION        ESTRADIOL (ESTRACE) 0.01 % (0.1 MG/GRAM) VAGINAL CREAM    Place 1 g vaginally 3 (three) times a week.    FLUOCINONIDE (LIDEX) 0.05 % EXTERNAL SOLUTION        FLUTICASONE PROPIONATE (FLONASE) 50 MCG/ACTUATION NASAL SPRAY    2 sprays (100 mcg total) by Each Nostril route once daily.    FUROSEMIDE (LASIX) 20 MG TABLET    Take 20 mg by mouth daily as needed.    ISOSORBIDE MONONITRATE (IMDUR) 60 MG 24 HR TABLET    Take 60 mg by mouth once daily.    MULTIVITAMIN (THERAGRAN) PER  TABLET    Take 1 tablet by mouth once daily.    MULTIVITAMIN WITH MINERALS TABLET        NIFEDIPINE (ADALAT CC) 30 MG TBSR    Take 30 mg by mouth.    NITROGLYCERIN (NITROSTAT) 0.4 MG SL TABLET    DISSOLVE ONE TABLET UNDER THE TONGUE EVERY 5 MINUTES AS NEEDED FOR CHEST PAIN. DO NOT EXCEED A TOTAL OF 3 DOSES IN 15 MINUTES    NYSTATIN-TRIAMCINOLONE (MYCOLOG II) CREAM    Apply topically 2 (two) times daily. for 7 days    OXYQUINOLINE-SOD.LAURYL SULFAT (TRIMO-ANDERSON JELLY) 0.025-0.01 % GEL    Place 1 application vaginally twice a week.    PANTOPRAZOLE (PROTONIX) 40 MG TABLET    Take 1 tablet (40 mg total) by mouth once daily.    TURMERIC ORAL    Take 1 capsule by mouth once daily.    VITAMIN D (VITAMIN D3) 1000 UNITS TAB    Take 1,000 Units by mouth.    ZILRETTA 32 MG INJECTION         Review of patient's allergies indicates:   Allergen Reactions    Hydrocodone-acetaminophen Nausea And Vomiting     Other reaction(s): Vomiting    Diclofenac Nausea And Vomiting and Other (See Comments)     Review of Systems   Constitutional: Negative for decreased appetite.   HENT: Negative for tinnitus.    Eyes: Negative for double vision.   Cardiovascular: Negative for chest pain.   Respiratory: Negative for wheezing.    Hematologic/Lymphatic: Negative for bleeding problem.   Skin: Negative for dry skin.   Musculoskeletal: Positive for arthritis, back pain, joint pain, muscle weakness and stiffness. Negative for gout and neck pain.   Gastrointestinal: Negative for abdominal pain.   Genitourinary: Negative for bladder incontinence.   Neurological: Positive for numbness. Negative for paresthesias and sensory change.   Psychiatric/Behavioral: Negative for altered mental status.       Objective:   There is no height or weight on file to calculate BMI.  There were no vitals filed for this visit.       General    Constitutional: She is oriented to person, place, and time. She appears well-developed.   HENT:   Head: Atraumatic.   Eyes: EOM are  normal.   Cardiovascular: Normal rate.    Pulmonary/Chest: Effort normal.   Neurological: She is alert and oriented to person, place, and time.   Psychiatric: Judgment normal.            Cervical spine rotation is functional but slightly limited  Lumbar with loss of lordosis and tenderness around the L4-5 and mostly over the left sacroiliac joint which is the area of severe tenderness and paraspinal.  Forward bending barely to proximal tibia lateral bending to mid thigh.  Pelvis is level.  Passive hip motion within normal limits.  Hip flexors and abductors as well as quads and hamstrings and ankle extensors and flexors are slightly weak at 5-/5.  Tenderness over the greater trochanter over the right hip   Right knee range of motion 5-120.  Slight crepitus to AP compression on the patella in on medial joint.  Very mild swelling.  Collaterals and cruciate stable.  Left knee with approximately 5- 7 ° of flexion contracture and flexion barely to 90 °.  Moderate swelling.  Collaterals and cruciate stable. Crepitus with motion.  Pain in medially to palpation.  Calves are soft nontender  Slight varicosities around the calves  DP 1+  Skin is warm to touch no obvious lesions  Patellar reflex 1+    Relevant imaging results reviewed and interpreted by me, discussed with the patient and / or family today         X-ray bilateral knees with left knee complete loss of medial joint space with osteophytic changes and sclerosis.  Same on the right knee but not as bad consistent with bilateral end-stage arthrosis.  07/02/2021  X-ray of the pelvis showing some degenerative changes on the left hip with good joint space left.  Right hip no joint space loss consistent with some arthrosis of the left hip.  Mild chronic degenerative changes in the sacroiliac joints 07/02/2021  X-ray of the lumbar spine in the electronic records multilevel facet arthropathy and osteophytes.  Anterior multilevel spurring.  Degenerative disc disease  07/02/2021  Assessment:     Encounter Diagnoses   Name Primary?    Sacroiliitis Yes    Greater trochanteric bursitis, left     Arthritis of knee, left     Arthritis of knee, right     Lumbar facet arthropathy     Acquired varus deformity knee, left     Acquired varus deformity knee, right     Peripheral arterial disease     Arthritis of left hip         Plan:   Sacroiliitis  -     Trigger Point Injection    Greater trochanteric bursitis, left    Arthritis of knee, left    Arthritis of knee, right    Lumbar facet arthropathy    Acquired varus deformity knee, left    Acquired varus deformity knee, right    Peripheral arterial disease    Arthritis of left hip         Patient Instructions   Left sacral- Iliac area seems the area that hurts her the most  Last visit we injected Depo-Medrol and that did not seem to help  Today we injected 80 mg Kenalog mixed with 5 cc of 1% lidocaine into that left sacroiliac area 03/28/2022 and you felt some relief after the injection  As far as the knee is concerned he still doing okay with that  The left greater trochanteric area of the hip is doing okay  Continue using Vicks rub  since it has helped  You can try capsaicin cream.  If you decide to try it it creates heat in the area to help with the pain however must wear gloves when you put it on so you do not burn your fingers  Come and see me back in 8 weeks  As far as the swelling in the leg it could be secondary to the new may occasion given to you  NIFEDIPINE and you have appointment April 15 coming up to discuss it with the cardiologist      (She understands that she can supplement with Tylenol for pain.   And we will be able to give her steroid injections once every 3 months .  She will call me sooner if she wants an injection of Depo-Medrol.  In the future we need to get her approved to have long-acting steroid injection like ZILRETTA which was given 11/08/2021 with good relief.  We spent a long time with the patient  going over all her x-rays today as well as examining her.  She is managing with cell own power as as well as having diclofenac gel at home which I told her she needs to use it 3 times a day.  She did not take her cyclobenzaprine which I prescribed last time.   I did inject the trigger point with some relief of pain.  .  I did show her that she has hardening of the arteries in the aorta as well as down the legs way you can see the popliteal artery .  Her cardiologist could check her to see if she has enough good circulation in the legs.  I did tell her usually if it is bad circulation in the legs when you walk distances you will have severe calf pain and goes with away with rest  Will avoid surgical intervention at this time.  Went over the x-rays with her again and shoulder the sacroiliac joint with arthritis and lumbosacral area however the hip joint seems to be okay.  She does have severe arthritis both knees but the left 1 is the 1 that hurts the most.  The pros and cons of the injections discussed.  She was worried about having sciatica but I did tell her tightness behind the knee and the swelling is secondary to arthritis)    Disclaimer: This note was prepared using a voice recognition system and is likely to have sound alike errors within the text.

## 2022-04-26 ENCOUNTER — PATIENT OUTREACH (OUTPATIENT)
Dept: ADMINISTRATIVE | Facility: OTHER | Age: 87
End: 2022-04-26
Payer: MEDICARE

## 2022-04-27 ENCOUNTER — OFFICE VISIT (OUTPATIENT)
Dept: OBSTETRICS AND GYNECOLOGY | Facility: CLINIC | Age: 87
End: 2022-04-27
Payer: MEDICARE

## 2022-04-27 VITALS
WEIGHT: 165.81 LBS | BODY MASS INDEX: 26.65 KG/M2 | DIASTOLIC BLOOD PRESSURE: 78 MMHG | HEIGHT: 66 IN | SYSTOLIC BLOOD PRESSURE: 130 MMHG

## 2022-04-27 DIAGNOSIS — N81.10 BADEN-WALKER GRADE 2 CYSTOCELE: ICD-10-CM

## 2022-04-27 DIAGNOSIS — Z46.89 ENCOUNTER FOR PESSARY MAINTENANCE: Primary | ICD-10-CM

## 2022-04-27 PROCEDURE — 1126F PR PAIN SEVERITY QUANTIFIED, NO PAIN PRESENT: ICD-10-PCS | Mod: CPTII,S$GLB,, | Performed by: OBSTETRICS & GYNECOLOGY

## 2022-04-27 PROCEDURE — 99999 PR PBB SHADOW E&M-EST. PATIENT-LVL III: ICD-10-PCS | Mod: PBBFAC,,, | Performed by: OBSTETRICS & GYNECOLOGY

## 2022-04-27 PROCEDURE — 1101F PT FALLS ASSESS-DOCD LE1/YR: CPT | Mod: CPTII,S$GLB,, | Performed by: OBSTETRICS & GYNECOLOGY

## 2022-04-27 PROCEDURE — 1159F MED LIST DOCD IN RCRD: CPT | Mod: CPTII,S$GLB,, | Performed by: OBSTETRICS & GYNECOLOGY

## 2022-04-27 PROCEDURE — 1101F PR PT FALLS ASSESS DOC 0-1 FALLS W/OUT INJ PAST YR: ICD-10-PCS | Mod: CPTII,S$GLB,, | Performed by: OBSTETRICS & GYNECOLOGY

## 2022-04-27 PROCEDURE — 99213 OFFICE O/P EST LOW 20 MIN: CPT | Mod: S$GLB,,, | Performed by: OBSTETRICS & GYNECOLOGY

## 2022-04-27 PROCEDURE — 3288F PR FALLS RISK ASSESSMENT DOCUMENTED: ICD-10-PCS | Mod: CPTII,S$GLB,, | Performed by: OBSTETRICS & GYNECOLOGY

## 2022-04-27 PROCEDURE — 99999 PR PBB SHADOW E&M-EST. PATIENT-LVL III: CPT | Mod: PBBFAC,,, | Performed by: OBSTETRICS & GYNECOLOGY

## 2022-04-27 PROCEDURE — 1159F PR MEDICATION LIST DOCUMENTED IN MEDICAL RECORD: ICD-10-PCS | Mod: CPTII,S$GLB,, | Performed by: OBSTETRICS & GYNECOLOGY

## 2022-04-27 PROCEDURE — 1126F AMNT PAIN NOTED NONE PRSNT: CPT | Mod: CPTII,S$GLB,, | Performed by: OBSTETRICS & GYNECOLOGY

## 2022-04-27 PROCEDURE — 99213 PR OFFICE/OUTPT VISIT, EST, LEVL III, 20-29 MIN: ICD-10-PCS | Mod: S$GLB,,, | Performed by: OBSTETRICS & GYNECOLOGY

## 2022-04-27 PROCEDURE — 3288F FALL RISK ASSESSMENT DOCD: CPT | Mod: CPTII,S$GLB,, | Performed by: OBSTETRICS & GYNECOLOGY

## 2022-04-27 RX ORDER — IRBESARTAN 150 MG/1
150 TABLET ORAL
COMMUNITY
Start: 2022-04-11 | End: 2024-03-04

## 2022-04-27 NOTE — PROGRESS NOTES
Subjective:       Patient ID: Tiffanie Barajas is a 86 y.o. female.    Chief Complaint:  Pessary Check      History of Present Illness  HPI  Presents for pessary check.  Wears a #4 ring pessary with support for symptomatic cystocele and rectocele.  Doing well with it.  No complaints.  Uses rePHresh OTC for vaginal odor, and that works well for her.    GYN & OB History  Patient's last menstrual period was 1990.   Date of Last Pap: No result found    OB History    Para Term  AB Living   2 1       2   SAB IAB Ectopic Multiple Live Births         1 1      # Outcome Date GA Lbr Dwayne/2nd Weight Sex Delivery Anes PTL Lv   2 Para      Vag-Spont   EMILIANO   1A       Vag-Spont      1B       Vag-Spont          Review of Systems  Review of Systems   Constitutional: Negative for fatigue, fever and unexpected weight change.   Gastrointestinal: Negative for abdominal pain, constipation, diarrhea, nausea and vomiting.   Genitourinary: Negative for dysuria, frequency, pelvic pain, urgency, vaginal bleeding, vaginal discharge, vaginal pain and vaginal odor.           Objective:    Physical Exam:   Constitutional: She is oriented to person, place, and time. She appears well-developed and well-nourished. No distress.             Abdominal: Soft. She exhibits no distension and no mass. There is no abdominal tenderness. There is no rebound and no guarding. Hernia confirmed negative in the right inguinal area and confirmed negative in the left inguinal area.     Genitourinary:    Vagina normal.      Pelvic exam was performed with patient supine.   There is no rash, tenderness, lesion or injury on the right labia. There is no rash, tenderness, lesion or injury on the left labia. Right adnexum displays no mass, no tenderness and no fullness. Left adnexum displays no mass, no tenderness and no fullness. There is rectocele (mild, grade 1 with pessary out) and cystocele (grade 2 with pessary out) in the vagina.  No erythema,  no vaginal discharge, tenderness, bleeding or unspecified prolapse of vaginal walls in the vagina.    No foreign body in the vagina.      No signs of injury in the vagina.   Cervix exhibits no motion tenderness, no discharge and no friability. Uterus is not deviated, not enlarged, not fixed and not tender.    Genitourinary Comments: Pessary removed, washed with soap and water, and replaced without difficulty                 Neurological: She is alert and oriented to person, place, and time.     Psychiatric: She has a normal mood and affect.          Assessment:        1. Encounter for pessary maintenance    2. Bishop-Walker grade 2 cystocele                Plan:      Tiffanie was seen today for pessary check.    Diagnoses and all orders for this visit:    Encounter for pessary maintenance    Bishop-Walker grade 2 cystocele    Doing well.  RTC 3-4 months for pessary check.

## 2022-05-10 ENCOUNTER — LAB VISIT (OUTPATIENT)
Dept: LAB | Facility: HOSPITAL | Age: 87
End: 2022-05-10
Attending: FAMILY MEDICINE
Payer: MEDICARE

## 2022-05-10 ENCOUNTER — OFFICE VISIT (OUTPATIENT)
Dept: INTERNAL MEDICINE | Facility: CLINIC | Age: 87
End: 2022-05-10
Payer: MEDICARE

## 2022-05-10 VITALS
DIASTOLIC BLOOD PRESSURE: 62 MMHG | WEIGHT: 165.25 LBS | TEMPERATURE: 99 F | SYSTOLIC BLOOD PRESSURE: 140 MMHG | HEART RATE: 80 BPM | HEIGHT: 66 IN | BODY MASS INDEX: 26.56 KG/M2 | OXYGEN SATURATION: 95 %

## 2022-05-10 DIAGNOSIS — E78.2 MIXED HYPERLIPIDEMIA: ICD-10-CM

## 2022-05-10 DIAGNOSIS — I10 ESSENTIAL HYPERTENSION: Primary | ICD-10-CM

## 2022-05-10 DIAGNOSIS — F32.A DEPRESSION, UNSPECIFIED DEPRESSION TYPE: ICD-10-CM

## 2022-05-10 LAB
ALBUMIN SERPL BCP-MCNC: 4.1 G/DL (ref 3.5–5.2)
ALP SERPL-CCNC: 81 U/L (ref 55–135)
ALT SERPL W/O P-5'-P-CCNC: 12 U/L (ref 10–44)
ANION GAP SERPL CALC-SCNC: 8 MMOL/L (ref 8–16)
AST SERPL-CCNC: 18 U/L (ref 10–40)
BILIRUB SERPL-MCNC: 0.6 MG/DL (ref 0.1–1)
BUN SERPL-MCNC: 12 MG/DL (ref 8–23)
CALCIUM SERPL-MCNC: 10.2 MG/DL (ref 8.7–10.5)
CHLORIDE SERPL-SCNC: 101 MMOL/L (ref 95–110)
CHOLEST SERPL-MCNC: 114 MG/DL (ref 120–199)
CHOLEST/HDLC SERPL: 2.1 {RATIO} (ref 2–5)
CO2 SERPL-SCNC: 32 MMOL/L (ref 23–29)
CREAT SERPL-MCNC: 0.6 MG/DL (ref 0.5–1.4)
EST. GFR  (AFRICAN AMERICAN): >60 ML/MIN/1.73 M^2
EST. GFR  (NON AFRICAN AMERICAN): >60 ML/MIN/1.73 M^2
GLUCOSE SERPL-MCNC: 87 MG/DL (ref 70–110)
HDLC SERPL-MCNC: 55 MG/DL (ref 40–75)
HDLC SERPL: 48.2 % (ref 20–50)
LDLC SERPL CALC-MCNC: 41.6 MG/DL (ref 63–159)
NONHDLC SERPL-MCNC: 59 MG/DL
POTASSIUM SERPL-SCNC: 4.1 MMOL/L (ref 3.5–5.1)
PROT SERPL-MCNC: 7.4 G/DL (ref 6–8.4)
SODIUM SERPL-SCNC: 141 MMOL/L (ref 136–145)
TRIGL SERPL-MCNC: 87 MG/DL (ref 30–150)

## 2022-05-10 PROCEDURE — 3288F PR FALLS RISK ASSESSMENT DOCUMENTED: ICD-10-PCS | Mod: CPTII,S$GLB,, | Performed by: FAMILY MEDICINE

## 2022-05-10 PROCEDURE — 1101F PR PT FALLS ASSESS DOC 0-1 FALLS W/OUT INJ PAST YR: ICD-10-PCS | Mod: CPTII,S$GLB,, | Performed by: FAMILY MEDICINE

## 2022-05-10 PROCEDURE — 80053 COMPREHEN METABOLIC PANEL: CPT | Performed by: FAMILY MEDICINE

## 2022-05-10 PROCEDURE — 1160F PR REVIEW ALL MEDS BY PRESCRIBER/CLIN PHARMACIST DOCUMENTED: ICD-10-PCS | Mod: CPTII,S$GLB,, | Performed by: FAMILY MEDICINE

## 2022-05-10 PROCEDURE — 1101F PT FALLS ASSESS-DOCD LE1/YR: CPT | Mod: CPTII,S$GLB,, | Performed by: FAMILY MEDICINE

## 2022-05-10 PROCEDURE — 99214 PR OFFICE/OUTPT VISIT, EST, LEVL IV, 30-39 MIN: ICD-10-PCS | Mod: S$GLB,,, | Performed by: FAMILY MEDICINE

## 2022-05-10 PROCEDURE — 1126F AMNT PAIN NOTED NONE PRSNT: CPT | Mod: CPTII,S$GLB,, | Performed by: FAMILY MEDICINE

## 2022-05-10 PROCEDURE — 80061 LIPID PANEL: CPT | Performed by: FAMILY MEDICINE

## 2022-05-10 PROCEDURE — 1126F PR PAIN SEVERITY QUANTIFIED, NO PAIN PRESENT: ICD-10-PCS | Mod: CPTII,S$GLB,, | Performed by: FAMILY MEDICINE

## 2022-05-10 PROCEDURE — 1159F MED LIST DOCD IN RCRD: CPT | Mod: CPTII,S$GLB,, | Performed by: FAMILY MEDICINE

## 2022-05-10 PROCEDURE — 36415 COLL VENOUS BLD VENIPUNCTURE: CPT | Performed by: FAMILY MEDICINE

## 2022-05-10 PROCEDURE — 99214 OFFICE O/P EST MOD 30 MIN: CPT | Mod: S$GLB,,, | Performed by: FAMILY MEDICINE

## 2022-05-10 PROCEDURE — 99999 PR PBB SHADOW E&M-EST. PATIENT-LVL IV: CPT | Mod: PBBFAC,,, | Performed by: FAMILY MEDICINE

## 2022-05-10 PROCEDURE — 3288F FALL RISK ASSESSMENT DOCD: CPT | Mod: CPTII,S$GLB,, | Performed by: FAMILY MEDICINE

## 2022-05-10 PROCEDURE — 1159F PR MEDICATION LIST DOCUMENTED IN MEDICAL RECORD: ICD-10-PCS | Mod: CPTII,S$GLB,, | Performed by: FAMILY MEDICINE

## 2022-05-10 PROCEDURE — 1160F RVW MEDS BY RX/DR IN RCRD: CPT | Mod: CPTII,S$GLB,, | Performed by: FAMILY MEDICINE

## 2022-05-10 PROCEDURE — 99999 PR PBB SHADOW E&M-EST. PATIENT-LVL IV: ICD-10-PCS | Mod: PBBFAC,,, | Performed by: FAMILY MEDICINE

## 2022-05-10 NOTE — PROGRESS NOTES
Subjective:       Patient ID: Tiffanie Barajas is a 86 y.o. female.    Chief Complaint: Follow-up (6 month)    Patient presents to clinic today for followup of chronic conditions.    Review of Systems   Constitutional: Negative for chills, fatigue, fever and unexpected weight change.   Eyes: Negative for visual disturbance.   Respiratory: Negative for shortness of breath.    Cardiovascular: Negative for chest pain.   Musculoskeletal: Negative for myalgias.   Neurological: Negative for headaches.         Objective:      Physical Exam  Vitals reviewed.   Constitutional:       General: She is not in acute distress.     Appearance: She is well-developed.   HENT:      Head: Normocephalic and atraumatic.   Eyes:      General: Lids are normal. No scleral icterus.     Extraocular Movements: Extraocular movements intact.      Conjunctiva/sclera: Conjunctivae normal.      Pupils: Pupils are equal, round, and reactive to light.   Pulmonary:      Effort: Pulmonary effort is normal.   Neurological:      Mental Status: She is alert and oriented to person, place, and time.      Cranial Nerves: No cranial nerve deficit.      Gait: Gait normal.   Psychiatric:         Mood and Affect: Mood and affect normal.         Assessment:       1. Essential hypertension    2. Mixed hyperlipidemia    3. Depression, unspecified depression type        Plan:     Problem List Items Addressed This Visit     Depression    Current Assessment & Plan     Controlled, continue citalopram           Essential hypertension - Primary    Current Assessment & Plan     Repeat BP improved; continue current medications; keep upcoming follow up with Cardiology           Relevant Orders    TSH    CBC Auto Differential    Mixed hyperlipidemia    Current Assessment & Plan     Status pending lab, continue atorvastatin           Relevant Orders    Comprehensive Metabolic Panel    Lipid Panel          Health Maintenance reviewed/updated.  Discussed eligibility for covid  booster, given scheduling information.  Staff to schedule DEXA, prior order.  Declines shingrix at this time, will consider in the future.

## 2022-05-10 NOTE — PATIENT INSTRUCTIONS
The FDA determined that the known and potential benefits of a second COVID-19 booster outweigh any known or potential risks. During the recent Omicron surge, those who were boosted were 21 times less likely to die from COVID-19 compared to those who were unvaccinated, and seven times less likely to be hospitalized.    Eligibility criteria for a second booster dose include:  Individuals 50 years of age and older at least four months after receipt of a first booster dose of any approved COVID-19 vaccine  Immunocompromised individuals 12 years of age and older at least four months after receipt of a first booster dose of any approved COVID-19    These individuals are eligible for a second booster dose regardless of which COVID-19 vaccine they received for their initial series. While all of the available COVID-19 vaccines were approved for the first booster dose, only Pfizer and Moderna have been approved as options for the second booster dose.  Pfizer is authorized for individuals 12 years of age and older  Moderna is authorized for individuals 18 years of age and older    Second booster shot appointments can be scheduled via [facility] or by calling [phone]. Walk-ins are also accepted.

## 2022-05-16 RX ORDER — PANTOPRAZOLE SODIUM 40 MG/1
40 TABLET, DELAYED RELEASE ORAL DAILY
COMMUNITY
End: 2022-05-16 | Stop reason: SDUPTHER

## 2022-05-16 RX ORDER — PANTOPRAZOLE SODIUM 40 MG/1
40 TABLET, DELAYED RELEASE ORAL DAILY
Qty: 30 TABLET | Refills: 11 | Status: SHIPPED | OUTPATIENT
Start: 2022-05-16 | End: 2023-05-11

## 2022-05-16 NOTE — TELEPHONE ENCOUNTER
Previous Rx ran out 04/27/2022.  Previous Rx instructions: 40 mg disp 30 w/ 11 refills daily po    LOV 05/10/2022

## 2022-06-02 ENCOUNTER — OFFICE VISIT (OUTPATIENT)
Dept: ORTHOPEDICS | Facility: CLINIC | Age: 87
End: 2022-06-02
Payer: MEDICARE

## 2022-06-02 VITALS — WEIGHT: 165 LBS | HEIGHT: 66 IN | BODY MASS INDEX: 26.52 KG/M2

## 2022-06-02 DIAGNOSIS — M47.816 LUMBAR FACET ARTHROPATHY: ICD-10-CM

## 2022-06-02 DIAGNOSIS — M17.11 ARTHRITIS OF KNEE, RIGHT: ICD-10-CM

## 2022-06-02 DIAGNOSIS — M70.62 GREATER TROCHANTERIC BURSITIS, LEFT: ICD-10-CM

## 2022-06-02 DIAGNOSIS — M21.162 ACQUIRED VARUS DEFORMITY KNEE, LEFT: ICD-10-CM

## 2022-06-02 DIAGNOSIS — I73.9 PERIPHERAL ARTERIAL DISEASE: ICD-10-CM

## 2022-06-02 DIAGNOSIS — M16.12 ARTHRITIS OF LEFT HIP: ICD-10-CM

## 2022-06-02 DIAGNOSIS — M17.12 PRIMARY OSTEOARTHRITIS OF LEFT KNEE: Primary | ICD-10-CM

## 2022-06-02 DIAGNOSIS — M46.1 SACROILIITIS: Primary | ICD-10-CM

## 2022-06-02 DIAGNOSIS — M21.161 ACQUIRED VARUS DEFORMITY KNEE, RIGHT: ICD-10-CM

## 2022-06-02 DIAGNOSIS — M17.12 ARTHRITIS OF KNEE, LEFT: ICD-10-CM

## 2022-06-02 PROCEDURE — 1101F PR PT FALLS ASSESS DOC 0-1 FALLS W/OUT INJ PAST YR: ICD-10-PCS | Mod: CPTII,S$GLB,, | Performed by: ORTHOPAEDIC SURGERY

## 2022-06-02 PROCEDURE — 1160F PR REVIEW ALL MEDS BY PRESCRIBER/CLIN PHARMACIST DOCUMENTED: ICD-10-PCS | Mod: CPTII,S$GLB,, | Performed by: ORTHOPAEDIC SURGERY

## 2022-06-02 PROCEDURE — 1125F PR PAIN SEVERITY QUANTIFIED, PAIN PRESENT: ICD-10-PCS | Mod: CPTII,S$GLB,, | Performed by: ORTHOPAEDIC SURGERY

## 2022-06-02 PROCEDURE — 99999 PR PBB SHADOW E&M-EST. PATIENT-LVL III: ICD-10-PCS | Mod: PBBFAC,,, | Performed by: ORTHOPAEDIC SURGERY

## 2022-06-02 PROCEDURE — 1160F RVW MEDS BY RX/DR IN RCRD: CPT | Mod: CPTII,S$GLB,, | Performed by: ORTHOPAEDIC SURGERY

## 2022-06-02 PROCEDURE — 3288F FALL RISK ASSESSMENT DOCD: CPT | Mod: CPTII,S$GLB,, | Performed by: ORTHOPAEDIC SURGERY

## 2022-06-02 PROCEDURE — 1125F AMNT PAIN NOTED PAIN PRSNT: CPT | Mod: CPTII,S$GLB,, | Performed by: ORTHOPAEDIC SURGERY

## 2022-06-02 PROCEDURE — 1159F PR MEDICATION LIST DOCUMENTED IN MEDICAL RECORD: ICD-10-PCS | Mod: CPTII,S$GLB,, | Performed by: ORTHOPAEDIC SURGERY

## 2022-06-02 PROCEDURE — 3288F PR FALLS RISK ASSESSMENT DOCUMENTED: ICD-10-PCS | Mod: CPTII,S$GLB,, | Performed by: ORTHOPAEDIC SURGERY

## 2022-06-02 PROCEDURE — 20610 DRAIN/INJ JOINT/BURSA W/O US: CPT | Mod: RT,S$GLB,, | Performed by: ORTHOPAEDIC SURGERY

## 2022-06-02 PROCEDURE — 1101F PT FALLS ASSESS-DOCD LE1/YR: CPT | Mod: CPTII,S$GLB,, | Performed by: ORTHOPAEDIC SURGERY

## 2022-06-02 PROCEDURE — 20610 LARGE JOINT ASPIRATION/INJECTION: R KNEE: ICD-10-PCS | Mod: RT,S$GLB,, | Performed by: ORTHOPAEDIC SURGERY

## 2022-06-02 PROCEDURE — 99999 PR PBB SHADOW E&M-EST. PATIENT-LVL III: CPT | Mod: PBBFAC,,, | Performed by: ORTHOPAEDIC SURGERY

## 2022-06-02 PROCEDURE — 1159F MED LIST DOCD IN RCRD: CPT | Mod: CPTII,S$GLB,, | Performed by: ORTHOPAEDIC SURGERY

## 2022-06-02 PROCEDURE — 99213 PR OFFICE/OUTPT VISIT, EST, LEVL III, 20-29 MIN: ICD-10-PCS | Mod: 25,S$GLB,, | Performed by: ORTHOPAEDIC SURGERY

## 2022-06-02 PROCEDURE — 99213 OFFICE O/P EST LOW 20 MIN: CPT | Mod: 25,S$GLB,, | Performed by: ORTHOPAEDIC SURGERY

## 2022-06-02 RX ORDER — METHYLPREDNISOLONE ACETATE 80 MG/ML
80 INJECTION, SUSPENSION INTRA-ARTICULAR; INTRALESIONAL; INTRAMUSCULAR; SOFT TISSUE
Status: DISCONTINUED | OUTPATIENT
Start: 2022-06-02 | End: 2022-06-02 | Stop reason: HOSPADM

## 2022-06-02 RX ADMIN — METHYLPREDNISOLONE ACETATE 80 MG: 80 INJECTION, SUSPENSION INTRA-ARTICULAR; INTRALESIONAL; INTRAMUSCULAR; SOFT TISSUE at 04:06

## 2022-06-02 NOTE — PROGRESS NOTES
Subjective:     Patient ID: Tiffanie Barajas is a 86 y.o. female.    Chief Complaint: Pain and Swelling of the Left Knee    HPI:  84-year-old complaining of pain over the left sacroiliac joint radiating down to her knee.  Previous history of sciatica and a cyst in the back that was removed. Has history of arthritis of both of her knees received injection longtime ago more than a year (lubrication shot).  She is not having too much pain in the right knee unable to fully extend the left knee difficulty with shopping and walking.  Pain roughly 4/10.  Denies loss of bowel bladder control.  Walking distance is seems to hurt approximately 200 ft.  She is on blood thinners and unable to take NSAIDs.  She takes 1 little Tylenol occasionally and does not help.    01/27/2020.  Left knee injection of Depo-Medrol seems to have helped this patient.  Given 01/03/2020.  Just started physical therapy and that seems to help also.  The Flexeril did not seem to work at all.  Already been 5 times to physical therapy and she has at least 3 more weeks to go.  Very pleased so far with her results.  Patient cannot take NSAIDs due to being on blood thinners.  Pain level 0/10.  Does have occasional discomfort right greater trochanteric area    02/13/2020  Patient with bilateral knee arthrosis left worse than right and sacroiliac pain.  She just finished her physical therapy and that seems to have helped and now she is doing independent exercise program.  We did inject the knees with steroid that seems to help.  She is ambulating without any assistive devices today. He is here to have Synvisc-One injection into the left knee.    05/22/2020  Patient complaining today of left hip pain/she points over the lumbar area radiating to the posterior aspect of the knee.  She has history of lumbosacral arthritis.  As far as her left knee injection of Synvisc-One she is not having any pain with that.  She is very happy with that result.  She wants  something done and 0 not want any prescriptions for any pills.  Denies loss of bowel bladder control or usage or any assistive devices to ambulate.  She is doing independent exercise as well as showing me that her  created exercise program for her.  I did recommend stationary bicycle and independent stretching exercises.  Denies loss of bowel bladder control and fever or chills or shortness of breath or chest pain    07/09/2020  Patient received trigger point injection of the lumbar spine 05/22/2020 with some good relief.  Now she is having more pain in the left knee.  She had received steroid injection 01/03/2020 and Synvisc-One in 02/13/2020.  She feels the knee is tight she tries to exercise it on her own.  Having sharp pain on the medial side.  Pain level is 3/10 but with gait it gets worst.  Denies any fever or chills and maintaining social distancing and wearing her own mask.  She would like some relief from the tightness and stiffness in the left knee but wants to avoid taking any medications besides a little Tylenol because she is on blood thinners and unable to take NSAIDs.    01/04/2020   Left knee pain and stiffness 6/10, low back pain / points on the left sacroiliac area also.  She is requesting injection in both areas.  Patient did have trigger point injection 05/22/2020 in the lumbar area which helped also had steroid injection in July 2020 which seems to have helped in the knee.  Also previous history of Synvisc-One injection 02/13/2020.  Tylenol does not seem to help.  Denies any fever or chills or shortness of breath or difficulty with chewing or swallowing loss of bowel bladder control blurry vision or double vision or loss sense smell or taste.  She is here with her significant other.  Asking about getting the COVID  Vaccine.  She is 85 years old and I told if her  She qualifies should be able to get it soon    02/08/2021  Right lumbar trigger point tenderness which was injected awhile ago  with relief on 05/22/2020 now it is hurting her a little bit more.  I did tell her since she is here today to receive viscosupplementation/approved only Monovisc not Synvisc this time we will hold off on trigger point injection.  Pain is 5/10 in the left knee.  The right knee is doing okay.  She is ambulating without any assistive devices.  There was a little discussion about difference is with me in Monovisc and Synvisc and put simply I told them it is like having a car with different brands.  Patient is maintaining her activity level.  She still did not get her COVID vaccine due to in availability of the vaccine.  Denies any fever or chills or shortness of breath or difficulty with chewing or swallowing or loss of bowel bladder control blurry vision double vision loss sense smell or taste.  She is ambulating without any assistive devices    04/01/2021  Low back pain which is severe we injected trigger point on 05/22/2020 with some relief.  Went over her x-rays in the electronic records today showing severe facet arthropathy and degenerative disc disease with large bone spur overgrowth.  That bothers her a lot with radiation down her legs a cannot sleep.  She is requesting may be something to help with the radicular symptoms and give her some sleep and rest as far as the Monovisc injection into the left knee that did not seem to help at all.  The steroid injection seems to work better for her and requested another injection.  She did go through therapy before.  She feels sciatica to the right leg as far as the right knee is concerned is hurting but not as much as the left knee  Monovisc injection 02/08/2021 into the left knee did not seem to help.  Voltaren gel she uses on occasion.  No fever no chills or shortness of breath or difficulty with chewing or swallowing loss of bowel bladder control blurry vision double vision or loss of sense smell or taste  Did did receive her COVID-19 vaccine    07/02/2021  Patient  is having low back pain with sciatic type of pain radiating down both legs.  Today will obtaining x-rays of her spine as well as the pelvis and hips and the knees.  The knees and not hurting her as much and she is not requesting any injections.  We did try Monovisc on the left knee but did not help and the steroid seems to work for her.  I did mention in the future maybe she would need long-acting steroid like  zilretta.  She is here with her daughter in law.  She is having some hip pains.  Able to manage it.  Her pain is 5/10 ambulating slowly without any assistive devices.  Cannot take any NSAIDs by mouth since she is on aspirin and Plavix and home cardiac evaluation by cardiologist.  No fever no chills no shortness of breath or difficulty with chewing or swallowing loss of bowel bladder control or blurry vision double vision loss sense smell or taste  She did have diclofenac gel at home but has not been using it appropriately  She states she is using SALONPAS which seems to help the back  Last visit we gave her cyclobenzaprine she is not taking it      10/18/2021  Severe low back pain left side more than the right.  We did have x-rays on her from last visit that showed sacroiliac arthritic changes sacroiliitis.  We did give her sacroiliac injection more than a year ago with some relief.  She also have pain in the knees and in the back specially on the left side a worried if it is sciatica or not.  She does have x-rays of severe arthritis in the spine also.  She usually gets steroid injections which help her for a little while.  She had been tried on viscosupplementation once without success.  She had sustained a little fall on 10/07/2021 but no major injury.  She is ambulating without any assistive devices.  No fever no chills no shortness of breath or difficulty with chewing or swallowing  She is using diclofenac gel as well as Salonpas patches.    11/08/2021  Injection of the trigger point in the lower part of  her back helped for 10 days.  That seems to come back now and hurts her a little bit more than her knee.  As far as the right knee she is doing okay the left knee is the 1 that bothers her.  She has been losing some range of motion since last visit we see her.  Her pain is around 6/10 in the sitting position and goes up with walking.  At this point I did tell her I do not want to give her 2 injections and not knowing which 1 will give her a reaction.  We have her approved to receive the left knee Zilreta injection.  The pros and cons discussed in details.  No loss of bowel bladder control or fever or chills or shortness of breath or difficulty with chewing or swallowing loss of bowel bladder control or loss of sense smell or taste      12/02/2021  Patient stated left knee steroid injections/ Zilreta seems to have helped so far.  Not bother her as much.  What bothers her is her lower part of her back on the left side there is a trigger point that seems to be the area.  She her  marked with a pen.  She is ambulating slowly without any assistive devices.  She would like to have that trigger point injected despite the fact her pain in her knee is 3/10.  Overall it seems the steroid injection given last visit in the knee seems to work so far  No fever no chills no shortness of breath or difficulty with chewing or swallowing loss of bowel bladder control    02/7/22  Complaining over left greater trochanteric and right sacroiliac area pain.  The left knee still doing okay received zilretta injection in November 2021 and is still helping.  She is requesting injections over the areas that hurts the most.  She has fluctuating blood pressure I did tell her I will not give but total 80 mg Depo-Medrol and we will split it into 2 areas.  She is ambulating without assistive devices.  We did get her approved for the long-acting steroid injection into the left knee but she says ri it since you doing okay.  ght now her knee is  doing okay and that previous shot still working.  We can always get it approved if needed again.  The approval is until February 27, 2022 however we still do not have to give it if you still doing okay      03/28/2022  On 02/07/2022 in [place] today greater trochanter which seems to have helped however the trigger point/left sacroiliac area that did not seem to help.  Both injections were with Depo-Medrol.  She stated she was changed to nifedipine for blood pressure and that cause severe swelling in the legs.  She has an appointment April 15 to see the cardiologist.  I did tell her this could be angioedema.  As far as her knee is concerned just stiff right now and she is not having much of any pain in it at 0/10.  Her main concern is the lumbar area this seems to have quite a bit of pain in it.  She had marked where the pain is in order to get another injection.  Denies loss of bowel bladder control blurry vision double vision.  She is here with her .  She had tried different topicals without success  Vicks rub seems to be the 1 that works the most  Trigger point pain over the left sacroiliac area approximately 3/10    06/02/2022  Severe left knee pain.  Given zilretta in November 2021 and just wore off on her almost  4 weeks ago.  Her pain is severe in the knee and limiting motion..  As far as the left hip and sacroiliac area on the left side and trigger point she is around 2-3/10.  She still ambulating without assistive devices taking very small steps.  She wants an injection into the left knee.  She stated the long-acting steroid helped quite a bit when we give it last and it was in November.  She would like that repeated.  However she stated today the left knee is hurting a lot she wants an injection today too.  I see no reason why not..  Still having pain over the left sacroiliac area and greater trochanteric area but not severe  Past Medical History:   Diagnosis Date    Acute pancreatitis without  infection or necrosis 12/5/2021    Anxiety     Arthritis     Atherosclerosis of native coronary artery of native heart with angina pectoris 8/8/2019    Cancer     Degenerative disc disease     Depression     Essential hypertension 10/26/2021    GERD (gastroesophageal reflux disease)     Glaucoma suspect of both eyes     Hyperlipidemia     Insomnia 4/6/2021    Low back derangement syndrome 12/11/2017    Melanoma     Pneumonia due to other staphylococcus      Past Surgical History:   Procedure Laterality Date    ADENOIDECTOMY      CHOLECYSTECTOMY  1973    SPINE SURGERY  2/6/13    L4-L5  cyst removed andfor sciatica    TONSILLECTOMY  1945     Family History   Problem Relation Age of Onset    Arthritis Mother     Hypertension Mother     Stroke Mother     Glaucoma Mother     Heart disease Father 56        fatal MI    Diabetes Brother     Cancer Son 56        melanoma with mets to lung and bone    Arthritis Sister     Hyperlipidemia Neg Hx      Social History     Socioeconomic History    Marital status:      Spouse name: fabio    Number of children: 3   Tobacco Use    Smoking status: Never Smoker    Smokeless tobacco: Never Used   Substance and Sexual Activity    Alcohol use: No     Alcohol/week: 0.0 standard drinks    Drug use: No    Sexual activity: Not Currently   Social History Narrative    . Decaf coffee only. No living will or advanced directive-copy of information given.      Medication List with Changes/Refills   Current Medications    ACETAMINOPHEN (TYLENOL) 650 MG TBSR    Take 1 tablet (650 mg total) by mouth every 8 (eight) hours.    ASPIRIN (ECOTRIN) 81 MG EC TABLET    Take 1 tablet by mouth Daily.    ATORVASTATIN (LIPITOR) 40 MG TABLET    Take 1 tablet by mouth every evening.    CITALOPRAM (CELEXA) 20 MG TABLET    Take 1 tablet (20 mg total) by mouth once daily.    CLOPIDOGREL (PLAVIX) 75 MG TABLET    Take 75 mg by mouth once daily.    FUROSEMIDE (LASIX) 20 MG  TABLET    Take 20 mg by mouth daily as needed.    IRBESARTAN (AVAPRO) 150 MG TABLET    Take 150 mg by mouth.    ISOSORBIDE MONONITRATE (IMDUR) 60 MG 24 HR TABLET    Take 60 mg by mouth once daily.    MULTIVITAMIN WITH MINERALS TABLET        NITROGLYCERIN (NITROSTAT) 0.4 MG SL TABLET    DISSOLVE ONE TABLET UNDER THE TONGUE EVERY 5 MINUTES AS NEEDED FOR CHEST PAIN. DO NOT EXCEED A TOTAL OF 3 DOSES IN 15 MINUTES    PANTOPRAZOLE (PROTONIX) 40 MG TABLET    Take 1 tablet (40 mg total) by mouth once daily.     Review of patient's allergies indicates:   Allergen Reactions    Hydrocodone-acetaminophen Nausea And Vomiting     Other reaction(s): Vomiting    Diclofenac Nausea And Vomiting and Other (See Comments)     Review of Systems   Constitutional: Negative for decreased appetite.   HENT: Negative for tinnitus.    Eyes: Negative for double vision.   Cardiovascular: Negative for chest pain.   Respiratory: Negative for wheezing.    Hematologic/Lymphatic: Negative for bleeding problem.   Skin: Negative for dry skin.   Musculoskeletal: Positive for arthritis, back pain, joint pain, muscle weakness and stiffness. Negative for gout and neck pain.   Gastrointestinal: Negative for abdominal pain.   Genitourinary: Negative for bladder incontinence.   Neurological: Positive for numbness. Negative for paresthesias and sensory change.   Psychiatric/Behavioral: Negative for altered mental status.       Objective:   Body mass index is 26.63 kg/m².  There were no vitals filed for this visit.       General    Constitutional: She is oriented to person, place, and time. She appears well-developed.   HENT:   Head: Atraumatic.   Eyes: EOM are normal.   Cardiovascular: Normal rate.    Pulmonary/Chest: Effort normal.   Neurological: She is alert and oriented to person, place, and time.   Psychiatric: Judgment normal.            Cervical spine rotation is functional but  limited due to stiffness  Lumbar with loss of lordosis and tenderness  around the L4-5 and mostly over the left sacroiliac joint which is the area of severe tenderness and paraspinal.  Forward bending barely to proximal tibia lateral bending to mid thigh.  Pelvis is level.  Passive hip motion within normal limits.  Hip flexors and abductors as well as quads and hamstrings and ankle extensors and flexors are slightly weak at 5-/5.  Tenderness over the greater trochanter over the right and left hip   Right knee range of motion 5-120.  Slight crepitus to AP compression on the patella in on medial joint.  Very mild swelling.  Collaterals and cruciate stable.  Left knee with approximately 7 ° of flexion contracture and flexion barely to 700 °.  Moderate swelling.  Collaterals and cruciate stable. Crepitus with motion.  Pain in medially to palpation.  Also tender to put was patient over the patella with severe crepitus  Calves are soft nontender  Slight varicosities around the calves  DP 1+  Skin is warm to touch no obvious lesions  Patellar reflex 1+    Relevant imaging results reviewed and interpreted by me, discussed with the patient and / or family today         X-ray bilateral knees with left knee complete loss of medial joint space with osteophytic changes and sclerosis.  Same on the right knee but not as bad consistent with bilateral end-stage arthrosis.  07/02/2021  X-ray of the pelvis showing some degenerative changes on the left hip with good joint space left.  Right hip no joint space loss consistent with some arthrosis of the left hip.  Mild chronic degenerative changes in the sacroiliac joints 07/02/2021  X-ray of the lumbar spine in the electronic records multilevel facet arthropathy and osteophytes.  Anterior multilevel spurring.  Degenerative disc disease 07/02/2021  Assessment:     Encounter Diagnoses   Name Primary?    Sacroiliitis Yes    Greater trochanteric bursitis, left     Arthritis of knee, left     Arthritis of knee, right     Lumbar facet arthropathy      Acquired varus deformity knee, left     Acquired varus deformity knee, right     Peripheral arterial disease     Arthritis of left hip         Plan:   Sacroiliitis    Greater trochanteric bursitis, left    Arthritis of knee, left  -     Large Joint Aspiration/Injection: R knee    Arthritis of knee, right    Lumbar facet arthropathy    Acquired varus deformity knee, left    Acquired varus deformity knee, right    Peripheral arterial disease    Arthritis of left hip         Patient Instructions   Your left knee is hurting her a lot any losing quite a bit of motion.  We did give you long-acting steroid/zilretta in November 2021 and she said now started over the last 3 weeks to hurt more.  She would like that repeated and I did tell her we would like to get it approved by the insurance.  She said today we will proceed with injection of Depo-Medrol 80 mg mixed with 5 cc 1% lidocaine 06/02/2022  Ice the needed next few days if it bothers you  She stated that does usually do not last long enough which I agree with her.  As far as your hip and your sacroiliac area she can tolerated today so continue with Tylenol and ice as needed.  Return in 4 weeks      (She understands that she can supplement with Tylenol for pain.   And we will be able to give her steroid injections once every 3 months .  She will call me sooner if she wants an injection of Depo-Medrol.  In the future we need to get her approved to have long-acting steroid injection like ZILRETTA which was given 11/08/2021 with good relief.  We spent a long time with the patient going over all her x-rays today as well as examining her.  She is managing with cell own power as as well as having diclofenac gel at home which I told her she needs to use it 3 times a day.  She did not take her cyclobenzaprine which I prescribed last time.   I did inject the trigger point with some relief of pain.  .  I did show her that she has hardening of the arteries in the aorta as well  as down the legs way you can see the popliteal artery .  Her cardiologist could check her to see if she has enough good circulation in the legs.  I did tell her usually if it is bad circulation in the legs when you walk distances you will have severe calf pain and goes with away with rest  Will avoid surgical intervention at this time.  Went over the x-rays with her again and shoulder the sacroiliac joint with arthritis and lumbosacral area however the hip joint seems to be okay.  She does have severe arthritis both knees but the left 1 is the 1 that hurts the most.  The pros and cons of the injections discussed.  She was worried about having sciatica but I did tell her tightness behind the knee and the swelling is secondary to arthritis)    Disclaimer: This note was prepared using a voice recognition system and is likely to have sound alike errors within the text.

## 2022-06-02 NOTE — PROCEDURES
Large Joint Aspiration/Injection: R knee    Date/Time: 6/2/2022 4:20 PM  Performed by: Ranjit Kelly MD  Authorized by: Ranjit Kelly MD     Consent Done?:  Yes (Verbal)  Site marked: the procedure site was marked    Timeout: prior to procedure the correct patient, procedure, and site was verified      Local anesthesia used?: Yes    Local anesthetic:  Lidocaine 1% without epinephrine    Details:  Needle Size:  22 G  Ultrasonic Guidance for needle placement?: No    Approach:  Anterolateral  Location:  Knee  Site:  R knee  Medications:  80 mg methylPREDNISolone acetate 80 mg/mL  Patient tolerance:  Patient tolerated the procedure well with no immediate complications

## 2022-06-02 NOTE — PATIENT INSTRUCTIONS
Your left knee is hurting her a lot any losing quite a bit of motion.  We did give you long-acting steroid/zilretta in November 2021 and she said now started over the last 3 weeks to hurt more.  She would like that repeated and I did tell her we would like to get it approved by the insurance.  She said today we will proceed with injection of Depo-Medrol 80 mg mixed with 5 cc 1% lidocaine 06/02/2022  Ice the needed next few days if it bothers you  She stated that does usually do not last long enough which I agree with her.  As far as your hip and your sacroiliac area she can tolerated today so continue with Tylenol and ice as needed.  Return in 4 weeks

## 2022-06-30 ENCOUNTER — OFFICE VISIT (OUTPATIENT)
Dept: ORTHOPEDICS | Facility: CLINIC | Age: 87
End: 2022-06-30
Payer: MEDICARE

## 2022-06-30 VITALS — BODY MASS INDEX: 26.5 KG/M2 | HEIGHT: 66 IN | WEIGHT: 164.88 LBS

## 2022-06-30 DIAGNOSIS — M17.12 PRIMARY OSTEOARTHRITIS OF LEFT KNEE: Primary | ICD-10-CM

## 2022-06-30 DIAGNOSIS — M21.162 ACQUIRED VARUS DEFORMITY KNEE, LEFT: ICD-10-CM

## 2022-06-30 DIAGNOSIS — M46.1 SACROILIITIS: ICD-10-CM

## 2022-06-30 DIAGNOSIS — M47.816 LUMBAR FACET ARTHROPATHY: ICD-10-CM

## 2022-06-30 DIAGNOSIS — M21.161 ACQUIRED VARUS DEFORMITY KNEE, RIGHT: ICD-10-CM

## 2022-06-30 DIAGNOSIS — M17.12 ARTHRITIS OF KNEE, LEFT: Primary | ICD-10-CM

## 2022-06-30 DIAGNOSIS — M17.11 ARTHRITIS OF KNEE, RIGHT: ICD-10-CM

## 2022-06-30 DIAGNOSIS — M16.12 ARTHRITIS OF LEFT HIP: ICD-10-CM

## 2022-06-30 PROCEDURE — 1159F MED LIST DOCD IN RCRD: CPT | Mod: CPTII,S$GLB,, | Performed by: ORTHOPAEDIC SURGERY

## 2022-06-30 PROCEDURE — 99999 PR PBB SHADOW E&M-EST. PATIENT-LVL III: ICD-10-PCS | Mod: PBBFAC,,, | Performed by: ORTHOPAEDIC SURGERY

## 2022-06-30 PROCEDURE — 3288F FALL RISK ASSESSMENT DOCD: CPT | Mod: CPTII,S$GLB,, | Performed by: ORTHOPAEDIC SURGERY

## 2022-06-30 PROCEDURE — 1101F PR PT FALLS ASSESS DOC 0-1 FALLS W/OUT INJ PAST YR: ICD-10-PCS | Mod: CPTII,S$GLB,, | Performed by: ORTHOPAEDIC SURGERY

## 2022-06-30 PROCEDURE — 3288F PR FALLS RISK ASSESSMENT DOCUMENTED: ICD-10-PCS | Mod: CPTII,S$GLB,, | Performed by: ORTHOPAEDIC SURGERY

## 2022-06-30 PROCEDURE — 1159F PR MEDICATION LIST DOCUMENTED IN MEDICAL RECORD: ICD-10-PCS | Mod: CPTII,S$GLB,, | Performed by: ORTHOPAEDIC SURGERY

## 2022-06-30 PROCEDURE — 1125F PR PAIN SEVERITY QUANTIFIED, PAIN PRESENT: ICD-10-PCS | Mod: CPTII,S$GLB,, | Performed by: ORTHOPAEDIC SURGERY

## 2022-06-30 PROCEDURE — 1101F PT FALLS ASSESS-DOCD LE1/YR: CPT | Mod: CPTII,S$GLB,, | Performed by: ORTHOPAEDIC SURGERY

## 2022-06-30 PROCEDURE — 99999 PR PBB SHADOW E&M-EST. PATIENT-LVL III: CPT | Mod: PBBFAC,,, | Performed by: ORTHOPAEDIC SURGERY

## 2022-06-30 PROCEDURE — 99213 PR OFFICE/OUTPT VISIT, EST, LEVL III, 20-29 MIN: ICD-10-PCS | Mod: 25,S$GLB,, | Performed by: ORTHOPAEDIC SURGERY

## 2022-06-30 PROCEDURE — 20610 DRAIN/INJ JOINT/BURSA W/O US: CPT | Mod: LT,S$GLB,, | Performed by: ORTHOPAEDIC SURGERY

## 2022-06-30 PROCEDURE — 99213 OFFICE O/P EST LOW 20 MIN: CPT | Mod: 25,S$GLB,, | Performed by: ORTHOPAEDIC SURGERY

## 2022-06-30 PROCEDURE — 1125F AMNT PAIN NOTED PAIN PRSNT: CPT | Mod: CPTII,S$GLB,, | Performed by: ORTHOPAEDIC SURGERY

## 2022-06-30 PROCEDURE — 20610 LARGE JOINT ASPIRATION/INJECTION: L KNEE: ICD-10-PCS | Mod: LT,S$GLB,, | Performed by: ORTHOPAEDIC SURGERY

## 2022-06-30 NOTE — PATIENT INSTRUCTIONS
Injection of left knee with zilretta 06/30/2022  This might take approximately 7-10 days for it to work  Ice the knee if needed  Continue with Tylenol on as-needed basis  Return to see me in 3 months

## 2022-06-30 NOTE — PROCEDURES
Large Joint Aspiration/Injection: L knee    Date/Time: 6/30/2022 10:00 AM  Performed by: Ranjit Kelly MD  Authorized by: Ranjit Kelly MD     Consent Done?:  Yes (Verbal)  Indications:  Arthritis and pain  Site marked: the procedure site was marked    Timeout: prior to procedure the correct patient, procedure, and site was verified    Prep: patient was prepped and draped in usual sterile fashion    Local anesthesia used?: No    Local anesthetic:  Topical anesthetic    Details:  Needle Size:  22 G  Ultrasonic Guidance for needle placement?: No    Approach:  Anterolateral  Location:  Knee  Site:  L knee  Medications:  32 mg triamcinolone acetonide 32 mg  Patient tolerance:  Patient tolerated the procedure well with no immediate complications     Left knee Zilretta

## 2022-06-30 NOTE — PROGRESS NOTES
Subjective:     Patient ID: Tiffanie Barajas is a 86 y.o. female.    Chief Complaint: Pain of the Left Knee    HPI:  84-year-old complaining of pain over the left sacroiliac joint radiating down to her knee.  Previous history of sciatica and a cyst in the back that was removed. Has history of arthritis of both of her knees received injection longtime ago more than a year (lubrication shot).  She is not having too much pain in the right knee unable to fully extend the left knee difficulty with shopping and walking.  Pain roughly 4/10.  Denies loss of bowel bladder control.  Walking distance is seems to hurt approximately 200 ft.  She is on blood thinners and unable to take NSAIDs.  She takes 1 little Tylenol occasionally and does not help.    01/27/2020.  Left knee injection of Depo-Medrol seems to have helped this patient.  Given 01/03/2020.  Just started physical therapy and that seems to help also.  The Flexeril did not seem to work at all.  Already been 5 times to physical therapy and she has at least 3 more weeks to go.  Very pleased so far with her results.  Patient cannot take NSAIDs due to being on blood thinners.  Pain level 0/10.  Does have occasional discomfort right greater trochanteric area    02/13/2020  Patient with bilateral knee arthrosis left worse than right and sacroiliac pain.  She just finished her physical therapy and that seems to have helped and now she is doing independent exercise program.  We did inject the knees with steroid that seems to help.  She is ambulating without any assistive devices today. He is here to have Synvisc-One injection into the left knee.    05/22/2020  Patient complaining today of left hip pain/she points over the lumbar area radiating to the posterior aspect of the knee.  She has history of lumbosacral arthritis.  As far as her left knee injection of Synvisc-One she is not having any pain with that.  She is very happy with that result.  She wants something done and  0 not want any prescriptions for any pills.  Denies loss of bowel bladder control or usage or any assistive devices to ambulate.  She is doing independent exercise as well as showing me that her  created exercise program for her.  I did recommend stationary bicycle and independent stretching exercises.  Denies loss of bowel bladder control and fever or chills or shortness of breath or chest pain    07/09/2020  Patient received trigger point injection of the lumbar spine 05/22/2020 with some good relief.  Now she is having more pain in the left knee.  She had received steroid injection 01/03/2020 and Synvisc-One in 02/13/2020.  She feels the knee is tight she tries to exercise it on her own.  Having sharp pain on the medial side.  Pain level is 3/10 but with gait it gets worst.  Denies any fever or chills and maintaining social distancing and wearing her own mask.  She would like some relief from the tightness and stiffness in the left knee but wants to avoid taking any medications besides a little Tylenol because she is on blood thinners and unable to take NSAIDs.    01/04/2020   Left knee pain and stiffness 6/10, low back pain / points on the left sacroiliac area also.  She is requesting injection in both areas.  Patient did have trigger point injection 05/22/2020 in the lumbar area which helped also had steroid injection in July 2020 which seems to have helped in the knee.  Also previous history of Synvisc-One injection 02/13/2020.  Tylenol does not seem to help.  Denies any fever or chills or shortness of breath or difficulty with chewing or swallowing loss of bowel bladder control blurry vision or double vision or loss sense smell or taste.  She is here with her significant other.  Asking about getting the COVID  Vaccine.  She is 85 years old and I told if her  She qualifies should be able to get it soon    02/08/2021  Right lumbar trigger point tenderness which was injected awhile ago with relief on  05/22/2020 now it is hurting her a little bit more.  I did tell her since she is here today to receive viscosupplementation/approved only Monovisc not Synvisc this time we will hold off on trigger point injection.  Pain is 5/10 in the left knee.  The right knee is doing okay.  She is ambulating without any assistive devices.  There was a little discussion about difference is with me in Monovisc and Synvisc and put simply I told them it is like having a car with different brands.  Patient is maintaining her activity level.  She still did not get her COVID vaccine due to in availability of the vaccine.  Denies any fever or chills or shortness of breath or difficulty with chewing or swallowing or loss of bowel bladder control blurry vision double vision loss sense smell or taste.  She is ambulating without any assistive devices    04/01/2021  Low back pain which is severe we injected trigger point on 05/22/2020 with some relief.  Went over her x-rays in the electronic records today showing severe facet arthropathy and degenerative disc disease with large bone spur overgrowth.  That bothers her a lot with radiation down her legs a cannot sleep.  She is requesting may be something to help with the radicular symptoms and give her some sleep and rest as far as the Monovisc injection into the left knee that did not seem to help at all.  The steroid injection seems to work better for her and requested another injection.  She did go through therapy before.  She feels sciatica to the right leg as far as the right knee is concerned is hurting but not as much as the left knee  Monovisc injection 02/08/2021 into the left knee did not seem to help.  Voltaren gel she uses on occasion.  No fever no chills or shortness of breath or difficulty with chewing or swallowing loss of bowel bladder control blurry vision double vision or loss of sense smell or taste  Did did receive her COVID-19 vaccine    07/02/2021  Patient is having low  back pain with sciatic type of pain radiating down both legs.  Today will obtaining x-rays of her spine as well as the pelvis and hips and the knees.  The knees and not hurting her as much and she is not requesting any injections.  We did try Monovisc on the left knee but did not help and the steroid seems to work for her.  I did mention in the future maybe she would need long-acting steroid like  zilretta.  She is here with her daughter in law.  She is having some hip pains.  Able to manage it.  Her pain is 5/10 ambulating slowly without any assistive devices.  Cannot take any NSAIDs by mouth since she is on aspirin and Plavix and home cardiac evaluation by cardiologist.  No fever no chills no shortness of breath or difficulty with chewing or swallowing loss of bowel bladder control or blurry vision double vision loss sense smell or taste  She did have diclofenac gel at home but has not been using it appropriately  She states she is using SALONPAS which seems to help the back  Last visit we gave her cyclobenzaprine she is not taking it      10/18/2021  Severe low back pain left side more than the right.  We did have x-rays on her from last visit that showed sacroiliac arthritic changes sacroiliitis.  We did give her sacroiliac injection more than a year ago with some relief.  She also have pain in the knees and in the back specially on the left side a worried if it is sciatica or not.  She does have x-rays of severe arthritis in the spine also.  She usually gets steroid injections which help her for a little while.  She had been tried on viscosupplementation once without success.  She had sustained a little fall on 10/07/2021 but no major injury.  She is ambulating without any assistive devices.  No fever no chills no shortness of breath or difficulty with chewing or swallowing  She is using diclofenac gel as well as Salonpas patches.    11/08/2021  Injection of the trigger point in the lower part of her back  helped for 10 days.  That seems to come back now and hurts her a little bit more than her knee.  As far as the right knee she is doing okay the left knee is the 1 that bothers her.  She has been losing some range of motion since last visit we see her.  Her pain is around 6/10 in the sitting position and goes up with walking.  At this point I did tell her I do not want to give her 2 injections and not knowing which 1 will give her a reaction.  We have her approved to receive the left knee Zilreta injection.  The pros and cons discussed in details.  No loss of bowel bladder control or fever or chills or shortness of breath or difficulty with chewing or swallowing loss of bowel bladder control or loss of sense smell or taste      12/02/2021  Patient stated left knee steroid injections/ Zilreta seems to have helped so far.  Not bother her as much.  What bothers her is her lower part of her back on the left side there is a trigger point that seems to be the area.  She her  marked with a pen.  She is ambulating slowly without any assistive devices.  She would like to have that trigger point injected despite the fact her pain in her knee is 3/10.  Overall it seems the steroid injection given last visit in the knee seems to work so far  No fever no chills no shortness of breath or difficulty with chewing or swallowing loss of bowel bladder control    02/7/22  Complaining over left greater trochanteric and right sacroiliac area pain.  The left knee still doing okay received zilretta injection in November 2021 and is still helping.  She is requesting injections over the areas that hurts the most.  She has fluctuating blood pressure I did tell her I will not give but total 80 mg Depo-Medrol and we will split it into 2 areas.  She is ambulating without assistive devices.  We did get her approved for the long-acting steroid injection into the left knee but she says ri it since you doing okay.  ght now her knee is doing  okay and that previous shot still working.  We can always get it approved if needed again.  The approval is until February 27, 2022 however we still do not have to give it if you still doing okay      03/28/2022  On 02/07/2022 in [place] today greater trochanter which seems to have helped however the trigger point/left sacroiliac area that did not seem to help.  Both injections were with Depo-Medrol.  She stated she was changed to nifedipine for blood pressure and that cause severe swelling in the legs.  She has an appointment April 15 to see the cardiologist.  I did tell her this could be angioedema.  As far as her knee is concerned just stiff right now and she is not having much of any pain in it at 0/10.  Her main concern is the lumbar area this seems to have quite a bit of pain in it.  She had marked where the pain is in order to get another injection.  Denies loss of bowel bladder control blurry vision double vision.  She is here with her .  She had tried different topicals without success  Vicks rub seems to be the 1 that works the most  Trigger point pain over the left sacroiliac area approximately 3/10    06/02/2022  Severe left knee pain.  Given zilretta in November 2021 and just wore off on her almost  4 weeks ago.  Her pain is severe in the knee and limiting motion..  As far as the left hip and sacroiliac area on the left side and trigger point she is around 2-3/10.  She still ambulating without assistive devices taking very small steps.  She wants an injection into the left knee.  She stated the long-acting steroid helped quite a bit when we give it last and it was in November.  She would like that repeated.  However she stated today the left knee is hurting a lot she wants an injection today too.  I see no reason why not..  Still having pain over the left sacroiliac area and greater trochanteric area but not severe    06/30/2022  We did inject her last visit and that did not seem to help as much.   She would like to have the long-acting steroid injection which helped significantly last time.  She still having some hip pain only when she leans on it but not when she is walking.  Her back is doing little bit better.  She does take Tylenol.  Her pain in the knee is 8/10  Past Medical History:   Diagnosis Date    Acute pancreatitis without infection or necrosis 12/5/2021    Anxiety     Arthritis     Atherosclerosis of native coronary artery of native heart with angina pectoris 8/8/2019    Cancer     Degenerative disc disease     Depression     Essential hypertension 10/26/2021    GERD (gastroesophageal reflux disease)     Glaucoma suspect of both eyes     Hyperlipidemia     Insomnia 4/6/2021    Low back derangement syndrome 12/11/2017    Melanoma     Pneumonia due to other staphylococcus      Past Surgical History:   Procedure Laterality Date    ADENOIDECTOMY      CHOLECYSTECTOMY  1973    SPINE SURGERY  2/6/13    L4-L5  cyst removed andfor sciatica    TONSILLECTOMY  1945     Family History   Problem Relation Age of Onset    Arthritis Mother     Hypertension Mother     Stroke Mother     Glaucoma Mother     Heart disease Father 56        fatal MI    Diabetes Brother     Cancer Son 56        melanoma with mets to lung and bone    Arthritis Sister     Hyperlipidemia Neg Hx      Social History     Socioeconomic History    Marital status:      Spouse name: fabio    Number of children: 3   Tobacco Use    Smoking status: Never Smoker    Smokeless tobacco: Never Used   Substance and Sexual Activity    Alcohol use: No     Alcohol/week: 0.0 standard drinks    Drug use: No    Sexual activity: Not Currently   Social History Narrative    . Decaf coffee only. No living will or advanced directive-copy of information given.      Medication List with Changes/Refills   Current Medications    ACETAMINOPHEN (TYLENOL) 650 MG TBSR    Take 1 tablet (650 mg total) by mouth every 8 (eight)  hours.    ASPIRIN (ECOTRIN) 81 MG EC TABLET    Take 1 tablet by mouth Daily.    ATORVASTATIN (LIPITOR) 40 MG TABLET    Take 1 tablet by mouth every evening.    CITALOPRAM (CELEXA) 20 MG TABLET    Take 1 tablet (20 mg total) by mouth once daily.    CLOPIDOGREL (PLAVIX) 75 MG TABLET    Take 75 mg by mouth once daily.    FUROSEMIDE (LASIX) 20 MG TABLET    Take 20 mg by mouth daily as needed.    IRBESARTAN (AVAPRO) 150 MG TABLET    Take 150 mg by mouth.    ISOSORBIDE MONONITRATE (IMDUR) 60 MG 24 HR TABLET    Take 60 mg by mouth once daily.    MULTIVITAMIN WITH MINERALS TABLET        NITROGLYCERIN (NITROSTAT) 0.4 MG SL TABLET    DISSOLVE ONE TABLET UNDER THE TONGUE EVERY 5 MINUTES AS NEEDED FOR CHEST PAIN. DO NOT EXCEED A TOTAL OF 3 DOSES IN 15 MINUTES    PANTOPRAZOLE (PROTONIX) 40 MG TABLET    Take 1 tablet (40 mg total) by mouth once daily.     Review of patient's allergies indicates:   Allergen Reactions    Hydrocodone-acetaminophen Nausea And Vomiting     Other reaction(s): Vomiting    Diclofenac Nausea And Vomiting and Other (See Comments)     Review of Systems   Constitutional: Negative for decreased appetite.   HENT: Negative for tinnitus.    Eyes: Negative for double vision.   Cardiovascular: Negative for chest pain.   Respiratory: Negative for wheezing.    Hematologic/Lymphatic: Negative for bleeding problem.   Skin: Negative for dry skin.   Musculoskeletal: Positive for arthritis, back pain, joint pain, muscle weakness and stiffness. Negative for gout and neck pain.   Gastrointestinal: Negative for abdominal pain.   Genitourinary: Negative for bladder incontinence.   Neurological: Positive for numbness. Negative for paresthesias and sensory change.   Psychiatric/Behavioral: Negative for altered mental status.       Objective:   Body mass index is 26.62 kg/m².  There were no vitals filed for this visit.       General    Constitutional: She is oriented to person, place, and time. She appears well-developed.    HENT:   Head: Atraumatic.   Eyes: EOM are normal.   Cardiovascular: Normal rate.    Pulmonary/Chest: Effort normal.   Neurological: She is alert and oriented to person, place, and time.   Psychiatric: Judgment normal.            Cervical spine rotation is functional but  limited due to stiffness  Lumbar with loss of lordosis and tenderness around the L4-5 and mostly over the left sacroiliac joint which is the area of severe tenderness and paraspinal.  Forward bending barely to proximal tibia lateral bending to mid thigh.  Pelvis is level.  Passive hip motion within normal limits.  Hip flexors and abductors as well as quads and hamstrings and ankle extensors and flexors are slightly weak at 5-/5.  Tenderness over the greater trochanter over the right and left hip   Right knee range of motion 5-120.  Slight crepitus to AP compression on the patella in on medial joint.  Very mild swelling.  Collaterals and cruciate stable.  Left knee with approximately 7 ° of flexion contracture and flexion barely to 700 °.  Moderate swelling.  Collaterals and cruciate stable. Crepitus with motion.  Pain in medially to palpation.  Also tender to put was patient over the patella with severe crepitus  Calves are soft nontender  Slight varicosities around the calves  DP 1+  Skin is warm to touch no obvious lesions  Patellar reflex 1+    Relevant imaging results reviewed and interpreted by me, discussed with the patient and / or family today         X-ray bilateral knees with left knee complete loss of medial joint space with osteophytic changes and sclerosis.  Same on the right knee but not as bad consistent with bilateral end-stage arthrosis.  07/02/2021  X-ray of the pelvis showing some degenerative changes on the left hip with good joint space left.  Right hip no joint space loss consistent with some arthrosis of the left hip.  Mild chronic degenerative changes in the sacroiliac joints 07/02/2021  X-ray of the lumbar spine in the  electronic records multilevel facet arthropathy and osteophytes.  Anterior multilevel spurring.  Degenerative disc disease 07/02/2021  Assessment:     Encounter Diagnoses   Name Primary?    Arthritis of knee, left Yes    Arthritis of knee, right     Sacroiliitis     Lumbar facet arthropathy     Acquired varus deformity knee, left     Acquired varus deformity knee, right     Arthritis of left hip         Plan:   Arthritis of knee, left  -     Large Joint Aspiration/Injection: L knee    Arthritis of knee, right    Sacroiliitis    Lumbar facet arthropathy    Acquired varus deformity knee, left    Acquired varus deformity knee, right    Arthritis of left hip         Patient Instructions   Injection of left knee with zilretta 06/30/2022  This might take approximately 7-10 days for it to work  Ice the knee if needed  Continue with Tylenol on as-needed basis  Return to see me in 3 months      (She understands that she can supplement with Tylenol for pain.   And we will be able to give her steroid injections once every 3 months .  She will call me sooner if she wants an injection of Depo-Medrol.  In the future we need to get her approved to have long-acting steroid injection like ZILRETTA which was given 11/08/2021 with good relief.  We spent a long time with the patient going over all her x-rays today as well as examining her.  She is managing with cell own power as as well as having diclofenac gel at home which I told her she needs to use it 3 times a day.  She did not take her cyclobenzaprine which I prescribed last time.   I did inject the trigger point with some relief of pain.  .  I did show her that she has hardening of the arteries in the aorta as well as down the legs way you can see the popliteal artery .  Her cardiologist could check her to see if she has enough good circulation in the legs.  I did tell her usually if it is bad circulation in the legs when you walk distances you will have severe calf pain  and goes with away with rest  Will avoid surgical intervention at this time.  Went over the x-rays with her again and shoulder the sacroiliac joint with arthritis and lumbosacral area however the hip joint seems to be okay.  She does have severe arthritis both knees but the left 1 is the 1 that hurts the most.  The pros and cons of the injections discussed.  She was worried about having sciatica but I did tell her tightness behind the knee and the swelling is secondary to arthritis)    Disclaimer: This note was prepared using a voice recognition system and is likely to have sound alike errors within the text.

## 2022-07-11 ENCOUNTER — TELEPHONE (OUTPATIENT)
Dept: OBSTETRICS AND GYNECOLOGY | Facility: CLINIC | Age: 87
End: 2022-07-11
Payer: MEDICARE

## 2022-07-11 NOTE — TELEPHONE ENCOUNTER
----- Message from Chantel Randle sent at 7/11/2022  8:31 AM CDT -----  Contact: Patient, [phone]  Calling to reschedule her appointment with Dr Gallo. Please call her. Thanks.

## 2022-09-19 ENCOUNTER — OFFICE VISIT (OUTPATIENT)
Dept: OBSTETRICS AND GYNECOLOGY | Facility: CLINIC | Age: 87
End: 2022-09-19
Payer: MEDICARE

## 2022-09-19 VITALS — WEIGHT: 172.19 LBS | HEIGHT: 66 IN | BODY MASS INDEX: 27.67 KG/M2

## 2022-09-19 DIAGNOSIS — N81.10 BADEN-WALKER GRADE 2 CYSTOCELE: ICD-10-CM

## 2022-09-19 DIAGNOSIS — Z46.89 ENCOUNTER FOR PESSARY MAINTENANCE: Primary | ICD-10-CM

## 2022-09-19 PROCEDURE — 1126F AMNT PAIN NOTED NONE PRSNT: CPT | Mod: CPTII,S$GLB,, | Performed by: OBSTETRICS & GYNECOLOGY

## 2022-09-19 PROCEDURE — 99213 OFFICE O/P EST LOW 20 MIN: CPT | Mod: S$GLB,,, | Performed by: OBSTETRICS & GYNECOLOGY

## 2022-09-19 PROCEDURE — 99999 PR PBB SHADOW E&M-EST. PATIENT-LVL III: CPT | Mod: PBBFAC,,, | Performed by: OBSTETRICS & GYNECOLOGY

## 2022-09-19 PROCEDURE — 3288F FALL RISK ASSESSMENT DOCD: CPT | Mod: CPTII,S$GLB,, | Performed by: OBSTETRICS & GYNECOLOGY

## 2022-09-19 PROCEDURE — 1101F PR PT FALLS ASSESS DOC 0-1 FALLS W/OUT INJ PAST YR: ICD-10-PCS | Mod: CPTII,S$GLB,, | Performed by: OBSTETRICS & GYNECOLOGY

## 2022-09-19 PROCEDURE — 1101F PT FALLS ASSESS-DOCD LE1/YR: CPT | Mod: CPTII,S$GLB,, | Performed by: OBSTETRICS & GYNECOLOGY

## 2022-09-19 PROCEDURE — 3288F PR FALLS RISK ASSESSMENT DOCUMENTED: ICD-10-PCS | Mod: CPTII,S$GLB,, | Performed by: OBSTETRICS & GYNECOLOGY

## 2022-09-19 PROCEDURE — 1159F PR MEDICATION LIST DOCUMENTED IN MEDICAL RECORD: ICD-10-PCS | Mod: CPTII,S$GLB,, | Performed by: OBSTETRICS & GYNECOLOGY

## 2022-09-19 PROCEDURE — 99999 PR PBB SHADOW E&M-EST. PATIENT-LVL III: ICD-10-PCS | Mod: PBBFAC,,, | Performed by: OBSTETRICS & GYNECOLOGY

## 2022-09-19 PROCEDURE — 1159F MED LIST DOCD IN RCRD: CPT | Mod: CPTII,S$GLB,, | Performed by: OBSTETRICS & GYNECOLOGY

## 2022-09-19 PROCEDURE — 99213 PR OFFICE/OUTPT VISIT, EST, LEVL III, 20-29 MIN: ICD-10-PCS | Mod: S$GLB,,, | Performed by: OBSTETRICS & GYNECOLOGY

## 2022-09-19 PROCEDURE — 1126F PR PAIN SEVERITY QUANTIFIED, NO PAIN PRESENT: ICD-10-PCS | Mod: CPTII,S$GLB,, | Performed by: OBSTETRICS & GYNECOLOGY

## 2022-09-19 RX ORDER — FUROSEMIDE 40 MG/1
20 TABLET ORAL
COMMUNITY
Start: 2022-05-24 | End: 2023-11-13

## 2022-09-19 RX ORDER — ISOSORBIDE MONONITRATE 30 MG/1
60 TABLET, EXTENDED RELEASE ORAL DAILY
COMMUNITY
Start: 2022-03-28 | End: 2023-01-17

## 2022-09-19 NOTE — PROGRESS NOTES
Subjective:       Patient ID: Tiffanie Barajas is a 86 y.o. female.    Chief Complaint:  Vaginal Prolapse      History of Present Illness  HPI  Presents for pessary check.  Wears a #4 ring pessary with support for cystocele and rectocele.  Does great with it.  Had 1 episode of spotting after straining to have a BM, but no other bleeding.  Has intermittent constipation.    GYN & OB History  Patient's last menstrual period was 1990.   Date of Last Pap: No result found    OB History    Para Term  AB Living   2 1       2   SAB IAB Ectopic Multiple Live Births         1 1      # Outcome Date GA Lbr Dwayne/2nd Weight Sex Delivery Anes PTL Lv   2 Para      Vag-Spont   EMILIANO   1A       Vag-Spont      1B       Vag-Spont          Review of Systems  Review of Systems   Constitutional:  Negative for fatigue, fever and unexpected weight change.   Gastrointestinal:  Positive for constipation. Negative for abdominal pain, diarrhea, nausea and vomiting.   Genitourinary:  Negative for dysuria, frequency, pelvic pain, urgency, vaginal bleeding, vaginal discharge, vaginal pain and vaginal odor.         Objective:    Physical Exam:   Constitutional: She is oriented to person, place, and time. She appears well-developed and well-nourished. No distress.             Abdominal: Soft. She exhibits no distension and no mass. There is no abdominal tenderness. There is no rebound and no guarding. Hernia confirmed negative in the right inguinal area and confirmed negative in the left inguinal area.     Genitourinary:    Vagina normal.      Pelvic exam was performed with patient supine.   There is no rash, tenderness, lesion or injury on the right labia. There is no rash, tenderness, lesion or injury on the left labia. Right adnexum displays no mass, no tenderness and no fullness. Left adnexum displays no mass, no tenderness and no fullness. There is rectocele (mild grade 1) and cystocele (grade 2) in the vagina.  No erythema,  no vaginal discharge, tenderness, bleeding or unspecified prolapse of vaginal walls in the vagina.    No foreign body in the vagina.      No signs of injury in the vagina.   Cervix exhibits no motion tenderness, no discharge and no friability. Uterus is not deviated, not enlarged, not fixed and not tender.    Genitourinary Comments: Pessary removed, washed, and replaced without difficulty.  Mucosa appears healthy                 Neurological: She is alert and oriented to person, place, and time.     Psychiatric: She has a normal mood and affect.        Assessment:        1. Encounter for pessary maintenance    2. [place]-Walker grade 2 cystocele                Plan:      Tiffanie was seen today for vaginal prolapse.    Diagnoses and all orders for this visit:    Encounter for pessary maintenance    [place]-Walker grade 2 cystocele   Doing well with pessary.  Pt already takes stool softeners, and drinks apple and prune juice.  Advised to add Miralax 2-3 times per week.  RTC 4 months for pessary check.

## 2022-11-03 ENCOUNTER — LAB VISIT (OUTPATIENT)
Dept: LAB | Facility: HOSPITAL | Age: 87
End: 2022-11-03
Attending: FAMILY MEDICINE
Payer: MEDICARE

## 2022-11-03 DIAGNOSIS — I10 ESSENTIAL HYPERTENSION: ICD-10-CM

## 2022-11-03 DIAGNOSIS — E78.2 MIXED HYPERLIPIDEMIA: ICD-10-CM

## 2022-11-03 LAB
ALBUMIN SERPL BCP-MCNC: 4.1 G/DL (ref 3.5–5.2)
ALP SERPL-CCNC: 72 U/L (ref 55–135)
ALT SERPL W/O P-5'-P-CCNC: 10 U/L (ref 10–44)
ANION GAP SERPL CALC-SCNC: 10 MMOL/L (ref 8–16)
AST SERPL-CCNC: 17 U/L (ref 10–40)
BASOPHILS # BLD AUTO: 0.03 K/UL (ref 0–0.2)
BASOPHILS NFR BLD: 0.4 % (ref 0–1.9)
BILIRUB SERPL-MCNC: 0.8 MG/DL (ref 0.1–1)
BUN SERPL-MCNC: 15 MG/DL (ref 8–23)
CALCIUM SERPL-MCNC: 10.1 MG/DL (ref 8.7–10.5)
CHLORIDE SERPL-SCNC: 102 MMOL/L (ref 95–110)
CHOLEST SERPL-MCNC: 88 MG/DL (ref 120–199)
CHOLEST/HDLC SERPL: 2.3 {RATIO} (ref 2–5)
CO2 SERPL-SCNC: 28 MMOL/L (ref 23–29)
CREAT SERPL-MCNC: 0.7 MG/DL (ref 0.5–1.4)
DIFFERENTIAL METHOD: NORMAL
EOSINOPHIL # BLD AUTO: 0.5 K/UL (ref 0–0.5)
EOSINOPHIL NFR BLD: 6.6 % (ref 0–8)
ERYTHROCYTE [DISTWIDTH] IN BLOOD BY AUTOMATED COUNT: 13.5 % (ref 11.5–14.5)
EST. GFR  (NO RACE VARIABLE): >60 ML/MIN/1.73 M^2
GLUCOSE SERPL-MCNC: 105 MG/DL (ref 70–110)
HCT VFR BLD AUTO: 39 % (ref 37–48.5)
HDLC SERPL-MCNC: 39 MG/DL (ref 40–75)
HDLC SERPL: 44.3 % (ref 20–50)
HGB BLD-MCNC: 12.6 G/DL (ref 12–16)
IMM GRANULOCYTES # BLD AUTO: 0.01 K/UL (ref 0–0.04)
IMM GRANULOCYTES NFR BLD AUTO: 0.1 % (ref 0–0.5)
LDLC SERPL CALC-MCNC: 33.8 MG/DL (ref 63–159)
LYMPHOCYTES # BLD AUTO: 1.9 K/UL (ref 1–4.8)
LYMPHOCYTES NFR BLD: 22.8 % (ref 18–48)
MCH RBC QN AUTO: 28.8 PG (ref 27–31)
MCHC RBC AUTO-ENTMCNC: 32.3 G/DL (ref 32–36)
MCV RBC AUTO: 89 FL (ref 82–98)
MONOCYTES # BLD AUTO: 0.6 K/UL (ref 0.3–1)
MONOCYTES NFR BLD: 6.7 % (ref 4–15)
NEUTROPHILS # BLD AUTO: 5.2 K/UL (ref 1.8–7.7)
NEUTROPHILS NFR BLD: 63.4 % (ref 38–73)
NONHDLC SERPL-MCNC: 49 MG/DL
NRBC BLD-RTO: 0 /100 WBC
PLATELET # BLD AUTO: 276 K/UL (ref 150–450)
PMV BLD AUTO: 10.6 FL (ref 9.2–12.9)
POTASSIUM SERPL-SCNC: 4.3 MMOL/L (ref 3.5–5.1)
PROT SERPL-MCNC: 7 G/DL (ref 6–8.4)
RBC # BLD AUTO: 4.37 M/UL (ref 4–5.4)
SODIUM SERPL-SCNC: 140 MMOL/L (ref 136–145)
T4 FREE SERPL-MCNC: 0.89 NG/DL (ref 0.71–1.51)
TRIGL SERPL-MCNC: 76 MG/DL (ref 30–150)
TSH SERPL DL<=0.005 MIU/L-ACNC: 4.35 UIU/ML (ref 0.4–4)
WBC # BLD AUTO: 8.23 K/UL (ref 3.9–12.7)

## 2022-11-03 PROCEDURE — 80053 COMPREHEN METABOLIC PANEL: CPT | Performed by: FAMILY MEDICINE

## 2022-11-03 PROCEDURE — 80061 LIPID PANEL: CPT | Performed by: FAMILY MEDICINE

## 2022-11-03 PROCEDURE — 85025 COMPLETE CBC W/AUTO DIFF WBC: CPT | Performed by: FAMILY MEDICINE

## 2022-11-03 PROCEDURE — 84439 ASSAY OF FREE THYROXINE: CPT | Performed by: FAMILY MEDICINE

## 2022-11-03 PROCEDURE — 36415 COLL VENOUS BLD VENIPUNCTURE: CPT | Performed by: FAMILY MEDICINE

## 2022-11-03 PROCEDURE — 84443 ASSAY THYROID STIM HORMONE: CPT | Performed by: FAMILY MEDICINE

## 2022-11-10 ENCOUNTER — OFFICE VISIT (OUTPATIENT)
Dept: INTERNAL MEDICINE | Facility: CLINIC | Age: 87
End: 2022-11-10
Payer: MEDICARE

## 2022-11-10 VITALS
TEMPERATURE: 99 F | DIASTOLIC BLOOD PRESSURE: 68 MMHG | OXYGEN SATURATION: 95 % | RESPIRATION RATE: 24 BRPM | WEIGHT: 160.94 LBS | BODY MASS INDEX: 25.86 KG/M2 | HEIGHT: 66 IN | SYSTOLIC BLOOD PRESSURE: 132 MMHG | HEART RATE: 67 BPM

## 2022-11-10 DIAGNOSIS — E78.2 MIXED HYPERLIPIDEMIA: ICD-10-CM

## 2022-11-10 DIAGNOSIS — F33.42 RECURRENT MAJOR DEPRESSIVE DISORDER, IN FULL REMISSION: ICD-10-CM

## 2022-11-10 DIAGNOSIS — Z00.00 ROUTINE GENERAL MEDICAL EXAMINATION AT A HEALTH CARE FACILITY: Primary | ICD-10-CM

## 2022-11-10 DIAGNOSIS — R94.6 ABNORMAL RESULTS OF THYROID FUNCTION STUDIES: ICD-10-CM

## 2022-11-10 DIAGNOSIS — R79.89 ELEVATED TSH: ICD-10-CM

## 2022-11-10 DIAGNOSIS — I10 ESSENTIAL HYPERTENSION: ICD-10-CM

## 2022-11-10 DIAGNOSIS — K21.9 GASTROESOPHAGEAL REFLUX DISEASE WITHOUT ESOPHAGITIS: ICD-10-CM

## 2022-11-10 DIAGNOSIS — Z23 NEED FOR INFLUENZA VACCINATION: ICD-10-CM

## 2022-11-10 DIAGNOSIS — I70.0 ATHEROSCLEROSIS OF AORTA: ICD-10-CM

## 2022-11-10 DIAGNOSIS — M85.88 OSTEOPENIA OF SPINE: ICD-10-CM

## 2022-11-10 PROBLEM — K85.90 ACUTE PANCREATITIS WITHOUT INFECTION OR NECROSIS: Status: RESOLVED | Noted: 2021-12-05 | Resolved: 2022-11-10

## 2022-11-10 PROCEDURE — 3288F FALL RISK ASSESSMENT DOCD: CPT | Mod: CPTII,S$GLB,, | Performed by: PHYSICIAN ASSISTANT

## 2022-11-10 PROCEDURE — 90694 FLU VACCINE - QUADRIVALENT - ADJUVANTED: ICD-10-PCS | Mod: S$GLB,,, | Performed by: PHYSICIAN ASSISTANT

## 2022-11-10 PROCEDURE — 99397 PER PM REEVAL EST PAT 65+ YR: CPT | Mod: 25,S$GLB,, | Performed by: PHYSICIAN ASSISTANT

## 2022-11-10 PROCEDURE — 99999 PR PBB SHADOW E&M-EST. PATIENT-LVL V: CPT | Mod: PBBFAC,,, | Performed by: PHYSICIAN ASSISTANT

## 2022-11-10 PROCEDURE — 99999 PR PBB SHADOW E&M-EST. PATIENT-LVL V: ICD-10-PCS | Mod: PBBFAC,,, | Performed by: PHYSICIAN ASSISTANT

## 2022-11-10 PROCEDURE — 1159F PR MEDICATION LIST DOCUMENTED IN MEDICAL RECORD: ICD-10-PCS | Mod: CPTII,S$GLB,, | Performed by: PHYSICIAN ASSISTANT

## 2022-11-10 PROCEDURE — 99397 PR PREVENTIVE VISIT,EST,65 & OVER: ICD-10-PCS | Mod: 25,S$GLB,, | Performed by: PHYSICIAN ASSISTANT

## 2022-11-10 PROCEDURE — G0008 FLU VACCINE - QUADRIVALENT - ADJUVANTED: ICD-10-PCS | Mod: S$GLB,,, | Performed by: PHYSICIAN ASSISTANT

## 2022-11-10 PROCEDURE — 99499 UNLISTED E&M SERVICE: CPT | Mod: HCNC,S$GLB,, | Performed by: PHYSICIAN ASSISTANT

## 2022-11-10 PROCEDURE — 90694 VACC AIIV4 NO PRSRV 0.5ML IM: CPT | Mod: S$GLB,,, | Performed by: PHYSICIAN ASSISTANT

## 2022-11-10 PROCEDURE — 1126F AMNT PAIN NOTED NONE PRSNT: CPT | Mod: CPTII,S$GLB,, | Performed by: PHYSICIAN ASSISTANT

## 2022-11-10 PROCEDURE — 1159F MED LIST DOCD IN RCRD: CPT | Mod: CPTII,S$GLB,, | Performed by: PHYSICIAN ASSISTANT

## 2022-11-10 PROCEDURE — 1101F PT FALLS ASSESS-DOCD LE1/YR: CPT | Mod: CPTII,S$GLB,, | Performed by: PHYSICIAN ASSISTANT

## 2022-11-10 PROCEDURE — G0008 ADMIN INFLUENZA VIRUS VAC: HCPCS | Mod: S$GLB,,, | Performed by: PHYSICIAN ASSISTANT

## 2022-11-10 PROCEDURE — 1101F PR PT FALLS ASSESS DOC 0-1 FALLS W/OUT INJ PAST YR: ICD-10-PCS | Mod: CPTII,S$GLB,, | Performed by: PHYSICIAN ASSISTANT

## 2022-11-10 PROCEDURE — 1126F PR PAIN SEVERITY QUANTIFIED, NO PAIN PRESENT: ICD-10-PCS | Mod: CPTII,S$GLB,, | Performed by: PHYSICIAN ASSISTANT

## 2022-11-10 PROCEDURE — 3288F PR FALLS RISK ASSESSMENT DOCUMENTED: ICD-10-PCS | Mod: CPTII,S$GLB,, | Performed by: PHYSICIAN ASSISTANT

## 2022-11-10 RX ORDER — CITALOPRAM 20 MG/1
20 TABLET, FILM COATED ORAL DAILY
Qty: 30 TABLET | Refills: 11 | Status: SHIPPED | OUTPATIENT
Start: 2022-11-10 | End: 2023-11-13 | Stop reason: SDUPTHER

## 2022-11-10 NOTE — PROGRESS NOTES
Subjective:       Patient ID: Tiffanie Barajas is a 87 y.o. female.    Chief Complaint: Annual Exam      Patient Care Team:  Lizeth Merrill MD as PCP - General (Family Medicine)  Yadi Nguyen LPN as Care Coordinator (Internal Medicine)  Alyson Saini PA-C as Physician Assistant (Internal Medicine)    Patient presents to clinic today for annual physical exam.      Review of Systems   Constitutional:  Negative for chills, fatigue, fever and unexpected weight change.   HENT:  Negative for congestion, dental problem, ear pain, hearing loss, rhinorrhea and trouble swallowing.    Eyes:  Negative for pain and visual disturbance.   Respiratory:  Negative for cough and shortness of breath.    Cardiovascular:  Negative for chest pain, palpitations and leg swelling.   Gastrointestinal:  Positive for constipation (Chronic). Negative for abdominal distention, abdominal pain, blood in stool, diarrhea, nausea and vomiting.   Genitourinary:  Negative for difficulty urinating and vaginal discharge.   Musculoskeletal:  Positive for arthralgias (Chronic). Negative for myalgias.   Skin:  Negative for rash.   Neurological:  Negative for dizziness, weakness, numbness and headaches.   Hematological:  Negative for adenopathy. Bruises/bleeds easily (Chronic).   Psychiatric/Behavioral:  Negative for dysphoric mood and sleep disturbance. The patient is not nervous/anxious.      Objective:      Physical Exam  Vitals and nursing note reviewed.   Constitutional:       General: She is not in acute distress.     Appearance: Normal appearance. She is well-developed.   HENT:      Head: Normocephalic and atraumatic.      Right Ear: Tympanic membrane, ear canal and external ear normal.      Left Ear: Tympanic membrane, ear canal and external ear normal.      Nose: Nose normal. No mucosal edema or rhinorrhea.      Mouth/Throat:      Lips: Pink.      Mouth: Mucous membranes are moist.      Pharynx: Oropharynx is clear. Uvula  midline.   Eyes:      General: Lids are normal. No scleral icterus.     Extraocular Movements: Extraocular movements intact.      Conjunctiva/sclera: Conjunctivae normal.      Pupils: Pupils are equal, round, and reactive to light.   Neck:      Thyroid: No thyromegaly.   Cardiovascular:      Rate and Rhythm: Normal rate and regular rhythm.      Pulses: Normal pulses.   Pulmonary:      Effort: Pulmonary effort is normal.      Breath sounds: Normal breath sounds. No wheezing, rhonchi or rales.   Abdominal:      General: Bowel sounds are normal. There is no distension.      Palpations: Abdomen is soft. There is no mass.      Tenderness: There is no abdominal tenderness.   Musculoskeletal:         General: Normal range of motion.      Cervical back: Normal range of motion and neck supple.      Right lower leg: No edema.      Left lower leg: No edema.   Lymphadenopathy:      Cervical: No cervical adenopathy.   Skin:     General: Skin is warm and dry.      Findings: No lesion or rash.      Nails: There is no clubbing.   Neurological:      Mental Status: She is alert.      Cranial Nerves: No cranial nerve deficit.      Sensory: No sensory deficit.   Psychiatric:         Mood and Affect: Mood and affect normal.       Assessment:       1. Routine general medical examination at a health care facility    2. Recurrent major depressive disorder, in full remission    3. Atherosclerosis of aorta    4. Essential hypertension    5. Mixed hyperlipidemia    6. Gastroesophageal reflux disease without esophagitis    7. Osteopenia of spine    8. Elevated TSH    9. Abnormal results of thyroid function studies    10. Need for influenza vaccination          Plan:   1. Routine general medical examination at a health care facility    2. Recurrent major depressive disorder, in full remission  Assessment & Plan:  Stable on Celexa    Orders:  -     citalopram (CELEXA) 20 MG tablet; Take 1 tablet (20 mg total) by mouth once daily.  Dispense: 30  tablet; Refill: 11    3. Atherosclerosis of aorta  Overview:  XR L-spine 12/2017, on ASA and statin      4. Essential hypertension  Assessment & Plan:  /68, controlled, continue Lasix, irbesartan, Imdur   Lab Results   Component Value Date     11/03/2022    K 4.3 11/03/2022    BUN 15 11/03/2022    CREATININE 0.7 11/03/2022    ESTGFRAFRICA >60.0 05/10/2022    EGFRNONAA >60.0 05/10/2022    EGFRNORACEVR >60.0 11/03/2022          5. Mixed hyperlipidemia  Assessment & Plan:  Controlled, continue atorvastatin   Lab Results   Component Value Date    CHOL 88 (L) 11/03/2022    CHOL 114 (L) 05/10/2022    LDLCALC 33.8 (L) 11/03/2022    LDLCALC 41.6 (L) 05/10/2022    TRIG 76 11/03/2022    HDL 39 (L) 11/03/2022    ALT 10 11/03/2022    AST 17 11/03/2022    ALKPHOS 72 11/03/2022        Orders:  -     Comprehensive Metabolic Panel; Future; Expected date: 05/10/2023  -     Lipid Panel; Future; Expected date: 05/10/2023    6. Gastroesophageal reflux disease without esophagitis  Assessment & Plan:  Stable on Protonix      7. Osteopenia of spine  -     DXA Bone Density Spine And Hip; Future; Expected date: 05/10/2023    8. Elevated TSH  Assessment & Plan:  No obvious hypothyroid symptoms besides constipation which is chronic.  Will recheck labs in 6 months.    Orders:  -     TSH; Future; Expected date: 05/10/2023  -     T4, Free; Future; Expected date: 05/10/2023    9. Abnormal results of thyroid function studies  -     TSH; Future; Expected date: 05/10/2023  -     T4, Free; Future; Expected date: 05/10/2023    10. Need for influenza vaccination    Other orders  -     Influenza - Quadrivalent (Adjuvanted)      Recent labs reviewed with patient.    DEXA ordered  Flu given  Discussed Covid booster, scheduling information given.  6 month f/u with Dr. Merrill scheduled with fasting labs PTA  Health Maintenance reviewed/updated.

## 2022-11-10 NOTE — ASSESSMENT & PLAN NOTE
/68, controlled, continue Lasix, irbesartan, Imdur   Lab Results   Component Value Date     11/03/2022    K 4.3 11/03/2022    BUN 15 11/03/2022    CREATININE 0.7 11/03/2022    ESTGFRAFRICA >60.0 05/10/2022    EGFRNONAA >60.0 05/10/2022    EGFRNORACEVR >60.0 11/03/2022

## 2022-11-10 NOTE — PATIENT INSTRUCTIONS
Please bring your medication or a written list to your next office visit.    If you check your blood pressure at home, please bring written your blood pressure log and your blood pressure machine to your next office visit.      The FDA determined that the known and potential benefits of a second COVID-19 booster outweigh any known or potential risks. During the recent Omicron surge, those who were boosted were 21 times less likely to die from COVID-19 compared to those who were unvaccinated, and seven times less likely to be hospitalized.    Eligibility criteria for a second booster dose include:  Individuals 50 years of age and older at least four months after receipt of a first booster dose of any approved COVID-19 vaccine  Immunocompromised individuals 12 years of age and older at least four months after receipt of a first booster dose of any approved COVID-19    These individuals are eligible for a second booster dose regardless of which COVID-19 vaccine they received for their initial series. While all of the available COVID-19 vaccines were approved for the first booster dose, only Pfizer and Moderna have been approved as options for the second booster dose.  Pfizer is authorized for individuals 12 years of age and older  Moderna is authorized for individuals 18 years of age and older    Second booster shot appointments can be scheduled via [facility] or by calling [phone]. Walk-ins are also accepted.

## 2022-11-10 NOTE — ASSESSMENT & PLAN NOTE
Controlled, continue atorvastatin   Lab Results   Component Value Date    CHOL 88 (L) 11/03/2022    CHOL 114 (L) 05/10/2022    LDLCALC 33.8 (L) 11/03/2022    LDLCALC 41.6 (L) 05/10/2022    TRIG 76 11/03/2022    HDL 39 (L) 11/03/2022    ALT 10 11/03/2022    AST 17 11/03/2022    ALKPHOS 72 11/03/2022

## 2022-11-10 NOTE — ASSESSMENT & PLAN NOTE
No obvious hypothyroid symptoms besides constipation which is chronic.  Will recheck labs in 6 months.

## 2023-01-11 DIAGNOSIS — M25.561 PAIN IN BOTH KNEES, UNSPECIFIED CHRONICITY: Primary | ICD-10-CM

## 2023-01-11 DIAGNOSIS — M25.562 PAIN IN BOTH KNEES, UNSPECIFIED CHRONICITY: Primary | ICD-10-CM

## 2023-01-17 ENCOUNTER — OFFICE VISIT (OUTPATIENT)
Dept: OBSTETRICS AND GYNECOLOGY | Facility: CLINIC | Age: 88
End: 2023-01-17
Payer: MEDICARE

## 2023-01-17 VITALS
BODY MASS INDEX: 26.26 KG/M2 | HEIGHT: 66 IN | WEIGHT: 163.38 LBS | DIASTOLIC BLOOD PRESSURE: 64 MMHG | SYSTOLIC BLOOD PRESSURE: 154 MMHG

## 2023-01-17 DIAGNOSIS — N76.3 SUBACUTE VULVITIS: ICD-10-CM

## 2023-01-17 DIAGNOSIS — N81.10 BADEN-WALKER GRADE 2 CYSTOCELE: ICD-10-CM

## 2023-01-17 DIAGNOSIS — Z46.89 ENCOUNTER FOR PESSARY MAINTENANCE: Primary | ICD-10-CM

## 2023-01-17 PROCEDURE — 3288F PR FALLS RISK ASSESSMENT DOCUMENTED: ICD-10-PCS | Mod: HCNC,CPTII,S$GLB, | Performed by: OBSTETRICS & GYNECOLOGY

## 2023-01-17 PROCEDURE — 3288F FALL RISK ASSESSMENT DOCD: CPT | Mod: HCNC,CPTII,S$GLB, | Performed by: OBSTETRICS & GYNECOLOGY

## 2023-01-17 PROCEDURE — 1159F MED LIST DOCD IN RCRD: CPT | Mod: HCNC,CPTII,S$GLB, | Performed by: OBSTETRICS & GYNECOLOGY

## 2023-01-17 PROCEDURE — 99213 PR OFFICE/OUTPT VISIT, EST, LEVL III, 20-29 MIN: ICD-10-PCS | Mod: HCNC,S$GLB,, | Performed by: OBSTETRICS & GYNECOLOGY

## 2023-01-17 PROCEDURE — 99213 OFFICE O/P EST LOW 20 MIN: CPT | Mod: HCNC,S$GLB,, | Performed by: OBSTETRICS & GYNECOLOGY

## 2023-01-17 PROCEDURE — 99999 PR PBB SHADOW E&M-EST. PATIENT-LVL III: ICD-10-PCS | Mod: PBBFAC,HCNC,, | Performed by: OBSTETRICS & GYNECOLOGY

## 2023-01-17 PROCEDURE — 99999 PR PBB SHADOW E&M-EST. PATIENT-LVL III: CPT | Mod: PBBFAC,HCNC,, | Performed by: OBSTETRICS & GYNECOLOGY

## 2023-01-17 PROCEDURE — 1101F PR PT FALLS ASSESS DOC 0-1 FALLS W/OUT INJ PAST YR: ICD-10-PCS | Mod: HCNC,CPTII,S$GLB, | Performed by: OBSTETRICS & GYNECOLOGY

## 2023-01-17 PROCEDURE — 1126F AMNT PAIN NOTED NONE PRSNT: CPT | Mod: HCNC,CPTII,S$GLB, | Performed by: OBSTETRICS & GYNECOLOGY

## 2023-01-17 PROCEDURE — 1159F PR MEDICATION LIST DOCUMENTED IN MEDICAL RECORD: ICD-10-PCS | Mod: HCNC,CPTII,S$GLB, | Performed by: OBSTETRICS & GYNECOLOGY

## 2023-01-17 PROCEDURE — 1126F PR PAIN SEVERITY QUANTIFIED, NO PAIN PRESENT: ICD-10-PCS | Mod: HCNC,CPTII,S$GLB, | Performed by: OBSTETRICS & GYNECOLOGY

## 2023-01-17 PROCEDURE — 1101F PT FALLS ASSESS-DOCD LE1/YR: CPT | Mod: HCNC,CPTII,S$GLB, | Performed by: OBSTETRICS & GYNECOLOGY

## 2023-01-17 RX ORDER — ISOSORBIDE MONONITRATE 60 MG/1
TABLET, EXTENDED RELEASE ORAL
COMMUNITY
Start: 2022-10-06

## 2023-01-17 RX ORDER — BETAMETHASONE VALERATE 1.2 MG/G
OINTMENT TOPICAL 2 TIMES DAILY
Qty: 45 G | Refills: 0 | Status: SHIPPED | OUTPATIENT
Start: 2023-01-17

## 2023-01-17 NOTE — PROGRESS NOTES
Subjective:       Patient ID: Tiffanie Barajas is a 87 y.o. female.    Chief Complaint:  Pessary Check      History of Present Illness  HPI  Presents for pessary check.  Wears a #4 ring with support for cystocele and rectocele.  Does well with it.  Uses vaginal RePHresh weekly to prevent odor.  Had spotting one time afterwards, and thinks she may have scratched herself with the applicator.  Spotting resolved.  Needs refill of BMZ ointment she uses as needed for vulvar itching.    GYN & OB History  Patient's last menstrual period was 1990.   Date of Last Pap: No result found    OB History    Para Term  AB Living   2 1       2   SAB IAB Ectopic Multiple Live Births         1 1      # Outcome Date GA Lbr Dwayne/2nd Weight Sex Delivery Anes PTL Lv   2 Para      Vag-Spont   EMILIANO   1A       Vag-Spont      1B       Vag-Spont          Review of Systems  Review of Systems   Constitutional:  Negative for fatigue, fever and unexpected weight change.   Gastrointestinal:  Negative for abdominal pain, constipation, diarrhea, nausea and vomiting.   Genitourinary:  Negative for dysuria, frequency, pelvic pain, urgency, vaginal bleeding, vaginal discharge, vaginal pain and vaginal odor.         Objective:    Physical Exam:   Constitutional: She is oriented to person, place, and time. She appears well-developed and well-nourished. No distress.             Abdominal: Soft. She exhibits no distension and no mass. There is no abdominal tenderness. There is no rebound and no guarding. Hernia confirmed negative in the right inguinal area and confirmed negative in the left inguinal area.     Genitourinary:    Vagina normal.      Pelvic exam was performed with patient supine.   There is no rash, tenderness, lesion or injury on the right labia. There is no rash, tenderness, lesion or injury on the left labia. Right adnexum displays no mass, no tenderness and no fullness. Left adnexum displays no mass, no  tenderness and no fullness. There is rectocele (grade 1 with pessary out) and cystocele (grade 2 with pessary out) in the vagina. No erythema,  no vaginal discharge, tenderness, bleeding or unspecified prolapse of vaginal walls in the vagina.    No foreign body in the vagina.      No signs of injury in the vagina.   Cervix exhibits no motion tenderness, no discharge and no friability. Uterus is not deviated, not enlarged, not fixed and not tender.    Genitourinary Comments: Pessary removed without difficulty, washed, and replaced without difficulty                 Neurological: She is alert and oriented to person, place, and time.     Psychiatric: She has a normal mood and affect.        Assessment:        1. Encounter for pessary maintenance    2. [place]-Walker grade 2 cystocele    3. Subacute vulvitis                Plan:      Tiffanie was seen today for pessary check.    Diagnoses and all orders for this visit:    Encounter for pessary maintenance    [place]-Walker grade 2 cystocele    Subacute vulvitis  -     betamethasone valerate 0.1% (VALISONE) 0.1 % Oint; Apply topically 2 (two) times daily.     Doing well.  RTC 4 months for pessary check.

## 2023-01-23 ENCOUNTER — OFFICE VISIT (OUTPATIENT)
Dept: ORTHOPEDICS | Facility: CLINIC | Age: 88
End: 2023-01-23
Payer: MEDICARE

## 2023-01-23 ENCOUNTER — HOSPITAL ENCOUNTER (OUTPATIENT)
Dept: RADIOLOGY | Facility: HOSPITAL | Age: 88
Discharge: HOME | End: 2023-01-23
Attending: ORTHOPAEDIC SURGERY
Payer: MEDICARE

## 2023-01-23 VITALS — HEIGHT: 66 IN | BODY MASS INDEX: 26.26 KG/M2 | WEIGHT: 163.38 LBS

## 2023-01-23 DIAGNOSIS — M25.561 PAIN IN BOTH KNEES, UNSPECIFIED CHRONICITY: ICD-10-CM

## 2023-01-23 DIAGNOSIS — M21.162 ACQUIRED VARUS DEFORMITY KNEE, LEFT: ICD-10-CM

## 2023-01-23 DIAGNOSIS — M21.161 ACQUIRED VARUS DEFORMITY KNEE, RIGHT: ICD-10-CM

## 2023-01-23 DIAGNOSIS — M25.561 PAIN AND SWELLING OF KNEE, RIGHT: ICD-10-CM

## 2023-01-23 DIAGNOSIS — M47.816 LUMBAR FACET ARTHROPATHY: ICD-10-CM

## 2023-01-23 DIAGNOSIS — M79.661 RIGHT CALF PAIN: ICD-10-CM

## 2023-01-23 DIAGNOSIS — M17.12 ARTHRITIS OF KNEE, LEFT: Primary | ICD-10-CM

## 2023-01-23 DIAGNOSIS — M17.11 ARTHRITIS OF KNEE, RIGHT: ICD-10-CM

## 2023-01-23 DIAGNOSIS — M16.12 ARTHRITIS OF LEFT HIP: ICD-10-CM

## 2023-01-23 DIAGNOSIS — T81.72XA COMPLICATION OF VEIN FOLLOWING A PROCEDURE, NOT ELSEWHERE CLASSIFIED, INITIAL ENCOUNTER: ICD-10-CM

## 2023-01-23 DIAGNOSIS — M25.461 PAIN AND SWELLING OF KNEE, RIGHT: ICD-10-CM

## 2023-01-23 DIAGNOSIS — M25.562 PAIN IN BOTH KNEES, UNSPECIFIED CHRONICITY: ICD-10-CM

## 2023-01-23 DIAGNOSIS — I73.9 PERIPHERAL ARTERIAL DISEASE: Primary | ICD-10-CM

## 2023-01-23 DIAGNOSIS — M46.1 SACROILIITIS: ICD-10-CM

## 2023-01-23 PROCEDURE — 1101F PR PT FALLS ASSESS DOC 0-1 FALLS W/OUT INJ PAST YR: ICD-10-PCS | Mod: HCNC,CPTII,S$GLB, | Performed by: ORTHOPAEDIC SURGERY

## 2023-01-23 PROCEDURE — 1101F PT FALLS ASSESS-DOCD LE1/YR: CPT | Mod: HCNC,CPTII,S$GLB, | Performed by: ORTHOPAEDIC SURGERY

## 2023-01-23 PROCEDURE — 1125F AMNT PAIN NOTED PAIN PRSNT: CPT | Mod: HCNC,CPTII,S$GLB, | Performed by: ORTHOPAEDIC SURGERY

## 2023-01-23 PROCEDURE — 20610 DRAIN/INJ JOINT/BURSA W/O US: CPT | Mod: 50,HCNC,S$GLB, | Performed by: ORTHOPAEDIC SURGERY

## 2023-01-23 PROCEDURE — 99499 UNLISTED E&M SERVICE: CPT | Mod: S$GLB,,, | Performed by: ORTHOPAEDIC SURGERY

## 2023-01-23 PROCEDURE — 1125F PR PAIN SEVERITY QUANTIFIED, PAIN PRESENT: ICD-10-PCS | Mod: HCNC,CPTII,S$GLB, | Performed by: ORTHOPAEDIC SURGERY

## 2023-01-23 PROCEDURE — 99499 RISK ADDL DX/OHS AUDIT: ICD-10-PCS | Mod: S$GLB,,, | Performed by: ORTHOPAEDIC SURGERY

## 2023-01-23 PROCEDURE — 99999 PR PBB SHADOW E&M-EST. PATIENT-LVL III: ICD-10-PCS | Mod: PBBFAC,HCNC,, | Performed by: ORTHOPAEDIC SURGERY

## 2023-01-23 PROCEDURE — 99214 PR OFFICE/OUTPT VISIT, EST, LEVL IV, 30-39 MIN: ICD-10-PCS | Mod: 25,HCNC,S$GLB, | Performed by: ORTHOPAEDIC SURGERY

## 2023-01-23 PROCEDURE — 73564 X-RAY EXAM KNEE 4 OR MORE: CPT | Mod: TC,50,HCNC

## 2023-01-23 PROCEDURE — 73564 X-RAY EXAM KNEE 4 OR MORE: CPT | Mod: 26,50,HCNC, | Performed by: RADIOLOGY

## 2023-01-23 PROCEDURE — 99214 OFFICE O/P EST MOD 30 MIN: CPT | Mod: 25,HCNC,S$GLB, | Performed by: ORTHOPAEDIC SURGERY

## 2023-01-23 PROCEDURE — 3288F PR FALLS RISK ASSESSMENT DOCUMENTED: ICD-10-PCS | Mod: HCNC,CPTII,S$GLB, | Performed by: ORTHOPAEDIC SURGERY

## 2023-01-23 PROCEDURE — 1159F MED LIST DOCD IN RCRD: CPT | Mod: HCNC,CPTII,S$GLB, | Performed by: ORTHOPAEDIC SURGERY

## 2023-01-23 PROCEDURE — 1159F PR MEDICATION LIST DOCUMENTED IN MEDICAL RECORD: ICD-10-PCS | Mod: HCNC,CPTII,S$GLB, | Performed by: ORTHOPAEDIC SURGERY

## 2023-01-23 PROCEDURE — 73564 XR KNEE ORTHO BILAT WITH FLEXION: ICD-10-PCS | Mod: 26,50,HCNC, | Performed by: RADIOLOGY

## 2023-01-23 PROCEDURE — 1160F RVW MEDS BY RX/DR IN RCRD: CPT | Mod: HCNC,CPTII,S$GLB, | Performed by: ORTHOPAEDIC SURGERY

## 2023-01-23 PROCEDURE — 20610 LARGE JOINT ASPIRATION/INJECTION: BILATERAL KNEE: ICD-10-PCS | Mod: 50,HCNC,S$GLB, | Performed by: ORTHOPAEDIC SURGERY

## 2023-01-23 PROCEDURE — 1160F PR REVIEW ALL MEDS BY PRESCRIBER/CLIN PHARMACIST DOCUMENTED: ICD-10-PCS | Mod: HCNC,CPTII,S$GLB, | Performed by: ORTHOPAEDIC SURGERY

## 2023-01-23 PROCEDURE — 3288F FALL RISK ASSESSMENT DOCD: CPT | Mod: HCNC,CPTII,S$GLB, | Performed by: ORTHOPAEDIC SURGERY

## 2023-01-23 PROCEDURE — 99999 PR PBB SHADOW E&M-EST. PATIENT-LVL III: CPT | Mod: PBBFAC,HCNC,, | Performed by: ORTHOPAEDIC SURGERY

## 2023-01-23 RX ADMIN — METHYLPREDNISOLONE ACETATE 80 MG: 80 INJECTION, SUSPENSION INTRA-ARTICULAR; INTRALESIONAL; INTRAMUSCULAR; SOFT TISSUE at 10:01

## 2023-01-23 NOTE — PATIENT INSTRUCTIONS
Having calf pain and burning.  We did discuss that she needs to have an ultrasound done.  She can not be accommodated today however she said she can come in on Thursday where family can bring her.  She is already on aspirin and Plavix but I doubt she has blood clot but I need to investigate it   Injected both of the knees each with 80 mg Depo-Medrol mixed with 5 cc 1% lidocaine 01/23/2023  Ice the knee next few days   Continue with the cane    X-rays today of both knees showing osteopenic bone and severe arthritis with bone spurs complete loss of joint space on the inside of the knee.  Return to see me in 3 months

## 2023-01-23 NOTE — PROCEDURES
Large Joint Aspiration/Injection: bilateral knee    Date/Time: 1/23/2023 10:20 AM  Performed by: Ranjit Kelly MD  Authorized by: Ranjit Kelly MD     Consent Done?:  Yes (Verbal)  Indications:  Arthritis  Site marked: the procedure site was marked    Timeout: prior to procedure the correct patient, procedure, and site was verified      Local anesthesia used?: Yes    Local anesthetic:  Lidocaine 1% without epinephrine    Details:  Needle Size:  22 G  Ultrasonic Guidance for needle placement?: No    Approach:  Anterolateral  Location:  Knee  Laterality:  Bilateral  Site:  Bilateral knee  Medications (Right):  80 mg methylPREDNISolone acetate 80 mg/mL  Medications (Left):  80 mg methylPREDNISolone acetate 80 mg/mL  Patient tolerance:  Patient tolerated the procedure well with no immediate complications

## 2023-01-23 NOTE — PROGRESS NOTES
Subjective:     Patient ID: Tiffanie Barajas is a 87 y.o. female.    Chief Complaint: Pain of the Right Knee and Pain of the Left Knee    HPI:  84-year-old complaining of pain over the left sacroiliac joint radiating down to her knee.  Previous history of sciatica and a cyst in the back that was removed. Has history of arthritis of both of her knees received injection longtime ago more than a year (lubrication shot).  She is not having too much pain in the right knee unable to fully extend the left knee difficulty with shopping and walking.  Pain roughly 4/10.  Denies loss of bowel bladder control.  Walking distance is seems to hurt approximately 200 ft.  She is on blood thinners and unable to take NSAIDs.  She takes 1 little Tylenol occasionally and does not help.    01/27/2020.  Left knee injection of Depo-Medrol seems to have helped this patient.  Given 01/03/2020.  Just started physical therapy and that seems to help also.  The Flexeril did not seem to work at all.  Already been 5 times to physical therapy and she has at least 3 more weeks to go.  Very pleased so far with her results.  Patient cannot take NSAIDs due to being on blood thinners.  Pain level 0/10.  Does have occasional discomfort right greater trochanteric area    02/13/2020  Patient with bilateral knee arthrosis left worse than right and sacroiliac pain.  She just finished her physical therapy and that seems to have helped and now she is doing independent exercise program.  We did inject the knees with steroid that seems to help.  She is ambulating without any assistive devices today. He is here to have Synvisc-One injection into the left knee.    05/22/2020  Patient complaining today of left hip pain/she points over the lumbar area radiating to the posterior aspect of the knee.  She has history of lumbosacral arthritis.  As far as her left knee injection of Synvisc-One she is not having any pain with that.  She is very happy with that result.   She wants something done and 0 not want any prescriptions for any pills.  Denies loss of bowel bladder control or usage or any assistive devices to ambulate.  She is doing independent exercise as well as showing me that her  created exercise program for her.  I did recommend stationary bicycle and independent stretching exercises.  Denies loss of bowel bladder control and fever or chills or shortness of breath or chest pain    07/09/2020  Patient received trigger point injection of the lumbar spine 05/22/2020 with some good relief.  Now she is having more pain in the left knee.  She had received steroid injection 01/03/2020 and Synvisc-One in 02/13/2020.  She feels the knee is tight she tries to exercise it on her own.  Having sharp pain on the medial side.  Pain level is 3/10 but with gait it gets worst.  Denies any fever or chills and maintaining social distancing and wearing her own mask.  She would like some relief from the tightness and stiffness in the left knee but wants to avoid taking any medications besides a little Tylenol because she is on blood thinners and unable to take NSAIDs.    01/04/2020   Left knee pain and stiffness 6/10, low back pain / points on the left sacroiliac area also.  She is requesting injection in both areas.  Patient did have trigger point injection 05/22/2020 in the lumbar area which helped also had steroid injection in July 2020 which seems to have helped in the knee.  Also previous history of Synvisc-One injection 02/13/2020.  Tylenol does not seem to help.  Denies any fever or chills or shortness of breath or difficulty with chewing or swallowing loss of bowel bladder control blurry vision or double vision or loss sense smell or taste.  She is here with her significant other.  Asking about getting the COVID  Vaccine.  She is 85 years old and I told if her  She qualifies should be able to get it soon    02/08/2021  Right lumbar trigger point tenderness which was injected  awhile ago with relief on 05/22/2020 now it is hurting her a little bit more.  I did tell her since she is here today to receive viscosupplementation/approved only Monovisc not Synvisc this time we will hold off on trigger point injection.  Pain is 5/10 in the left knee.  The right knee is doing okay.  She is ambulating without any assistive devices.  There was a little discussion about difference is with me in Monovisc and Synvisc and put simply I told them it is like having a car with different brands.  Patient is maintaining her activity level.  She still did not get her COVID vaccine due to in availability of the vaccine.  Denies any fever or chills or shortness of breath or difficulty with chewing or swallowing or loss of bowel bladder control blurry vision double vision loss sense smell or taste.  She is ambulating without any assistive devices    04/01/2021  Low back pain which is severe we injected trigger point on 05/22/2020 with some relief.  Went over her x-rays in the electronic records today showing severe facet arthropathy and degenerative disc disease with large bone spur overgrowth.  That bothers her a lot with radiation down her legs a cannot sleep.  She is requesting may be something to help with the radicular symptoms and give her some sleep and rest as far as the Monovisc injection into the left knee that did not seem to help at all.  The steroid injection seems to work better for her and requested another injection.  She did go through therapy before.  She feels sciatica to the right leg as far as the right knee is concerned is hurting but not as much as the left knee  Monovisc injection 02/08/2021 into the left knee did not seem to help.  Voltaren gel she uses on occasion.  No fever no chills or shortness of breath or difficulty with chewing or swallowing loss of bowel bladder control blurry vision double vision or loss of sense smell or taste  Did did receive her COVID-19  vaccine    07/02/2021  Patient is having low back pain with sciatic type of pain radiating down both legs.  Today will obtaining x-rays of her spine as well as the pelvis and hips and the knees.  The knees and not hurting her as much and she is not requesting any injections.  We did try Monovisc on the left knee but did not help and the steroid seems to work for her.  I did mention in the future maybe she would need long-acting steroid like  zilretta.  She is here with her daughter in law.  She is having some hip pains.  Able to manage it.  Her pain is 5/10 ambulating slowly without any assistive devices.  Cannot take any NSAIDs by mouth since she is on aspirin and Plavix and home cardiac evaluation by cardiologist.  No fever no chills no shortness of breath or difficulty with chewing or swallowing loss of bowel bladder control or blurry vision double vision loss sense smell or taste  She did have diclofenac gel at home but has not been using it appropriately  She states she is using SALONPAS which seems to help the back  Last visit we gave her cyclobenzaprine she is not taking it      10/18/2021  Severe low back pain left side more than the right.  We did have x-rays on her from last visit that showed sacroiliac arthritic changes sacroiliitis.  We did give her sacroiliac injection more than a year ago with some relief.  She also have pain in the knees and in the back specially on the left side a worried if it is sciatica or not.  She does have x-rays of severe arthritis in the spine also.  She usually gets steroid injections which help her for a little while.  She had been tried on viscosupplementation once without success.  She had sustained a little fall on 10/07/2021 but no major injury.  She is ambulating without any assistive devices.  No fever no chills no shortness of breath or difficulty with chewing or swallowing  She is using diclofenac gel as well as Salonpas patches.    11/08/2021  Injection of the  trigger point in the lower part of her back helped for 10 days.  That seems to come back now and hurts her a little bit more than her knee.  As far as the right knee she is doing okay the left knee is the 1 that bothers her.  She has been losing some range of motion since last visit we see her.  Her pain is around 6/10 in the sitting position and goes up with walking.  At this point I did tell her I do not want to give her 2 injections and not knowing which 1 will give her a reaction.  We have her approved to receive the left knee Zilreta injection.  The pros and cons discussed in details.  No loss of bowel bladder control or fever or chills or shortness of breath or difficulty with chewing or swallowing loss of bowel bladder control or loss of sense smell or taste      12/02/2021  Patient stated left knee steroid injections/ Zilreta seems to have helped so far.  Not bother her as much.  What bothers her is her lower part of her back on the left side there is a trigger point that seems to be the area.  She her  marked with a pen.  She is ambulating slowly without any assistive devices.  She would like to have that trigger point injected despite the fact her pain in her knee is 3/10.  Overall it seems the steroid injection given last visit in the knee seems to work so far  No fever no chills no shortness of breath or difficulty with chewing or swallowing loss of bowel bladder control    02/7/22  Complaining over left greater trochanteric and right sacroiliac area pain.  The left knee still doing okay received zilretta injection in November 2021 and is still helping.  She is requesting injections over the areas that hurts the most.  She has fluctuating blood pressure I did tell her I will not give but total 80 mg Depo-Medrol and we will split it into 2 areas.  She is ambulating without assistive devices.  We did get her approved for the long-acting steroid injection into the left knee but she says ri it since  you doing okay.  ght now her knee is doing okay and that previous shot still working.  We can always get it approved if needed again.  The approval is until February 27, 2022 however we still do not have to give it if you still doing okay      03/28/2022  On 02/07/2022 in [place] today greater trochanter which seems to have helped however the trigger point/left sacroiliac area that did not seem to help.  Both injections were with Depo-Medrol.  She stated she was changed to nifedipine for blood pressure and that cause severe swelling in the legs.  She has an appointment April 15 to see the cardiologist.  I did tell her this could be angioedema.  As far as her knee is concerned just stiff right now and she is not having much of any pain in it at 0/10.  Her main concern is the lumbar area this seems to have quite a bit of pain in it.  She had marked where the pain is in order to get another injection.  Denies loss of bowel bladder control blurry vision double vision.  She is here with her .  She had tried different topicals without success  Vicks rub seems to be the 1 that works the most  Trigger point pain over the left sacroiliac area approximately 3/10    06/02/2022  Severe left knee pain.  Given zilretta in November 2021 and just wore off on her almost  4 weeks ago.  Her pain is severe in the knee and limiting motion..  As far as the left hip and sacroiliac area on the left side and trigger point she is around 2-3/10.  She still ambulating without assistive devices taking very small steps.  She wants an injection into the left knee.  She stated the long-acting steroid helped quite a bit when we give it last and it was in November.  She would like that repeated.  However she stated today the left knee is hurting a lot she wants an injection today too.  I see no reason why not..  Still having pain over the left sacroiliac area and greater trochanteric area but not severe    06/30/2022  We did inject her last  visit and that did not seem to help as much.  She would like to have the long-acting steroid injection which helped significantly last time.  She still having some hip pain only when she leans on it but not when she is walking.  Her back is doing little bit better.  She does take Tylenol.  Her pain in the knee is 8/10    01/23/2023   Patient is complaining of right calf burning and discomfort.  This is been going on for several weeks.  I have not seen her because she was taking care of her  received radiation treatment.  She is here with him.  She is alert oriented x3.  Now the right and the left knee both of them are hurting.  She is using a cane to get around.  Her pain is 8/10.  Usually most of her problem is the left knee and sacroiliac area but today the back is not bothering her as much as the right knee and right calf as well as left knee.  She is using a cane to get around.  Despite the fact she still having left sacroiliac and greater trochanteric discomfort the knees are coming 1st today.    No fever no chills no shortness of breath difficulty with chewing or swallowing loss of bowel bladder control   She has family and children that could take her to doctor's.  Past Medical History:   Diagnosis Date    Acute pancreatitis without infection or necrosis 12/5/2021    Anxiety     Arthritis     Atherosclerosis of native coronary artery of native heart with angina pectoris 8/8/2019    Cancer     Degenerative disc disease     Depression     Essential hypertension 10/26/2021    GERD (gastroesophageal reflux disease)     Glaucoma suspect of both eyes     Hyperlipidemia     Insomnia 4/6/2021    Low back derangement syndrome 12/11/2017    Melanoma     Pneumonia due to other staphylococcus      Past Surgical History:   Procedure Laterality Date    ADENOIDECTOMY      CHOLECYSTECTOMY  1973    SPINE SURGERY  2/6/13    L4-L5  cyst removed andfor sciatica    TONSILLECTOMY  1945     Family History   Problem Relation  Age of Onset    Arthritis Mother     Hypertension Mother     Stroke Mother     Glaucoma Mother     Heart disease Father 56        fatal MI    Diabetes Brother     Cancer Son 56        melanoma with mets to lung and bone    Arthritis Sister     Hyperlipidemia Neg Hx      Social History     Socioeconomic History    Marital status:      Spouse name: fabio    Number of children: 3   Tobacco Use    Smoking status: Never    Smokeless tobacco: Never   Substance and Sexual Activity    Alcohol use: No     Alcohol/week: 0.0 standard drinks    Drug use: No    Sexual activity: Not Currently   Social History Narrative    . [employer] coffee only. No living will or advanced directive-copy of information given.      Medication List with Changes/Refills   Current Medications    ACETAMINOPHEN (TYLENOL) 650 MG TBSR    Take 1 tablet (650 mg total) by mouth every 8 (eight) hours.    ASPIRIN (ECOTRIN) 81 MG EC TABLET    Take 1 tablet by mouth Daily.    ATORVASTATIN (LIPITOR) 40 MG TABLET    Take 1 tablet by mouth every evening.    BETAMETHASONE VALERATE 0.1% (VALISONE) 0.1 % OINT    Apply topically 2 (two) times daily.    CITALOPRAM (CELEXA) 20 MG TABLET    Take 1 tablet (20 mg total) by mouth once daily.    CLOPIDOGREL (PLAVIX) 75 MG TABLET    Take 75 mg by mouth once daily.    FUROSEMIDE (LASIX) 40 MG TABLET    20 mg.    IRBESARTAN (AVAPRO) 150 MG TABLET    Take 150 mg by mouth.    ISOSORBIDE MONONITRATE (IMDUR) 60 MG 24 HR TABLET        MULTIVITAMIN WITH MINERALS TABLET        NITROGLYCERIN (NITROSTAT) 0.4 MG SL TABLET    DISSOLVE ONE TABLET UNDER THE TONGUE EVERY 5 MINUTES AS NEEDED FOR CHEST PAIN. DO NOT EXCEED A TOTAL OF 3 DOSES IN 15 MINUTES    PANTOPRAZOLE (PROTONIX) 40 MG TABLET    Take 1 tablet (40 mg total) by mouth once daily.     Review of patient's allergies indicates:   Allergen Reactions    Hydrocodone-acetaminophen Nausea And Vomiting     Other reaction(s): Vomiting    Diclofenac Nausea And Vomiting and Other  (See Comments)     Review of Systems   Constitutional: Negative for decreased appetite.   HENT: Negative for tinnitus.    Eyes: Negative for double vision.   Cardiovascular: Negative for chest pain.   Respiratory: Negative for wheezing.    Hematologic/Lymphatic: Negative for bleeding problem.   Skin: Negative for dry skin.   Musculoskeletal: Positive for arthritis, back pain, joint pain, muscle weakness and stiffness. Negative for gout and neck pain.   Gastrointestinal: Negative for abdominal pain.   Genitourinary: Negative for bladder incontinence.   Neurological: Positive for numbness. Negative for paresthesias and sensory change.   Psychiatric/Behavioral: Negative for altered mental status.       Objective:   Body mass index is 26.37 kg/m².  There were no vitals filed for this visit.       General    Constitutional: She is oriented to person, place, and time. She appears well-developed.   HENT:   Head: Atraumatic.   Eyes: EOM are normal.   Cardiovascular: Normal rate.    Pulmonary/Chest: Effort normal.   Neurological: She is alert and oriented to person, place, and time.   Psychiatric: Judgment normal.            Cervical spine rotation is functional but  limited due to stiffness  Lumbar with loss of lordosis and tenderness around the L4-5 and mostly over the left sacroiliac joint which is the area of severe tenderness and paraspinal.  Forward bending barely to proximal tibia lateral bending to mid thigh.  Pelvis is level.  Passive hip motion within normal limits.  Hip flexors and abductors as well as quads and hamstrings and ankle extensors and flexors are slightly weak at 5-/5.  Tenderness over the greater trochanter over the right and left hip   Right knee range of motion 5-120.  Slight crepitus to AP compression on the patella in on medial joint.  Very mild swelling.  Collaterals and cruciate stable.  Left knee with approximately 10 ° of flexion contracture and flexion barely to 70°.  Moderate swelling.   Collaterals and cruciate stable. Crepitus with motion.  Pain in medially to palpation.  Also tender to put was patient over the patella with severe crepitus  Calves are soft nontender on the left slightly tender on the right  Slight varicosities around the calves  DP 1+  Skin is warm to touch no obvious lesions      Relevant imaging results reviewed and interpreted by me, discussed with the patient and / or family today     X-ray 01/23/2023 bilateral knees with severe osteopenic bone.  There is loss of medial joint space bilaterally and marginal osteophytic changes.  There is no fracture seen consistent with arthritis    X-ray bilateral knees with left knee complete loss of medial joint space with osteophytic changes and sclerosis.  Same on the right knee but not as bad consistent with bilateral end-stage arthrosis.  07/02/2021  X-ray of the pelvis showing some degenerative changes on the left hip with good joint space left.  Right hip no joint space loss consistent with some arthrosis of the left hip.  Mild chronic degenerative changes in the sacroiliac joints 07/02/2021  X-ray of the lumbar spine in the electronic records multilevel facet arthropathy and osteophytes.  Anterior multilevel spurring.  Degenerative disc disease 07/02/2021  Assessment:     Encounter Diagnoses   Name Primary?    Arthritis of knee, left Yes    Acquired varus deformity knee, left     Arthritis of knee, right     Acquired varus deformity knee, right     Sacroiliitis     Lumbar facet arthropathy     Arthritis of left hip     Right calf pain         Plan:   Arthritis of knee, left  -     Large Joint Aspiration/Injection: bilateral knee    Acquired varus deformity knee, left    Arthritis of knee, right  -     Large Joint Aspiration/Injection: bilateral knee    Acquired varus deformity knee, right    Sacroiliitis    Lumbar facet arthropathy    Arthritis of left hip    Right calf pain         Patient Instructions   Having calf pain and burning.   We did discuss that she needs to have an ultrasound done.  She can not be accommodated today however she said she can come in on Thursday where family can bring her.  She is already on aspirin and Plavix but I doubt she has blood clot but I need to investigate it   Injected both of the knees each with 80 mg Depo-Medrol mixed with 5 cc 1% lidocaine 01/23/2023  Ice the knee next few days   Continue with the cane    X-rays today of both knees showing osteopenic bone and severe arthritis with bone spurs complete loss of joint space on the inside of the knee.  Return to see me in 3 months    (She understands that she can supplement with Tylenol for pain.   And we will be able to give her steroid injections once every 3 months .  She will call me sooner if she wants an injection of Depo-Medrol.  In the future we need to get her approved to have long-acting steroid injection like ZILRETTA which was given 11/08/2021 with good relief.  We spent a long time with the patient going over all her x-rays today as well as examining her.  She is managing with cell own power as as well as having diclofenac gel at home which I told her she needs to use it 3 times a day.  She did not take her cyclobenzaprine which I prescribed last time.   I did inject the trigger point with some relief of pain.  .  I did show her that she has hardening of the arteries in the aorta as well as down the legs way you can see the popliteal artery .  Her cardiologist could check her to see if she has enough good circulation in the legs.  I did tell her usually if it is bad circulation in the legs when you walk distances you will have severe calf pain and goes with away with rest  Will avoid surgical intervention at this time.  Went over the x-rays with her again and shoulder the sacroiliac joint with arthritis and lumbosacral area however the hip joint seems to be okay.  She does have severe arthritis both knees but the left 1 is the 1 that hurts the most.   The pros and cons of the injections discussed.  She was worried about having sciatica but I did tell her tightness behind the knee and the swelling is secondary to arthritis)    Disclaimer: This note was prepared using a voice recognition system and is likely to have sound alike errors within the text.

## 2023-01-25 RX ORDER — METHYLPREDNISOLONE ACETATE 80 MG/ML
80 INJECTION, SUSPENSION INTRA-ARTICULAR; INTRALESIONAL; INTRAMUSCULAR; SOFT TISSUE
Status: DISCONTINUED | OUTPATIENT
Start: 2023-01-23 | End: 2023-01-25 | Stop reason: HOSPADM

## 2023-01-26 ENCOUNTER — HOSPITAL ENCOUNTER (OUTPATIENT)
Dept: RADIOLOGY | Facility: HOSPITAL | Age: 88
Discharge: HOME | End: 2023-01-26
Attending: ORTHOPAEDIC SURGERY
Payer: MEDICARE

## 2023-01-26 DIAGNOSIS — M25.561 PAIN AND SWELLING OF KNEE, RIGHT: ICD-10-CM

## 2023-01-26 DIAGNOSIS — M25.461 PAIN AND SWELLING OF KNEE, RIGHT: ICD-10-CM

## 2023-01-26 DIAGNOSIS — I73.9 PERIPHERAL ARTERIAL DISEASE: ICD-10-CM

## 2023-01-26 DIAGNOSIS — T81.72XA COMPLICATION OF VEIN FOLLOWING A PROCEDURE, NOT ELSEWHERE CLASSIFIED, INITIAL ENCOUNTER: ICD-10-CM

## 2023-01-26 PROCEDURE — 93971 EXTREMITY STUDY: CPT | Mod: 26,HCNC,RT, | Performed by: STUDENT IN AN ORGANIZED HEALTH CARE EDUCATION/TRAINING PROGRAM

## 2023-01-26 PROCEDURE — 93971 EXTREMITY STUDY: CPT | Mod: TC,HCNC,RT

## 2023-01-26 PROCEDURE — 93971 US LOWER EXTREMITY VEINS RIGHT: ICD-10-PCS | Mod: 26,HCNC,RT, | Performed by: STUDENT IN AN ORGANIZED HEALTH CARE EDUCATION/TRAINING PROGRAM

## 2023-03-02 ENCOUNTER — TELEPHONE (OUTPATIENT)
Dept: ORTHOPEDICS | Facility: CLINIC | Age: 88
End: 2023-03-02
Payer: MEDICARE

## 2023-03-02 NOTE — TELEPHONE ENCOUNTER
Called and spoke with patient - rescheduled her appointment     Thankful for call and verbalized understanding

## 2023-03-02 NOTE — TELEPHONE ENCOUNTER
Left message on patients cell phone in regards to appt today -- provider was called out for emergency surgery --     Patient will not be able to be seen in clinic today due to provider being out of the office     This appointment was scheduled in error     I have been trying to call patient all afternoon to reach her before she arrives to the clinic

## 2023-03-02 NOTE — TELEPHONE ENCOUNTER
----- Message from Gela Palmer sent at 3/2/2023  1:58 PM CST -----  Pt had an appt today that was cancelled and she would like the nurse to call her back at .[phone]. Thx. EL

## 2023-03-16 ENCOUNTER — OFFICE VISIT (OUTPATIENT)
Dept: ORTHOPEDICS | Facility: CLINIC | Age: 88
End: 2023-03-16
Payer: MEDICARE

## 2023-03-16 VITALS — WEIGHT: 163 LBS | HEIGHT: 66 IN | BODY MASS INDEX: 26.2 KG/M2

## 2023-03-16 DIAGNOSIS — M79.661 RIGHT CALF PAIN: ICD-10-CM

## 2023-03-16 DIAGNOSIS — M17.12 ARTHRITIS OF KNEE, LEFT: Primary | ICD-10-CM

## 2023-03-16 DIAGNOSIS — M17.11 ARTHRITIS OF KNEE, RIGHT: ICD-10-CM

## 2023-03-16 DIAGNOSIS — M46.1 SACROILIITIS: ICD-10-CM

## 2023-03-16 DIAGNOSIS — M21.162 ACQUIRED VARUS DEFORMITY KNEE, LEFT: ICD-10-CM

## 2023-03-16 DIAGNOSIS — M16.12 ARTHRITIS OF LEFT HIP: ICD-10-CM

## 2023-03-16 DIAGNOSIS — M21.161 ACQUIRED VARUS DEFORMITY KNEE, RIGHT: ICD-10-CM

## 2023-03-16 PROCEDURE — 1159F MED LIST DOCD IN RCRD: CPT | Mod: HCNC,CPTII,S$GLB, | Performed by: ORTHOPAEDIC SURGERY

## 2023-03-16 PROCEDURE — 20610 DRAIN/INJ JOINT/BURSA W/O US: CPT | Mod: HCNC,RT,S$GLB, | Performed by: ORTHOPAEDIC SURGERY

## 2023-03-16 PROCEDURE — 20610 LARGE JOINT ASPIRATION/INJECTION: R GREATER TROCHANTERIC BURSA: ICD-10-PCS | Mod: HCNC,RT,S$GLB, | Performed by: ORTHOPAEDIC SURGERY

## 2023-03-16 PROCEDURE — 1101F PR PT FALLS ASSESS DOC 0-1 FALLS W/OUT INJ PAST YR: ICD-10-PCS | Mod: HCNC,CPTII,S$GLB, | Performed by: ORTHOPAEDIC SURGERY

## 2023-03-16 PROCEDURE — 99999 PR PBB SHADOW E&M-EST. PATIENT-LVL III: CPT | Mod: PBBFAC,HCNC,, | Performed by: ORTHOPAEDIC SURGERY

## 2023-03-16 PROCEDURE — 3288F FALL RISK ASSESSMENT DOCD: CPT | Mod: HCNC,CPTII,S$GLB, | Performed by: ORTHOPAEDIC SURGERY

## 2023-03-16 PROCEDURE — 1125F AMNT PAIN NOTED PAIN PRSNT: CPT | Mod: HCNC,CPTII,S$GLB, | Performed by: ORTHOPAEDIC SURGERY

## 2023-03-16 PROCEDURE — 99213 OFFICE O/P EST LOW 20 MIN: CPT | Mod: 25,HCNC,S$GLB, | Performed by: ORTHOPAEDIC SURGERY

## 2023-03-16 PROCEDURE — 99213 PR OFFICE/OUTPT VISIT, EST, LEVL III, 20-29 MIN: ICD-10-PCS | Mod: 25,HCNC,S$GLB, | Performed by: ORTHOPAEDIC SURGERY

## 2023-03-16 PROCEDURE — 1101F PT FALLS ASSESS-DOCD LE1/YR: CPT | Mod: HCNC,CPTII,S$GLB, | Performed by: ORTHOPAEDIC SURGERY

## 2023-03-16 PROCEDURE — 3288F PR FALLS RISK ASSESSMENT DOCUMENTED: ICD-10-PCS | Mod: HCNC,CPTII,S$GLB, | Performed by: ORTHOPAEDIC SURGERY

## 2023-03-16 PROCEDURE — 99999 PR PBB SHADOW E&M-EST. PATIENT-LVL III: ICD-10-PCS | Mod: PBBFAC,HCNC,, | Performed by: ORTHOPAEDIC SURGERY

## 2023-03-16 PROCEDURE — 1159F PR MEDICATION LIST DOCUMENTED IN MEDICAL RECORD: ICD-10-PCS | Mod: HCNC,CPTII,S$GLB, | Performed by: ORTHOPAEDIC SURGERY

## 2023-03-16 PROCEDURE — 1125F PR PAIN SEVERITY QUANTIFIED, PAIN PRESENT: ICD-10-PCS | Mod: HCNC,CPTII,S$GLB, | Performed by: ORTHOPAEDIC SURGERY

## 2023-03-16 RX ORDER — COLCHICINE 0.6 MG/1
0.6 TABLET ORAL DAILY PRN
COMMUNITY

## 2023-03-16 RX ORDER — CYCLOBENZAPRINE HCL 10 MG
10 TABLET ORAL 3 TIMES DAILY PRN
COMMUNITY
End: 2023-05-10 | Stop reason: ALTCHOICE

## 2023-03-16 RX ORDER — METHYLPREDNISOLONE ACETATE 80 MG/ML
80 INJECTION, SUSPENSION INTRA-ARTICULAR; INTRALESIONAL; INTRAMUSCULAR; SOFT TISSUE
Status: DISCONTINUED | OUTPATIENT
Start: 2023-03-16 | End: 2023-03-16 | Stop reason: HOSPADM

## 2023-03-16 RX ORDER — METHOCARBAMOL 750 MG/1
750 TABLET, FILM COATED ORAL 3 TIMES DAILY
Qty: 90 TABLET | Refills: 3 | Status: SHIPPED | OUTPATIENT
Start: 2023-03-16 | End: 2023-03-26

## 2023-03-16 RX ORDER — PREDNISONE 5 MG/1
5 TABLET ORAL DAILY
Qty: 30 TABLET | Refills: 2 | Status: SHIPPED | OUTPATIENT
Start: 2023-03-16 | End: 2024-03-22

## 2023-03-16 RX ADMIN — METHYLPREDNISOLONE ACETATE 80 MG: 80 INJECTION, SUSPENSION INTRA-ARTICULAR; INTRALESIONAL; INTRAMUSCULAR; SOFT TISSUE at 08:03

## 2023-03-16 NOTE — PATIENT INSTRUCTIONS
I would like you to stop taking Flexeril/cyclobenzaprine the muscle relaxant since it does not help   I would like you to start methocarbamol which is Robaxin a new muscle relaxant to take as needed   I will start you on prednisone 5 mg to take only on a bad day do not take it daily if you do not needed   We injected the right hip today with 80 mg Depo-Medrol mixed with 5 cc 1% lidocaine  You can still take Tylenol 650 mg 3 times a day if needed  I will see you back in 3 months/April 23rd

## 2023-03-16 NOTE — PROGRESS NOTES
Subjective:     Patient ID: Tiffanie Barajas is a 87 y.o. female.    Chief Complaint: Pain of the Right Knee    HPI:  84-year-old complaining of pain over the left sacroiliac joint radiating down to her knee.  Previous history of sciatica and a cyst in the back that was removed. Has history of arthritis of both of her knees received injection longtime ago more than a year (lubrication shot).  She is not having too much pain in the right knee unable to fully extend the left knee difficulty with shopping and walking.  Pain roughly 4/10.  Denies loss of bowel bladder control.  Walking distance is seems to hurt approximately 200 ft.  She is on blood thinners and unable to take NSAIDs.  She takes 1 little Tylenol occasionally and does not help.    01/27/2020.  Left knee injection of Depo-Medrol seems to have helped this patient.  Given 01/03/2020.  Just started physical therapy and that seems to help also.  The Flexeril did not seem to work at all.  Already been 5 times to physical therapy and she has at least 3 more weeks to go.  Very pleased so far with her results.  Patient cannot take NSAIDs due to being on blood thinners.  Pain level 0/10.  Does have occasional discomfort right greater trochanteric area    02/13/2020  Patient with bilateral knee arthrosis left worse than right and sacroiliac pain.  She just finished her physical therapy and that seems to have helped and now she is doing independent exercise program.  We did inject the knees with steroid that seems to help.  She is ambulating without any assistive devices today. He is here to have Synvisc-One injection into the left knee.    05/22/2020  Patient complaining today of left hip pain/she points over the lumbar area radiating to the posterior aspect of the knee.  She has history of lumbosacral arthritis.  As far as her left knee injection of Synvisc-One she is not having any pain with that.  She is very happy with that result.  She wants something done  and 0 not want any prescriptions for any pills.  Denies loss of bowel bladder control or usage or any assistive devices to ambulate.  She is doing independent exercise as well as showing me that her  created exercise program for her.  I did recommend stationary bicycle and independent stretching exercises.  Denies loss of bowel bladder control and fever or chills or shortness of breath or chest pain    07/09/2020  Patient received trigger point injection of the lumbar spine 05/22/2020 with some good relief.  Now she is having more pain in the left knee.  She had received steroid injection 01/03/2020 and Synvisc-One in 02/13/2020.  She feels the knee is tight she tries to exercise it on her own.  Having sharp pain on the medial side.  Pain level is 3/10 but with gait it gets worst.  Denies any fever or chills and maintaining social distancing and wearing her own mask.  She would like some relief from the tightness and stiffness in the left knee but wants to avoid taking any medications besides a little Tylenol because she is on blood thinners and unable to take NSAIDs.    01/04/2020   Left knee pain and stiffness 6/10, low back pain / points on the left sacroiliac area also.  She is requesting injection in both areas.  Patient did have trigger point injection 05/22/2020 in the lumbar area which helped also had steroid injection in July 2020 which seems to have helped in the knee.  Also previous history of Synvisc-One injection 02/13/2020.  Tylenol does not seem to help.  Denies any fever or chills or shortness of breath or difficulty with chewing or swallowing loss of bowel bladder control blurry vision or double vision or loss sense smell or taste.  She is here with her significant other.  Asking about getting the COVID  Vaccine.  She is 85 years old and I told if her  She qualifies should be able to get it soon    02/08/2021  Right lumbar trigger point tenderness which was injected awhile ago with relief on  05/22/2020 now it is hurting her a little bit more.  I did tell her since she is here today to receive viscosupplementation/approved only Monovisc not Synvisc this time we will hold off on trigger point injection.  Pain is 5/10 in the left knee.  The right knee is doing okay.  She is ambulating without any assistive devices.  There was a little discussion about difference is with me in Monovisc and Synvisc and put simply I told them it is like having a car with different brands.  Patient is maintaining her activity level.  She still did not get her COVID vaccine due to in availability of the vaccine.  Denies any fever or chills or shortness of breath or difficulty with chewing or swallowing or loss of bowel bladder control blurry vision double vision loss sense smell or taste.  She is ambulating without any assistive devices    04/01/2021  Low back pain which is severe we injected trigger point on 05/22/2020 with some relief.  Went over her x-rays in the electronic records today showing severe facet arthropathy and degenerative disc disease with large bone spur overgrowth.  That bothers her a lot with radiation down her legs a cannot sleep.  She is requesting may be something to help with the radicular symptoms and give her some sleep and rest as far as the Monovisc injection into the left knee that did not seem to help at all.  The steroid injection seems to work better for her and requested another injection.  She did go through therapy before.  She feels sciatica to the right leg as far as the right knee is concerned is hurting but not as much as the left knee  Monovisc injection 02/08/2021 into the left knee did not seem to help.  Voltaren gel she uses on occasion.  No fever no chills or shortness of breath or difficulty with chewing or swallowing loss of bowel bladder control blurry vision double vision or loss of sense smell or taste  Did did receive her COVID-19 vaccine    07/02/2021  Patient is having low  back pain with sciatic type of pain radiating down both legs.  Today will obtaining x-rays of her spine as well as the pelvis and hips and the knees.  The knees and not hurting her as much and she is not requesting any injections.  We did try Monovisc on the left knee but did not help and the steroid seems to work for her.  I did mention in the future maybe she would need long-acting steroid like  zilretta.  She is here with her daughter in law.  She is having some hip pains.  Able to manage it.  Her pain is 5/10 ambulating slowly without any assistive devices.  Cannot take any NSAIDs by mouth since she is on aspirin and Plavix and home cardiac evaluation by cardiologist.  No fever no chills no shortness of breath or difficulty with chewing or swallowing loss of bowel bladder control or blurry vision double vision loss sense smell or taste  She did have diclofenac gel at home but has not been using it appropriately  She states she is using SALONPAS which seems to help the back  Last visit we gave her cyclobenzaprine she is not taking it      10/18/2021  Severe low back pain left side more than the right.  We did have x-rays on her from last visit that showed sacroiliac arthritic changes sacroiliitis.  We did give her sacroiliac injection more than a year ago with some relief.  She also have pain in the knees and in the back specially on the left side a worried if it is sciatica or not.  She does have x-rays of severe arthritis in the spine also.  She usually gets steroid injections which help her for a little while.  She had been tried on viscosupplementation once without success.  She had sustained a little fall on 10/07/2021 but no major injury.  She is ambulating without any assistive devices.  No fever no chills no shortness of breath or difficulty with chewing or swallowing  She is using diclofenac gel as well as Salonpas patches.    11/08/2021  Injection of the trigger point in the lower part of her back  helped for 10 days.  That seems to come back now and hurts her a little bit more than her knee.  As far as the right knee she is doing okay the left knee is the 1 that bothers her.  She has been losing some range of motion since last visit we see her.  Her pain is around 6/10 in the sitting position and goes up with walking.  At this point I did tell her I do not want to give her 2 injections and not knowing which 1 will give her a reaction.  We have her approved to receive the left knee Zilreta injection.  The pros and cons discussed in details.  No loss of bowel bladder control or fever or chills or shortness of breath or difficulty with chewing or swallowing loss of bowel bladder control or loss of sense smell or taste      12/02/2021  Patient stated left knee steroid injections/ Zilreta seems to have helped so far.  Not bother her as much.  What bothers her is her lower part of her back on the left side there is a trigger point that seems to be the area.  She her  marked with a pen.  She is ambulating slowly without any assistive devices.  She would like to have that trigger point injected despite the fact her pain in her knee is 3/10.  Overall it seems the steroid injection given last visit in the knee seems to work so far  No fever no chills no shortness of breath or difficulty with chewing or swallowing loss of bowel bladder control    02/7/22  Complaining over left greater trochanteric and right sacroiliac area pain.  The left knee still doing okay received zilretta injection in November 2021 and is still helping.  She is requesting injections over the areas that hurts the most.  She has fluctuating blood pressure I did tell her I will not give but total 80 mg Depo-Medrol and we will split it into 2 areas.  She is ambulating without assistive devices.  We did get her approved for the long-acting steroid injection into the left knee but she says ri it since you doing okay.  ght now her knee is doing  okay and that previous shot still working.  We can always get it approved if needed again.  The approval is until February 27, 2022 however we still do not have to give it if you still doing okay      03/28/2022  On 02/07/2022 in [place] today greater trochanter which seems to have helped however the trigger point/left sacroiliac area that did not seem to help.  Both injections were with Depo-Medrol.  She stated she was changed to nifedipine for blood pressure and that cause severe swelling in the legs.  She has an appointment April 15 to see the cardiologist.  I did tell her this could be angioedema.  As far as her knee is concerned just stiff right now and she is not having much of any pain in it at 0/10.  Her main concern is the lumbar area this seems to have quite a bit of pain in it.  She had marked where the pain is in order to get another injection.  Denies loss of bowel bladder control blurry vision double vision.  She is here with her .  She had tried different topicals without success  Vicks rub seems to be the 1 that works the most  Trigger point pain over the left sacroiliac area approximately 3/10    06/02/2022  Severe left knee pain.  Given zilretta in November 2021 and just wore off on her almost  4 weeks ago.  Her pain is severe in the knee and limiting motion..  As far as the left hip and sacroiliac area on the left side and trigger point she is around 2-3/10.  She still ambulating without assistive devices taking very small steps.  She wants an injection into the left knee.  She stated the long-acting steroid helped quite a bit when we give it last and it was in November.  She would like that repeated.  However she stated today the left knee is hurting a lot she wants an injection today too.  I see no reason why not..  Still having pain over the left sacroiliac area and greater trochanteric area but not severe    06/30/2022  We did inject her last visit and that did not seem to help as much.   She would like to have the long-acting steroid injection which helped significantly last time.  She still having some hip pain only when she leans on it but not when she is walking.  Her back is doing little bit better.  She does take Tylenol.  Her pain in the knee is 8/10    01/23/2023   Patient is complaining of right calf burning and discomfort.  This is been going on for several weeks.  I have not seen her because she was taking care of her  received radiation treatment.  She is here with him.  She is alert oriented x3.  Now the right and the left knee both of them are hurting.  She is using a cane to get around.  Her pain is 8/10.  Usually most of her problem is the left knee and sacroiliac area but today the back is not bothering her as much as the right knee and right calf as well as left knee.  She is using a cane to get around.  Despite the fact she still having left sacroiliac and greater trochanteric discomfort the knees are coming 1st today.    No fever no chills no shortness of breath difficulty with chewing or swallowing loss of bowel bladder control   She has family and children that could take her to doctor's.    03/16/2023   She is having cramps in the back of her right thigh and she has severe arthritis unable to fully extend the knee.  The Flexeril/cyclobenzaprine did not seem to help.  Today decided to change her to methocarbamol went over it with her.  Her lower part of her back is hurting and she stated that the injection to both of her knees helped last time of Depo-Medrol.  She does have arthritis we did obtain ultrasound last time was negative for DVT in the lower extremity.  She is on Plavix and aspirin.  Her pain is around 10/10 right now and I think at this time we probably start as her morning stiffness and achiness in the shoulder and her knees  She is 87 she does not want undergo surgery at this time  She is hurting more over the right greater trochanteric slightly posterior  aspect in the piriformis fossa.  Past Medical History:   Diagnosis Date    Acute pancreatitis without infection or necrosis 12/5/2021    Anxiety     Arthritis     Atherosclerosis of native coronary artery of native heart with angina pectoris 8/8/2019    Cancer     Degenerative disc disease     Depression     Essential hypertension 10/26/2021    GERD (gastroesophageal reflux disease)     Glaucoma suspect of both eyes     Hyperlipidemia     Insomnia 4/6/2021    Low back derangement syndrome 12/11/2017    Melanoma     Pneumonia due to other staphylococcus      Past Surgical History:   Procedure Laterality Date    ADENOIDECTOMY      CHOLECYSTECTOMY  1973    SPINE SURGERY  2/6/13    L4-L5  cyst removed andfor sciatica    TONSILLECTOMY  1945     Family History   Problem Relation Age of Onset    Arthritis Mother     Hypertension Mother     Stroke Mother     Glaucoma Mother     Heart disease Father 56        fatal MI    Diabetes Brother     Cancer Son 56        melanoma with mets to lung and bone    Arthritis Sister     Hyperlipidemia Neg Hx      Social History     Socioeconomic History    Marital status:      Spouse name: fabio    Number of children: 3   Tobacco Use    Smoking status: Never    Smokeless tobacco: Never   Substance and Sexual Activity    Alcohol use: No     Alcohol/week: 0.0 standard drinks    Drug use: No    Sexual activity: Not Currently   Social History Narrative    . [employer] coffee only. No living will or advanced directive-copy of information given.      Medication List with Changes/Refills   New Medications    METHOCARBAMOL (ROBAXIN) 750 MG TAB    Take 1 tablet (750 mg total) by mouth 3 (three) times daily. for 10 days    PREDNISONE (DELTASONE) 5 MG TABLET    Take 1 tablet (5 mg total) by mouth once daily.   Current Medications    ACETAMINOPHEN (TYLENOL) 650 MG TBSR    Take 1 tablet (650 mg total) by mouth every 8 (eight) hours.    ASPIRIN (ECOTRIN) 81 MG EC TABLET    Take 1 tablet by  mouth Daily.    ATORVASTATIN (LIPITOR) 40 MG TABLET    Take 1 tablet by mouth every evening.    BETAMETHASONE VALERATE 0.1% (VALISONE) 0.1 % OINT    Apply topically 2 (two) times daily.    CITALOPRAM (CELEXA) 20 MG TABLET    Take 1 tablet (20 mg total) by mouth once daily.    CLOPIDOGREL (PLAVIX) 75 MG TABLET    Take 75 mg by mouth once daily.    COLCHICINE (COLCRYS) 0.6 MG TABLET    Take 0.6 mg by mouth daily as needed.    CYCLOBENZAPRINE (FLEXERIL) 10 MG TABLET    Take 10 mg by mouth 3 (three) times daily as needed.    FUROSEMIDE (LASIX) 40 MG TABLET    20 mg.    IRBESARTAN (AVAPRO) 150 MG TABLET    Take 150 mg by mouth.    ISOSORBIDE MONONITRATE (IMDUR) 60 MG 24 HR TABLET        MULTIVITAMIN WITH MINERALS TABLET        NITROGLYCERIN (NITROSTAT) 0.4 MG SL TABLET    DISSOLVE ONE TABLET UNDER THE TONGUE EVERY 5 MINUTES AS NEEDED FOR CHEST PAIN. DO NOT EXCEED A TOTAL OF 3 DOSES IN 15 MINUTES    PANTOPRAZOLE (PROTONIX) 40 MG TABLET    Take 1 tablet (40 mg total) by mouth once daily.     Review of patient's allergies indicates:   Allergen Reactions    Hydrocodone-acetaminophen Nausea And Vomiting     Other reaction(s): Vomiting    Diclofenac Nausea And Vomiting and Other (See Comments)     Review of Systems   Constitutional: Negative for decreased appetite.   HENT: Negative for tinnitus.    Eyes: Negative for double vision.   Cardiovascular: Negative for chest pain.   Respiratory: Negative for wheezing.    Hematologic/Lymphatic: Negative for bleeding problem.   Skin: Negative for dry skin.   Musculoskeletal: Positive for arthritis, back pain, joint pain, muscle weakness and stiffness. Negative for gout and neck pain.   Gastrointestinal: Negative for abdominal pain.   Genitourinary: Negative for bladder incontinence.   Neurological: Positive for numbness. Negative for paresthesias and sensory change.   Psychiatric/Behavioral: Negative for altered mental status.       Objective:   Body mass index is 26.31 kg/m².  There  were no vitals filed for this visit.       General    Constitutional: She is oriented to person, place, and time. She appears well-developed.   HENT:   Head: Atraumatic.   Eyes: EOM are normal.   Cardiovascular: Normal rate.    Pulmonary/Chest: Effort normal.   Neurological: She is alert and oriented to person, place, and time.   Psychiatric: Judgment normal.            Cervical spine rotation is functional but  limited due to stiffness  Lumbar with loss of lordosis and tenderness around the L4-5 and mostly over the left sacroiliac joint which is the area of severe tenderness and paraspinal.  Forward bending barely to proximal tibia lateral bending to mid thigh.  Pelvis is level.  Passive hip motion within normal limits.  Hip flexors and abductors as well as quads and hamstrings and ankle extensors and flexors are slightly weak at 5-/5.  Tenderness over the greater trochanter over the right and left hip   Right knee range of motion 5-120.  Slight crepitus to AP compression on the patella in on medial joint.  Very mild swelling.  Collaterals and cruciate stable.  Left knee with approximately 10 ° of flexion contracture and flexion barely to 70°.  Moderate swelling.  Collaterals and cruciate stable. Crepitus with motion.  Pain in medially to palpation.  Also tender to put was patient over the patella with severe crepitus  Calves are soft nontender on the left slightly tender on the right  Slight varicosities around the calves  DP 1+  Skin is warm to touch no obvious lesions      Relevant imaging results reviewed and interpreted by me, discussed with the patient and / or family today   Ultrasound 01/26/2023 of lower extremity was negative for DVT/patient on Plavix and aspirin  X-ray 01/23/2023 bilateral knees with severe osteopenic bone.  There is loss of medial joint space bilaterally and marginal osteophytic changes.  There is no fracture seen consistent with arthritis    X-ray bilateral knees with left knee complete  loss of medial joint space with osteophytic changes and sclerosis.  Same on the right knee but not as bad consistent with bilateral end-stage arthrosis.  07/02/2021  X-ray of the pelvis showing some degenerative changes on the left hip with good joint space left.  Right hip no joint space loss consistent with some arthrosis of the  hip.  Mild chronic degenerative changes in the sacroiliac joints 07/02/2021  X-ray of the lumbar spine in the electronic records multilevel facet arthropathy and osteophytes.  Anterior multilevel spurring.  Degenerative disc disease 07/02/2021  Assessment:     Encounter Diagnoses   Name Primary?    Arthritis of knee, left Yes    Acquired varus deformity knee, left     Arthritis of knee, right     Acquired varus deformity knee, right     Sacroiliitis     Arthritis of left hip     Right calf pain         Plan:   Arthritis of knee, left  -     predniSONE (DELTASONE) 5 MG tablet; Take 1 tablet (5 mg total) by mouth once daily.  Dispense: 30 tablet; Refill: 2    Acquired varus deformity knee, left    Arthritis of knee, right  -     predniSONE (DELTASONE) 5 MG tablet; Take 1 tablet (5 mg total) by mouth once daily.  Dispense: 30 tablet; Refill: 2    Acquired varus deformity knee, right    Sacroiliitis  -     methocarbamoL (ROBAXIN) 750 MG Tab; Take 1 tablet (750 mg total) by mouth 3 (three) times daily. for 10 days  Dispense: 90 tablet; Refill: 3    Arthritis of left hip  -     predniSONE (DELTASONE) 5 MG tablet; Take 1 tablet (5 mg total) by mouth once daily.  Dispense: 30 tablet; Refill: 2  -     Large Joint Aspiration/Injection: R greater trochanteric bursa    Right calf pain  -     methocarbamoL (ROBAXIN) 750 MG Tab; Take 1 tablet (750 mg total) by mouth 3 (three) times daily. for 10 days  Dispense: 90 tablet; Refill: 3         Patient Instructions   I would like you to stop taking Flexeril/cyclobenzaprine the muscle relaxant since it does not help   I would like you to start methocarbamol  which is Robaxin a new muscle relaxant to take as needed   I will start you on prednisone 5 mg to take only on a bad day do not take it daily if you do not needed   We injected the right hip today with 80 mg Depo-Medrol mixed with 5 cc 1% lidocaine  You can still take Tylenol 650 mg 3 times a day if needed  I will see you back in 3 months/April 23rd    (She understands that she can supplement with Tylenol for pain.   And we will be able to give her steroid injections once every 3 months .  She will call me sooner if she wants an injection of Depo-Medrol.  In the future we need to get her approved to have long-acting steroid injection like ZILRETTA which was given 11/08/2021 with good relief.  We spent a long time with the patient going over all her x-rays today as well as examining her.  She is managing with cell [employer] power as as well as having diclofenac gel at home which I told her she needs to use it 3 times a day.  She did not take her cyclobenzaprine which I prescribed last time.   I did inject the trigger point with some relief of pain.  .  I did show her that she has hardening of the arteries in the aorta as well as down the legs way you can see the popliteal artery .  Her cardiologist could check her to see if she has enough good circulation in the legs.  I did tell her usually if it is bad circulation in the legs when you walk distances you will have severe calf pain and goes with away with rest  Will avoid surgical intervention at this time.  Went over the x-rays with her again and shoulder the sacroiliac joint with arthritis and lumbosacral area however the hip joint seems to be okay.  She does have severe arthritis both knees but the left 1 is the 1 that hurts the most.  The pros and cons of the injections discussed.  She was worried about having sciatica but I did tell her tightness behind the knee and the swelling is secondary to arthritis)    Disclaimer: This note was prepared using a voice recognition  system and is likely to have sound alike errors within the text.

## 2023-03-16 NOTE — PROCEDURES
Large Joint Aspiration/Injection: R greater trochanteric bursa    Date/Time: 3/16/2023 8:00 AM  Performed by: Ranjit Kelly MD  Authorized by: Ranjit Kelly MD     Consent Done?:  Yes (Verbal)  Indications:  Arthritis  Site marked: the procedure site was marked    Timeout: prior to procedure the correct patient, procedure, and site was verified      Local anesthesia used?: Yes    Local anesthetic:  Lidocaine 1% without epinephrine  Ultrasonic Guidance for needle placement?: No    Location:  Hip  Site:  R greater trochanteric bursa  Medications:  80 mg methylPREDNISolone acetate 80 mg/mL  Patient tolerance:  Patient tolerated the procedure well with no immediate complications

## 2023-04-24 ENCOUNTER — OFFICE VISIT (OUTPATIENT)
Dept: ORTHOPEDICS | Facility: CLINIC | Age: 88
End: 2023-04-24
Payer: MEDICARE

## 2023-04-24 VITALS — WEIGHT: 162.94 LBS | BODY MASS INDEX: 26.19 KG/M2 | HEIGHT: 66 IN

## 2023-04-24 DIAGNOSIS — M47.816 LUMBAR FACET ARTHROPATHY: ICD-10-CM

## 2023-04-24 DIAGNOSIS — M21.161 ACQUIRED VARUS DEFORMITY KNEE, RIGHT: ICD-10-CM

## 2023-04-24 DIAGNOSIS — M17.11 ARTHRITIS OF KNEE, RIGHT: ICD-10-CM

## 2023-04-24 DIAGNOSIS — M21.162 ACQUIRED VARUS DEFORMITY KNEE, LEFT: ICD-10-CM

## 2023-04-24 DIAGNOSIS — M16.12 ARTHRITIS OF LEFT HIP: ICD-10-CM

## 2023-04-24 DIAGNOSIS — M46.1 SACROILIITIS: ICD-10-CM

## 2023-04-24 DIAGNOSIS — M79.661 RIGHT CALF PAIN: ICD-10-CM

## 2023-04-24 DIAGNOSIS — I73.9 PERIPHERAL ARTERIAL DISEASE: ICD-10-CM

## 2023-04-24 DIAGNOSIS — M17.12 ARTHRITIS OF KNEE, LEFT: Primary | ICD-10-CM

## 2023-04-24 PROCEDURE — 3288F FALL RISK ASSESSMENT DOCD: CPT | Mod: HCNC,CPTII,S$GLB, | Performed by: ORTHOPAEDIC SURGERY

## 2023-04-24 PROCEDURE — 1101F PR PT FALLS ASSESS DOC 0-1 FALLS W/OUT INJ PAST YR: ICD-10-PCS | Mod: HCNC,CPTII,S$GLB, | Performed by: ORTHOPAEDIC SURGERY

## 2023-04-24 PROCEDURE — 1159F PR MEDICATION LIST DOCUMENTED IN MEDICAL RECORD: ICD-10-PCS | Mod: HCNC,CPTII,S$GLB, | Performed by: ORTHOPAEDIC SURGERY

## 2023-04-24 PROCEDURE — 1160F PR REVIEW ALL MEDS BY PRESCRIBER/CLIN PHARMACIST DOCUMENTED: ICD-10-PCS | Mod: HCNC,CPTII,S$GLB, | Performed by: ORTHOPAEDIC SURGERY

## 2023-04-24 PROCEDURE — 99213 PR OFFICE/OUTPT VISIT, EST, LEVL III, 20-29 MIN: ICD-10-PCS | Mod: HCNC,S$GLB,, | Performed by: ORTHOPAEDIC SURGERY

## 2023-04-24 PROCEDURE — 1159F MED LIST DOCD IN RCRD: CPT | Mod: HCNC,CPTII,S$GLB, | Performed by: ORTHOPAEDIC SURGERY

## 2023-04-24 PROCEDURE — 99213 OFFICE O/P EST LOW 20 MIN: CPT | Mod: HCNC,S$GLB,, | Performed by: ORTHOPAEDIC SURGERY

## 2023-04-24 PROCEDURE — 99999 PR PBB SHADOW E&M-EST. PATIENT-LVL III: CPT | Mod: PBBFAC,HCNC,, | Performed by: ORTHOPAEDIC SURGERY

## 2023-04-24 PROCEDURE — 99999 PR PBB SHADOW E&M-EST. PATIENT-LVL III: ICD-10-PCS | Mod: PBBFAC,HCNC,, | Performed by: ORTHOPAEDIC SURGERY

## 2023-04-24 PROCEDURE — 1101F PT FALLS ASSESS-DOCD LE1/YR: CPT | Mod: HCNC,CPTII,S$GLB, | Performed by: ORTHOPAEDIC SURGERY

## 2023-04-24 PROCEDURE — 1125F PR PAIN SEVERITY QUANTIFIED, PAIN PRESENT: ICD-10-PCS | Mod: HCNC,CPTII,S$GLB, | Performed by: ORTHOPAEDIC SURGERY

## 2023-04-24 PROCEDURE — 3288F PR FALLS RISK ASSESSMENT DOCUMENTED: ICD-10-PCS | Mod: HCNC,CPTII,S$GLB, | Performed by: ORTHOPAEDIC SURGERY

## 2023-04-24 PROCEDURE — 1160F RVW MEDS BY RX/DR IN RCRD: CPT | Mod: HCNC,CPTII,S$GLB, | Performed by: ORTHOPAEDIC SURGERY

## 2023-04-24 PROCEDURE — 1125F AMNT PAIN NOTED PAIN PRSNT: CPT | Mod: HCNC,CPTII,S$GLB, | Performed by: ORTHOPAEDIC SURGERY

## 2023-04-24 NOTE — PATIENT INSTRUCTIONS
You may take the methocarbamol/muscle relaxant on as needed for spasms as well as if needed for sleep   You having numbness in the feet and that is secondary to your back because you not diabetic and your x-rays on your back show multilevel arthritic condition   You have prednisone at home 5 mg and does steroids take him on a bad day  I injected both of her knees each with 80 mg Depo-Medrol mixed with 5 cc 1% lidocaine  Ice does knees the next few days   I will see you back in 8 weeks

## 2023-04-24 NOTE — PROGRESS NOTES
Subjective:     Patient ID: Tiffanie Barajas is a 87 y.o. female.    Chief Complaint: Pain of the Right Knee, Pain of the Left Knee, and Pain of the Right Hip    HPI:  84-year-old complaining of pain over the left sacroiliac joint radiating down to her knee.  Previous history of sciatica and a cyst in the back that was removed. Has history of arthritis of both of her knees received injection longtime ago more than a year (lubrication shot).  She is not having too much pain in the right knee unable to fully extend the left knee difficulty with shopping and walking.  Pain roughly 4/10.  Denies loss of bowel bladder control.  Walking distance is seems to hurt approximately 200 ft.  She is on blood thinners and unable to take NSAIDs.  She takes 1 little Tylenol occasionally and does not help.    01/27/2020.  Left knee injection of Depo-Medrol seems to have helped this patient.  Given 01/03/2020.  Just started physical therapy and that seems to help also.  The Flexeril did not seem to work at all.  Already been 5 times to physical therapy and she has at least 3 more weeks to go.  Very pleased so far with her results.  Patient cannot take NSAIDs due to being on blood thinners.  Pain level 0/10.  Does have occasional discomfort right greater trochanteric area    02/13/2020  Patient with bilateral knee arthrosis left worse than right and sacroiliac pain.  She just finished her physical therapy and that seems to have helped and now she is doing independent exercise program.  We did inject the knees with steroid that seems to help.  She is ambulating without any assistive devices today. He is here to have Synvisc-One injection into the left knee.    05/22/2020  Patient complaining today of left hip pain/she points over the lumbar area radiating to the posterior aspect of the knee.  She has history of lumbosacral arthritis.  As far as her left knee injection of Synvisc-One she is not having any pain with that.  She is very  happy with that result.  She wants something done and 0 not want any prescriptions for any pills.  Denies loss of bowel bladder control or usage or any assistive devices to ambulate.  She is doing independent exercise as well as showing me that her  created exercise program for her.  I did recommend stationary bicycle and independent stretching exercises.  Denies loss of bowel bladder control and fever or chills or shortness of breath or chest pain    07/09/2020  Patient received trigger point injection of the lumbar spine 05/22/2020 with some good relief.  Now she is having more pain in the left knee.  She had received steroid injection 01/03/2020 and Synvisc-One in 02/13/2020.  She feels the knee is tight she tries to exercise it on her own.  Having sharp pain on the medial side.  Pain level is 3/10 but with gait it gets worst.  Denies any fever or chills and maintaining social distancing and wearing her own mask.  She would like some relief from the tightness and stiffness in the left knee but wants to avoid taking any medications besides a little Tylenol because she is on blood thinners and unable to take NSAIDs.    01/04/2020   Left knee pain and stiffness 6/10, low back pain / points on the left sacroiliac area also.  She is requesting injection in both areas.  Patient did have trigger point injection 05/22/2020 in the lumbar area which helped also had steroid injection in July 2020 which seems to have helped in the knee.  Also previous history of Synvisc-One injection 02/13/2020.  Tylenol does not seem to help.  Denies any fever or chills or shortness of breath or difficulty with chewing or swallowing loss of bowel bladder control blurry vision or double vision or loss sense smell or taste.  She is here with her significant other.  Asking about getting the COVID  Vaccine.  She is 85 years old and I told if her  She qualifies should be able to get it soon    02/08/2021  Right lumbar trigger point  tenderness which was injected awhile ago with relief on 05/22/2020 now it is hurting her a little bit more.  I did tell her since she is here today to receive viscosupplementation/approved only Monovisc not Synvisc this time we will hold off on trigger point injection.  Pain is 5/10 in the left knee.  The right knee is doing okay.  She is ambulating without any assistive devices.  There was a little discussion about difference is with me in Monovisc and Synvisc and put simply I told them it is like having a car with different brands.  Patient is maintaining her activity level.  She still did not get her COVID vaccine due to in availability of the vaccine.  Denies any fever or chills or shortness of breath or difficulty with chewing or swallowing or loss of bowel bladder control blurry vision double vision loss sense smell or taste.  She is ambulating without any assistive devices    04/01/2021  Low back pain which is severe we injected trigger point on 05/22/2020 with some relief.  Went over her x-rays in the electronic records today showing severe facet arthropathy and degenerative disc disease with large bone spur overgrowth.  That bothers her a lot with radiation down her legs a cannot sleep.  She is requesting may be something to help with the radicular symptoms and give her some sleep and rest as far as the Monovisc injection into the left knee that did not seem to help at all.  The steroid injection seems to work better for her and requested another injection.  She did go through therapy before.  She feels sciatica to the right leg as far as the right knee is concerned is hurting but not as much as the left knee  Monovisc injection 02/08/2021 into the left knee did not seem to help.  Voltaren gel she uses on occasion.  No fever no chills or shortness of breath or difficulty with chewing or swallowing loss of bowel bladder control blurry vision double vision or loss of sense smell or taste  Did did receive her  COVID-19 vaccine    07/02/2021  Patient is having low back pain with sciatic type of pain radiating down both legs.  Today will obtaining x-rays of her spine as well as the pelvis and hips and the knees.  The knees and not hurting her as much and she is not requesting any injections.  We did try Monovisc on the left knee but did not help and the steroid seems to work for her.  I did mention in the future maybe she would need long-acting steroid like  zilretta.  She is here with her daughter in law.  She is having some hip pains.  Able to manage it.  Her pain is 5/10 ambulating slowly without any assistive devices.  Cannot take any NSAIDs by mouth since she is on aspirin and Plavix and home cardiac evaluation by cardiologist.  No fever no chills no shortness of breath or difficulty with chewing or swallowing loss of bowel bladder control or blurry vision double vision loss sense smell or taste  She did have diclofenac gel at home but has not been using it appropriately  She states she is using SALONPAS which seems to help the back  Last visit we gave her cyclobenzaprine she is not taking it      10/18/2021  Severe low back pain left side more than the right.  We did have x-rays on her from last visit that showed sacroiliac arthritic changes sacroiliitis.  We did give her sacroiliac injection more than a year ago with some relief.  She also have pain in the knees and in the back specially on the left side a worried if it is sciatica or not.  She does have x-rays of severe arthritis in the spine also.  She usually gets steroid injections which help her for a little while.  She had been tried on viscosupplementation once without success.  She had sustained a little fall on 10/07/2021 but no major injury.  She is ambulating without any assistive devices.  No fever no chills no shortness of breath or difficulty with chewing or swallowing  She is using diclofenac gel as well as Salonpas patches.    11/08/2021  Injection of  the trigger point in the lower part of her back helped for 10 days.  That seems to come back now and hurts her a little bit more than her knee.  As far as the right knee she is doing okay the left knee is the 1 that bothers her.  She has been losing some range of motion since last visit we see her.  Her pain is around 6/10 in the sitting position and goes up with walking.  At this point I did tell her I do not want to give her 2 injections and not knowing which 1 will give her a reaction.  We have her approved to receive the left knee Zilreta injection.  The pros and cons discussed in details.  No loss of bowel bladder control or fever or chills or shortness of breath or difficulty with chewing or swallowing loss of bowel bladder control or loss of sense smell or taste      12/02/2021  Patient stated left knee steroid injections/ Zilreta seems to have helped so far.  Not bother her as much.  What bothers her is her lower part of her back on the left side there is a trigger point that seems to be the area.  She her  marked with a pen.  She is ambulating slowly without any assistive devices.  She would like to have that trigger point injected despite the fact her pain in her knee is 3/10.  Overall it seems the steroid injection given last visit in the knee seems to work so far  No fever no chills no shortness of breath or difficulty with chewing or swallowing loss of bowel bladder control    02/7/22  Complaining over left greater trochanteric and right sacroiliac area pain.  The left knee still doing okay received zilretta injection in November 2021 and is still helping.  She is requesting injections over the areas that hurts the most.  She has fluctuating blood pressure I did tell her I will not give but total 80 mg Depo-Medrol and we will split it into 2 areas.  She is ambulating without assistive devices.  We did get her approved for the long-acting steroid injection into the left knee but she says ri it  since you doing okay.  ght now her knee is doing okay and that previous shot still working.  We can always get it approved if needed again.  The approval is until February 27, 2022 however we still do not have to give it if you still doing okay      03/28/2022  On 02/07/2022 in [place] today greater trochanter which seems to have helped however the trigger point/left sacroiliac area that did not seem to help.  Both injections were with Depo-Medrol.  She stated she was changed to nifedipine for blood pressure and that cause severe swelling in the legs.  She has an appointment April 15 to see the cardiologist.  I did tell her this could be angioedema.  As far as her knee is concerned just stiff right now and she is not having much of any pain in it at 0/10.  Her main concern is the lumbar area this seems to have quite a bit of pain in it.  She had marked where the pain is in order to get another injection.  Denies loss of bowel bladder control blurry vision double vision.  She is here with her .  She had tried different topicals without success  Vicks rub seems to be the 1 that works the most  Trigger point pain over the left sacroiliac area approximately 3/10    06/02/2022  Severe left knee pain.  Given zilretta in November 2021 and just wore off on her almost  4 weeks ago.  Her pain is severe in the knee and limiting motion..  As far as the left hip and sacroiliac area on the left side and trigger point she is around 2-3/10.  She still ambulating without assistive devices taking very small steps.  She wants an injection into the left knee.  She stated the long-acting steroid helped quite a bit when we give it last and it was in November.  She would like that repeated.  However she stated today the left knee is hurting a lot she wants an injection today too.  I see no reason why not..  Still having pain over the left sacroiliac area and greater trochanteric area but not severe    06/30/2022  We did inject her  last visit and that did not seem to help as much.  She would like to have the long-acting steroid injection which helped significantly last time.  She still having some hip pain only when she leans on it but not when she is walking.  Her back is doing little bit better.  She does take Tylenol.  Her pain in the knee is 8/10    01/23/2023   Patient is complaining of right calf burning and discomfort.  This is been going on for several weeks.  I have not seen her because she was taking care of her  received radiation treatment.  She is here with him.  She is alert oriented x3.  Now the right and the left knee both of them are hurting.  She is using a cane to get around.  Her pain is 8/10.  Usually most of her problem is the left knee and sacroiliac area but today the back is not bothering her as much as the right knee and right calf as well as left knee.  She is using a cane to get around.  Despite the fact she still having left sacroiliac and greater trochanteric discomfort the knees are coming 1st today.    No fever no chills no shortness of breath difficulty with chewing or swallowing loss of bowel bladder control   She has family and children that could take her to doctor's.    03/16/2023   She is having cramps in the back of her right thigh and she has severe arthritis unable to fully extend the knee.  The Flexeril/cyclobenzaprine did not seem to help.  Today decided to change her to methocarbamol went over it with her.  Her lower part of her back is hurting and she stated that the injection to both of her knees helped last time of Depo-Medrol.  She does have arthritis we did obtain ultrasound last time was negative for DVT in the lower extremity.  She is on Plavix and aspirin.  Her pain is around 10/10 right now and I think at this time we probably start as her morning stiffness and achiness in the shoulder and her knees  She is 87 she does not want undergo surgery at this time  She is hurting more over  the right greater trochanteric slightly posterior aspect in the piriformis fossa.      04/24/2023   Right hip bursitis injected 03/16/2023 and helped until a week ago.  I did place her on prednisone 5 mg to take on a bad day as well as methocarbamol as a muscle relaxant.  She did take those for 10 days and made things better.  We went over it electronic record and reviewed her x-rays in the system again.  We reviewed the x-rays of the lumbar spine the pelvis and the knees.  I reviewed the electronic records she received cortisone injection on 01/23/2023 in both of her knees Depo-Medrol.  And does seems to have helped but now they wore off.    Both of her knees are what is causing her more pain as well as numbness and tingling in both of her legs which is something new she never complained about before.  I did tell her she does have a pinched nerve in the back but I do not want to place her on anymore medication like gabapentin which could pleased with her head at her age of 87.  She is very smart alert oriented.  She is worried about taking too much prednisone makes her hungry and gained weight.  No fever no chills no shortness of breath or difficulty with chewing swallowing loss of bowel bladder control   She uses a cane to walk around and she tells take small steps but able to do it.    As far as the sacroiliac joint is not bothering her at this time  Past Medical History:   Diagnosis Date    Acute pancreatitis without infection or necrosis 12/5/2021    Anxiety     Arthritis     Atherosclerosis of native coronary artery of native heart with angina pectoris 8/8/2019    Cancer     Degenerative disc disease     Depression     Essential hypertension 10/26/2021    GERD (gastroesophageal reflux disease)     Glaucoma suspect of both eyes     Hyperlipidemia     Insomnia 4/6/2021    Low back derangement syndrome 12/11/2017    Melanoma     Pneumonia due to other staphylococcus      Past Surgical History:   Procedure  Laterality Date    ADENOIDECTOMY      CHOLECYSTECTOMY  1973    SPINE SURGERY  2/6/13    L4-L5  cyst removed andfor sciatica    TONSILLECTOMY  1945     Family History   Problem Relation Age of Onset    Arthritis Mother     Hypertension Mother     Stroke Mother     Glaucoma Mother     Heart disease Father 56        fatal MI    Diabetes Brother     Cancer Son 56        melanoma with mets to lung and bone    Arthritis Sister     Hyperlipidemia Neg Hx      Social History     Socioeconomic History    Marital status:      Spouse name: fabio    Number of children: 3   Tobacco Use    Smoking status: Never    Smokeless tobacco: Never   Substance and Sexual Activity    Alcohol use: No     Alcohol/week: 0.0 standard drinks    Drug use: No    Sexual activity: Not Currently   Social History Narrative    . [employer] coffee only. No living will or advanced directive-copy of information given.      Medication List with Changes/Refills   Current Medications    ACETAMINOPHEN (TYLENOL) 650 MG TBSR    Take 1 tablet (650 mg total) by mouth every 8 (eight) hours.    ASPIRIN (ECOTRIN) 81 MG EC TABLET    Take 1 tablet by mouth Daily.    ATORVASTATIN (LIPITOR) 40 MG TABLET    Take 1 tablet by mouth every evening.    BETAMETHASONE VALERATE 0.1% (VALISONE) 0.1 % OINT    Apply topically 2 (two) times daily.    CITALOPRAM (CELEXA) 20 MG TABLET    Take 1 tablet (20 mg total) by mouth once daily.    CLOPIDOGREL (PLAVIX) 75 MG TABLET    Take 75 mg by mouth once daily.    COLCHICINE (COLCRYS) 0.6 MG TABLET    Take 0.6 mg by mouth daily as needed.    CYCLOBENZAPRINE (FLEXERIL) 10 MG TABLET    Take 10 mg by mouth 3 (three) times daily as needed.    FUROSEMIDE (LASIX) 40 MG TABLET    20 mg.    IRBESARTAN (AVAPRO) 150 MG TABLET    Take 150 mg by mouth.    ISOSORBIDE MONONITRATE (IMDUR) 60 MG 24 HR TABLET        MULTIVITAMIN WITH MINERALS TABLET        NITROGLYCERIN (NITROSTAT) 0.4 MG SL TABLET    DISSOLVE ONE TABLET UNDER THE TONGUE EVERY 5  MINUTES AS NEEDED FOR CHEST PAIN. DO NOT EXCEED A TOTAL OF 3 DOSES IN 15 MINUTES    PANTOPRAZOLE (PROTONIX) 40 MG TABLET    Take 1 tablet (40 mg total) by mouth once daily.    PREDNISONE (DELTASONE) 5 MG TABLET    Take 1 tablet (5 mg total) by mouth once daily.     Review of patient's allergies indicates:   Allergen Reactions    Hydrocodone-acetaminophen Nausea And Vomiting     Other reaction(s): Vomiting    Diclofenac Nausea And Vomiting and Other (See Comments)     Review of Systems   Constitutional: Negative for decreased appetite.   HENT: Negative for tinnitus.    Eyes: Negative for double vision.   Cardiovascular: Negative for chest pain.   Respiratory: Negative for wheezing.    Hematologic/Lymphatic: Negative for bleeding problem.   Skin: Negative for dry skin.   Musculoskeletal: Positive for arthritis, back pain, joint pain, muscle weakness and stiffness. Negative for gout and neck pain.   Gastrointestinal: Negative for abdominal pain.   Genitourinary: Negative for bladder incontinence.   Neurological: Positive for numbness. Negative for paresthesias and sensory change.   Psychiatric/Behavioral: Negative for altered mental status.       Objective:   Body mass index is 26.3 kg/m².  There were no vitals filed for this visit.       General    Constitutional: She is oriented to person, place, and time. She appears well-developed.   HENT:   Head: Atraumatic.   Eyes: EOM are normal.   Cardiovascular: Normal rate.    Pulmonary/Chest: Effort normal.   Neurological: She is alert and oriented to person, place, and time.   Psychiatric: Judgment normal.            Cervical spine rotation is functional but  limited due to stiffness  Lumbar with loss of lordosis and tenderness around the L4-5 and mostly over the left sacroiliac joint which is the area of severe tenderness and paraspinal.  Forward bending barely to proximal tibia lateral bending to mid thigh.  Pelvis is level.  Passive hip motion within normal limits.  Hip  flexors and abductors as well as quads and hamstrings and ankle extensors and flexors are slightly weak at 5-/5.  Tenderness over the greater trochanter over the right and left hip   Right knee range of motion 5-120.  Slight crepitus to AP compression on the patella in on medial joint.  Very mild swelling.  Collaterals and cruciate stable.  Left knee with approximately 10 ° of flexion contracture and flexion barely to 70°.  Moderate swelling.  Collaterals and cruciate stable. Crepitus with motion.  Pain in medially to palpation.  Also tender to put was patient over the patella with severe crepitus  Calves are soft nontender on the left slightly tender on the right  Slight varicosities around the calves  DP 1+  Skin is warm to touch no obvious lesions      Relevant imaging results reviewed and interpreted by me, discussed with the patient and / or family today   Ultrasound 01/26/2023 of lower extremity was negative for DVT/patient on Plavix and aspirin  X-ray 01/23/2023 bilateral knees with severe osteopenic bone.  There is loss of medial joint space bilaterally and marginal osteophytic changes.  There is no fracture seen consistent with arthritis    X-ray bilateral knees with left knee complete loss of medial joint space with osteophytic changes and sclerosis.  Same on the right knee but not as bad consistent with bilateral end-stage arthrosis.  07/02/2021  X-ray of the pelvis showing some degenerative changes on the left hip with good joint space left.  Right hip no joint space loss consistent with some arthrosis of the  hip.  Mild chronic degenerative changes in the sacroiliac joints 07/02/2021  X-ray of the lumbar spine in the electronic records multilevel facet arthropathy and osteophytes.  Anterior multilevel spurring.  Degenerative disc disease 07/02/2021  Assessment:     Encounter Diagnoses   Name Primary?    Arthritis of knee, left Yes    Acquired varus deformity knee, left     Arthritis of knee, right      Acquired varus deformity knee, right     Sacroiliitis     Arthritis of left hip     Peripheral arterial disease     Lumbar facet arthropathy     Right calf pain         Plan:   Arthritis of knee, left    Acquired varus deformity knee, left    Arthritis of knee, right    Acquired varus deformity knee, right    Sacroiliitis    Arthritis of left hip    Peripheral arterial disease    Lumbar facet arthropathy    Right calf pain         Patient Instructions   You may take the methocarbamol/muscle relaxant on as needed for spasms as well as if needed for sleep   You having numbness in the feet and that is secondary to your back because you not diabetic and your x-rays on your back show multilevel arthritic condition   You have prednisone at home 5 mg and does steroids take him on a bad day  I injected both of her knees each with 80 mg Depo-Medrol mixed with 5 cc 1% lidocaine  Ice does knees the next few days   I will see you back in 8 weeks   I went and reviewed all her x-rays in the lumbar spine showing multilevel facet arthropathy and some degenerative disc disease.  I also reviewed her pelvis film showing very mild degenerative radiation of her hips.  The x-rays on her knees showing severe arthritic condition.  Now she is complaining of numbness and tingling in the legs and I did tell her could be secondary to lumbar issues and at her age I do not want to add any medications that will make her get confused like gout gabapentin.  She does not have diabetes and most likely her problem is secondary to the lumbar pinched nerves.  She does get cramps in the back of her legs and we did put her on methocarbamol to take as needed.  We also discussed prednisone that she takes only if needed.  We did discuss the pros and cons of steroid injections into the knees.  She can have does once every 10-12 weeks.  I do not want a place her on NSAIDs she is on Plavix and aspirin and I think prednisone is ideal for her at her  age.  Disclaimer:is note was prepared using a voice recognition system and is likely to have sound alike errors within the text.

## 2023-05-03 ENCOUNTER — LAB VISIT (OUTPATIENT)
Dept: LAB | Facility: HOSPITAL | Age: 88
End: 2023-05-03
Attending: PHYSICIAN ASSISTANT
Payer: MEDICARE

## 2023-05-03 DIAGNOSIS — E78.2 MIXED HYPERLIPIDEMIA: ICD-10-CM

## 2023-05-03 DIAGNOSIS — R79.89 ELEVATED TSH: ICD-10-CM

## 2023-05-03 DIAGNOSIS — R94.6 ABNORMAL RESULTS OF THYROID FUNCTION STUDIES: ICD-10-CM

## 2023-05-03 LAB
ALBUMIN SERPL BCP-MCNC: 4.1 G/DL (ref 3.5–5.2)
ALP SERPL-CCNC: 90 U/L (ref 55–135)
ALT SERPL W/O P-5'-P-CCNC: 14 U/L (ref 10–44)
ANION GAP SERPL CALC-SCNC: 7 MMOL/L (ref 8–16)
AST SERPL-CCNC: 14 U/L (ref 10–40)
BILIRUB SERPL-MCNC: 0.8 MG/DL (ref 0.1–1)
BUN SERPL-MCNC: 21 MG/DL (ref 8–23)
CALCIUM SERPL-MCNC: 10 MG/DL (ref 8.7–10.5)
CHLORIDE SERPL-SCNC: 100 MMOL/L (ref 95–110)
CHOLEST SERPL-MCNC: 119 MG/DL (ref 120–199)
CHOLEST/HDLC SERPL: 1.9 {RATIO} (ref 2–5)
CO2 SERPL-SCNC: 33 MMOL/L (ref 23–29)
CREAT SERPL-MCNC: 0.7 MG/DL (ref 0.5–1.4)
EST. GFR  (NO RACE VARIABLE): >60 ML/MIN/1.73 M^2
GLUCOSE SERPL-MCNC: 90 MG/DL (ref 70–110)
HDLC SERPL-MCNC: 63 MG/DL (ref 40–75)
HDLC SERPL: 52.9 % (ref 20–50)
LDLC SERPL CALC-MCNC: 41.6 MG/DL (ref 63–159)
NONHDLC SERPL-MCNC: 56 MG/DL
POTASSIUM SERPL-SCNC: 4.3 MMOL/L (ref 3.5–5.1)
PROT SERPL-MCNC: 6.7 G/DL (ref 6–8.4)
SODIUM SERPL-SCNC: 140 MMOL/L (ref 136–145)
T4 FREE SERPL-MCNC: 0.87 NG/DL (ref 0.71–1.51)
TRIGL SERPL-MCNC: 72 MG/DL (ref 30–150)
TSH SERPL DL<=0.005 MIU/L-ACNC: 3.41 UIU/ML (ref 0.4–4)

## 2023-05-03 PROCEDURE — 84443 ASSAY THYROID STIM HORMONE: CPT | Performed by: PHYSICIAN ASSISTANT

## 2023-05-03 PROCEDURE — 36415 COLL VENOUS BLD VENIPUNCTURE: CPT | Performed by: PHYSICIAN ASSISTANT

## 2023-05-03 PROCEDURE — 80061 LIPID PANEL: CPT | Performed by: PHYSICIAN ASSISTANT

## 2023-05-03 PROCEDURE — 84439 ASSAY OF FREE THYROXINE: CPT | Performed by: PHYSICIAN ASSISTANT

## 2023-05-03 PROCEDURE — 80053 COMPREHEN METABOLIC PANEL: CPT | Performed by: PHYSICIAN ASSISTANT

## 2023-05-10 ENCOUNTER — OFFICE VISIT (OUTPATIENT)
Dept: INTERNAL MEDICINE | Facility: CLINIC | Age: 88
End: 2023-05-10
Payer: MEDICARE

## 2023-05-10 VITALS
SYSTOLIC BLOOD PRESSURE: 136 MMHG | HEART RATE: 68 BPM | DIASTOLIC BLOOD PRESSURE: 72 MMHG | BODY MASS INDEX: 26.01 KG/M2 | WEIGHT: 161.81 LBS | TEMPERATURE: 98 F | RESPIRATION RATE: 19 BRPM | OXYGEN SATURATION: 92 % | HEIGHT: 66 IN

## 2023-05-10 DIAGNOSIS — M85.88 OSTEOPENIA OF SPINE: ICD-10-CM

## 2023-05-10 DIAGNOSIS — F33.42 RECURRENT MAJOR DEPRESSIVE DISORDER, IN FULL REMISSION: Primary | ICD-10-CM

## 2023-05-10 DIAGNOSIS — E78.2 MIXED HYPERLIPIDEMIA: ICD-10-CM

## 2023-05-10 DIAGNOSIS — I10 ESSENTIAL HYPERTENSION: ICD-10-CM

## 2023-05-10 DIAGNOSIS — K21.9 GASTROESOPHAGEAL REFLUX DISEASE WITHOUT ESOPHAGITIS: ICD-10-CM

## 2023-05-10 DIAGNOSIS — I70.0 ATHEROSCLEROSIS OF AORTA: ICD-10-CM

## 2023-05-10 DIAGNOSIS — H61.21 IMPACTED CERUMEN OF RIGHT EAR: ICD-10-CM

## 2023-05-10 DIAGNOSIS — M46.1 SACROILIITIS: ICD-10-CM

## 2023-05-10 PROBLEM — R79.89 ELEVATED TSH: Status: RESOLVED | Noted: 2022-11-10 | Resolved: 2023-05-10

## 2023-05-10 PROCEDURE — 99999 PR PBB SHADOW E&M-EST. PATIENT-LVL V: ICD-10-PCS | Mod: PBBFAC,,, | Performed by: PHYSICIAN ASSISTANT

## 2023-05-10 PROCEDURE — 1101F PR PT FALLS ASSESS DOC 0-1 FALLS W/OUT INJ PAST YR: ICD-10-PCS | Mod: CPTII,S$GLB,, | Performed by: PHYSICIAN ASSISTANT

## 2023-05-10 PROCEDURE — 1159F MED LIST DOCD IN RCRD: CPT | Mod: CPTII,S$GLB,, | Performed by: PHYSICIAN ASSISTANT

## 2023-05-10 PROCEDURE — 1159F PR MEDICATION LIST DOCUMENTED IN MEDICAL RECORD: ICD-10-PCS | Mod: CPTII,S$GLB,, | Performed by: PHYSICIAN ASSISTANT

## 2023-05-10 PROCEDURE — 1126F PR PAIN SEVERITY QUANTIFIED, NO PAIN PRESENT: ICD-10-PCS | Mod: CPTII,S$GLB,, | Performed by: PHYSICIAN ASSISTANT

## 2023-05-10 PROCEDURE — 99999 PR PBB SHADOW E&M-EST. PATIENT-LVL V: CPT | Mod: PBBFAC,,, | Performed by: PHYSICIAN ASSISTANT

## 2023-05-10 PROCEDURE — 3288F PR FALLS RISK ASSESSMENT DOCUMENTED: ICD-10-PCS | Mod: CPTII,S$GLB,, | Performed by: PHYSICIAN ASSISTANT

## 2023-05-10 PROCEDURE — 3288F FALL RISK ASSESSMENT DOCD: CPT | Mod: CPTII,S$GLB,, | Performed by: PHYSICIAN ASSISTANT

## 2023-05-10 PROCEDURE — 1126F AMNT PAIN NOTED NONE PRSNT: CPT | Mod: CPTII,S$GLB,, | Performed by: PHYSICIAN ASSISTANT

## 2023-05-10 PROCEDURE — 99214 PR OFFICE/OUTPT VISIT, EST, LEVL IV, 30-39 MIN: ICD-10-PCS | Mod: S$GLB,,, | Performed by: PHYSICIAN ASSISTANT

## 2023-05-10 PROCEDURE — 1101F PT FALLS ASSESS-DOCD LE1/YR: CPT | Mod: CPTII,S$GLB,, | Performed by: PHYSICIAN ASSISTANT

## 2023-05-10 PROCEDURE — 99214 OFFICE O/P EST MOD 30 MIN: CPT | Mod: S$GLB,,, | Performed by: PHYSICIAN ASSISTANT

## 2023-05-10 PROCEDURE — 1160F RVW MEDS BY RX/DR IN RCRD: CPT | Mod: CPTII,S$GLB,, | Performed by: PHYSICIAN ASSISTANT

## 2023-05-10 PROCEDURE — 1160F PR REVIEW ALL MEDS BY PRESCRIBER/CLIN PHARMACIST DOCUMENTED: ICD-10-PCS | Mod: CPTII,S$GLB,, | Performed by: PHYSICIAN ASSISTANT

## 2023-05-10 RX ORDER — ASCORBIC ACID 500 MG
TABLET,CHEWABLE ORAL DAILY
COMMUNITY

## 2023-05-10 RX ORDER — METHOCARBAMOL 500 MG/1
500 TABLET, FILM COATED ORAL 2 TIMES DAILY PRN
COMMUNITY
End: 2023-11-13

## 2023-05-10 RX ORDER — ERGOCALCIFEROL 1.25 MG/1
CAPSULE ORAL DAILY
COMMUNITY

## 2023-05-10 RX ORDER — GUAIFENESIN AND PHENYLEPHRINE HCL 400; 10 MG/1; MG/1
TABLET ORAL DAILY
COMMUNITY

## 2023-05-10 NOTE — PATIENT INSTRUCTIONS
"The bivalent covid booster is now available. You can schedule an appointment for the vaccine through [facility] or ask  to assist you with scheduling an appointment for the vaccine.    The bivalent vaccines, known as the "Omicron" vaccines, target both the original strain of COVID-19 as well as the Omicron variant, which is now the predominant strain in the [place].    The Omicron booster is authorized for individuals 12 years and older and is given two months after last dose of primary or booster shot.   "

## 2023-05-10 NOTE — ASSESSMENT & PLAN NOTE
/72, controlled, continue lasix, imdur, irbesartan  Lab Results   Component Value Date     05/03/2023    K 4.3 05/03/2023    BUN 21 05/03/2023    CREATININE 0.7 05/03/2023    ESTGFRAFRICA >60.0 05/10/2022    EGFRNONAA >60.0 05/10/2022    EGFRNORACEVR >60.0 05/03/2023

## 2023-05-10 NOTE — PROGRESS NOTES
Subjective:       Patient ID: Tiffanie Barajas is a 87 y.o. female.    Chief Complaint: 6 Month Follow-Up      Patient presents to clinic today for followup of chronic conditions.      Review of Systems   Constitutional:  Negative for chills, fatigue, fever and unexpected weight change.   HENT:  Positive for hearing loss (right ear, acute, h/o cerumen impaction).    Eyes:  Negative for visual disturbance.   Respiratory:  Negative for shortness of breath.    Cardiovascular:  Negative for chest pain.   Musculoskeletal:  Negative for myalgias.   Neurological:  Negative for headaches.     Objective:      Physical Exam  Vitals and nursing note reviewed.   Constitutional:       General: She is not in acute distress.     Appearance: She is well-developed.   HENT:      Head: Normocephalic and atraumatic.   Eyes:      General: Lids are normal. No scleral icterus.     Extraocular Movements: Extraocular movements intact.      Conjunctiva/sclera: Conjunctivae normal.   Cardiovascular:      Rate and Rhythm: Normal rate and regular rhythm.   Pulmonary:      Effort: Pulmonary effort is normal.      Breath sounds: Normal breath sounds. No decreased breath sounds, wheezing, rhonchi or rales.   Neurological:      Mental Status: She is alert.      Cranial Nerves: No cranial nerve deficit.   Psychiatric:         Mood and Affect: Mood and affect normal.       Assessment:       1. Recurrent major depressive disorder, in full remission    2. Sacroiliitis    3. Atherosclerosis of aorta    4. Essential hypertension    5. Mixed hyperlipidemia    6. Gastroesophageal reflux disease without esophagitis    7. Osteopenia of spine    8. Impacted cerumen of right ear        Plan:   1. Recurrent major depressive disorder, in full remission  Assessment & Plan:  Stable on Celexa      2. Sacroiliitis  Overview:  Stable, continue to monitor      3. Atherosclerosis of aorta  Overview:  XR L-spine 12/2017, on ASA and statin      4. Essential  hypertension  Assessment & Plan:  /72, controlled, continue lasix, imdur, irbesartan  Lab Results   Component Value Date     05/03/2023    K 4.3 05/03/2023    BUN 21 05/03/2023    CREATININE 0.7 05/03/2023    ESTGFRAFRICA >60.0 05/10/2022    EGFRNONAA >60.0 05/10/2022    EGFRNORACEVR >60.0 05/03/2023        Orders:  -     TSH; Future; Expected date: 11/10/2023  -     CBC Auto Differential; Future; Expected date: 11/10/2023    5. Mixed hyperlipidemia  Assessment & Plan:  Controlled, continue atorvastatin  Lab Results   Component Value Date    CHOL 119 (L) 05/03/2023    LDLCALC 41.6 (L) 05/03/2023    TRIG 72 05/03/2023    HDL 63 05/03/2023    ALT 14 05/03/2023    AST 14 05/03/2023    ALKPHOS 90 05/03/2023        Orders:  -     Comprehensive Metabolic Panel; Future; Expected date: 11/10/2023  -     Lipid Panel; Future; Expected date: 11/10/2023    6. Gastroesophageal reflux disease without esophagitis  Assessment & Plan:  Stable on protonix      7. Osteopenia of spine  Overview:  DEXA 9/2016    Assessment & Plan:  Declines DEXA imaging      8. Impacted cerumen of right ear  Comments:  Recommended debrox nightly 5-10 drops, if no relief after 1-2 weeks consider ENT referral        Recent labs reviewed with patient.    Declines DEXA scan  Discussed Covid booster, scheduling information given.  Annual with Dr. Merrill scheduled with fasting labs PTA   Health Maintenance reviewed/updated.

## 2023-05-10 NOTE — ASSESSMENT & PLAN NOTE
Controlled, continue atorvastatin  Lab Results   Component Value Date    CHOL 119 (L) 05/03/2023    LDLCALC 41.6 (L) 05/03/2023    TRIG 72 05/03/2023    HDL 63 05/03/2023    ALT 14 05/03/2023    AST 14 05/03/2023    ALKPHOS 90 05/03/2023

## 2023-05-11 RX ORDER — PANTOPRAZOLE SODIUM 40 MG/1
TABLET, DELAYED RELEASE ORAL
Qty: 30 TABLET | Refills: 11 | Status: SHIPPED | OUTPATIENT
Start: 2023-05-11 | End: 2023-11-13 | Stop reason: ALTCHOICE

## 2023-05-18 ENCOUNTER — OFFICE VISIT (OUTPATIENT)
Dept: OBSTETRICS AND GYNECOLOGY | Facility: CLINIC | Age: 88
End: 2023-05-18
Payer: MEDICARE

## 2023-05-18 VITALS
DIASTOLIC BLOOD PRESSURE: 68 MMHG | SYSTOLIC BLOOD PRESSURE: 140 MMHG | HEIGHT: 66 IN | BODY MASS INDEX: 26.75 KG/M2 | WEIGHT: 166.44 LBS

## 2023-05-18 DIAGNOSIS — N81.10 BADEN-WALKER GRADE 2 CYSTOCELE: ICD-10-CM

## 2023-05-18 DIAGNOSIS — Z46.89 ENCOUNTER FOR PESSARY MAINTENANCE: Primary | ICD-10-CM

## 2023-05-18 PROCEDURE — 1159F MED LIST DOCD IN RCRD: CPT | Mod: CPTII,,, | Performed by: OBSTETRICS & GYNECOLOGY

## 2023-05-18 PROCEDURE — 99999 PR PBB SHADOW E&M-EST. PATIENT-LVL IV: CPT | Mod: PBBFAC,,, | Performed by: OBSTETRICS & GYNECOLOGY

## 2023-05-18 PROCEDURE — 3288F FALL RISK ASSESSMENT DOCD: CPT | Mod: CPTII,,, | Performed by: OBSTETRICS & GYNECOLOGY

## 2023-05-18 PROCEDURE — 1101F PR PT FALLS ASSESS DOC 0-1 FALLS W/OUT INJ PAST YR: ICD-10-PCS | Mod: CPTII,,, | Performed by: OBSTETRICS & GYNECOLOGY

## 2023-05-18 PROCEDURE — 99213 OFFICE O/P EST LOW 20 MIN: CPT | Mod: ,,, | Performed by: OBSTETRICS & GYNECOLOGY

## 2023-05-18 PROCEDURE — 1126F AMNT PAIN NOTED NONE PRSNT: CPT | Mod: CPTII,,, | Performed by: OBSTETRICS & GYNECOLOGY

## 2023-05-18 PROCEDURE — 1159F PR MEDICATION LIST DOCUMENTED IN MEDICAL RECORD: ICD-10-PCS | Mod: CPTII,,, | Performed by: OBSTETRICS & GYNECOLOGY

## 2023-05-18 PROCEDURE — 99214 OFFICE O/P EST MOD 30 MIN: CPT | Performed by: OBSTETRICS & GYNECOLOGY

## 2023-05-18 PROCEDURE — 99213 PR OFFICE/OUTPT VISIT, EST, LEVL III, 20-29 MIN: ICD-10-PCS | Mod: ,,, | Performed by: OBSTETRICS & GYNECOLOGY

## 2023-05-18 PROCEDURE — 3288F PR FALLS RISK ASSESSMENT DOCUMENTED: ICD-10-PCS | Mod: CPTII,,, | Performed by: OBSTETRICS & GYNECOLOGY

## 2023-05-18 PROCEDURE — 1126F PR PAIN SEVERITY QUANTIFIED, NO PAIN PRESENT: ICD-10-PCS | Mod: CPTII,,, | Performed by: OBSTETRICS & GYNECOLOGY

## 2023-05-18 PROCEDURE — 99999 PR PBB SHADOW E&M-EST. PATIENT-LVL IV: ICD-10-PCS | Mod: PBBFAC,,, | Performed by: OBSTETRICS & GYNECOLOGY

## 2023-05-18 PROCEDURE — 1101F PT FALLS ASSESS-DOCD LE1/YR: CPT | Mod: CPTII,,, | Performed by: OBSTETRICS & GYNECOLOGY

## 2023-05-18 RX ORDER — FUROSEMIDE 40 MG/1
1 TABLET ORAL EVERY MORNING
COMMUNITY
Start: 2023-05-16 | End: 2023-11-13

## 2023-05-18 NOTE — PROGRESS NOTES
Subjective:      Patient ID: Tiffanie Barajas is a 87 y.o. female.    Chief Complaint:  Pessary Check      History of Present Illness  HPI  Presents for pessary check.  Wears a #4 ring pessary with support for cystocele and rectocele.  Doing well.  No pain or bleeding.      GYN & OB History  Patient's last menstrual period was 1990.   Date of Last Pap: No result found    OB History    Para Term  AB Living   2 1       2   SAB IAB Ectopic Multiple Live Births         1 1      # Outcome Date GA Lbr Dwayne/2nd Weight Sex Delivery Anes PTL Lv   2 Para      Vag-Spont   EMILIANO   1A       Vag-Spont      1B       Vag-Spont          Review of Systems  Review of Systems       Objective:     Physical Exam:   Constitutional: She is oriented to person, place, and time. She appears well-developed and well-nourished. No distress.             Abdominal: Soft. She exhibits no distension and no mass. There is no abdominal tenderness. There is no rebound and no guarding. Hernia confirmed negative in the right inguinal area and confirmed negative in the left inguinal area.     Genitourinary:    Vagina normal.      Pelvic exam was performed with patient supine.   There is no rash, tenderness, lesion or injury on the right labia. There is no rash, tenderness, lesion or injury on the left labia. Right adnexum displays no mass, no tenderness and no fullness. Left adnexum displays no mass, no tenderness and no fullness. There is rectocele (grade 1) and cystocele (grade 2) in the vagina. No erythema,  no vaginal discharge, tenderness, bleeding or unspecified prolapse of vaginal walls in the vagina.    No foreign body in the vagina.      No signs of injury in the vagina.   Cervix exhibits no motion tenderness, no discharge and no friability. Uterus is not deviated, not enlarged, not fixed and not tender.    Genitourinary Comments: Pessary removed without difficulty, washed with soap and water, and replaced  without difficulty                 Neurological: She is alert and oriented to person, place, and time.     Psychiatric: She has a normal mood and affect.       Assessment:     1. Encounter for pessary maintenance    2. [place] grade 2 cystocele               Plan:     Tiffanie was seen today for pessary check.    Diagnoses and all orders for this visit:    Encounter for pessary maintenance    [place] grade 2 cystocele     Doing well.  RTC 4 months for pessary check.

## 2023-09-12 ENCOUNTER — TELEPHONE (OUTPATIENT)
Dept: OBSTETRICS AND GYNECOLOGY | Facility: CLINIC | Age: 88
End: 2023-09-12
Payer: MEDICARE

## 2023-09-18 ENCOUNTER — OFFICE VISIT (OUTPATIENT)
Dept: ORTHOPEDICS | Facility: CLINIC | Age: 88
End: 2023-09-18
Payer: MEDICARE

## 2023-09-18 VITALS — WEIGHT: 166 LBS | BODY MASS INDEX: 26.68 KG/M2 | HEIGHT: 66 IN

## 2023-09-18 DIAGNOSIS — I73.9 PERIPHERAL ARTERIAL DISEASE: ICD-10-CM

## 2023-09-18 DIAGNOSIS — M16.12 ARTHRITIS OF LEFT HIP: ICD-10-CM

## 2023-09-18 DIAGNOSIS — M17.11 ARTHRITIS OF KNEE, RIGHT: ICD-10-CM

## 2023-09-18 DIAGNOSIS — M17.12 ARTHRITIS OF KNEE, LEFT: Primary | ICD-10-CM

## 2023-09-18 DIAGNOSIS — M21.161 ACQUIRED VARUS DEFORMITY KNEE, RIGHT: ICD-10-CM

## 2023-09-18 DIAGNOSIS — M46.1 SACROILIITIS: ICD-10-CM

## 2023-09-18 DIAGNOSIS — M79.661 RIGHT CALF PAIN: ICD-10-CM

## 2023-09-18 DIAGNOSIS — M47.816 LUMBAR FACET ARTHROPATHY: ICD-10-CM

## 2023-09-18 DIAGNOSIS — M21.162 ACQUIRED VARUS DEFORMITY KNEE, LEFT: ICD-10-CM

## 2023-09-18 PROCEDURE — 20610 DRAIN/INJ JOINT/BURSA W/O US: CPT | Mod: 50,HCNC,S$GLB, | Performed by: ORTHOPAEDIC SURGERY

## 2023-09-18 PROCEDURE — 1160F PR REVIEW ALL MEDS BY PRESCRIBER/CLIN PHARMACIST DOCUMENTED: ICD-10-PCS | Mod: HCNC,CPTII,S$GLB, | Performed by: ORTHOPAEDIC SURGERY

## 2023-09-18 PROCEDURE — 3288F PR FALLS RISK ASSESSMENT DOCUMENTED: ICD-10-PCS | Mod: HCNC,CPTII,S$GLB, | Performed by: ORTHOPAEDIC SURGERY

## 2023-09-18 PROCEDURE — 1101F PR PT FALLS ASSESS DOC 0-1 FALLS W/OUT INJ PAST YR: ICD-10-PCS | Mod: HCNC,CPTII,S$GLB, | Performed by: ORTHOPAEDIC SURGERY

## 2023-09-18 PROCEDURE — 99213 PR OFFICE/OUTPT VISIT, EST, LEVL III, 20-29 MIN: ICD-10-PCS | Mod: 25,HCNC,S$GLB, | Performed by: ORTHOPAEDIC SURGERY

## 2023-09-18 PROCEDURE — 99999 PR PBB SHADOW E&M-EST. PATIENT-LVL IV: ICD-10-PCS | Mod: PBBFAC,HCNC,, | Performed by: ORTHOPAEDIC SURGERY

## 2023-09-18 PROCEDURE — 1160F RVW MEDS BY RX/DR IN RCRD: CPT | Mod: HCNC,CPTII,S$GLB, | Performed by: ORTHOPAEDIC SURGERY

## 2023-09-18 PROCEDURE — 1159F MED LIST DOCD IN RCRD: CPT | Mod: HCNC,CPTII,S$GLB, | Performed by: ORTHOPAEDIC SURGERY

## 2023-09-18 PROCEDURE — 1125F AMNT PAIN NOTED PAIN PRSNT: CPT | Mod: HCNC,CPTII,S$GLB, | Performed by: ORTHOPAEDIC SURGERY

## 2023-09-18 PROCEDURE — 3288F FALL RISK ASSESSMENT DOCD: CPT | Mod: HCNC,CPTII,S$GLB, | Performed by: ORTHOPAEDIC SURGERY

## 2023-09-18 PROCEDURE — 1159F PR MEDICATION LIST DOCUMENTED IN MEDICAL RECORD: ICD-10-PCS | Mod: HCNC,CPTII,S$GLB, | Performed by: ORTHOPAEDIC SURGERY

## 2023-09-18 PROCEDURE — 1125F PR PAIN SEVERITY QUANTIFIED, PAIN PRESENT: ICD-10-PCS | Mod: HCNC,CPTII,S$GLB, | Performed by: ORTHOPAEDIC SURGERY

## 2023-09-18 PROCEDURE — 99213 OFFICE O/P EST LOW 20 MIN: CPT | Mod: 25,HCNC,S$GLB, | Performed by: ORTHOPAEDIC SURGERY

## 2023-09-18 PROCEDURE — 99999 PR PBB SHADOW E&M-EST. PATIENT-LVL IV: CPT | Mod: PBBFAC,HCNC,, | Performed by: ORTHOPAEDIC SURGERY

## 2023-09-18 PROCEDURE — 1101F PT FALLS ASSESS-DOCD LE1/YR: CPT | Mod: HCNC,CPTII,S$GLB, | Performed by: ORTHOPAEDIC SURGERY

## 2023-09-18 PROCEDURE — 20610 LARGE JOINT ASPIRATION/INJECTION: BILATERAL KNEE: ICD-10-PCS | Mod: 50,HCNC,S$GLB, | Performed by: ORTHOPAEDIC SURGERY

## 2023-09-18 RX ORDER — METHYLPREDNISOLONE ACETATE 80 MG/ML
80 INJECTION, SUSPENSION INTRA-ARTICULAR; INTRALESIONAL; INTRAMUSCULAR; SOFT TISSUE
Status: DISCONTINUED | OUTPATIENT
Start: 2023-09-18 | End: 2023-09-18 | Stop reason: HOSPADM

## 2023-09-18 RX ADMIN — METHYLPREDNISOLONE ACETATE 80 MG: 80 INJECTION, SUSPENSION INTRA-ARTICULAR; INTRALESIONAL; INTRAMUSCULAR; SOFT TISSUE at 11:09

## 2023-09-18 NOTE — PROGRESS NOTES
Subjective:     Patient ID: Tiffanie Barajas is a 87 y.o. female.    Chief Complaint: Pain of the Left Knee and Pain of the Right Knee    HPI:  84-year-old complaining of pain over the left sacroiliac joint radiating down to her knee.  Previous history of sciatica and a cyst in the back that was removed. Has history of arthritis of both of her knees received injection longtime ago more than a year (lubrication shot).  She is not having too much pain in the right knee unable to fully extend the left knee difficulty with shopping and walking.  Pain roughly 4/10.  Denies loss of bowel bladder control.  Walking distance is seems to hurt approximately 200 ft.  She is on blood thinners and unable to take NSAIDs.  She takes 1 little Tylenol occasionally and does not help.    01/27/2020.  Left knee injection of Depo-Medrol seems to have helped this patient.  Given 01/03/2020.  Just started physical therapy and that seems to help also.  The Flexeril did not seem to work at all.  Already been 5 times to physical therapy and she has at least 3 more weeks to go.  Very pleased so far with her results.  Patient cannot take NSAIDs due to being on blood thinners.  Pain level 0/10.  Does have occasional discomfort right greater trochanteric area    02/13/2020  Patient with bilateral knee arthrosis left worse than right and sacroiliac pain.  She just finished her physical therapy and that seems to have helped and now she is doing independent exercise program.  We did inject the knees with steroid that seems to help.  She is ambulating without any assistive devices today. He is here to have Synvisc-One injection into the left knee.    05/22/2020  Patient complaining today of left hip pain/she points over the lumbar area radiating to the posterior aspect of the knee.  She has history of lumbosacral arthritis.  As far as her left knee injection of Synvisc-One she is not having any pain with that.  She is very happy with that result.   She wants something done and 0 not want any prescriptions for any pills.  Denies loss of bowel bladder control or usage or any assistive devices to ambulate.  She is doing independent exercise as well as showing me that her  created exercise program for her.  I did recommend stationary bicycle and independent stretching exercises.  Denies loss of bowel bladder control and fever or chills or shortness of breath or chest pain    07/09/2020  Patient received trigger point injection of the lumbar spine 05/22/2020 with some good relief.  Now she is having more pain in the left knee.  She had received steroid injection 01/03/2020 and Synvisc-One in 02/13/2020.  She feels the knee is tight she tries to exercise it on her own.  Having sharp pain on the medial side.  Pain level is 3/10 but with gait it gets worst.  Denies any fever or chills and maintaining social distancing and wearing her own mask.  She would like some relief from the tightness and stiffness in the left knee but wants to avoid taking any medications besides a little Tylenol because she is on blood thinners and unable to take NSAIDs.    01/04/2020   Left knee pain and stiffness 6/10, low back pain / points on the left sacroiliac area also.  She is requesting injection in both areas.  Patient did have trigger point injection 05/22/2020 in the lumbar area which helped also had steroid injection in July 2020 which seems to have helped in the knee.  Also previous history of Synvisc-One injection 02/13/2020.  Tylenol does not seem to help.  Denies any fever or chills or shortness of breath or difficulty with chewing or swallowing loss of bowel bladder control blurry vision or double vision or loss sense smell or taste.  She is here with her significant other.  Asking about getting the COVID  Vaccine.  She is 85 years old and I told if her  She qualifies should be able to get it soon    02/08/2021  Right lumbar trigger point tenderness which was injected  awhile ago with relief on 05/22/2020 now it is hurting her a little bit more.  I did tell her since she is here today to receive viscosupplementation/approved only Monovisc not Synvisc this time we will hold off on trigger point injection.  Pain is 5/10 in the left knee.  The right knee is doing okay.  She is ambulating without any assistive devices.  There was a little discussion about difference is with me in Monovisc and Synvisc and put simply I told them it is like having a car with different brands.  Patient is maintaining her activity level.  She still did not get her COVID vaccine due to in availability of the vaccine.  Denies any fever or chills or shortness of breath or difficulty with chewing or swallowing or loss of bowel bladder control blurry vision double vision loss sense smell or taste.  She is ambulating without any assistive devices    04/01/2021  Low back pain which is severe we injected trigger point on 05/22/2020 with some relief.  Went over her x-rays in the electronic records today showing severe facet arthropathy and degenerative disc disease with large bone spur overgrowth.  That bothers her a lot with radiation down her legs a cannot sleep.  She is requesting may be something to help with the radicular symptoms and give her some sleep and rest as far as the Monovisc injection into the left knee that did not seem to help at all.  The steroid injection seems to work better for her and requested another injection.  She did go through therapy before.  She feels sciatica to the right leg as far as the right knee is concerned is hurting but not as much as the left knee  Monovisc injection 02/08/2021 into the left knee did not seem to help.  Voltaren gel she uses on occasion.  No fever no chills or shortness of breath or difficulty with chewing or swallowing loss of bowel bladder control blurry vision double vision or loss of sense smell or taste  Did did receive her COVID-19  vaccine    07/02/2021  Patient is having low back pain with sciatic type of pain radiating down both legs.  Today will obtaining x-rays of her spine as well as the pelvis and hips and the knees.  The knees and not hurting her as much and she is not requesting any injections.  We did try Monovisc on the left knee but did not help and the steroid seems to work for her.  I did mention in the future maybe she would need long-acting steroid like  zilretta.  She is here with her daughter in law.  She is having some hip pains.  Able to manage it.  Her pain is 5/10 ambulating slowly without any assistive devices.  Cannot take any NSAIDs by mouth since she is on aspirin and Plavix and home cardiac evaluation by cardiologist.  No fever no chills no shortness of breath or difficulty with chewing or swallowing loss of bowel bladder control or blurry vision double vision loss sense smell or taste  She did have diclofenac gel at home but has not been using it appropriately  She states she is using SALONPAS which seems to help the back  Last visit we gave her cyclobenzaprine she is not taking it      10/18/2021  Severe low back pain left side more than the right.  We did have x-rays on her from last visit that showed sacroiliac arthritic changes sacroiliitis.  We did give her sacroiliac injection more than a year ago with some relief.  She also have pain in the knees and in the back specially on the left side a worried if it is sciatica or not.  She does have x-rays of severe arthritis in the spine also.  She usually gets steroid injections which help her for a little while.  She had been tried on viscosupplementation once without success.  She had sustained a little fall on 10/07/2021 but no major injury.  She is ambulating without any assistive devices.  No fever no chills no shortness of breath or difficulty with chewing or swallowing  She is using diclofenac gel as well as Salonpas patches.    11/08/2021  Injection of the  trigger point in the lower part of her back helped for 10 days.  That seems to come back now and hurts her a little bit more than her knee.  As far as the right knee she is doing okay the left knee is the 1 that bothers her.  She has been losing some range of motion since last visit we see her.  Her pain is around 6/10 in the sitting position and goes up with walking.  At this point I did tell her I do not want to give her 2 injections and not knowing which 1 will give her a reaction.  We have her approved to receive the left knee Zilreta injection.  The pros and cons discussed in details.  No loss of bowel bladder control or fever or chills or shortness of breath or difficulty with chewing or swallowing loss of bowel bladder control or loss of sense smell or taste      12/02/2021  Patient stated left knee steroid injections/ Zilreta seems to have helped so far.  Not bother her as much.  What bothers her is her lower part of her back on the left side there is a trigger point that seems to be the area.  She her  marked with a pen.  She is ambulating slowly without any assistive devices.  She would like to have that trigger point injected despite the fact her pain in her knee is 3/10.  Overall it seems the steroid injection given last visit in the knee seems to work so far  No fever no chills no shortness of breath or difficulty with chewing or swallowing loss of bowel bladder control    02/7/22  Complaining over left greater trochanteric and right sacroiliac area pain.  The left knee still doing okay received zilretta injection in November 2021 and is still helping.  She is requesting injections over the areas that hurts the most.  She has fluctuating blood pressure I did tell her I will not give but total 80 mg Depo-Medrol and we will split it into 2 areas.  She is ambulating without assistive devices.  We did get her approved for the long-acting steroid injection into the left knee but she says ri it since  you doing okay.  ght now her knee is doing okay and that previous shot still working.  We can always get it approved if needed again.  The approval is until February 27, 2022 however we still do not have to give it if you still doing okay      03/28/2022  On 02/07/2022 in [place] today greater trochanter which seems to have helped however the trigger point/left sacroiliac area that did not seem to help.  Both injections were with Depo-Medrol.  She stated she was changed to nifedipine for blood pressure and that cause severe swelling in the legs.  She has an appointment April 15 to see the cardiologist.  I did tell her this could be angioedema.  As far as her knee is concerned just stiff right now and she is not having much of any pain in it at 0/10.  Her main concern is the lumbar area this seems to have quite a bit of pain in it.  She had marked where the pain is in order to get another injection.  Denies loss of bowel bladder control blurry vision double vision.  She is here with her .  She had tried different topicals without success  Vicks rub seems to be the 1 that works the most  Trigger point pain over the left sacroiliac area approximately 3/10    06/02/2022  Severe left knee pain.  Given zilretta in November 2021 and just wore off on her almost  4 weeks ago.  Her pain is severe in the knee and limiting motion..  As far as the left hip and sacroiliac area on the left side and trigger point she is around 2-3/10.  She still ambulating without assistive devices taking very small steps.  She wants an injection into the left knee.  She stated the long-acting steroid helped quite a bit when we give it last and it was in November.  She would like that repeated.  However she stated today the left knee is hurting a lot she wants an injection today too.  I see no reason why not..  Still having pain over the left sacroiliac area and greater trochanteric area but not severe    06/30/2022  We did inject her last  visit and that did not seem to help as much.  She would like to have the long-acting steroid injection which helped significantly last time.  She still having some hip pain only when she leans on it but not when she is walking.  Her back is doing little bit better.  She does take Tylenol.  Her pain in the knee is 8/10    01/23/2023   Patient is complaining of right calf burning and discomfort.  This is been going on for several weeks.  I have not seen her because she was taking care of her  received radiation treatment.  She is here with him.  She is alert oriented x3.  Now the right and the left knee both of them are hurting.  She is using a cane to get around.  Her pain is 8/10.  Usually most of her problem is the left knee and sacroiliac area but today the back is not bothering her as much as the right knee and right calf as well as left knee.  She is using a cane to get around.  Despite the fact she still having left sacroiliac and greater trochanteric discomfort the knees are coming 1st today.    No fever no chills no shortness of breath difficulty with chewing or swallowing loss of bowel bladder control   She has family and children that could take her to doctor's.    03/16/2023   She is having cramps in the back of her right thigh and she has severe arthritis unable to fully extend the knee.  The Flexeril/cyclobenzaprine did not seem to help.  Today decided to change her to methocarbamol went over it with her.  Her lower part of her back is hurting and she stated that the injection to both of her knees helped last time of Depo-Medrol.  She does have arthritis we did obtain ultrasound last time was negative for DVT in the lower extremity.  She is on Plavix and aspirin.  Her pain is around 10/10 right now and I think at this time we probably start as her morning stiffness and achiness in the shoulder and her knees  She is 87 she does not want undergo surgery at this time  She is hurting more over the  right greater trochanteric slightly posterior aspect in the piriformis fossa.      04/24/2023   Right hip bursitis injected 03/16/2023 and helped until a week ago.  I did place her on prednisone 5 mg to take on a bad day as well as methocarbamol as a muscle relaxant.  She did take those for 10 days and made things better.  We went over it electronic record and reviewed her x-rays in the system again.  We reviewed the x-rays of the lumbar spine the pelvis and the knees.  I reviewed the electronic records she received cortisone injection on 01/23/2023 in both of her knees Depo-Medrol.  And does seems to have helped but now they wore off.    Both of her knees are what is causing her more pain as well as numbness and tingling in both of her legs which is something new she never complained about before.  I did tell her she does have a pinched nerve in the back but I do not want to place her on anymore medication like gabapentin which could pleased with her head at her age of 87.  She is very smart alert oriented.  She is worried about taking too much prednisone makes her hungry and gained weight.  No fever no chills no shortness of breath or difficulty with chewing swallowing loss of bowel bladder control   She uses a cane to walk around and she tells take small steps but able to do it.    As far as the sacroiliac joint is not bothering her at this time    09/18/2023   Bilateral knee severe pain.  She missed her appointment in June because she was not having any pain and then a week or 10 days later she started with the pain and could not make another appointment until now.  She is using a cane now to get around.  She got stuck in traffic because of a major accident and we told her even if she shows a plate that we will see her then we accommodated her.  She felt very appreciative.  Her pain in the knees is 10/10.  She is always worried about getting another appointment in 3 months and if he is doing okay she did not  want to waste might time yet it takes another 3 months to get another appointment.  I did tell her will give her to appointments 1 in 3 months 1 in 4 months to bypass that issue.  An even if she shows up early I will even see her than 2.  No fever no chills no shortness of breath   She has what psoriatic changes in her hands and was told the psoriasis.  I did tell her that psoriasis could cause severe arthritis not the other way around  Past Medical History:   Diagnosis Date    Acute pancreatitis without infection or necrosis 12/5/2021    Anxiety     Arthritis     Atherosclerosis of native coronary artery of native heart with angina pectoris 8/8/2019    Cancer     Degenerative disc disease     Depression     Essential hypertension 10/26/2021    GERD (gastroesophageal reflux disease)     Glaucoma suspect of both eyes     Hyperlipidemia     Insomnia 4/6/2021    Low back derangement syndrome 12/11/2017    Melanoma     Pneumonia due to other staphylococcus      Past Surgical History:   Procedure Laterality Date    ADENOIDECTOMY      CHOLECYSTECTOMY  1973    SPINE SURGERY  2/6/13    L4-L5  cyst removed andfor sciatica    TONSILLECTOMY  1945     Family History   Problem Relation Age of Onset    Arthritis Mother     Hypertension Mother     Stroke Mother     Glaucoma Mother     Heart disease Father 56        fatal MI    Diabetes Brother     Cancer Son 56        melanoma with mets to lung and bone    Arthritis Sister     Hyperlipidemia Neg Hx      Social History     Socioeconomic History    Marital status:      Spouse name: fabio    Number of children: 3   Tobacco Use    Smoking status: Never    Smokeless tobacco: Never   Substance and Sexual Activity    Alcohol use: No     Alcohol/week: 0.0 standard drinks of alcohol    Drug use: No    Sexual activity: Not Currently   Social History Narrative    . Dec[employer] coffee only. No living will or advanced directive-copy of information given.      Medication List with  Changes/Refills   Current Medications    ACETAMINOPHEN (TYLENOL) 650 MG TBSR    Take 1 tablet (650 mg total) by mouth every 8 (eight) hours.    ASCORBIC ACID, VITAMIN C, 500 MG CHEW    Take by mouth once daily.    ASPIRIN (ECOTRIN) 81 MG EC TABLET    Take 1 tablet by mouth Daily.    ATORVASTATIN (LIPITOR) 40 MG TABLET    Take 1 tablet by mouth every evening.    BETAMETHASONE VALERATE 0.1% (VALISONE) 0.1 % OINT    Apply topically 2 (two) times daily.    CITALOPRAM (CELEXA) 20 MG TABLET    Take 1 tablet (20 mg total) by mouth once daily.    CLOPIDOGREL (PLAVIX) 75 MG TABLET    Take 75 mg by mouth once daily.    COLCHICINE (COLCRYS) 0.6 MG TABLET    Take 0.6 mg by mouth daily as needed.    ERGOCALCIFEROL (ERGOCALCIFEROL) 50,000 UNIT CAP    Take by mouth once daily. Patient takes 5,000 units daily    FUROSEMIDE (LASIX) 40 MG TABLET    20 mg.    FUROSEMIDE (LASIX) 40 MG TABLET    Take 1 tablet by mouth every morning.    IRBESARTAN (AVAPRO) 150 MG TABLET    Take 150 mg by mouth.    ISOSORBIDE MONONITRATE (IMDUR) 60 MG 24 HR TABLET        METHOCARBAMOL (ROBAXIN) 500 MG TAB    Take 500 mg by mouth 2 (two) times daily as needed. Patient takes 150mg PRN    MULTIVIT,IRON,MINERALS/LUTEIN (CENTRUM SILVER ULTRA WOMEN'S ORAL)    Take by mouth once daily.    NITROGLYCERIN (NITROSTAT) 0.4 MG SL TABLET    DISSOLVE ONE TABLET UNDER THE TONGUE EVERY 5 MINUTES AS NEEDED FOR CHEST PAIN. DO NOT EXCEED A TOTAL OF 3 DOSES IN 15 MINUTES    PANTOPRAZOLE (PROTONIX) 40 MG TABLET    Take 1 tablet by mouth once daily    PREDNISONE (DELTASONE) 5 MG TABLET    Take 1 tablet (5 mg total) by mouth once daily.    TURMERIC ROOT EXTRACT 500 MG CAP    Take by mouth once daily.    VITAMIN E 200 UNIT CAPSULE    Take 200 Units by mouth once daily.     Review of patient's allergies indicates:   Allergen Reactions    Hydrocodone-acetaminophen Nausea And Vomiting     Other reaction(s): Vomiting    Diclofenac Nausea And Vomiting and Other (See Comments)      Review of Systems   Constitutional: Negative for decreased appetite.   HENT: Negative for tinnitus.    Eyes: Negative for double vision.   Cardiovascular: Negative for chest pain.   Respiratory: Negative for wheezing.    Hematologic/Lymphatic: Negative for bleeding problem.   Skin: Negative for dry skin.   Musculoskeletal: Positive for arthritis, back pain, joint pain, muscle weakness and stiffness. Negative for gout and neck pain.   Gastrointestinal: Negative for abdominal pain.   Genitourinary: Negative for bladder incontinence.   Neurological: Positive for numbness. Negative for paresthesias and sensory change.   Psychiatric/Behavioral: Negative for altered mental status.       Objective:   Body mass index is 26.79 kg/m².  There were no vitals filed for this visit.       General    Constitutional: She is oriented to person, place, and time. She appears well-developed.   HENT:   Head: Atraumatic.   Eyes: EOM are normal.   Cardiovascular: Normal rate.    Pulmonary/Chest: Effort normal.   Neurological: She is alert and oriented to person, place, and time.   Psychiatric: Judgment normal.         Ambulating with a cane   Cervical spine rotation is functional but  limited due to stiffness  Lumbar with loss of lordosis and tenderness around the L4-5 and mostly over the left sacroiliac joint which is the area of severe tenderness and paraspinal.  Forward bending barely to proximal tibia lateral bending to mid thigh.  Pelvis is level.  Passive hip motion within normal limits.  Hip flexors and abductors as well as quads and hamstrings and ankle extensors and flexors are slightly weak at 5-/5.  Tenderness over the greater trochanter over the right and left hip   Right knee range of motion 8-110.  Slight crepitus to AP compression on the patella in on medial joint.  Pain medially and anteriorly and slightly laterally.  No defect in the patella or quadriceps tendon Very mild swelling.  Collaterals and cruciate  stable.  Left knee with approximately 10 ° of flexion contracture and flexion barely to 70°.  Moderate swelling.  Collaterals and cruciate stable. Crepitus with motion.  Pain in medially to palpation.  Also tender to put was patient over the patella with severe crepitus  Calves are soft nontender on the left slightly tender on the right  Slight varicosities around the calves  DP 1+  Skin is warm to touch no obvious lesions      Relevant imaging results reviewed and interpreted by me, discussed with the patient and / or family today   Ultrasound 01/26/2023 of lower extremity was negative for DVT/patient on Plavix and aspirin  X-ray 01/23/2023 bilateral knees with severe osteopenic bone.  There is loss of medial joint space bilaterally and marginal osteophytic changes.  There is no fracture seen consistent with arthritis    X-ray bilateral knees with left knee complete loss of medial joint space with osteophytic changes and sclerosis.  Same on the right knee but not as bad consistent with bilateral end-stage arthrosis.  07/02/2021  X-ray of the pelvis showing some degenerative changes on the left hip with good joint space left.  Right hip no joint space loss consistent with some arthrosis of the  hip.  Mild chronic degenerative changes in the sacroiliac joints 07/02/2021  X-ray of the lumbar spine in the electronic records multilevel facet arthropathy and osteophytes.  Anterior multilevel spurring.  Degenerative disc disease 07/02/2021  Assessment:     Encounter Diagnoses   Name Primary?    Arthritis of knee, left Yes    Acquired varus deformity knee, left     Arthritis of knee, right     Acquired varus deformity knee, right     Sacroiliitis     Arthritis of left hip     Peripheral arterial disease     Lumbar facet arthropathy     Right calf pain         Plan:   Arthritis of knee, left  -     Large Joint Aspiration/Injection: bilateral knee    Acquired varus deformity knee, left    Arthritis of knee, right  -      Large Joint Aspiration/Injection: bilateral knee    Acquired varus deformity knee, right    Sacroiliitis    Arthritis of left hip    Peripheral arterial disease    Lumbar facet arthropathy    Right calf pain         Patient Instructions   Injection of both of her knees each with 80 mg Depo-Medrol mixed with 5 cc 1% lidocaine  Ice the knees the next few days if they bother you  I will see you in 3 months however if you doing okay I will see you a month later   If you show up very early to your appointment I will still see you please let the  know that you are here and check in   I went and reviewed all her x-rays in the lumbar spine showing multilevel facet arthropathy and some degenerative disc disease.  I also reviewed her pelvis film showing very mild degenerative radiation of her hips.  The x-rays on her knees showing severe arthritic condition.  Now she is complaining of numbness and tingling in the legs and I did tell her could be secondary to lumbar issues and at her age I do not want to add any medications that will make her get confused like gout gabapentin.  She does not have diabetes and most likely her problem is secondary to the lumbar pinched nerves.  She does get cramps in the back of her legs and we did put her on methocarbamol to take as needed.  We also discussed prednisone that she takes only if needed.  We did discuss the pros and cons of steroid injections into the knees.  She can have does once every 10-12 weeks.  I do not want a place her on NSAIDs she is on Plavix and aspirin and I think prednisone is ideal for her at her age.  Disclaimer:is note was prepared using a voice recognition system and is likely to have sound alike errors within the text.

## 2023-09-18 NOTE — PATIENT INSTRUCTIONS
Injection of both of her knees each with 80 mg Depo-Medrol mixed with 5 cc 1% lidocaine  Ice the knees the next few days if they bother you  I will see you in 3 months however if you doing okay I will see you a month later   If you show up very early to your appointment I will still see you please let the  know that you are here and check in

## 2023-09-18 NOTE — PROCEDURES
Large Joint Aspiration/Injection: bilateral knee    Date/Time: 9/18/2023 11:00 AM    Performed by: Ranjit Kelly MD  Authorized by: Ranjit Kelly MD    Consent Done?:  Yes (Verbal)  Indications:  Arthritis  Site marked: the procedure site was marked    Timeout: prior to procedure the correct patient, procedure, and site was verified      Local anesthesia used?: Yes    Local anesthetic:  Lidocaine 1% without epinephrine    Details:  Needle Size:  22 G  Ultrasonic Guidance for needle placement?: No    Approach:  Anterolateral  Location:  Knee  Laterality:  Bilateral  Site:  Bilateral knee  Medications (Right):  80 mg methylPREDNISolone acetate 80 mg/mL  Medications (Left):  80 mg methylPREDNISolone acetate 80 mg/mL  Patient tolerance:  Patient tolerated the procedure well with no immediate complications

## 2023-10-23 ENCOUNTER — OFFICE VISIT (OUTPATIENT)
Dept: OBSTETRICS AND GYNECOLOGY | Facility: CLINIC | Age: 88
End: 2023-10-23
Payer: MEDICARE

## 2023-10-23 VITALS
WEIGHT: 168.19 LBS | HEIGHT: 66 IN | SYSTOLIC BLOOD PRESSURE: 124 MMHG | BODY MASS INDEX: 27.03 KG/M2 | DIASTOLIC BLOOD PRESSURE: 64 MMHG

## 2023-10-23 DIAGNOSIS — N81.10 BADEN-WALKER GRADE 2 CYSTOCELE: ICD-10-CM

## 2023-10-23 DIAGNOSIS — Z46.89 ENCOUNTER FOR PESSARY MAINTENANCE: Primary | ICD-10-CM

## 2023-10-23 PROCEDURE — 1101F PR PT FALLS ASSESS DOC 0-1 FALLS W/OUT INJ PAST YR: ICD-10-PCS | Mod: HCNC,CPTII,S$GLB, | Performed by: OBSTETRICS & GYNECOLOGY

## 2023-10-23 PROCEDURE — 99213 PR OFFICE/OUTPT VISIT, EST, LEVL III, 20-29 MIN: ICD-10-PCS | Mod: HCNC,S$GLB,, | Performed by: OBSTETRICS & GYNECOLOGY

## 2023-10-23 PROCEDURE — 99213 OFFICE O/P EST LOW 20 MIN: CPT | Mod: HCNC,S$GLB,, | Performed by: OBSTETRICS & GYNECOLOGY

## 2023-10-23 PROCEDURE — 1126F PR PAIN SEVERITY QUANTIFIED, NO PAIN PRESENT: ICD-10-PCS | Mod: HCNC,CPTII,S$GLB, | Performed by: OBSTETRICS & GYNECOLOGY

## 2023-10-23 PROCEDURE — 3288F PR FALLS RISK ASSESSMENT DOCUMENTED: ICD-10-PCS | Mod: HCNC,CPTII,S$GLB, | Performed by: OBSTETRICS & GYNECOLOGY

## 2023-10-23 PROCEDURE — 1101F PT FALLS ASSESS-DOCD LE1/YR: CPT | Mod: HCNC,CPTII,S$GLB, | Performed by: OBSTETRICS & GYNECOLOGY

## 2023-10-23 PROCEDURE — 1159F MED LIST DOCD IN RCRD: CPT | Mod: HCNC,CPTII,S$GLB, | Performed by: OBSTETRICS & GYNECOLOGY

## 2023-10-23 PROCEDURE — 99999 PR PBB SHADOW E&M-EST. PATIENT-LVL III: CPT | Mod: PBBFAC,HCNC,, | Performed by: OBSTETRICS & GYNECOLOGY

## 2023-10-23 PROCEDURE — 99999 PR PBB SHADOW E&M-EST. PATIENT-LVL III: ICD-10-PCS | Mod: PBBFAC,HCNC,, | Performed by: OBSTETRICS & GYNECOLOGY

## 2023-10-23 PROCEDURE — 1126F AMNT PAIN NOTED NONE PRSNT: CPT | Mod: HCNC,CPTII,S$GLB, | Performed by: OBSTETRICS & GYNECOLOGY

## 2023-10-23 PROCEDURE — 3288F FALL RISK ASSESSMENT DOCD: CPT | Mod: HCNC,CPTII,S$GLB, | Performed by: OBSTETRICS & GYNECOLOGY

## 2023-10-23 PROCEDURE — 1159F PR MEDICATION LIST DOCUMENTED IN MEDICAL RECORD: ICD-10-PCS | Mod: HCNC,CPTII,S$GLB, | Performed by: OBSTETRICS & GYNECOLOGY

## 2023-10-23 NOTE — PROGRESS NOTES
Subjective:      Patient ID: Tiffanie Barajas is a 88 y.o. female.    Chief Complaint:  pessary cleaning      History of Present Illness  HPI  Presents for pessary check.  Wears a #4 fenestrated ring pessary for cystocele and rectocele.  Does well with pessary.  No VB, d/c, or odor.  No pain.    GYN & OB History  Patient's last menstrual period was 1990.   Date of Last Pap: No result found    OB History    Para Term  AB Living   2 1       2   SAB IAB Ectopic Multiple Live Births         1 1      # Outcome Date GA Lbr Dwayne/2nd Weight Sex Delivery Anes PTL Lv   2 Para      Vag-Spont   EMILIANO   1A       Vag-Spont      1B       Vag-Spont          Review of Systems  Review of Systems       Objective:     Physical Exam:   Constitutional: She is oriented to person, place, and time. She appears well-developed and well-nourished. No distress.             Abdominal: Soft. She exhibits no distension and no mass. There is no abdominal tenderness. There is no rebound and no guarding. Hernia confirmed negative in the right inguinal area and confirmed negative in the left inguinal area.     Genitourinary:    Vagina normal.      Pelvic exam was performed with patient supine.   There is no rash, tenderness, lesion or injury on the right labia. There is no rash, tenderness, lesion or injury on the left labia. Right adnexum displays no mass, no tenderness and no fullness. Left adnexum displays no mass, no tenderness and no fullness. There is rectocele (grade 1) and cystocele (grade 2) in the vagina. No erythema,  no vaginal discharge, tenderness, bleeding or unspecified prolapse of vaginal walls in the vagina.    No foreign body in the vagina.      No signs of injury in the vagina.   Cervix exhibits no motion tenderness, no discharge and no friability. Uterus is not deviated, not enlarged, not fixed and not tender.    Genitourinary Comments: Pessary removed without difficulty, washed with soap and  water, and replaced without difficulty                 Neurological: She is alert and oriented to person, place, and time.     Psychiatric: She has a normal mood and affect.         Assessment:     1. Encounter for pessary maintenance    2. Yoder-Walker grade 2 cystocele               Plan:     Tiffanie was seen today for pessary cleaning.    Diagnoses and all orders for this visit:    Encounter for pessary maintenance    Yoder-Walker grade 2 cystocele     RTC 4 months for pessary check

## 2023-11-06 ENCOUNTER — LAB VISIT (OUTPATIENT)
Dept: LAB | Facility: HOSPITAL | Age: 88
End: 2023-11-06
Attending: PHYSICIAN ASSISTANT
Payer: MEDICARE

## 2023-11-06 DIAGNOSIS — I10 ESSENTIAL HYPERTENSION: ICD-10-CM

## 2023-11-06 DIAGNOSIS — E78.2 MIXED HYPERLIPIDEMIA: ICD-10-CM

## 2023-11-06 LAB
ALBUMIN SERPL BCP-MCNC: 4 G/DL (ref 3.5–5.2)
ALP SERPL-CCNC: 86 U/L (ref 55–135)
ALT SERPL W/O P-5'-P-CCNC: 10 U/L (ref 10–44)
ANION GAP SERPL CALC-SCNC: 12 MMOL/L (ref 8–16)
AST SERPL-CCNC: 17 U/L (ref 10–40)
BASOPHILS # BLD AUTO: 0.05 K/UL (ref 0–0.2)
BASOPHILS NFR BLD: 0.6 % (ref 0–1.9)
BILIRUB SERPL-MCNC: 0.6 MG/DL (ref 0.1–1)
BUN SERPL-MCNC: 13 MG/DL (ref 8–23)
CALCIUM SERPL-MCNC: 9.8 MG/DL (ref 8.7–10.5)
CHLORIDE SERPL-SCNC: 102 MMOL/L (ref 95–110)
CHOLEST SERPL-MCNC: 100 MG/DL (ref 120–199)
CHOLEST/HDLC SERPL: 2.3 {RATIO} (ref 2–5)
CO2 SERPL-SCNC: 28 MMOL/L (ref 23–29)
CREAT SERPL-MCNC: 0.6 MG/DL (ref 0.5–1.4)
DIFFERENTIAL METHOD: ABNORMAL
EOSINOPHIL # BLD AUTO: 0.7 K/UL (ref 0–0.5)
EOSINOPHIL NFR BLD: 8.7 % (ref 0–8)
ERYTHROCYTE [DISTWIDTH] IN BLOOD BY AUTOMATED COUNT: 13.9 % (ref 11.5–14.5)
EST. GFR  (NO RACE VARIABLE): >60 ML/MIN/1.73 M^2
GLUCOSE SERPL-MCNC: 102 MG/DL (ref 70–110)
HCT VFR BLD AUTO: 40.2 % (ref 37–48.5)
HDLC SERPL-MCNC: 44 MG/DL (ref 40–75)
HDLC SERPL: 44 % (ref 20–50)
HGB BLD-MCNC: 12.4 G/DL (ref 12–16)
IMM GRANULOCYTES # BLD AUTO: 0.02 K/UL (ref 0–0.04)
IMM GRANULOCYTES NFR BLD AUTO: 0.2 % (ref 0–0.5)
LDLC SERPL CALC-MCNC: 41.6 MG/DL (ref 63–159)
LYMPHOCYTES # BLD AUTO: 2.1 K/UL (ref 1–4.8)
LYMPHOCYTES NFR BLD: 24.4 % (ref 18–48)
MCH RBC QN AUTO: 28.5 PG (ref 27–31)
MCHC RBC AUTO-ENTMCNC: 30.8 G/DL (ref 32–36)
MCV RBC AUTO: 92 FL (ref 82–98)
MONOCYTES # BLD AUTO: 0.7 K/UL (ref 0.3–1)
MONOCYTES NFR BLD: 7.9 % (ref 4–15)
NEUTROPHILS # BLD AUTO: 4.9 K/UL (ref 1.8–7.7)
NEUTROPHILS NFR BLD: 58.2 % (ref 38–73)
NONHDLC SERPL-MCNC: 56 MG/DL
NRBC BLD-RTO: 0 /100 WBC
PLATELET # BLD AUTO: 268 K/UL (ref 150–450)
PMV BLD AUTO: 10.9 FL (ref 9.2–12.9)
POTASSIUM SERPL-SCNC: 4 MMOL/L (ref 3.5–5.1)
PROT SERPL-MCNC: 7 G/DL (ref 6–8.4)
RBC # BLD AUTO: 4.35 M/UL (ref 4–5.4)
SODIUM SERPL-SCNC: 142 MMOL/L (ref 136–145)
TRIGL SERPL-MCNC: 72 MG/DL (ref 30–150)
TSH SERPL DL<=0.005 MIU/L-ACNC: 2.92 UIU/ML (ref 0.4–4)
WBC # BLD AUTO: 8.48 K/UL (ref 3.9–12.7)

## 2023-11-06 PROCEDURE — 84443 ASSAY THYROID STIM HORMONE: CPT | Mod: HCNC | Performed by: PHYSICIAN ASSISTANT

## 2023-11-06 PROCEDURE — 85025 COMPLETE CBC W/AUTO DIFF WBC: CPT | Mod: HCNC | Performed by: PHYSICIAN ASSISTANT

## 2023-11-06 PROCEDURE — 80053 COMPREHEN METABOLIC PANEL: CPT | Mod: HCNC | Performed by: PHYSICIAN ASSISTANT

## 2023-11-06 PROCEDURE — 36415 COLL VENOUS BLD VENIPUNCTURE: CPT | Mod: HCNC | Performed by: PHYSICIAN ASSISTANT

## 2023-11-06 PROCEDURE — 80061 LIPID PANEL: CPT | Mod: HCNC | Performed by: PHYSICIAN ASSISTANT

## 2023-11-13 ENCOUNTER — OFFICE VISIT (OUTPATIENT)
Dept: INTERNAL MEDICINE | Facility: CLINIC | Age: 88
End: 2023-11-13
Payer: MEDICARE

## 2023-11-13 VITALS
SYSTOLIC BLOOD PRESSURE: 142 MMHG | HEART RATE: 72 BPM | OXYGEN SATURATION: 95 % | DIASTOLIC BLOOD PRESSURE: 66 MMHG | BODY MASS INDEX: 26.53 KG/M2 | WEIGHT: 164.38 LBS | TEMPERATURE: 97 F

## 2023-11-13 DIAGNOSIS — I10 ESSENTIAL HYPERTENSION: ICD-10-CM

## 2023-11-13 DIAGNOSIS — H61.21 IMPACTED CERUMEN OF RIGHT EAR: ICD-10-CM

## 2023-11-13 DIAGNOSIS — M85.88 OSTEOPENIA OF SPINE: ICD-10-CM

## 2023-11-13 DIAGNOSIS — F33.42 RECURRENT MAJOR DEPRESSIVE DISORDER, IN FULL REMISSION: ICD-10-CM

## 2023-11-13 DIAGNOSIS — M46.1 SACROILIITIS: ICD-10-CM

## 2023-11-13 DIAGNOSIS — E78.2 MIXED HYPERLIPIDEMIA: ICD-10-CM

## 2023-11-13 DIAGNOSIS — Z00.00 ROUTINE GENERAL MEDICAL EXAMINATION AT A HEALTH CARE FACILITY: Primary | ICD-10-CM

## 2023-11-13 DIAGNOSIS — Z23 NEED FOR VACCINATION: ICD-10-CM

## 2023-11-13 DIAGNOSIS — K21.9 GASTROESOPHAGEAL REFLUX DISEASE WITHOUT ESOPHAGITIS: ICD-10-CM

## 2023-11-13 DIAGNOSIS — I70.0 ATHEROSCLEROSIS OF AORTA: ICD-10-CM

## 2023-11-13 PROCEDURE — 1126F PR PAIN SEVERITY QUANTIFIED, NO PAIN PRESENT: ICD-10-PCS | Mod: HCNC,CPTII,S$GLB, | Performed by: PHYSICIAN ASSISTANT

## 2023-11-13 PROCEDURE — 90694 VACC AIIV4 NO PRSRV 0.5ML IM: CPT | Mod: HCNC,S$GLB,, | Performed by: PHYSICIAN ASSISTANT

## 2023-11-13 PROCEDURE — 1159F PR MEDICATION LIST DOCUMENTED IN MEDICAL RECORD: ICD-10-PCS | Mod: HCNC,CPTII,S$GLB, | Performed by: PHYSICIAN ASSISTANT

## 2023-11-13 PROCEDURE — 1126F AMNT PAIN NOTED NONE PRSNT: CPT | Mod: HCNC,CPTII,S$GLB, | Performed by: PHYSICIAN ASSISTANT

## 2023-11-13 PROCEDURE — 99999 PR PBB SHADOW E&M-EST. PATIENT-LVL V: ICD-10-PCS | Mod: PBBFAC,HCNC,, | Performed by: PHYSICIAN ASSISTANT

## 2023-11-13 PROCEDURE — G0008 ADMIN INFLUENZA VIRUS VAC: HCPCS | Mod: HCNC,S$GLB,, | Performed by: PHYSICIAN ASSISTANT

## 2023-11-13 PROCEDURE — 1160F RVW MEDS BY RX/DR IN RCRD: CPT | Mod: HCNC,CPTII,S$GLB, | Performed by: PHYSICIAN ASSISTANT

## 2023-11-13 PROCEDURE — G0008 FLU VACCINE - QUADRIVALENT - ADJUVANTED: ICD-10-PCS | Mod: HCNC,S$GLB,, | Performed by: PHYSICIAN ASSISTANT

## 2023-11-13 PROCEDURE — 99397 PER PM REEVAL EST PAT 65+ YR: CPT | Mod: HCNC,S$GLB,, | Performed by: PHYSICIAN ASSISTANT

## 2023-11-13 PROCEDURE — 1159F MED LIST DOCD IN RCRD: CPT | Mod: HCNC,CPTII,S$GLB, | Performed by: PHYSICIAN ASSISTANT

## 2023-11-13 PROCEDURE — 3288F PR FALLS RISK ASSESSMENT DOCUMENTED: ICD-10-PCS | Mod: HCNC,CPTII,S$GLB, | Performed by: PHYSICIAN ASSISTANT

## 2023-11-13 PROCEDURE — 1100F PTFALLS ASSESS-DOCD GE2>/YR: CPT | Mod: HCNC,CPTII,S$GLB, | Performed by: PHYSICIAN ASSISTANT

## 2023-11-13 PROCEDURE — 99397 PR PREVENTIVE VISIT,EST,65 & OVER: ICD-10-PCS | Mod: HCNC,S$GLB,, | Performed by: PHYSICIAN ASSISTANT

## 2023-11-13 PROCEDURE — 90694 FLU VACCINE - QUADRIVALENT - ADJUVANTED: ICD-10-PCS | Mod: HCNC,S$GLB,, | Performed by: PHYSICIAN ASSISTANT

## 2023-11-13 PROCEDURE — 1160F PR REVIEW ALL MEDS BY PRESCRIBER/CLIN PHARMACIST DOCUMENTED: ICD-10-PCS | Mod: HCNC,CPTII,S$GLB, | Performed by: PHYSICIAN ASSISTANT

## 2023-11-13 PROCEDURE — 1100F PR PT FALLS ASSESS DOC 2+ FALLS/FALL W/INJURY/YR: ICD-10-PCS | Mod: HCNC,CPTII,S$GLB, | Performed by: PHYSICIAN ASSISTANT

## 2023-11-13 PROCEDURE — 99999 PR PBB SHADOW E&M-EST. PATIENT-LVL V: CPT | Mod: PBBFAC,HCNC,, | Performed by: PHYSICIAN ASSISTANT

## 2023-11-13 PROCEDURE — 3288F FALL RISK ASSESSMENT DOCD: CPT | Mod: HCNC,CPTII,S$GLB, | Performed by: PHYSICIAN ASSISTANT

## 2023-11-13 RX ORDER — CITALOPRAM 20 MG/1
20 TABLET, FILM COATED ORAL DAILY
Qty: 90 TABLET | Refills: 3 | Status: SHIPPED | OUTPATIENT
Start: 2023-11-13 | End: 2024-11-12

## 2023-11-13 RX ORDER — OMEPRAZOLE 40 MG/1
40 CAPSULE, DELAYED RELEASE ORAL DAILY
Qty: 90 CAPSULE | Refills: 3 | Status: SHIPPED | OUTPATIENT
Start: 2023-11-13 | End: 2024-11-12

## 2023-11-13 NOTE — ASSESSMENT & PLAN NOTE
Controlled, continue atorvastatin   Lab Results   Component Value Date    CHOL 100 (L) 11/06/2023    LDLCALC 41.6 (L) 11/06/2023    TRIG 72 11/06/2023    HDL 44 11/06/2023    ALT 10 11/06/2023    AST 17 11/06/2023    ALKPHOS 86 11/06/2023

## 2023-11-13 NOTE — PROGRESS NOTES
Subjective:       Patient ID: Tiffanie Barajas is a 88 y.o. female.    Chief Complaint: Annual Exam      Patient presents to clinic today for annual physical exam.        Review of Systems   Constitutional:  Negative for chills, fatigue, fever and unexpected weight change.   HENT:  Positive for hearing loss (chronic). Negative for congestion, dental problem, ear pain, rhinorrhea and trouble swallowing.    Eyes:  Negative for pain and visual disturbance.   Respiratory:  Negative for cough and shortness of breath.    Cardiovascular:  Negative for chest pain, palpitations and leg swelling.   Gastrointestinal:  Negative for abdominal distention, abdominal pain, blood in stool, constipation, diarrhea, nausea and vomiting.   Genitourinary:  Negative for difficulty urinating and vaginal discharge.   Musculoskeletal:  Positive for arthralgias (chronic). Negative for myalgias.   Skin:  Negative for rash.   Neurological:  Negative for dizziness, weakness, numbness and headaches.   Hematological:  Negative for adenopathy. Does not bruise/bleed easily.   Psychiatric/Behavioral:  Negative for dysphoric mood and sleep disturbance. The patient is not nervous/anxious.        Objective:      Physical Exam  Vitals and nursing note reviewed.   Constitutional:       General: She is not in acute distress.     Appearance: Normal appearance. She is well-developed.   HENT:      Head: Normocephalic and atraumatic.      Right Ear: Tympanic membrane, ear canal and external ear normal.      Left Ear: Tympanic membrane, ear canal and external ear normal.      Nose: Nose normal. No mucosal edema or rhinorrhea.      Mouth/Throat:      Lips: Pink.      Mouth: Mucous membranes are moist.      Pharynx: Oropharynx is clear. Uvula midline.   Eyes:      General: Lids are normal. No scleral icterus.     Extraocular Movements: Extraocular movements intact.      Conjunctiva/sclera: Conjunctivae normal.      Pupils: Pupils are equal, round, and reactive  to light.   Neck:      Thyroid: No thyromegaly.   Cardiovascular:      Rate and Rhythm: Normal rate and regular rhythm.      Pulses: Normal pulses.   Pulmonary:      Effort: Pulmonary effort is normal.      Breath sounds: Normal breath sounds. No wheezing, rhonchi or rales.   Musculoskeletal:         General: Normal range of motion.      Cervical back: Normal range of motion and neck supple.      Right lower leg: No edema.      Left lower leg: No edema.   Lymphadenopathy:      Cervical: No cervical adenopathy.   Skin:     General: Skin is warm and dry.      Findings: No lesion or rash.      Nails: There is no clubbing.   Neurological:      Mental Status: She is alert.      Cranial Nerves: No cranial nerve deficit.      Sensory: No sensory deficit.   Psychiatric:         Mood and Affect: Mood and affect normal.         Assessment:       1. Routine general medical examination at a health care facility    2. Recurrent major depressive disorder, in full remission    3. Sacroiliitis    4. Atherosclerosis of aorta    5. Essential hypertension    6. Mixed hyperlipidemia    7. Gastroesophageal reflux disease without esophagitis    8. Osteopenia of spine    9. Need for vaccination    10. Impacted cerumen of right ear        Plan:   1. Routine general medical examination at a health care facility    2. Recurrent major depressive disorder, in full remission  Assessment & Plan:  Continue Celexa    Orders:  -     citalopram (CELEXA) 20 MG tablet; Take 1 tablet (20 mg total) by mouth once daily.  Dispense: 90 tablet; Refill: 3    3. Sacroiliitis  Overview:  Stable, continue to monitor      4. Atherosclerosis of aorta  Overview:  CT ABD 12/2021, on ASA and statin      5. Essential hypertension  Assessment & Plan:  /66, controlled for age, continue irbesartan, Imdur  Lab Results   Component Value Date     11/06/2023    K 4.0 11/06/2023    BUN 13 11/06/2023    CREATININE 0.6 11/06/2023    ESTGFRAFRICA >60.0 05/10/2022     EGFRNONAA >60.0 05/10/2022    EGFRNORACEVR >60.0 11/06/2023          6. Mixed hyperlipidemia  Assessment & Plan:  Controlled, continue atorvastatin   Lab Results   Component Value Date    CHOL 100 (L) 11/06/2023    LDLCALC 41.6 (L) 11/06/2023    TRIG 72 11/06/2023    HDL 44 11/06/2023    ALT 10 11/06/2023    AST 17 11/06/2023    ALKPHOS 86 11/06/2023        Orders:  -     Comprehensive Metabolic Panel; Future; Expected date: 05/13/2024  -     Lipid Panel; Future; Expected date: 05/13/2024    7. Gastroesophageal reflux disease without esophagitis  Assessment & Plan:  Protonix no longer helping, switch to omeprazole    Orders:  -     omeprazole (PRILOSEC) 40 MG capsule; Take 1 capsule (40 mg total) by mouth once daily.  Dispense: 90 capsule; Refill: 3    8. Osteopenia of spine  Overview:  DEXA 9/2016, declines future DEXA scans      9. Need for vaccination  -     Influenza - Quadrivalent (Adjuvanted)    10. Impacted cerumen of right ear  -     Ambulatory referral/consult to ENT; Future; Expected date: 11/20/2023        Recent labs reviewed with patient.    Discussed Covid booster, scheduling information given.  Declines DEXA scan and shingles shot  Encouraged RSV vaccine   6 month f/u with Dr. Merrill scheduled with fasting labs PTA   Health Maintenance reviewed/updated.

## 2023-11-13 NOTE — ASSESSMENT & PLAN NOTE
/66, controlled for age, continue irbesartan, Imdur  Lab Results   Component Value Date     11/06/2023    K 4.0 11/06/2023    BUN 13 11/06/2023    CREATININE 0.6 11/06/2023    ESTGFRAFRICA >60.0 05/10/2022    EGFRNONAA >60.0 05/10/2022    EGFRNORACEVR >60.0 11/06/2023

## 2023-11-13 NOTE — PATIENT INSTRUCTIONS
Check with your pharmacist regarding shingrix vaccine.     The bivalent covid booster is now available. You can visit [facility] in our primary care building by the front entrance to get this vaccine.     Pharmacy hours:  Monday-Friday 8am-5:30pm     Please bring your medication or a written list to your next office visit.    If you check your blood pressure at home, please bring written your blood pressure log and your blood pressure machine to your next office visit.

## 2024-01-18 ENCOUNTER — OFFICE VISIT (OUTPATIENT)
Dept: ORTHOPEDICS | Facility: CLINIC | Age: 89
End: 2024-01-18
Payer: MEDICARE

## 2024-01-18 VITALS — HEIGHT: 66 IN | WEIGHT: 164 LBS | BODY MASS INDEX: 26.36 KG/M2

## 2024-01-18 DIAGNOSIS — I73.9 PERIPHERAL ARTERIAL DISEASE: ICD-10-CM

## 2024-01-18 DIAGNOSIS — M17.11 ARTHRITIS OF KNEE, RIGHT: ICD-10-CM

## 2024-01-18 DIAGNOSIS — M21.161 ACQUIRED VARUS DEFORMITY KNEE, RIGHT: ICD-10-CM

## 2024-01-18 DIAGNOSIS — R29.6 FREQUENT FALLS: ICD-10-CM

## 2024-01-18 DIAGNOSIS — M17.12 ARTHRITIS OF KNEE, LEFT: Primary | ICD-10-CM

## 2024-01-18 DIAGNOSIS — M46.1 SACROILIITIS: ICD-10-CM

## 2024-01-18 DIAGNOSIS — M79.661 RIGHT CALF PAIN: ICD-10-CM

## 2024-01-18 DIAGNOSIS — T81.72XA COMPLICATION OF VEIN FOLLOWING A PROCEDURE, NOT ELSEWHERE CLASSIFIED, INITIAL ENCOUNTER: ICD-10-CM

## 2024-01-18 DIAGNOSIS — M47.816 LUMBAR FACET ARTHROPATHY: ICD-10-CM

## 2024-01-18 DIAGNOSIS — M16.12 ARTHRITIS OF LEFT HIP: ICD-10-CM

## 2024-01-18 DIAGNOSIS — M70.62 GREATER TROCHANTERIC BURSITIS, LEFT: ICD-10-CM

## 2024-01-18 DIAGNOSIS — M25.561 PAIN AND SWELLING OF KNEE, RIGHT: ICD-10-CM

## 2024-01-18 DIAGNOSIS — M25.561 PAIN IN BOTH KNEES, UNSPECIFIED CHRONICITY: ICD-10-CM

## 2024-01-18 DIAGNOSIS — M54.50 LUMBAR SPINE PAIN: ICD-10-CM

## 2024-01-18 DIAGNOSIS — M17.12 PRIMARY OSTEOARTHRITIS OF LEFT KNEE: ICD-10-CM

## 2024-01-18 DIAGNOSIS — M25.461 PAIN AND SWELLING OF KNEE, RIGHT: ICD-10-CM

## 2024-01-18 DIAGNOSIS — M21.162 ACQUIRED VARUS DEFORMITY KNEE, LEFT: ICD-10-CM

## 2024-01-18 DIAGNOSIS — M25.562 PAIN IN BOTH KNEES, UNSPECIFIED CHRONICITY: ICD-10-CM

## 2024-01-18 PROCEDURE — 3288F FALL RISK ASSESSMENT DOCD: CPT | Mod: HCNC,CPTII,S$GLB, | Performed by: ORTHOPAEDIC SURGERY

## 2024-01-18 PROCEDURE — 1159F MED LIST DOCD IN RCRD: CPT | Mod: HCNC,CPTII,S$GLB, | Performed by: ORTHOPAEDIC SURGERY

## 2024-01-18 PROCEDURE — 99213 OFFICE O/P EST LOW 20 MIN: CPT | Mod: 25,HCNC,S$GLB, | Performed by: ORTHOPAEDIC SURGERY

## 2024-01-18 PROCEDURE — 99999 PR PBB SHADOW E&M-EST. PATIENT-LVL III: CPT | Mod: PBBFAC,HCNC,, | Performed by: ORTHOPAEDIC SURGERY

## 2024-01-18 PROCEDURE — 20610 DRAIN/INJ JOINT/BURSA W/O US: CPT | Mod: 50,HCNC,S$GLB, | Performed by: ORTHOPAEDIC SURGERY

## 2024-01-18 PROCEDURE — 1101F PT FALLS ASSESS-DOCD LE1/YR: CPT | Mod: HCNC,CPTII,S$GLB, | Performed by: ORTHOPAEDIC SURGERY

## 2024-01-18 PROCEDURE — 1125F AMNT PAIN NOTED PAIN PRSNT: CPT | Mod: HCNC,CPTII,S$GLB, | Performed by: ORTHOPAEDIC SURGERY

## 2024-01-18 RX ORDER — METHYLPREDNISOLONE ACETATE 80 MG/ML
80 INJECTION, SUSPENSION INTRA-ARTICULAR; INTRALESIONAL; INTRAMUSCULAR; SOFT TISSUE
Status: DISCONTINUED | OUTPATIENT
Start: 2024-01-18 | End: 2024-01-18 | Stop reason: HOSPADM

## 2024-01-18 RX ADMIN — METHYLPREDNISOLONE ACETATE 80 MG: 80 INJECTION, SUSPENSION INTRA-ARTICULAR; INTRALESIONAL; INTRAMUSCULAR; SOFT TISSUE at 01:01

## 2024-01-18 NOTE — PROGRESS NOTES
Subjective:     Patient ID: Tiffanie Barajas is a 88 y.o. female.    Chief Complaint: Pain of the Right Knee and Pain of the Left Knee    HPI:  84-year-old complaining of pain over the left sacroiliac joint radiating down to her knee.  Previous history of sciatica and a cyst in the back that was removed. Has history of arthritis of both of her knees received injection longtime ago more than a year (lubrication shot).  She is not having too much pain in the right knee unable to fully extend the left knee difficulty with shopping and walking.  Pain roughly 4/10.  Denies loss of bowel bladder control.  Walking distance is seems to hurt approximately 200 ft.  She is on blood thinners and unable to take NSAIDs.  She takes 1 little Tylenol occasionally and does not help.    01/27/2020.  Left knee injection of Depo-Medrol seems to have helped this patient.  Given 01/03/2020.  Just started physical therapy and that seems to help also.  The Flexeril did not seem to work at all.  Already been 5 times to physical therapy and she has at least 3 more weeks to go.  Very pleased so far with her results.  Patient cannot take NSAIDs due to being on blood thinners.  Pain level 0/10.  Does have occasional discomfort right greater trochanteric area    02/13/2020  Patient with bilateral knee arthrosis left worse than right and sacroiliac pain.  She just finished her physical therapy and that seems to have helped and now she is doing independent exercise program.  We did inject the knees with steroid that seems to help.  She is ambulating without any assistive devices today. He is here to have Synvisc-One injection into the left knee.    05/22/2020  Patient complaining today of left hip pain/she points over the lumbar area radiating to the posterior aspect of the knee.  She has history of lumbosacral arthritis.  As far as her left knee injection of Synvisc-One she is not having any pain with that.  She is very happy with that result.   She wants something done and 0 not want any prescriptions for any pills.  Denies loss of bowel bladder control or usage or any assistive devices to ambulate.  She is doing independent exercise as well as showing me that her  created exercise program for her.  I did recommend stationary bicycle and independent stretching exercises.  Denies loss of bowel bladder control and fever or chills or shortness of breath or chest pain    07/09/2020  Patient received trigger point injection of the lumbar spine 05/22/2020 with some good relief.  Now she is having more pain in the left knee.  She had received steroid injection 01/03/2020 and Synvisc-One in 02/13/2020.  She feels the knee is tight she tries to exercise it on her own.  Having sharp pain on the medial side.  Pain level is 3/10 but with gait it gets worst.  Denies any fever or chills and maintaining social distancing and wearing her own mask.  She would like some relief from the tightness and stiffness in the left knee but wants to avoid taking any medications besides a little Tylenol because she is on blood thinners and unable to take NSAIDs.    01/04/2020   Left knee pain and stiffness 6/10, low back pain / points on the left sacroiliac area also.  She is requesting injection in both areas.  Patient did have trigger point injection 05/22/2020 in the lumbar area which helped also had steroid injection in July 2020 which seems to have helped in the knee.  Also previous history of Synvisc-One injection 02/13/2020.  Tylenol does not seem to help.  Denies any fever or chills or shortness of breath or difficulty with chewing or swallowing loss of bowel bladder control blurry vision or double vision or loss sense smell or taste.  She is here with her significant other.  Asking about getting the COVID  Vaccine.  She is 85 years old and I told if her  She qualifies should be able to get it soon    02/08/2021  Right lumbar trigger point tenderness which was injected  awhile ago with relief on 05/22/2020 now it is hurting her a little bit more.  I did tell her since she is here today to receive viscosupplementation/approved only Monovisc not Synvisc this time we will hold off on trigger point injection.  Pain is 5/10 in the left knee.  The right knee is doing okay.  She is ambulating without any assistive devices.  There was a little discussion about difference is with me in Monovisc and Synvisc and put simply I told them it is like having a car with different brands.  Patient is maintaining her activity level.  She still did not get her COVID vaccine due to in availability of the vaccine.  Denies any fever or chills or shortness of breath or difficulty with chewing or swallowing or loss of bowel bladder control blurry vision double vision loss sense smell or taste.  She is ambulating without any assistive devices    04/01/2021  Low back pain which is severe we injected trigger point on 05/22/2020 with some relief.  Went over her x-rays in the electronic records today showing severe facet arthropathy and degenerative disc disease with large bone spur overgrowth.  That bothers her a lot with radiation down her legs a cannot sleep.  She is requesting may be something to help with the radicular symptoms and give her some sleep and rest as far as the Monovisc injection into the left knee that did not seem to help at all.  The steroid injection seems to work better for her and requested another injection.  She did go through therapy before.  She feels sciatica to the right leg as far as the right knee is concerned is hurting but not as much as the left knee  Monovisc injection 02/08/2021 into the left knee did not seem to help.  Voltaren gel she uses on occasion.  No fever no chills or shortness of breath or difficulty with chewing or swallowing loss of bowel bladder control blurry vision double vision or loss of sense smell or taste  Did did receive her COVID-19  vaccine    07/02/2021  Patient is having low back pain with sciatic type of pain radiating down both legs.  Today will obtaining x-rays of her spine as well as the pelvis and hips and the knees.  The knees and not hurting her as much and she is not requesting any injections.  We did try Monovisc on the left knee but did not help and the steroid seems to work for her.  I did mention in the future maybe she would need long-acting steroid like  zilretta.  She is here with her daughter in law.  She is having some hip pains.  Able to manage it.  Her pain is 5/10 ambulating slowly without any assistive devices.  Cannot take any NSAIDs by mouth since she is on aspirin and Plavix and home cardiac evaluation by cardiologist.  No fever no chills no shortness of breath or difficulty with chewing or swallowing loss of bowel bladder control or blurry vision double vision loss sense smell or taste  She did have diclofenac gel at home but has not been using it appropriately  She states she is using SALONPAS which seems to help the back  Last visit we gave her cyclobenzaprine she is not taking it      10/18/2021  Severe low back pain left side more than the right.  We did have x-rays on her from last visit that showed sacroiliac arthritic changes sacroiliitis.  We did give her sacroiliac injection more than a year ago with some relief.  She also have pain in the knees and in the back specially on the left side a worried if it is sciatica or not.  She does have x-rays of severe arthritis in the spine also.  She usually gets steroid injections which help her for a little while.  She had been tried on viscosupplementation once without success.  She had sustained a little fall on 10/07/2021 but no major injury.  She is ambulating without any assistive devices.  No fever no chills no shortness of breath or difficulty with chewing or swallowing  She is using diclofenac gel as well as Salonpas patches.    11/08/2021  Injection of the  trigger point in the lower part of her back helped for 10 days.  That seems to come back now and hurts her a little bit more than her knee.  As far as the right knee she is doing okay the left knee is the 1 that bothers her.  She has been losing some range of motion since last visit we see her.  Her pain is around 6/10 in the sitting position and goes up with walking.  At this point I did tell her I do not want to give her 2 injections and not knowing which 1 will give her a reaction.  We have her approved to receive the left knee Zilreta injection.  The pros and cons discussed in details.  No loss of bowel bladder control or fever or chills or shortness of breath or difficulty with chewing or swallowing loss of bowel bladder control or loss of sense smell or taste      12/02/2021  Patient stated left knee steroid injections/ Zilreta seems to have helped so far.  Not bother her as much.  What bothers her is her lower part of her back on the left side there is a trigger point that seems to be the area.  She her  marked with a pen.  She is ambulating slowly without any assistive devices.  She would like to have that trigger point injected despite the fact her pain in her knee is 3/10.  Overall it seems the steroid injection given last visit in the knee seems to work so far  No fever no chills no shortness of breath or difficulty with chewing or swallowing loss of bowel bladder control    02/7/22  Complaining over left greater trochanteric and right sacroiliac area pain.  The left knee still doing okay received zilretta injection in November 2021 and is still helping.  She is requesting injections over the areas that hurts the most.  She has fluctuating blood pressure I did tell her I will not give but total 80 mg Depo-Medrol and we will split it into 2 areas.  She is ambulating without assistive devices.  We did get her approved for the long-acting steroid injection into the left knee but she says ri it since  you doing okay.  ght now her knee is doing okay and that previous shot still working.  We can always get it approved if needed again.  The approval is until February 27, 2022 however we still do not have to give it if you still doing okay      03/28/2022  On 02/07/2022 in [place] today greater trochanter which seems to have helped however the trigger point/left sacroiliac area that did not seem to help.  Both injections were with Depo-Medrol.  She stated she was changed to nifedipine for blood pressure and that cause severe swelling in the legs.  She has an appointment April 15 to see the cardiologist.  I did tell her this could be angioedema.  As far as her knee is concerned just stiff right now and she is not having much of any pain in it at 0/10.  Her main concern is the lumbar area this seems to have quite a bit of pain in it.  She had marked where the pain is in order to get another injection.  Denies loss of bowel bladder control blurry vision double vision.  She is here with her .  She had tried different topicals without success  Vicks rub seems to be the 1 that works the most  Trigger point pain over the left sacroiliac area approximately 3/10    06/02/2022  Severe left knee pain.  Given zilretta in November 2021 and just wore off on her almost  4 weeks ago.  Her pain is severe in the knee and limiting motion..  As far as the left hip and sacroiliac area on the left side and trigger point she is around 2-3/10.  She still ambulating without assistive devices taking very small steps.  She wants an injection into the left knee.  She stated the long-acting steroid helped quite a bit when we give it last and it was in November.  She would like that repeated.  However she stated today the left knee is hurting a lot she wants an injection today too.  I see no reason why not..  Still having pain over the left sacroiliac area and greater trochanteric area but not severe    06/30/2022  We did inject her last  visit and that did not seem to help as much.  She would like to have the long-acting steroid injection which helped significantly last time.  She still having some hip pain only when she leans on it but not when she is walking.  Her back is doing little bit better.  She does take Tylenol.  Her pain in the knee is 8/10    01/23/2023   Patient is complaining of right calf burning and discomfort.  This is been going on for several weeks.  I have not seen her because she was taking care of her  received radiation treatment.  She is here with him.  She is alert oriented x3.  Now the right and the left knee both of them are hurting.  She is using a cane to get around.  Her pain is 8/10.  Usually most of her problem is the left knee and sacroiliac area but today the back is not bothering her as much as the right knee and right calf as well as left knee.  She is using a cane to get around.  Despite the fact she still having left sacroiliac and greater trochanteric discomfort the knees are coming 1st today.    No fever no chills no shortness of breath difficulty with chewing or swallowing loss of bowel bladder control   She has family and children that could take her to doctor's.    03/16/2023   She is having cramps in the back of her right thigh and she has severe arthritis unable to fully extend the knee.  The Flexeril/cyclobenzaprine did not seem to help.  Today decided to change her to methocarbamol went over it with her.  Her lower part of her back is hurting and she stated that the injection to both of her knees helped last time of Depo-Medrol.  She does have arthritis we did obtain ultrasound last time was negative for DVT in the lower extremity.  She is on Plavix and aspirin.  Her pain is around 10/10 right now and I think at this time we probably start as her morning stiffness and achiness in the shoulder and her knees  She is 87 she does not want undergo surgery at this time  She is hurting more over the  right greater trochanteric slightly posterior aspect in the piriformis fossa.      04/24/2023   Right hip bursitis injected 03/16/2023 and helped until a week ago.  I did place her on prednisone 5 mg to take on a bad day as well as methocarbamol as a muscle relaxant.  She did take those for 10 days and made things better.  We went over it electronic record and reviewed her x-rays in the system again.  We reviewed the x-rays of the lumbar spine the pelvis and the knees.  I reviewed the electronic records she received cortisone injection on 01/23/2023 in both of her knees Depo-Medrol.  And does seems to have helped but now they wore off.    Both of her knees are what is causing her more pain as well as numbness and tingling in both of her legs which is something new she never complained about before.  I did tell her she does have a pinched nerve in the back but I do not want to place her on anymore medication like gabapentin which could pleased with her head at her age of 87.  She is very smart alert oriented.  She is worried about taking too much prednisone makes her hungry and gained weight.  No fever no chills no shortness of breath or difficulty with chewing swallowing loss of bowel bladder control   She uses a cane to walk around and she tells take small steps but able to do it.    As far as the sacroiliac joint is not bothering her at this time    09/18/2023   Bilateral knee severe pain.  She missed her appointment in June because she was not having any pain and then a week or 10 days later she started with the pain and could not make another appointment until now.  She is using a cane now to get around.  She got stuck in traffic because of a major accident and we told her even if she shows a plate that we will see her then we accommodated her.  She felt very appreciative.  Her pain in the knees is 10/10.  She is always worried about getting another appointment in 3 months and if he is doing okay she did not  want to waste might time yet it takes another 3 months to get another appointment.  I did tell her will give her to appointments 1 in 3 months 1 in 4 months to bypass that issue.  An even if she shows up early I will even see her than 2.  No fever no chills no shortness of breath   She has what psoriatic changes in her hands and was told the psoriasis.  I did tell her that psoriasis could cause severe arthritis not the other way around  Past Medical History:   Diagnosis Date    Acute pancreatitis without infection or necrosis 12/5/2021    Anxiety     Arthritis     Atherosclerosis of native coronary artery of native heart with angina pectoris 8/8/2019    Cancer     Degenerative disc disease     Depression     Essential hypertension 10/26/2021    GERD (gastroesophageal reflux disease)     Glaucoma suspect of both eyes     Hyperlipidemia     Insomnia 4/6/2021    Low back derangement syndrome 12/11/2017    Melanoma     Pneumonia due to other staphylococcus      Past Surgical History:   Procedure Laterality Date    ADENOIDECTOMY      CHOLECYSTECTOMY  1973    SPINE SURGERY  2/6/13    L4-L5  cyst removed andfor sciatica    TONSILLECTOMY  1945     Family History   Problem Relation Age of Onset    Arthritis Mother     Hypertension Mother     Stroke Mother     Glaucoma Mother     Heart disease Father 56        fatal MI    Diabetes Brother     Cancer Son 56        melanoma with mets to lung and bone    Arthritis Sister     Hyperlipidemia Neg Hx      Social History     Socioeconomic History    Marital status:      Spouse name: fabio    Number of children: 3   Tobacco Use    Smoking status: Never    Smokeless tobacco: Never   Substance and Sexual Activity    Alcohol use: No     Alcohol/week: 0.0 standard drinks of alcohol    Drug use: No    Sexual activity: Not Currently   Social History Narrative    . Decaf coffee only. No living will or advanced directive-copy of information given.      Medication List with  Changes/Refills   Current Medications    ACETAMINOPHEN (TYLENOL) 650 MG TBSR    Take 1 tablet (650 mg total) by mouth every 8 (eight) hours.    ASCORBIC ACID, VITAMIN C, 500 MG CHEW    Take by mouth once daily.    ASPIRIN (ECOTRIN) 81 MG EC TABLET    Take 1 tablet by mouth Daily.    ATORVASTATIN (LIPITOR) 40 MG TABLET    Take 1 tablet by mouth every evening.    BETAMETHASONE VALERATE 0.1% (VALISONE) 0.1 % OINT    Apply topically 2 (two) times daily.    CITALOPRAM (CELEXA) 20 MG TABLET    Take 1 tablet (20 mg total) by mouth once daily.    CLOPIDOGREL (PLAVIX) 75 MG TABLET    Take 75 mg by mouth once daily.    COLCHICINE (COLCRYS) 0.6 MG TABLET    Take 0.6 mg by mouth daily as needed.    ERGOCALCIFEROL (ERGOCALCIFEROL) 50,000 UNIT CAP    Take by mouth once daily. Patient takes 5,000 units daily    IRBESARTAN (AVAPRO) 150 MG TABLET    Take 150 mg by mouth.    ISOSORBIDE MONONITRATE (IMDUR) 60 MG 24 HR TABLET        MULTIVIT,IRON,MINERALS/LUTEIN (CENTRUM SILVER ULTRA WOMEN'S ORAL)    Take by mouth once daily.    NITROGLYCERIN (NITROSTAT) 0.4 MG SL TABLET    DISSOLVE ONE TABLET UNDER THE TONGUE EVERY 5 MINUTES AS NEEDED FOR CHEST PAIN. DO NOT EXCEED A TOTAL OF 3 DOSES IN 15 MINUTES    OMEPRAZOLE (PRILOSEC) 40 MG CAPSULE    Take 1 capsule (40 mg total) by mouth once daily.    PREDNISONE (DELTASONE) 5 MG TABLET    Take 1 tablet (5 mg total) by mouth once daily.    TURMERIC ROOT EXTRACT 500 MG CAP    Take by mouth once daily.    VITAMIN E 200 UNIT CAPSULE    Take 200 Units by mouth once daily.     Review of patient's allergies indicates:   Allergen Reactions    Hydrocodone-acetaminophen Nausea And Vomiting     Other reaction(s): Vomiting    Diclofenac Nausea And Vomiting and Other (See Comments)     Review of Systems   Constitutional: Negative for decreased appetite.   HENT: Negative for tinnitus.    Eyes: Negative for double vision.   Cardiovascular: Negative for chest pain.   Respiratory: Negative for wheezing.     Hematologic/Lymphatic: Negative for bleeding problem.   Skin: Negative for dry skin.   Musculoskeletal: Positive for arthritis, back pain, joint pain, muscle weakness and stiffness. Negative for gout and neck pain.   Gastrointestinal: Negative for abdominal pain.   Genitourinary: Negative for bladder incontinence.   Neurological: Positive for numbness. Negative for paresthesias and sensory change.   Psychiatric/Behavioral: Negative for altered mental status.       Objective:   Body mass index is 26.47 kg/m².  There were no vitals filed for this visit.       General    Constitutional: She is oriented to person, place, and time. She appears well-developed.   HENT:   Head: Atraumatic.   Eyes: EOM are normal.   Cardiovascular: Normal rate.    Pulmonary/Chest: Effort normal.   Neurological: She is alert and oriented to person, place, and time.   Psychiatric: Judgment normal.         Ambulating with a cane   Cervical spine rotation is functional but  limited due to stiffness  Lumbar with loss of lordosis and tenderness around the L4-5 and mostly over the left sacroiliac joint which is the area of severe tenderness and paraspinal.  Forward bending barely to proximal tibia lateral bending to mid thigh.  Pelvis is level.  Passive hip motion within normal limits.  Hip flexors and abductors as well as quads and hamstrings and ankle extensors and flexors are slightly weak at 5-/5.  Tenderness over the greater trochanter over the right and left hip   Right knee range of motion 8-110.  Slight crepitus to AP compression on the patella in on medial joint.  Pain medially and anteriorly and slightly laterally.  No defect in the patella or quadriceps tendon Very mild swelling.  Collaterals and cruciate stable.  Left knee with approximately 10 ° of flexion contracture and flexion barely to 70°.  Moderate swelling.  Collaterals and cruciate stable. Crepitus with motion.  Pain in medially to palpation.  Also tender to put was patient  over the patella with severe crepitus  Calves are soft nontender on the left slightly tender on the right  Slight varicosities around the calves  DP 1+  Skin is warm to touch no obvious lesions      Relevant imaging results reviewed and interpreted by me, discussed with the patient and / or family today   Ultrasound 01/26/2023 of lower extremity was negative for DVT/patient on Plavix and aspirin  X-ray 01/23/2023 bilateral knees with severe osteopenic bone.  There is loss of medial joint space bilaterally and marginal osteophytic changes.  There is no fracture seen consistent with arthritis    X-ray bilateral knees with left knee complete loss of medial joint space with osteophytic changes and sclerosis.  Same on the right knee but not as bad consistent with bilateral end-stage arthrosis.  07/02/2021  X-ray of the pelvis showing some degenerative changes on the left hip with good joint space left.  Right hip no joint space loss consistent with some arthrosis of the  hip.  Mild chronic degenerative changes in the sacroiliac joints 07/02/2021  X-ray of the lumbar spine in the electronic records multilevel facet arthropathy and osteophytes.  Anterior multilevel spurring.  Degenerative disc disease 07/02/2021  Assessment:     Encounter Diagnoses   Name Primary?    Arthritis of knee, left Yes    Acquired varus deformity knee, left     Arthritis of knee, right     Acquired varus deformity knee, right     Sacroiliitis     Arthritis of left hip     Peripheral arterial disease     Lumbar facet arthropathy     Frequent falls         Plan:   Arthritis of knee, left  -     Large Joint Aspiration/Injection: bilateral knee    Acquired varus deformity knee, left    Arthritis of knee, right    Acquired varus deformity knee, right    Sacroiliitis    Arthritis of left hip  -     Large Joint Aspiration/Injection: bilateral knee    Peripheral arterial disease    Lumbar facet arthropathy    Frequent falls         There are no Patient  Instructions on file for this visit.   I went and reviewed all her x-rays in the lumbar spine showing multilevel facet arthropathy and some degenerative disc disease.  I also reviewed her pelvis film showing very mild degenerative radiation of her hips.  The x-rays on her knees showing severe arthritic condition.  Now she is complaining of numbness and tingling in the legs and I did tell her could be secondary to lumbar issues and at her age I do not want to add any medications that will make her get confused like gout gabapentin.  She does not have diabetes and most likely her problem is secondary to the lumbar pinched nerves.  She does get cramps in the back of her legs and we did put her on methocarbamol to take as needed.  We also discussed prednisone that she takes only if needed.  We did discuss the pros and cons of steroid injections into the knees.  She can have does once every 10-12 weeks.  I do not want a place her on NSAIDs she is on Plavix and aspirin and I think prednisone is ideal for her at her age.    01/18/2024   Patient is having frequent falls minimum 2 over the last week or so and just hit her knee a little bit.  She has think difficulty getting up and needed help.  She is 88 years of age and numerous arthritic conditions.  She is fully alert and oriented in doing really well mentally.  At this time I did tell her that I must have the insurance provide you with a Rollator.  It is a walker with a seat that you need to use instead of the cane.  The injections in the knees seems to help but they are not lasting long enough  Proceeded injecting both of her knees today 01/28/2024 each with 80 mg Depo-Medrol mixed with 5 cc 1% lidocaine 01/18/2024   Ice the knees the next few days   You may take Tylenol   As far as the back is concerned the knees was hurting more today in the will be too much steroid if we inject the trigger point in the back  She can not be on NSAIDs she is on Plavix and aspirin   You  turn in 3 months sooner if needed  Disclaimer:is note was prepared using a voice recognition system and is likely to have sound alike errors within the text.

## 2024-01-18 NOTE — PROCEDURES
Large Joint Aspiration/Injection: bilateral knee    Date/Time: 1/18/2024 1:20 PM    Performed by: Ranjti Kelly MD  Authorized by: Ranjit Kelly MD    Consent Done?:  Yes (Verbal)  Indications:  Arthritis  Site marked: the procedure site was marked    Timeout: prior to procedure the correct patient, procedure, and site was verified      Local anesthesia used?: Yes    Local anesthetic:  Lidocaine 1% without epinephrine    Details:  Needle Size:  22 G  Ultrasonic Guidance for needle placement?: No    Approach:  Anterolateral  Location:  Knee  Laterality:  Bilateral  Site:  Bilateral knee  Medications (Right):  80 mg methylPREDNISolone acetate 80 mg/mL  Medications (Left):  80 mg methylPREDNISolone acetate 80 mg/mL  Patient tolerance:  Patient tolerated the procedure well with no immediate complications

## 2024-02-26 ENCOUNTER — TELEPHONE (OUTPATIENT)
Dept: OBSTETRICS AND GYNECOLOGY | Facility: CLINIC | Age: 89
End: 2024-02-26
Payer: MEDICARE

## 2024-02-26 NOTE — TELEPHONE ENCOUNTER
Spoke with pt. Assisted pt with rescheduling appt due to Dr. Gallo not being in clinic today. Pt verbalized understanding.

## 2024-03-04 ENCOUNTER — OFFICE VISIT (OUTPATIENT)
Dept: OBSTETRICS AND GYNECOLOGY | Facility: CLINIC | Age: 89
End: 2024-03-04
Payer: MEDICARE

## 2024-03-04 VITALS
SYSTOLIC BLOOD PRESSURE: 126 MMHG | HEIGHT: 66 IN | BODY MASS INDEX: 25.61 KG/M2 | DIASTOLIC BLOOD PRESSURE: 76 MMHG | WEIGHT: 159.38 LBS

## 2024-03-04 DIAGNOSIS — Z46.89 ENCOUNTER FOR PESSARY MAINTENANCE: Primary | ICD-10-CM

## 2024-03-04 PROCEDURE — 99212 OFFICE O/P EST SF 10 MIN: CPT | Mod: HCNC,S$GLB,, | Performed by: NURSE PRACTITIONER

## 2024-03-04 PROCEDURE — 99999 PR PBB SHADOW E&M-EST. PATIENT-LVL IV: CPT | Mod: PBBFAC,HCNC,, | Performed by: NURSE PRACTITIONER

## 2024-03-04 PROCEDURE — 1160F RVW MEDS BY RX/DR IN RCRD: CPT | Mod: HCNC,CPTII,S$GLB, | Performed by: NURSE PRACTITIONER

## 2024-03-04 PROCEDURE — 1126F AMNT PAIN NOTED NONE PRSNT: CPT | Mod: HCNC,CPTII,S$GLB, | Performed by: NURSE PRACTITIONER

## 2024-03-04 PROCEDURE — 1159F MED LIST DOCD IN RCRD: CPT | Mod: HCNC,CPTII,S$GLB, | Performed by: NURSE PRACTITIONER

## 2024-03-04 RX ORDER — CLONIDINE 0.2 MG/24H
1 PATCH, EXTENDED RELEASE TRANSDERMAL
COMMUNITY

## 2024-03-04 NOTE — PROGRESS NOTES
Subjective:       Patient ID: Tiffanie Barajas is a 88 y.o. female.    Chief Complaint:  Pessary Check    Patient's last menstrual period was 1990.  History of Present Illness  Presents for pessary check.  Wears a #4 fenestrated ring pessary for cystocele and rectocele.  Does well with pessary.  No VB, d/c, or odor.  No pain.       OB History    Para Term  AB Living   2 1       2   SAB IAB Ectopic Multiple Live Births         1 1      # Outcome Date GA Lbr Dwayne/2nd Weight Sex Delivery Anes PTL Lv   2 Para      Vag-Spont   EMILIANO   1A       Vag-Spont      1B       Vag-Spont          Review of Systems  Review of Systems        Objective:    Physical Exam    Physical Exam:   Constitutional: She is oriented to person, place, and time. She appears well-developed and well-nourished. No distress.              Abdominal: Soft. She exhibits no distension and no mass. There is no abdominal tenderness. There is no rebound and no guarding. Hernia confirmed negative in the right inguinal area and confirmed negative in the left inguinal area.     Genitourinary:    Vagina normal.      Pelvic exam was performed with patient supine.   There is no rash, tenderness, lesion or injury on the right labia. There is no rash, tenderness, lesion or injury on the left labia. Right adnexum displays no mass, no tenderness and no fullness. Left adnexum displays no mass, no tenderness and no fullness. There is rectocele (grade 1) and cystocele (grade 2) in the vagina. No erythema,  no vaginal discharge, tenderness, bleeding or unspecified prolapse of vaginal walls in the vagina.    No foreign body in the vagina.      No signs of injury in the vagina.   Cervix exhibits no motion tenderness, no discharge and no friability. Uterus is not deviated, not enlarged, not fixed and not tender.    Genitourinary Comments: Pessary removed without difficulty, washed with soap and water, and replaced without difficulty                  Neurological: She is alert and oriented to person, place, and time.     Psychiatric: She has a normal mood and affect.   Assessment:     1. Encounter for pessary maintenance              Plan:   Tiffanie was seen today for pessary check.    Diagnoses and all orders for this visit:    Encounter for pessary maintenance    Return to clinic in 4 months for pessary check

## 2024-03-18 DIAGNOSIS — M25.561 PAIN IN BOTH KNEES, UNSPECIFIED CHRONICITY: Primary | ICD-10-CM

## 2024-03-18 DIAGNOSIS — M25.562 PAIN IN BOTH KNEES, UNSPECIFIED CHRONICITY: Primary | ICD-10-CM

## 2024-03-22 ENCOUNTER — OFFICE VISIT (OUTPATIENT)
Dept: ORTHOPEDICS | Facility: CLINIC | Age: 89
End: 2024-03-22
Payer: MEDICARE

## 2024-03-22 ENCOUNTER — HOSPITAL ENCOUNTER (OUTPATIENT)
Dept: RADIOLOGY | Facility: HOSPITAL | Age: 89
Discharge: HOME | End: 2024-03-22
Attending: ORTHOPAEDIC SURGERY
Payer: MEDICARE

## 2024-03-22 VITALS — BODY MASS INDEX: 25.61 KG/M2 | WEIGHT: 159.38 LBS | HEIGHT: 66 IN

## 2024-03-22 DIAGNOSIS — M25.562 PAIN IN BOTH KNEES, UNSPECIFIED CHRONICITY: ICD-10-CM

## 2024-03-22 DIAGNOSIS — M21.162 ACQUIRED VARUS DEFORMITY KNEE, LEFT: ICD-10-CM

## 2024-03-22 DIAGNOSIS — M17.11 ARTHRITIS OF KNEE, RIGHT: ICD-10-CM

## 2024-03-22 DIAGNOSIS — M47.816 LUMBAR FACET ARTHROPATHY: ICD-10-CM

## 2024-03-22 DIAGNOSIS — I73.9 PERIPHERAL ARTERIAL DISEASE: ICD-10-CM

## 2024-03-22 DIAGNOSIS — M21.161 ACQUIRED VARUS DEFORMITY KNEE, RIGHT: ICD-10-CM

## 2024-03-22 DIAGNOSIS — M16.12 ARTHRITIS OF LEFT HIP: ICD-10-CM

## 2024-03-22 DIAGNOSIS — M25.561 PAIN IN BOTH KNEES, UNSPECIFIED CHRONICITY: ICD-10-CM

## 2024-03-22 DIAGNOSIS — M17.12 ARTHRITIS OF KNEE, LEFT: Primary | ICD-10-CM

## 2024-03-22 DIAGNOSIS — M46.1 SACROILIITIS: ICD-10-CM

## 2024-03-22 DIAGNOSIS — R29.6 FREQUENT FALLS: ICD-10-CM

## 2024-03-22 PROCEDURE — 1101F PT FALLS ASSESS-DOCD LE1/YR: CPT | Mod: CPTII,S$GLB,, | Performed by: ORTHOPAEDIC SURGERY

## 2024-03-22 PROCEDURE — 1126F AMNT PAIN NOTED NONE PRSNT: CPT | Mod: CPTII,S$GLB,, | Performed by: ORTHOPAEDIC SURGERY

## 2024-03-22 PROCEDURE — 99999 PR PBB SHADOW E&M-EST. PATIENT-LVL III: CPT | Mod: PBBFAC,,, | Performed by: ORTHOPAEDIC SURGERY

## 2024-03-22 PROCEDURE — 3288F FALL RISK ASSESSMENT DOCD: CPT | Mod: CPTII,S$GLB,, | Performed by: ORTHOPAEDIC SURGERY

## 2024-03-22 PROCEDURE — 1159F MED LIST DOCD IN RCRD: CPT | Mod: CPTII,S$GLB,, | Performed by: ORTHOPAEDIC SURGERY

## 2024-03-22 PROCEDURE — 73564 X-RAY EXAM KNEE 4 OR MORE: CPT | Mod: 26,50,, | Performed by: RADIOLOGY

## 2024-03-22 PROCEDURE — 73564 X-RAY EXAM KNEE 4 OR MORE: CPT | Mod: TC,50

## 2024-03-22 PROCEDURE — 99213 OFFICE O/P EST LOW 20 MIN: CPT | Mod: S$GLB,,, | Performed by: ORTHOPAEDIC SURGERY

## 2024-03-22 RX ORDER — GABAPENTIN 100 MG/1
100 CAPSULE ORAL 3 TIMES DAILY
Qty: 90 CAPSULE | Refills: 11 | Status: SHIPPED | OUTPATIENT
Start: 2024-03-22 | End: 2025-03-22

## 2024-03-22 NOTE — PATIENT INSTRUCTIONS
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  After 2 weeks you can go up to 200 mg at night for another 2 weeks and then if you wish you can go up to 300 mg at night  I will see you back in 3 months

## 2024-03-22 NOTE — PROGRESS NOTES
Subjective:     Patient ID: Tiffanie Barajas is a 88 y.o. female.    Chief Complaint: Pain of the Right Knee and Pain of the Left Knee    HPI:  84-year-old complaining of pain over the left sacroiliac joint radiating down to her knee.  Previous history of sciatica and a cyst in the back that was removed. Has history of arthritis of both of her knees received injection longtime ago more than a year (lubrication shot).  She is not having too much pain in the right knee unable to fully extend the left knee difficulty with shopping and walking.  Pain roughly 4/10.  Denies loss of bowel bladder control.  Walking distance is seems to hurt approximately 200 ft.  She is on blood thinners and unable to take NSAIDs.  She takes 1 little Tylenol occasionally and does not help.    01/27/2020.  Left knee injection of Depo-Medrol seems to have helped this patient.  Given 01/03/2020.  Just started physical therapy and that seems to help also.  The Flexeril did not seem to work at all.  Already been 5 times to physical therapy and she has at least 3 more weeks to go.  Very pleased so far with her results.  Patient cannot take NSAIDs due to being on blood thinners.  Pain level 0/10.  Does have occasional discomfort right greater trochanteric area    02/13/2020  Patient with bilateral knee arthrosis left worse than right and sacroiliac pain.  She just finished her physical therapy and that seems to have helped and now she is doing independent exercise program.  We did inject the knees with steroid that seems to help.  She is ambulating without any assistive devices today. He is here to have Synvisc-One injection into the left knee.    05/22/2020  Patient complaining today of left hip pain/she points over the lumbar area radiating to the posterior aspect of the knee.  She has history of lumbosacral arthritis.  As far as her left knee injection of Synvisc-One she is not having any pain with that.  She is very happy with that result.   She wants something done and 0 not want any prescriptions for any pills.  Denies loss of bowel bladder control or usage or any assistive devices to ambulate.  She is doing independent exercise as well as showing me that her  created exercise program for her.  I did recommend stationary bicycle and independent stretching exercises.  Denies loss of bowel bladder control and fever or chills or shortness of breath or chest pain    07/09/2020  Patient received trigger point injection of the lumbar spine 05/22/2020 with some good relief.  Now she is having more pain in the left knee.  She had received steroid injection 01/03/2020 and Synvisc-One in 02/13/2020.  She feels the knee is tight she tries to exercise it on her own.  Having sharp pain on the medial side.  Pain level is 3/10 but with gait it gets worst.  Denies any fever or chills and maintaining social distancing and wearing her own mask.  She would like some relief from the tightness and stiffness in the left knee but wants to avoid taking any medications besides a little Tylenol because she is on blood thinners and unable to take NSAIDs.    01/04/2020   Left knee pain and stiffness 6/10, low back pain / points on the left sacroiliac area also.  She is requesting injection in both areas.  Patient did have trigger point injection 05/22/2020 in the lumbar area which helped also had steroid injection in July 2020 which seems to have helped in the knee.  Also previous history of Synvisc-One injection 02/13/2020.  Tylenol does not seem to help.  Denies any fever or chills or shortness of breath or difficulty with chewing or swallowing loss of bowel bladder control blurry vision or double vision or loss sense smell or taste.  She is here with her significant other.  Asking about getting the COVID  Vaccine.  She is 85 years old and I told if her  She qualifies should be able to get it soon    02/08/2021  Right lumbar trigger point tenderness which was injected  awhile ago with relief on 05/22/2020 now it is hurting her a little bit more.  I did tell her since she is here today to receive viscosupplementation/approved only Monovisc not Synvisc this time we will hold off on trigger point injection.  Pain is 5/10 in the left knee.  The right knee is doing okay.  She is ambulating without any assistive devices.  There was a little discussion about difference is with me in Monovisc and Synvisc and put simply I told them it is like having a car with different brands.  Patient is maintaining her activity level.  She still did not get her COVID vaccine due to in availability of the vaccine.  Denies any fever or chills or shortness of breath or difficulty with chewing or swallowing or loss of bowel bladder control blurry vision double vision loss sense smell or taste.  She is ambulating without any assistive devices    04/01/2021  Low back pain which is severe we injected trigger point on 05/22/2020 with some relief.  Went over her x-rays in the electronic records today showing severe facet arthropathy and degenerative disc disease with large bone spur overgrowth.  That bothers her a lot with radiation down her legs a cannot sleep.  She is requesting may be something to help with the radicular symptoms and give her some sleep and rest as far as the Monovisc injection into the left knee that did not seem to help at all.  The steroid injection seems to work better for her and requested another injection.  She did go through therapy before.  She feels sciatica to the right leg as far as the right knee is concerned is hurting but not as much as the left knee  Monovisc injection 02/08/2021 into the left knee did not seem to help.  Voltaren gel she uses on occasion.  No fever no chills or shortness of breath or difficulty with chewing or swallowing loss of bowel bladder control blurry vision double vision or loss of sense smell or taste  Did did receive her COVID-19  vaccine    07/02/2021  Patient is having low back pain with sciatic type of pain radiating down both legs.  Today will obtaining x-rays of her spine as well as the pelvis and hips and the knees.  The knees and not hurting her as much and she is not requesting any injections.  We did try Monovisc on the left knee but did not help and the steroid seems to work for her.  I did mention in the future maybe she would need long-acting steroid like  zilretta.  She is here with her daughter in law.  She is having some hip pains.  Able to manage it.  Her pain is 5/10 ambulating slowly without any assistive devices.  Cannot take any NSAIDs by mouth since she is on aspirin and Plavix and home cardiac evaluation by cardiologist.  No fever no chills no shortness of breath or difficulty with chewing or swallowing loss of bowel bladder control or blurry vision double vision loss sense smell or taste  She did have diclofenac gel at home but has not been using it appropriately  She states she is using SALONPAS which seems to help the back  Last visit we gave her cyclobenzaprine she is not taking it      10/18/2021  Severe low back pain left side more than the right.  We did have x-rays on her from last visit that showed sacroiliac arthritic changes sacroiliitis.  We did give her sacroiliac injection more than a year ago with some relief.  She also have pain in the knees and in the back specially on the left side a worried if it is sciatica or not.  She does have x-rays of severe arthritis in the spine also.  She usually gets steroid injections which help her for a little while.  She had been tried on viscosupplementation once without success.  She had sustained a little fall on 10/07/2021 but no major injury.  She is ambulating without any assistive devices.  No fever no chills no shortness of breath or difficulty with chewing or swallowing  She is using diclofenac gel as well as Salonpas patches.    11/08/2021  Injection of the  trigger point in the lower part of her back helped for 10 days.  That seems to come back now and hurts her a little bit more than her knee.  As far as the right knee she is doing okay the left knee is the 1 that bothers her.  She has been losing some range of motion since last visit we see her.  Her pain is around 6/10 in the sitting position and goes up with walking.  At this point I did tell her I do not want to give her 2 injections and not knowing which 1 will give her a reaction.  We have her approved to receive the left knee Zilreta injection.  The pros and cons discussed in details.  No loss of bowel bladder control or fever or chills or shortness of breath or difficulty with chewing or swallowing loss of bowel bladder control or loss of sense smell or taste      12/02/2021  Patient stated left knee steroid injections/ Zilreta seems to have helped so far.  Not bother her as much.  What bothers her is her lower part of her back on the left side there is a trigger point that seems to be the area.  She her  marked with a pen.  She is ambulating slowly without any assistive devices.  She would like to have that trigger point injected despite the fact her pain in her knee is 3/10.  Overall it seems the steroid injection given last visit in the knee seems to work so far  No fever no chills no shortness of breath or difficulty with chewing or swallowing loss of bowel bladder control    02/7/22  Complaining over left greater trochanteric and right sacroiliac area pain.  The left knee still doing okay received zilretta injection in November 2021 and is still helping.  She is requesting injections over the areas that hurts the most.  She has fluctuating blood pressure I did tell her I will not give but total 80 mg Depo-Medrol and we will split it into 2 areas.  She is ambulating without assistive devices.  We did get her approved for the long-acting steroid injection into the left knee but she says ri it since  you doing okay.  ght now her knee is doing okay and that previous shot still working.  We can always get it approved if needed again.  The approval is until February 27, 2022 however we still do not have to give it if you still doing okay      03/28/2022  On 02/07/2022 in [place] today greater trochanter which seems to have helped however the trigger point/left sacroiliac area that did not seem to help.  Both injections were with Depo-Medrol.  She stated she was changed to nifedipine for blood pressure and that cause severe swelling in the legs.  She has an appointment April 15 to see the cardiologist.  I did tell her this could be angioedema.  As far as her knee is concerned just stiff right now and she is not having much of any pain in it at 0/10.  Her main concern is the lumbar area this seems to have quite a bit of pain in it.  She had marked where the pain is in order to get another injection.  Denies loss of bowel bladder control blurry vision double vision.  She is here with her .  She had tried different topicals without success  Vicks rub seems to be the 1 that works the most  Trigger point pain over the left sacroiliac area approximately 3/10    06/02/2022  Severe left knee pain.  Given zilretta in November 2021 and just wore off on her almost  4 weeks ago.  Her pain is severe in the knee and limiting motion..  As far as the left hip and sacroiliac area on the left side and trigger point she is around 2-3/10.  She still ambulating without assistive devices taking very small steps.  She wants an injection into the left knee.  She stated the long-acting steroid helped quite a bit when we give it last and it was in November.  She would like that repeated.  However she stated today the left knee is hurting a lot she wants an injection today too.  I see no reason why not..  Still having pain over the left sacroiliac area and greater trochanteric area but not severe    06/30/2022  We did inject her last  visit and that did not seem to help as much.  She would like to have the long-acting steroid injection which helped significantly last time.  She still having some hip pain only when she leans on it but not when she is walking.  Her back is doing little bit better.  She does take Tylenol.  Her pain in the knee is 8/10    01/23/2023   Patient is complaining of right calf burning and discomfort.  This is been going on for several weeks.  I have not seen her because she was taking care of her  received radiation treatment.  She is here with him.  She is alert oriented x3.  Now the right and the left knee both of them are hurting.  She is using a cane to get around.  Her pain is 8/10.  Usually most of her problem is the left knee and sacroiliac area but today the back is not bothering her as much as the right knee and right calf as well as left knee.  She is using a cane to get around.  Despite the fact she still having left sacroiliac and greater trochanteric discomfort the knees are coming 1st today.    No fever no chills no shortness of breath difficulty with chewing or swallowing loss of bowel bladder control   She has family and children that could take her to doctor's.    03/16/2023   She is having cramps in the back of her right thigh and she has severe arthritis unable to fully extend the knee.  The Flexeril/cyclobenzaprine did not seem to help.  Today decided to change her to methocarbamol went over it with her.  Her lower part of her back is hurting and she stated that the injection to both of her knees helped last time of Depo-Medrol.  She does have arthritis we did obtain ultrasound last time was negative for DVT in the lower extremity.  She is on Plavix and aspirin.  Her pain is around 10/10 right now and I think at this time we probably start as her morning stiffness and achiness in the shoulder and her knees  She is 87 she does not want undergo surgery at this time  She is hurting more over the  right greater trochanteric slightly posterior aspect in the piriformis fossa.      04/24/2023   Right hip bursitis injected 03/16/2023 and helped until a week ago.  I did place her on prednisone 5 mg to take on a bad day as well as methocarbamol as a muscle relaxant.  She did take those for 10 days and made things better.  We went over it electronic record and reviewed her x-rays in the system again.  We reviewed the x-rays of the lumbar spine the pelvis and the knees.  I reviewed the electronic records she received cortisone injection on 01/23/2023 in both of her knees Depo-Medrol.  And does seems to have helped but now they wore off.    Both of her knees are what is causing her more pain as well as numbness and tingling in both of her legs which is something new she never complained about before.  I did tell her she does have a pinched nerve in the back but I do not want to place her on anymore medication like gabapentin which could pleased with her head at her age of 87.  She is very smart alert oriented.  She is worried about taking too much prednisone makes her hungry and gained weight.  No fever no chills no shortness of breath or difficulty with chewing swallowing loss of bowel bladder control   She uses a cane to walk around and she tells take small steps but able to do it.    As far as the sacroiliac joint is not bothering her at this time    09/18/2023   Bilateral knee severe pain.  She missed her appointment in June because she was not having any pain and then a week or 10 days later she started with the pain and could not make another appointment until now.  She is using a cane now to get around.  She got stuck in traffic because of a major accident and we told her even if she shows a plate that we will see her then we accommodated her.  She felt very appreciative.  Her pain in the knees is 10/10.  She is always worried about getting another appointment in 3 months and if he is doing okay she did not  want to waste might time yet it takes another 3 months to get another appointment.  I did tell her will give her to appointments 1 in 3 months 1 in 4 months to bypass that issue.  An even if she shows up early I will even see her than 2.  No fever no chills no shortness of breath   She has what psoriatic changes in her hands and was told the psoriasis.  I did tell her that psoriasis could cause severe arthritis not the other way around    03/22/2024   She is complaining of hip pain and she points over the iliac crest bilaterally over the superior aspect.  She is ambulating with a cane.  She takes small steps.  She said she is having also pain behind her knees and we know it is severe arthritis.  She has not at a point that she needs any injections today.  She asked about burning pains in the legs and I did tell her that is usually nerve issues.  There are certain things you can do like gabapentin or Lyrica.  She has never been on any of does.  We always very careful about giving her anything that could cause gastrointestinal liver problems  We did go over her medications with her and we went over her new x-rays of her knees today.  She did have a little fall on 03/12/2024  She was upset about giving her an appointment with the primary care physician and that physician after she has been in the room for more than an hour nobody showed up.  Then the she was told the doctor is not there and then a nurse practitioner showed up after she had to walk looking for people.  She was left in the room and she was upset about it.  She said she wants to tell someone about it and I told her to call the main number and asked for patient relations  Overall she states she gives everybody good rapport however she was really upset about that episode    Injections of Depo-Medrol into the knees on last visit 01/18/2024 seems to have helped  Past Medical History:   Diagnosis Date    Acute pancreatitis without infection or necrosis  12/5/2021    Anxiety     Arthritis     Atherosclerosis of native coronary artery of native heart with angina pectoris 8/8/2019    Cancer     Degenerative disc disease     Depression     Essential hypertension 10/26/2021    GERD (gastroesophageal reflux disease)     Glaucoma suspect of both eyes     Hyperlipidemia     Insomnia 4/6/2021    Low back derangement syndrome 12/11/2017    Melanoma     Pneumonia due to other staphylococcus      Past Surgical History:   Procedure Laterality Date    ADENOIDECTOMY      CHOLECYSTECTOMY  1973    SPINE SURGERY  2/6/13    L4-L5  cyst removed andfor sciatica    TONSILLECTOMY  1945     Family History   Problem Relation Age of Onset    Arthritis Mother     Hypertension Mother     Stroke Mother     Glaucoma Mother     Heart disease Father 56        fatal MI    Diabetes Brother     Cancer Son 56        melanoma with mets to lung and bone    Arthritis Sister     Hyperlipidemia Neg Hx      Social History     Socioeconomic History    Marital status:      Spouse name: fabio    Number of children: 3   Tobacco Use    Smoking status: Never    Smokeless tobacco: Never   Substance and Sexual Activity    Alcohol use: No     Alcohol/week: 0.0 standard drinks of alcohol    Drug use: No    Sexual activity: Not Currently   Social History Narrative    . Decaf coffee only. No living will or advanced directive-copy of information given.      Medication List with Changes/Refills   New Medications    GABAPENTIN (NEURONTIN) 100 MG CAPSULE    Take 1 capsule (100 mg total) by mouth 3 (three) times daily.   Current Medications    ACETAMINOPHEN (TYLENOL) 650 MG TBSR    Take 1 tablet (650 mg total) by mouth every 8 (eight) hours.    ASCORBIC ACID, VITAMIN C, 500 MG CHEW    Take by mouth once daily.    ASPIRIN (ECOTRIN) 81 MG EC TABLET    Take 1 tablet by mouth Daily.    ATORVASTATIN (LIPITOR) 40 MG TABLET    Take 1 tablet by mouth every evening.    BETAMETHASONE VALERATE 0.1% (VALISONE) 0.1 %  OINT    Apply topically 2 (two) times daily.    CITALOPRAM (CELEXA) 20 MG TABLET    Take 1 tablet (20 mg total) by mouth once daily.    CLONIDINE 0.2 MG/24 HR TD PTWK (CATAPRES) 0.2 MG/24 HR    1 patch every 7 days.    CLOPIDOGREL (PLAVIX) 75 MG TABLET    Take 75 mg by mouth once daily.    COLCHICINE (COLCRYS) 0.6 MG TABLET    Take 0.6 mg by mouth daily as needed.    ERGOCALCIFEROL (ERGOCALCIFEROL) 50,000 UNIT CAP    Take by mouth once daily. Patient takes 5,000 units daily    IRBESARTAN (AVAPRO) 150 MG TABLET    Take 150 mg by mouth.    ISOSORBIDE MONONITRATE (IMDUR) 60 MG 24 HR TABLET        MULTIVIT,IRON,MINERALS/LUTEIN (CENTRUM SILVER ULTRA WOMEN'S ORAL)    Take by mouth once daily.    NITROGLYCERIN (NITROSTAT) 0.4 MG SL TABLET    DISSOLVE ONE TABLET UNDER THE TONGUE EVERY 5 MINUTES AS NEEDED FOR CHEST PAIN. DO NOT EXCEED A TOTAL OF 3 DOSES IN 15 MINUTES    OMEPRAZOLE (PRILOSEC) 40 MG CAPSULE    Take 1 capsule (40 mg total) by mouth once daily.    TURMERIC ROOT EXTRACT 500 MG CAP    Take by mouth once daily.    VITAMIN E 200 UNIT CAPSULE    Take 200 Units by mouth once daily.   Discontinued Medications    PREDNISONE (DELTASONE) 5 MG TABLET    Take 1 tablet (5 mg total) by mouth once daily.     Review of patient's allergies indicates:   Allergen Reactions    Hydrocodone-acetaminophen Nausea And Vomiting     Other reaction(s): Vomiting    Diclofenac Nausea And Vomiting and Other (See Comments)     Review of Systems   Constitutional: Negative for decreased appetite.   HENT: Negative for tinnitus.    Eyes: Negative for double vision.   Cardiovascular: Negative for chest pain.   Respiratory: Negative for wheezing.    Hematologic/Lymphatic: Negative for bleeding problem.   Skin: Negative for dry skin.   Musculoskeletal: Positive for arthritis, back pain, joint pain, muscle weakness and stiffness. Negative for gout and neck pain.   Gastrointestinal: Negative for abdominal pain.   Genitourinary: Negative for bladder  incontinence.   Neurological: Positive for numbness. Negative for paresthesias and sensory change.   Psychiatric/Behavioral: Negative for altered mental status.       Objective:   Body mass index is 25.73 kg/m².  There were no vitals filed for this visit.       General    Constitutional: She is oriented to person, place, and time. She appears well-developed.   HENT:   Head: Atraumatic.   Eyes: EOM are normal.   Cardiovascular: Normal rate.    Pulmonary/Chest: Effort normal.   Neurological: She is alert and oriented to person, place, and time.   Psychiatric: Judgment normal.         Ambulating with a cane   Cervical spine rotation is functional but  limited due to stiffness  Lumbar with loss of lordosis and tenderness around the L4-5 and mostly over the left sacroiliac joint which is the area of severe tenderness and paraspinal.  Forward bending barely to proximal tibia lateral bending to mid thigh.    Pelvis is level.  There is tenderness over the anterior superior iliac crest on both sides    Passive hip motion within normal limits.  Hip flexors and abductors as well as quads and hamstrings and ankle extensors and flexors are slightly weak at 5-/5.  Tenderness over the greater trochanter over the right and left hip   Right knee range of motion 8-110.  Slight crepitus to AP compression on the patella in on medial joint.  Pain medially and anteriorly and slightly laterally.  No defect in the patella or quadriceps tendon Very mild swelling.  Collaterals and cruciate stable.  Left knee with approximately 10 ° of flexion contracture and flexion barely to 70°.  Moderate swelling.  Collaterals and cruciate stable. Crepitus with motion.  Pain in medially to palpation.  Also tender to put was patient over the patella with severe crepitus  Calves are soft nontender on the left slightly tender on the right  Slight varicosities around the calves  DP 1+  Skin is warm to touch no obvious lesions      Relevant imaging results  reviewed and interpreted by me, discussed with the patient and / or family today     X-ray 03/22/2024 bilateral knees showing in the popliteal area calcified vessels, she has severe arthritis and marginal osteophytes and loss of joint space bilateral knees with some varus deformity  Ultrasound 01/26/2023 of lower extremity was negative for DVT/patient on Plavix and aspirin  X-ray 01/23/2023 bilateral knees with severe osteopenic bone.  There is loss of medial joint space bilaterally and marginal osteophytic changes.  There is no fracture seen consistent with arthritis    X-ray bilateral knees with left knee complete loss of medial joint space with osteophytic changes and sclerosis.  Same on the right knee but not as bad consistent with bilateral end-stage arthrosis.  07/02/2021  X-ray of the pelvis showing some degenerative changes on the left hip with good joint space left.  Right hip no joint space loss consistent with some arthrosis of the  hip.  Mild chronic degenerative changes in the sacroiliac joints 07/02/2021  X-ray of the lumbar spine in the electronic records multilevel facet arthropathy and osteophytes.  Anterior multilevel spurring.  Degenerative disc disease 07/02/2021  Assessment:     Encounter Diagnoses   Name Primary?    Arthritis of knee, left Yes    Acquired varus deformity knee, left     Arthritis of knee, right     Acquired varus deformity knee, right     Sacroiliitis     Arthritis of left hip     Peripheral arterial disease     Lumbar facet arthropathy     Frequent falls         Plan:   Arthritis of knee, left  -     gabapentin (NEURONTIN) 100 MG capsule; Take 1 capsule (100 mg total) by mouth 3 (three) times daily.  Dispense: 90 capsule; Refill: 11    Acquired varus deformity knee, left    Arthritis of knee, right  -     gabapentin (NEURONTIN) 100 MG capsule; Take 1 capsule (100 mg total) by mouth 3 (three) times daily.  Dispense: 90 capsule; Refill: 11    Acquired varus deformity knee,  right    Sacroiliitis  -     gabapentin (NEURONTIN) 100 MG capsule; Take 1 capsule (100 mg total) by mouth 3 (three) times daily.  Dispense: 90 capsule; Refill: 11    Arthritis of left hip    Peripheral arterial disease    Lumbar facet arthropathy  -     gabapentin (NEURONTIN) 100 MG capsule; Take 1 capsule (100 mg total) by mouth 3 (three) times daily.  Dispense: 90 capsule; Refill: 11    Frequent falls         Patient Instructions   Your x-ray show severe arthritis in both of her knees   You have arthritis in you back and sacroiliac joint from previous x-rays   You do get cramps behind the knees when you straighten them up too much   I want to start you on gabapentin very low does 100 mg at night to see how that works for you.  After 2 weeks you can go up to 200 mg at night for another 2 weeks and then if you wish you can go up to 300 mg at night  I will see you back in 3 months   I went and reviewed all her x-rays in the lumbar spine showing multilevel facet arthropathy and some degenerative disc disease.  I also reviewed her pelvis film showing very mild degenerative radiation of her hips.  The x-rays on her knees showing severe arthritic condition.  Now she is complaining of numbness and tingling in the legs and I did tell her could be secondary to lumbar issues and at her age I do not want to add any medications that will make her get confused like gout gabapentin.  She does not have diabetes and most likely her problem is secondary to the lumbar pinched nerves.  She does get cramps in the back of her legs and we did put her on methocarbamol to take as needed.  We also discussed prednisone that she takes only if needed.  We did discuss the pros and cons of steroid injections into the knees.  She can have does once every 10-12 weeks.  I do not want a place her on NSAIDs she is on Plavix and aspirin and I think prednisone is ideal for her at her age.    01/18/2024   Patient is having frequent falls minimum 2  over the last week or so and just hit her knee a little bit.  She has think difficulty getting up and needed help.  She is 88 years of age and numerous arthritic conditions.  She is fully alert and oriented in doing really well mentally.  At this time I did tell her that I must have the insurance provide you with a Rollator.  It is a walker with a seat that you need to use instead of the cane.  The injections in the knees seems to help but they are not lasting long enough  Proceeded injecting both of her knees today 01/28/2024 each with 80 mg Depo-Medrol mixed with 5 cc 1% lidocaine 01/18/2024   Ice the knees the next few days   You may take Tylenol   As far as the back is concerned the knees was hurting more today in the will be too much steroid if we inject the trigger point in the back  She can not be on NSAIDs she is on Plavix and aspirin   You turn in 3 months sooner if needed  Disclaimer:is note was prepared using a voice recognition system and is likely to have sound alike errors within the text.

## 2024-04-18 ENCOUNTER — OFFICE VISIT (OUTPATIENT)
Dept: OTOLARYNGOLOGY | Facility: CLINIC | Age: 89
End: 2024-04-18
Payer: MEDICARE

## 2024-04-18 VITALS — WEIGHT: 158.5 LBS | BODY MASS INDEX: 25.47 KG/M2 | TEMPERATURE: 98 F | HEIGHT: 66 IN

## 2024-04-18 DIAGNOSIS — H61.21 IMPACTED CERUMEN OF RIGHT EAR: ICD-10-CM

## 2024-04-18 PROCEDURE — 99999 PR PBB SHADOW E&M-EST. PATIENT-LVL III: CPT | Mod: PBBFAC,HCNC,, | Performed by: OTOLARYNGOLOGY

## 2024-04-18 PROCEDURE — 99499 UNLISTED E&M SERVICE: CPT | Mod: 25,HCNC,S$GLB, | Performed by: OTOLARYNGOLOGY

## 2024-04-18 PROCEDURE — 69210 REMOVE IMPACTED EAR WAX UNI: CPT | Mod: HCNC,S$GLB,, | Performed by: OTOLARYNGOLOGY

## 2024-04-18 NOTE — PROGRESS NOTES
REFERRING PROVIDER  Alyson Saini Pa-c  [id number] [facility] [place] [address]  Subjective:   Patient: Tiffanie Barajas [id number], :1935   Visit date:2024 10:36 AM    Chief Complaint:  Cerumen Impaction (Ear cleaning. Patient states she is not sure if she lost her hearing in R ear or if her ears need cleaning)        HPI:     Tiffanie Barajas is a 88 y.o. female whom I am asked to see for evaluation of otalgia or hearing loss in both ears for the past 1-4 weeks.   Tiffanie rates the severity as moderate.  No exacerbating or relieving factors.  There is no drainage from the ears.      Her meds, allergies, medical, surgical, social & family histories were reviewed & updated:  -     She has a current medication list which includes the following prescription(s): acetaminophen, ascorbic acid (vitamin c), aspirin, atorvastatin, betamethasone valerate 0.1%, citalopram, clonidine 0.2 mg/24 hr td ptwk, clopidogrel, colchicine, ergocalciferol, gabapentin, isosorbide mononitrate, multivit,iron,minerals/lutein, nitroglycerin, omeprazole, turmeric root extract, vitamin e, and irbesartan.  -     She  has a past medical history of Acute pancreatitis without infection or necrosis (2021), Anxiety, Arthritis, Atherosclerosis of native coronary artery of native heart with angina pectoris (2019), Cancer, Degenerative disc disease, Depression, Essential hypertension (10/26/2021), GERD (gastroesophageal reflux disease), Glaucoma suspect of both eyes, Hyperlipidemia, Insomnia (2021), Low back derangement syndrome (2017), Melanoma, and Pneumonia due to other staphylococcus.   -     She does not have any pertinent problems on file.   -     She  has a past surgical history that includes Spine surgery (13); Cholecystectomy (); Tonsillectomy (); and Adenoidectomy.  -     She  reports that she has never smoked. She has never used smokeless tobacco. She reports that she does not drink  "alcohol and does not use drugs.  -     Her family history includes Arthritis in her mother and sister; Cancer (age of onset: 56) in her son; Diabetes in her brother; Glaucoma in her mother; Heart disease (age of onset: 56) in her father; Hypertension in her mother; Stroke in her mother.  -     She is allergic to hydrocodone-acetaminophen and diclofenac.      Review of Systems:  -     Allergic/Immunologic: is allergic to hydrocodone-acetaminophen and diclofenac..  -     Constitutional: Current temp: 98.1 °F (36.7 °C) (Temporal)        Objective:     Physical Exam:  Vitals:  Temp 98.1 °F (36.7 °C) (Temporal)   Ht 5' 6" (1.676 m)   Wt 71.9 kg (158 lb 8.2 oz)   LMP 07/26/1990   BMI 25.58 kg/m²   Communication:  Able to communicate, no hoarseness.  Head & Face:  Normocephalic, atraumatic, no sinus tenderness.  Eyes:  Extraocular motions intact.  Ears:  Otoscopy of external auditory canals reveals impaction of bilateral ear canals.  With the patient in the supine position, we used the operating microscope to examine both ears with the appropriate sized ear speculum.  A variety of sterile, micro-instruments were utilized to remove the cerumen atraumatically from the impacted ear(s).   After removal, the ears were reexamined-  Right Ear:  No mass/lesion of auricle. The external auditory canals is without erythema or discharge. Pneumatic otoscopy of the tympanic membrane revealed no perforation and good mobility, with no fluid in middle ear. Clinical speech reception thresholds grossly normal.  Left Ear:  No mass/lesion of auricle. The external auditory canals is without erythema or discharge. Pneumatic otoscopy of the tympanic membrane revealed no perforation and good mobility, with no fluid in middle ear. Clinical speech reception thresholds grossly normal  Nose:  No masses/lesions of external nose, nasal mucosa, septum, and turbinates were within normal limits.  Mouth:  No mass/lesion of lips, teeth, gums, hard/soft " palate, tongue, tonsils, or oropharynx.  Neck & Lymphatics:  No cervical lymphadenopathy, no neck mass/crepitus/ asymmetry, trachea is midline, no thyroid enlargement/tenderness/mass.  Neuro/Psych: Alert with normal mood and affect.   Respiration/Chest:  Symmetric expansion during respiration, normal respiratory effort.  Skin:  Warm and intact.    Assessment & Plan:       -     Cerumen Impaction - Tiffanie has cerumen impaction.  We discussed preventative measures and treatment options.  Q-tips must be avoided, instead the ears can be cleaned with OTC ear rinses (or a mixture of alcohol & vinegar in equal parts).   For hard wax, Tiffanie may place mineral oil/baby oil in the ear with a cotton ball at night and remove in the shower.  This will assist in softening the wax and allow it to drain out on its own. If the cerumen impacts the ear canal and causes hearing loss or infection she needs to follow-up in the clinic for treatment and cleaning.

## 2024-04-26 ENCOUNTER — TELEPHONE (OUTPATIENT)
Dept: ORTHOPEDICS | Facility: CLINIC | Age: 89
End: 2024-04-26
Payer: MEDICARE

## 2024-04-26 NOTE — TELEPHONE ENCOUNTER
----- Message from Catrachita Graham sent at 4/26/2024  9:40 AM CDT -----  Contact: Tiffanie  .Patient is calling to speak with the nurse regarding appt . Reports some one cancel  appt and needing appt for Monday April 29  . Please give patient a call back at   [phone]

## 2024-04-26 NOTE — TELEPHONE ENCOUNTER
Returned the patient's phone call in regards to their message. Informed the patient that when they called back in march to move their appointment to [facility] the appointment desk canceled their appointment on 04/29/24. Informed the patient that when they saw Dr. Kelly on 03/22/24 he wanted to see them back in 3 months. Informed the patient that they will not need to see us until June 27th. Patient verbalized understanding.

## 2024-05-07 ENCOUNTER — LAB VISIT (OUTPATIENT)
Dept: LAB | Facility: HOSPITAL | Age: 89
End: 2024-05-07
Attending: PHYSICIAN ASSISTANT
Payer: MEDICARE

## 2024-05-07 DIAGNOSIS — E78.2 MIXED HYPERLIPIDEMIA: ICD-10-CM

## 2024-05-07 LAB
ALBUMIN SERPL BCP-MCNC: 3.9 G/DL (ref 3.5–5.2)
ALP SERPL-CCNC: 77 U/L (ref 55–135)
ALT SERPL W/O P-5'-P-CCNC: 11 U/L (ref 10–44)
ANION GAP SERPL CALC-SCNC: 9 MMOL/L (ref 8–16)
AST SERPL-CCNC: 16 U/L (ref 10–40)
BILIRUB SERPL-MCNC: 0.4 MG/DL (ref 0.1–1)
BUN SERPL-MCNC: 20 MG/DL (ref 8–23)
CALCIUM SERPL-MCNC: 9.7 MG/DL (ref 8.7–10.5)
CHLORIDE SERPL-SCNC: 102 MMOL/L (ref 95–110)
CHOLEST SERPL-MCNC: 89 MG/DL (ref 120–199)
CHOLEST/HDLC SERPL: 2.3 {RATIO} (ref 2–5)
CO2 SERPL-SCNC: 28 MMOL/L (ref 23–29)
CREAT SERPL-MCNC: 0.7 MG/DL (ref 0.5–1.4)
EST. GFR  (NO RACE VARIABLE): >60 ML/MIN/1.73 M^2
GLUCOSE SERPL-MCNC: 91 MG/DL (ref 70–110)
HDLC SERPL-MCNC: 39 MG/DL (ref 40–75)
HDLC SERPL: 43.8 % (ref 20–50)
LDLC SERPL CALC-MCNC: 33 MG/DL (ref 63–159)
NONHDLC SERPL-MCNC: 50 MG/DL
POTASSIUM SERPL-SCNC: 4.3 MMOL/L (ref 3.5–5.1)
PROT SERPL-MCNC: 6.7 G/DL (ref 6–8.4)
SODIUM SERPL-SCNC: 139 MMOL/L (ref 136–145)
TRIGL SERPL-MCNC: 85 MG/DL (ref 30–150)

## 2024-05-07 PROCEDURE — 80053 COMPREHEN METABOLIC PANEL: CPT | Mod: HCNC | Performed by: PHYSICIAN ASSISTANT

## 2024-05-07 PROCEDURE — 36415 COLL VENOUS BLD VENIPUNCTURE: CPT | Mod: HCNC | Performed by: PHYSICIAN ASSISTANT

## 2024-05-07 PROCEDURE — 80061 LIPID PANEL: CPT | Mod: HCNC | Performed by: PHYSICIAN ASSISTANT

## 2024-05-28 ENCOUNTER — OFFICE VISIT (OUTPATIENT)
Dept: INTERNAL MEDICINE | Facility: CLINIC | Age: 89
End: 2024-05-28
Payer: MEDICARE

## 2024-05-28 VITALS
BODY MASS INDEX: 25.19 KG/M2 | SYSTOLIC BLOOD PRESSURE: 138 MMHG | OXYGEN SATURATION: 96 % | HEART RATE: 67 BPM | WEIGHT: 156.75 LBS | HEIGHT: 66 IN | DIASTOLIC BLOOD PRESSURE: 60 MMHG

## 2024-05-28 DIAGNOSIS — F33.42 RECURRENT MAJOR DEPRESSIVE DISORDER, IN FULL REMISSION: ICD-10-CM

## 2024-05-28 DIAGNOSIS — E78.2 MIXED HYPERLIPIDEMIA: ICD-10-CM

## 2024-05-28 DIAGNOSIS — I70.0 ATHEROSCLEROSIS OF AORTA: ICD-10-CM

## 2024-05-28 DIAGNOSIS — K21.9 GASTROESOPHAGEAL REFLUX DISEASE WITHOUT ESOPHAGITIS: ICD-10-CM

## 2024-05-28 DIAGNOSIS — I25.119 ATHEROSCLEROSIS OF NATIVE CORONARY ARTERY OF NATIVE HEART WITH ANGINA PECTORIS: ICD-10-CM

## 2024-05-28 DIAGNOSIS — I10 ESSENTIAL HYPERTENSION: Primary | ICD-10-CM

## 2024-05-28 PROCEDURE — 99214 OFFICE O/P EST MOD 30 MIN: CPT | Mod: HCNC,S$GLB,, | Performed by: FAMILY MEDICINE

## 2024-05-28 PROCEDURE — 3288F FALL RISK ASSESSMENT DOCD: CPT | Mod: HCNC,CPTII,S$GLB, | Performed by: FAMILY MEDICINE

## 2024-05-28 PROCEDURE — 1160F RVW MEDS BY RX/DR IN RCRD: CPT | Mod: HCNC,CPTII,S$GLB, | Performed by: FAMILY MEDICINE

## 2024-05-28 PROCEDURE — 99999 PR PBB SHADOW E&M-EST. PATIENT-LVL IV: CPT | Mod: PBBFAC,HCNC,, | Performed by: FAMILY MEDICINE

## 2024-05-28 PROCEDURE — 1126F AMNT PAIN NOTED NONE PRSNT: CPT | Mod: HCNC,CPTII,S$GLB, | Performed by: FAMILY MEDICINE

## 2024-05-28 PROCEDURE — G2211 COMPLEX E/M VISIT ADD ON: HCPCS | Mod: HCNC,S$GLB,, | Performed by: FAMILY MEDICINE

## 2024-05-28 PROCEDURE — 1101F PT FALLS ASSESS-DOCD LE1/YR: CPT | Mod: HCNC,CPTII,S$GLB, | Performed by: FAMILY MEDICINE

## 2024-05-28 PROCEDURE — 1159F MED LIST DOCD IN RCRD: CPT | Mod: HCNC,CPTII,S$GLB, | Performed by: FAMILY MEDICINE

## 2024-05-28 NOTE — PROGRESS NOTES
Subjective:       Patient ID: Tiffanie Barajas is a 88 y.o. female.    Chief Complaint: Follow-up    Patient presents to clinic today for followup of chronic conditions.  Her  is present with her today.  On questioning, patient reports waking up a few days ago with right eye redness.  She denies any vision change or associated pain.  Patient is otherwise without concerns today.      Review of Systems   Constitutional:  Negative for chills, fatigue, fever and unexpected weight change.   Eyes:  Positive for redness. Negative for pain and visual disturbance.   Respiratory:  Negative for shortness of breath.    Cardiovascular:  Negative for chest pain.   Musculoskeletal:  Negative for myalgias.   Neurological:  Negative for headaches.         Objective:      Physical Exam  Vitals reviewed.   Constitutional:       General: She is not in acute distress.     Appearance: She is well-developed.   HENT:      Head: Normocephalic and atraumatic.   Eyes:      General: Lids are normal. No scleral icterus.     Extraocular Movements: Extraocular movements intact.      Conjunctiva/sclera:      Right eye: Hemorrhage present.      Left eye: No hemorrhage.     Pupils: Pupils are equal, round, and reactive to light.   Pulmonary:      Effort: Pulmonary effort is normal.   Neurological:      Mental Status: She is alert and oriented to person, place, and time.      Cranial Nerves: No cranial nerve deficit.      Gait: Gait normal.   Psychiatric:         Mood and Affect: Mood and affect normal.         Assessment:       1. Essential hypertension    2. Mixed hyperlipidemia    3. Recurrent major depressive disorder, in full remission    4. Atherosclerosis of aorta    5. Atherosclerosis of native coronary artery of native heart with angina pectoris    6. Gastroesophageal reflux disease without esophagitis        Plan:   1. Essential hypertension  Assessment & Plan:  Controlled, continue irbesartan and clonidine    Orders:  -     TSH;  Future; Expected date: 11/28/2024  -     CBC Auto Differential; Future; Expected date: 11/28/2024    2. Mixed hyperlipidemia  Assessment & Plan:  Controlled on atorvastatin 40 mg daily; advised to discuss consideration of decreasing her atorvastatin to 20 given how low her LDL cholesterol is; she plans to discuss with Dr. Turner, Cardiology.    Lab Results   Component Value Date    CHOL 89 (L) 05/07/2024    LDLCALC 33.0 (L) 05/07/2024    TRIG 85 05/07/2024    HDL 39 (L) 05/07/2024    ALT 11 05/07/2024    AST 16 05/07/2024    ALKPHOS 77 05/07/2024         Orders:  -     Comprehensive Metabolic Panel; Future; Expected date: 11/28/2024  -     Lipid Panel; Future; Expected date: 11/28/2024    3. Recurrent major depressive disorder, in full remission  Assessment & Plan:  Stable on celexa      4. Atherosclerosis of aorta  Overview:  CT ABD 12/2021, on ASA and statin      5. Atherosclerosis of native coronary artery of native heart with angina pectoris  Overview:  Followed by Dr. Turner, Cardiology, continue with current treatment plan      6. Gastroesophageal reflux disease without esophagitis  Assessment & Plan:  Occasional symptoms, takes sid seltzer as needed with good results      Visit today included increased complexity associated with the care of the episodic problem hypertension, which was addressed while instituting co-management of the longitudinal care of the patient due to the serious and/or complex managed problem(s) .    I have evaluated and discussed management associated with medical care services that serve as the continuing focal point for all needed health care services and/or with medical care services that are part of ongoing care related to my patient's single, serious condition or a complex condition(s).    I am providing ongoing care and I am the primary care provider for this patient, and they are being managed, monitored, and/or observed for their chronic conditions over time.     I have  addressed their ongoing health maintenance requirements and needs for all health care services and reviewed co-management plans provided by specialty providers when available.    Health Maintenance Due   Topic Date Due    RSV Vaccine (Age 60+ and Pregnant patients) (1 - 1-dose 60+ series) Never done    COVID-19 Vaccine (4 - 2023-24 season) 09/01/2023     Health Maintenance reviewed/updated. Given VIS for RSV vaccine, advised RSV and covid boosters are available at pharmacy.    Follow up in about 6 months (around 11/28/2024), or if symptoms worsen or fail to improve, for EPP/annual with jarrett Shields PTA.    This note was generated with the assistance of ambient listening technology. Verbal consent was obtained by the patient and accompanying visitor(s) for the recording of patient appointment to facilitate this note. I attest to having reviewed and edited the generated note for accuracy, though some syntax or spelling errors may persist. Please contact the author of this note for any clarification.

## 2024-05-28 NOTE — ASSESSMENT & PLAN NOTE
Controlled on atorvastatin 40 mg daily; advised to discuss consideration of decreasing her atorvastatin to 20 given how low her LDL cholesterol is; she plans to discuss with Dr. Turner, Cardiology.    Lab Results   Component Value Date    CHOL 89 (L) 05/07/2024    LDLCALC 33.0 (L) 05/07/2024    TRIG 85 05/07/2024    HDL 39 (L) 05/07/2024    ALT 11 05/07/2024    AST 16 05/07/2024    ALKPHOS 77 05/07/2024

## 2024-06-17 ENCOUNTER — TELEPHONE (OUTPATIENT)
Dept: OBSTETRICS AND GYNECOLOGY | Facility: CLINIC | Age: 89
End: 2024-06-17
Payer: MEDICARE

## 2024-06-17 NOTE — TELEPHONE ENCOUNTER
Called patient to reschedule her appointment due to Ms Hopper being out of clinic on 7/5/2024. Patient rescheduled for 7/17/2024 at 1:15pm with Dr. Agrawal.

## 2024-06-27 ENCOUNTER — OFFICE VISIT (OUTPATIENT)
Dept: ORTHOPEDICS | Facility: CLINIC | Age: 89
End: 2024-06-27
Payer: MEDICARE

## 2024-06-27 VITALS — WEIGHT: 156.75 LBS | HEIGHT: 66 IN | BODY MASS INDEX: 25.19 KG/M2

## 2024-06-27 DIAGNOSIS — M65.331 TRIGGER FINGER, RIGHT MIDDLE FINGER: ICD-10-CM

## 2024-06-27 DIAGNOSIS — M46.1 SACROILIITIS: ICD-10-CM

## 2024-06-27 DIAGNOSIS — M65.332 TRIGGER FINGER, LEFT MIDDLE FINGER: ICD-10-CM

## 2024-06-27 DIAGNOSIS — M21.161 ACQUIRED VARUS DEFORMITY KNEE, RIGHT: ICD-10-CM

## 2024-06-27 DIAGNOSIS — I73.9 PERIPHERAL ARTERIAL DISEASE: ICD-10-CM

## 2024-06-27 DIAGNOSIS — M17.12 ARTHRITIS OF KNEE, LEFT: Primary | ICD-10-CM

## 2024-06-27 DIAGNOSIS — M17.11 ARTHRITIS OF KNEE, RIGHT: ICD-10-CM

## 2024-06-27 DIAGNOSIS — M16.12 ARTHRITIS OF LEFT HIP: ICD-10-CM

## 2024-06-27 DIAGNOSIS — M47.816 LUMBAR FACET ARTHROPATHY: ICD-10-CM

## 2024-06-27 DIAGNOSIS — M21.162 ACQUIRED VARUS DEFORMITY KNEE, LEFT: ICD-10-CM

## 2024-06-27 PROCEDURE — 99999 PR PBB SHADOW E&M-EST. PATIENT-LVL III: CPT | Mod: PBBFAC,HCNC,, | Performed by: ORTHOPAEDIC SURGERY

## 2024-06-27 RX ORDER — TRIAMCINOLONE ACETONIDE 40 MG/ML
80 INJECTION, SUSPENSION INTRA-ARTICULAR; INTRAMUSCULAR
Status: DISCONTINUED | OUTPATIENT
Start: 2024-06-27 | End: 2024-06-27 | Stop reason: HOSPADM

## 2024-06-27 RX ADMIN — TRIAMCINOLONE ACETONIDE 80 MG: 40 INJECTION, SUSPENSION INTRA-ARTICULAR; INTRAMUSCULAR at 10:06

## 2024-06-27 NOTE — PROGRESS NOTES
Subjective:     Patient ID: Tiffanie Barajas is a 88 y.o. female.    Chief Complaint: Pain of the Right Knee and Pain of the Left Knee    HPI:  84-year-old complaining of pain over the left sacroiliac joint radiating down to her knee.  Previous history of sciatica and a cyst in the back that was removed. Has history of arthritis of both of her knees received injection longtime ago more than a year (lubrication shot).  She is not having too much pain in the right knee unable to fully extend the left knee difficulty with shopping and walking.  Pain roughly 4/10.  Denies loss of bowel bladder control.  Walking distance is seems to hurt approximately 200 ft.  She is on blood thinners and unable to take NSAIDs.  She takes 1 little Tylenol occasionally and does not help.    01/27/2020.  Left knee injection of Depo-Medrol seems to have helped this patient.  Given 01/03/2020.  Just started physical therapy and that seems to help also.  The Flexeril did not seem to work at all.  Already been 5 times to physical therapy and she has at least 3 more weeks to go.  Very pleased so far with her results.  Patient cannot take NSAIDs due to being on blood thinners.  Pain level 0/10.  Does have occasional discomfort right greater trochanteric area    02/13/2020  Patient with bilateral knee arthrosis left worse than right and sacroiliac pain.  She just finished her physical therapy and that seems to have helped and now she is doing independent exercise program.  We did inject the knees with steroid that seems to help.  She is ambulating without any assistive devices today. He is here to have Synvisc-One injection into the left knee.    05/22/2020  Patient complaining today of left hip pain/she points over the lumbar area radiating to the posterior aspect of the knee.  She has history of lumbosacral arthritis.  As far as her left knee injection of Synvisc-One she is not having any pain with that.  She is very happy with that result.   She wants something done and 0 not want any prescriptions for any pills.  Denies loss of bowel bladder control or usage or any assistive devices to ambulate.  She is doing independent exercise as well as showing me that her  created exercise program for her.  I did recommend stationary bicycle and independent stretching exercises.  Denies loss of bowel bladder control and fever or chills or shortness of breath or chest pain    07/09/2020  Patient received trigger point injection of the lumbar spine 05/22/2020 with some good relief.  Now she is having more pain in the left knee.  She had received steroid injection 01/03/2020 and Synvisc-One in 02/13/2020.  She feels the knee is tight she tries to exercise it on her own.  Having sharp pain on the medial side.  Pain level is 3/10 but with gait it gets worst.  Denies any fever or chills and maintaining social distancing and wearing her own mask.  She would like some relief from the tightness and stiffness in the left knee but wants to avoid taking any medications besides a little Tylenol because she is on blood thinners and unable to take NSAIDs.    01/04/2020   Left knee pain and stiffness 6/10, low back pain / points on the left sacroiliac area also.  She is requesting injection in both areas.  Patient did have trigger point injection 05/22/2020 in the lumbar area which helped also had steroid injection in July 2020 which seems to have helped in the knee.  Also previous history of Synvisc-One injection 02/13/2020.  Tylenol does not seem to help.  Denies any fever or chills or shortness of breath or difficulty with chewing or swallowing loss of bowel bladder control blurry vision or double vision or loss sense smell or taste.  She is here with her significant other.  Asking about getting the COVID  Vaccine.  She is 85 years old and I told if her  She qualifies should be able to get it soon    02/08/2021  Right lumbar trigger point tenderness which was injected  awhile ago with relief on 05/22/2020 now it is hurting her a little bit more.  I did tell her since she is here today to receive viscosupplementation/approved only Monovisc not Synvisc this time we will hold off on trigger point injection.  Pain is 5/10 in the left knee.  The right knee is doing okay.  She is ambulating without any assistive devices.  There was a little discussion about difference is with me in Monovisc and Synvisc and put simply I told them it is like having a car with different brands.  Patient is maintaining her activity level.  She still did not get her COVID vaccine due to in availability of the vaccine.  Denies any fever or chills or shortness of breath or difficulty with chewing or swallowing or loss of bowel bladder control blurry vision double vision loss sense smell or taste.  She is ambulating without any assistive devices    04/01/2021  Low back pain which is severe we injected trigger point on 05/22/2020 with some relief.  Went over her x-rays in the electronic records today showing severe facet arthropathy and degenerative disc disease with large bone spur overgrowth.  That bothers her a lot with radiation down her legs a cannot sleep.  She is requesting may be something to help with the radicular symptoms and give her some sleep and rest as far as the Monovisc injection into the left knee that did not seem to help at all.  The steroid injection seems to work better for her and requested another injection.  She did go through therapy before.  She feels sciatica to the right leg as far as the right knee is concerned is hurting but not as much as the left knee  Monovisc injection 02/08/2021 into the left knee did not seem to help.  Voltaren gel she uses on occasion.  No fever no chills or shortness of breath or difficulty with chewing or swallowing loss of bowel bladder control blurry vision double vision or loss of sense smell or taste  Did did receive her COVID-19  vaccine    07/02/2021  Patient is having low back pain with sciatic type of pain radiating down both legs.  Today will obtaining x-rays of her spine as well as the pelvis and hips and the knees.  The knees and not hurting her as much and she is not requesting any injections.  We did try Monovisc on the left knee but did not help and the steroid seems to work for her.  I did mention in the future maybe she would need long-acting steroid like  zilretta.  She is here with her daughter in law.  She is having some hip pains.  Able to manage it.  Her pain is 5/10 ambulating slowly without any assistive devices.  Cannot take any NSAIDs by mouth since she is on aspirin and Plavix and home cardiac evaluation by cardiologist.  No fever no chills no shortness of breath or difficulty with chewing or swallowing loss of bowel bladder control or blurry vision double vision loss sense smell or taste  She did have diclofenac gel at home but has not been using it appropriately  She states she is using SALONPAS which seems to help the back  Last visit we gave her cyclobenzaprine she is not taking it      10/18/2021  Severe low back pain left side more than the right.  We did have x-rays on her from last visit that showed sacroiliac arthritic changes sacroiliitis.  We did give her sacroiliac injection more than a year ago with some relief.  She also have pain in the knees and in the back specially on the left side a worried if it is sciatica or not.  She does have x-rays of severe arthritis in the spine also.  She usually gets steroid injections which help her for a little while.  She had been tried on viscosupplementation once without success.  She had sustained a little fall on 10/07/2021 but no major injury.  She is ambulating without any assistive devices.  No fever no chills no shortness of breath or difficulty with chewing or swallowing  She is using diclofenac gel as well as Salonpas patches.    11/08/2021  Injection of the  trigger point in the lower part of her back helped for 10 days.  That seems to come back now and hurts her a little bit more than her knee.  As far as the right knee she is doing okay the left knee is the 1 that bothers her.  She has been losing some range of motion since last visit we see her.  Her pain is around 6/10 in the sitting position and goes up with walking.  At this point I did tell her I do not want to give her 2 injections and not knowing which 1 will give her a reaction.  We have her approved to receive the left knee Zilreta injection.  The pros and cons discussed in details.  No loss of bowel bladder control or fever or chills or shortness of breath or difficulty with chewing or swallowing loss of bowel bladder control or loss of sense smell or taste      12/02/2021  Patient stated left knee steroid injections/ Zilreta seems to have helped so far.  Not bother her as much.  What bothers her is her lower part of her back on the left side there is a trigger point that seems to be the area.  She her  marked with a pen.  She is ambulating slowly without any assistive devices.  She would like to have that trigger point injected despite the fact her pain in her knee is 3/10.  Overall it seems the steroid injection given last visit in the knee seems to work so far  No fever no chills no shortness of breath or difficulty with chewing or swallowing loss of bowel bladder control    02/7/22  Complaining over left greater trochanteric and right sacroiliac area pain.  The left knee still doing okay received zilretta injection in November 2021 and is still helping.  She is requesting injections over the areas that hurts the most.  She has fluctuating blood pressure I did tell her I will not give but total 80 mg Depo-Medrol and we will split it into 2 areas.  She is ambulating without assistive devices.  We did get her approved for the long-acting steroid injection into the left knee but she says ri it since  you doing okay.  ght now her knee is doing okay and that previous shot still working.  We can always get it approved if needed again.  The approval is until February 27, 2022 however we still do not have to give it if you still doing okay      03/28/2022  On 02/07/2022 in [place] today greater trochanter which seems to have helped however the trigger point/left sacroiliac area that did not seem to help.  Both injections were with Depo-Medrol.  She stated she was changed to nifedipine for blood pressure and that cause severe swelling in the legs.  She has an appointment April 15 to see the cardiologist.  I did tell her this could be angioedema.  As far as her knee is concerned just stiff right now and she is not having much of any pain in it at 0/10.  Her main concern is the lumbar area this seems to have quite a bit of pain in it.  She had marked where the pain is in order to get another injection.  Denies loss of bowel bladder control blurry vision double vision.  She is here with her .  She had tried different topicals without success  Vicks rub seems to be the 1 that works the most  Trigger point pain over the left sacroiliac area approximately 3/10    06/02/2022  Severe left knee pain.  Given zilretta in November 2021 and just wore off on her almost  4 weeks ago.  Her pain is severe in the knee and limiting motion..  As far as the left hip and sacroiliac area on the left side and trigger point she is around 2-3/10.  She still ambulating without assistive devices taking very small steps.  She wants an injection into the left knee.  She stated the long-acting steroid helped quite a bit when we give it last and it was in November.  She would like that repeated.  However she stated today the left knee is hurting a lot she wants an injection today too.  I see no reason why not..  Still having pain over the left sacroiliac area and greater trochanteric area but not severe    06/30/2022  We did inject her last  visit and that did not seem to help as much.  She would like to have the long-acting steroid injection which helped significantly last time.  She still having some hip pain only when she leans on it but not when she is walking.  Her back is doing little bit better.  She does take Tylenol.  Her pain in the knee is 8/10    01/23/2023   Patient is complaining of right calf burning and discomfort.  This is been going on for several weeks.  I have not seen her because she was taking care of her  received radiation treatment.  She is here with him.  She is alert oriented x3.  Now the right and the left knee both of them are hurting.  She is using a cane to get around.  Her pain is 8/10.  Usually most of her problem is the left knee and sacroiliac area but today the back is not bothering her as much as the right knee and right calf as well as left knee.  She is using a cane to get around.  Despite the fact she still having left sacroiliac and greater trochanteric discomfort the knees are coming 1st today.    No fever no chills no shortness of breath difficulty with chewing or swallowing loss of bowel bladder control   She has family and children that could take her to doctor's.    03/16/2023   She is having cramps in the back of her right thigh and she has severe arthritis unable to fully extend the knee.  The Flexeril/cyclobenzaprine did not seem to help.  Today decided to change her to methocarbamol went over it with her.  Her lower part of her back is hurting and she stated that the injection to both of her knees helped last time of Depo-Medrol.  She does have arthritis we did obtain ultrasound last time was negative for DVT in the lower extremity.  She is on Plavix and aspirin.  Her pain is around 10/10 right now and I think at this time we probably start as her morning stiffness and achiness in the shoulder and her knees  She is 87 she does not want undergo surgery at this time  She is hurting more over the  right greater trochanteric slightly posterior aspect in the piriformis fossa.      04/24/2023   Right hip bursitis injected 03/16/2023 and helped until a week ago.  I did place her on prednisone 5 mg to take on a bad day as well as methocarbamol as a muscle relaxant.  She did take those for 10 days and made things better.  We went over it electronic record and reviewed her x-rays in the system again.  We reviewed the x-rays of the lumbar spine the pelvis and the knees.  I reviewed the electronic records she received cortisone injection on 01/23/2023 in both of her knees Depo-Medrol.  And does seems to have helped but now they wore off.    Both of her knees are what is causing her more pain as well as numbness and tingling in both of her legs which is something new she never complained about before.  I did tell her she does have a pinched nerve in the back but I do not want to place her on anymore medication like gabapentin which could pleased with her head at her age of 87.  She is very smart alert oriented.  She is worried about taking too much prednisone makes her hungry and gained weight.  No fever no chills no shortness of breath or difficulty with chewing swallowing loss of bowel bladder control   She uses a cane to walk around and she tells take small steps but able to do it.    As far as the sacroiliac joint is not bothering her at this time    09/18/2023   Bilateral knee severe pain.  She missed her appointment in June because she was not having any pain and then a week or 10 days later she started with the pain and could not make another appointment until now.  She is using a cane now to get around.  She got stuck in traffic because of a major accident and we told her even if she shows a plate that we will see her then we accommodated her.  She felt very appreciative.  Her pain in the knees is 10/10.  She is always worried about getting another appointment in 3 months and if he is doing okay she did not  want to waste might time yet it takes another 3 months to get another appointment.  I did tell her will give her to appointments 1 in 3 months 1 in 4 months to bypass that issue.  An even if she shows up early I will even see her than 2.  No fever no chills no shortness of breath   She has what psoriatic changes in her hands and was told the psoriasis.  I did tell her that psoriasis could cause severe arthritis not the other way around    03/22/2024   She is complaining of hip pain and she points over the iliac crest bilaterally over the superior aspect.  She is ambulating with a cane.  She takes small steps.  She said she is having also pain behind her knees and we know it is severe arthritis.  She has not at a point that she needs any injections today.  She asked about burning pains in the legs and I did tell her that is usually nerve issues.  There are certain things you can do like gabapentin or Lyrica.  She has never been on any of does.  We always very careful about giving her anything that could cause gastrointestinal liver problems  We did go over her medications with her and we went over her new x-rays of her knees today.  She did have a little fall on 03/12/2024  She was upset about giving her an appointment with the primary care physician and that physician after she has been in the room for more than an hour nobody showed up.  Then the she was told the doctor is not there and then a nurse practitioner showed up after she had to walk looking for people.  She was left in the room and she was upset about it.  She said she wants to tell someone about it and I told her to call the main number and asked for patient relations  Overall she states she gives everybody good rapport however she was really upset about that episode    Injections of Depo-Medrol into the knees on last visit 01/18/2024 seems to have helped      06/27/2024   New complain of locking bilateral 3rd digits that she needs to extend them by  pulling on them.  This is new at this time we examined her and we recommended that she sees Dr. Dick which he might give her steroid injection in the trigger fingers.    As far as the knees are concerned today we will try different steroid.  She said her pain is 9/10 but she is managing.  She wants to avoid anything that could cause gastrointestinal injury.  She is doing well ambulating slow steps at 88.  She is mentally fully competent and discussing different social subjects.  No fever no chills no chest pain no shortness of breath  Her children or step daughters drive them to the doctor's  Past Medical History:   Diagnosis Date    Acute pancreatitis without infection or necrosis 12/5/2021    Anxiety     Arthritis     Atherosclerosis of native coronary artery of native heart with angina pectoris 8/8/2019    Cancer     Degenerative disc disease     Depression     Essential hypertension 10/26/2021    GERD (gastroesophageal reflux disease)     Glaucoma suspect of both eyes     Hyperlipidemia     Insomnia 4/6/2021    Low back derangement syndrome 12/11/2017    Melanoma     Pneumonia due to other staphylococcus      Past Surgical History:   Procedure Laterality Date    ADENOIDECTOMY      CHOLECYSTECTOMY  1973    SPINE SURGERY  2/6/13    L4-L5  cyst removed andfor sciatica    TONSILLECTOMY  1945     Family History   Problem Relation Name Age of Onset    Arthritis Mother      Hypertension Mother      Stroke Mother      Glaucoma Mother      Heart disease Father  56        fatal MI    Diabetes Brother      Cancer Son patrick (twin) 56        melanoma with mets to lung and bone    Arthritis Sister      Hyperlipidemia Neg Hx       Social History     Socioeconomic History    Marital status:      Spouse name: fabio    Number of children: 3   Tobacco Use    Smoking status: Never    Smokeless tobacco: Never   Substance and Sexual Activity    Alcohol use: No     Alcohol/week: 0.0 standard drinks of alcohol    Drug use:  No    Sexual activity: Not Currently   Social History Narrative    . Decaf coffee only. No living will or advanced directive-copy of information given.      Medication List with Changes/Refills   Current Medications    ACETAMINOPHEN (TYLENOL) 650 MG TBSR    Take 1 tablet (650 mg total) by mouth every 8 (eight) hours.    ASCORBIC ACID, VITAMIN C, 500 MG CHEW    Take by mouth once daily.    ASPIRIN (ECOTRIN) 81 MG EC TABLET    Take 1 tablet by mouth Daily.    ATORVASTATIN (LIPITOR) 40 MG TABLET    Take 1 tablet by mouth every evening.    BETAMETHASONE VALERATE 0.1% (VALISONE) 0.1 % OINT    Apply topically 2 (two) times daily.    CITALOPRAM (CELEXA) 20 MG TABLET    Take 1 tablet (20 mg total) by mouth once daily.    CLONIDINE 0.2 MG/24 HR TD PTWK (CATAPRES) 0.2 MG/24 HR    1 patch every 7 days.    CLOPIDOGREL (PLAVIX) 75 MG TABLET    Take 75 mg by mouth once daily.    COLCHICINE (COLCRYS) 0.6 MG TABLET    Take 0.6 mg by mouth daily as needed.    ERGOCALCIFEROL (ERGOCALCIFEROL) 50,000 UNIT CAP    Take by mouth once daily. Patient takes 5,000 units daily    GABAPENTIN (NEURONTIN) 100 MG CAPSULE    Take 1 capsule (100 mg total) by mouth 3 (three) times daily.    IRBESARTAN (AVAPRO) 150 MG TABLET    Take 150 mg by mouth.    ISOSORBIDE MONONITRATE (IMDUR) 60 MG 24 HR TABLET        MULTIVIT,IRON,MINERALS/LUTEIN (CENTRUM SILVER ULTRA WOMEN'S ORAL)    Take by mouth once daily.    NITROGLYCERIN (NITROSTAT) 0.4 MG SL TABLET    DISSOLVE ONE TABLET UNDER THE TONGUE EVERY 5 MINUTES AS NEEDED FOR CHEST PAIN. DO NOT EXCEED A TOTAL OF 3 DOSES IN 15 MINUTES    TURMERIC ROOT EXTRACT 500 MG CAP    Take by mouth once daily.     Review of patient's allergies indicates:   Allergen Reactions    Hydrocodone-acetaminophen Nausea And Vomiting     Other reaction(s): Vomiting    Diclofenac Nausea And Vomiting and Other (See Comments)     Review of Systems   Constitutional: Negative for decreased appetite.   HENT: Negative for tinnitus.     Eyes: Negative for double vision.   Cardiovascular: Negative for chest pain.   Respiratory: Negative for wheezing.    Hematologic/Lymphatic: Negative for bleeding problem.   Skin: Negative for dry skin.   Musculoskeletal: Positive for arthritis, back pain, joint pain, muscle weakness and stiffness. Negative for gout and neck pain.   Gastrointestinal: Negative for abdominal pain.   Genitourinary: Negative for bladder incontinence.   Neurological: Positive for numbness. Negative for paresthesias and sensory change.   Psychiatric/Behavioral: Negative for altered mental status.       Objective:   Body mass index is 25.3 kg/m².  There were no vitals filed for this visit.       General    Constitutional: She is oriented to person, place, and time. She appears well-developed.   HENT:   Head: Atraumatic.   Eyes: EOM are normal.   Cardiovascular: Normal rate.    Pulmonary/Chest: Effort normal.   Neurological: She is alert and oriented to person, place, and time.   Psychiatric: Judgment normal.         Ambulating with a cane   Cervical spine rotation is functional but  limited due to stiffness  Bilateral hand 3rd fingers triggering with tenderness at the volar metacarpophalangeal joint  Lumbar with loss of lordosis and tenderness around the L4-5 and mostly over the left sacroiliac joint which is the area of severe tenderness and paraspinal.  Forward bending barely to proximal tibia lateral bending to mid thigh.    Pelvis is level.  There is tenderness over the anterior superior iliac crest on both sides    Passive hip motion within normal limits.  Hip flexors and abductors as well as quads and hamstrings and ankle extensors and flexors are slightly weak at 5-/5.  Tenderness over the greater trochanter over the right and left hip   Right knee range of motion 8-110.  Slight crepitus to AP compression on the patella in on medial joint.  Pain medially and anteriorly and slightly laterally.  No defect in the patella or  quadriceps tendon Very mild swelling.  Collaterals and cruciate stable.  Left knee with approximately 10 ° of flexion contracture and flexion barely to 70°.  Moderate swelling.  Collaterals and cruciate stable. Crepitus with motion.  Pain in medially to palpation.  Also tender to put was patient over the patella with severe crepitus  Calves are soft nontender on the left slightly tender on the right  Slight varicosities around the calves  DP 1+  Skin is warm to touch no obvious lesions      Relevant imaging results reviewed and interpreted by me, discussed with the patient and / or family today     X-ray 03/22/2024 bilateral knees showing in the popliteal area calcified vessels, she has severe arthritis and marginal osteophytes and loss of joint space bilateral knees with some varus deformity  Ultrasound 01/26/2023 of lower extremity was negative for DVT/patient on Plavix and aspirin  X-ray 01/23/2023 bilateral knees with severe osteopenic bone.  There is loss of medial joint space bilaterally and marginal osteophytic changes.  There is no fracture seen consistent with arthritis    X-ray bilateral knees with left knee complete loss of medial joint space with osteophytic changes and sclerosis.  Same on the right knee but not as bad consistent with bilateral end-stage arthrosis.  07/02/2021  X-ray of the pelvis showing some degenerative changes on the left hip with good joint space left.  Right hip no joint space loss consistent with some arthrosis of the  hip.  Mild chronic degenerative changes in the sacroiliac joints 07/02/2021  X-ray of the lumbar spine in the electronic records multilevel facet arthropathy and osteophytes.  Anterior multilevel spurring.  Degenerative disc disease 07/02/2021  Assessment:     Encounter Diagnoses   Name Primary?    Arthritis of knee, left Yes    Acquired varus deformity knee, left     Arthritis of knee, right     Acquired varus deformity knee, right     Sacroiliitis     Arthritis of  left hip     Peripheral arterial disease     Lumbar facet arthropathy     Trigger finger, left middle finger     Trigger finger, right middle finger         Plan:   Arthritis of knee, left  -     Large Joint Aspiration/Injection: bilateral knee    Acquired varus deformity knee, left    Arthritis of knee, right  -     Large Joint Aspiration/Injection: bilateral knee    Acquired varus deformity knee, right    Sacroiliitis    Arthritis of left hip    Peripheral arterial disease    Lumbar facet arthropathy    Trigger finger, left middle finger    Trigger finger, right middle finger         Patient Instructions   Bilateral knee severe pain we reviewed the x-rays last time you were here and there was severe arthritis and peripheral arterial disease  We will change the cortisone we injecting you because we been giving you Depo-Medrol all this time I think we should change to Kenalog to see if it helps   We injected both knees each with 80 mg of Kenalog mixed with 5 cc of 1% lidocaine 06/27/2024   You have bilateral hands 3rd digit trigger fingers I will have you see Dr. Dick he probably would inject you but we will wait 3 weeks or so because you got enough steroid right now  I will see you back in 3 months for re-evaluation we will give you an extra appointment in 4 months because it seems that you had your appointment canceled on you could not get another 1.  Please call and asked to talk to our nurses because they will fit you in on my schedule if needed   I went and reviewed all her x-rays in the lumbar spine showing multilevel facet arthropathy and some degenerative disc disease.  I also reviewed her pelvis film showing very mild degenerative radiation of her hips.  The x-rays on her knees showing severe arthritic condition.  Now she is complaining of numbness and tingling in the legs and I did tell her could be secondary to lumbar issues and at her age I do not want to add any medications that will make her get  confused like gout gabapentin.  She does not have diabetes and most likely her problem is secondary to the lumbar pinched nerves.  She does get cramps in the back of her legs and we did put her on methocarbamol to take as needed.  We also discussed prednisone that she takes only if needed.  We did discuss the pros and cons of steroid injections into the knees.  She can have does once every 10-12 weeks.  I do not want a place her on NSAIDs she is on Plavix and aspirin and I think prednisone is ideal for her at her age.    01/18/2024   Patient is having frequent falls minimum 2 over the last week or so and just hit her knee a little bit.  She has think difficulty getting up and needed help.  She is 88 years of age and numerous arthritic conditions.  She is fully alert and oriented in doing really well mentally.  At this time I did tell her that I must have the insurance provide you with a Rollator.  It is a walker with a seat that you need to use instead of the cane.  The injections in the knees seems to help but they are not lasting long enough  Proceeded injecting both of her knees today 01/28/2024 each with 80 mg Depo-Medrol mixed with 5 cc 1% lidocaine 01/18/2024   Ice the knees the next few days   You may take Tylenol   As far as the back is concerned the knees was hurting more today in the will be too much steroid if we inject the trigger point in the back  She can not be on NSAIDs she is on Plavix and aspirin   You turn in 3 months sooner if needed  Disclaimer:is note was prepared using a voice recognition system and is likely to have sound alike errors within the text.

## 2024-06-27 NOTE — PROCEDURES
Large Joint Aspiration/Injection: bilateral knee    Date/Time: 6/27/2024 10:20 AM    Performed by: Ranjit Kelly MD  Authorized by: Ranjit Kelly MD    Consent Done?:  Yes (Verbal)  Indications:  Arthritis  Site marked: the procedure site was marked    Timeout: prior to procedure the correct patient, procedure, and site was verified      Local anesthesia used?: Yes    Local anesthetic:  Lidocaine 1% without epinephrine    Details:  Needle Size:  22 G  Ultrasonic Guidance for needle placement?: No    Approach:  Anterolateral  Location:  Knee  Laterality:  Bilateral  Site:  Bilateral knee  Medications (Right):  80 mg triamcinolone acetonide 40 mg/mL  Medications (Left):  80 mg triamcinolone acetonide 40 mg/mL  Patient tolerance:  Patient tolerated the procedure well with no immediate complications

## 2024-06-27 NOTE — PATIENT INSTRUCTIONS
Bilateral knee severe pain we reviewed the x-rays last time you were here and there was severe arthritis and peripheral arterial disease  We will change the cortisone we injecting you because we been giving you Depo-Medrol all this time I think we should change to Kenalog to see if it helps   We injected both knees each with 80 mg of Kenalog mixed with 5 cc of 1% lidocaine 06/27/2024   You have bilateral hands 3rd digit trigger fingers I will have you see Dr. Dick he probably would inject you but we will wait 3 weeks or so because you got enough steroid right now  I will see you back in 3 months for re-evaluation we will give you an extra appointment in 4 months because it seems that you had your appointment canceled on you could not get another 1.  Please call and asked to talk to our nurses because they will fit you in on my schedule if needed

## 2024-07-17 ENCOUNTER — OFFICE VISIT (OUTPATIENT)
Dept: OBSTETRICS AND GYNECOLOGY | Facility: CLINIC | Age: 89
End: 2024-07-17
Payer: MEDICARE

## 2024-07-17 VITALS
BODY MASS INDEX: 25.05 KG/M2 | HEIGHT: 66 IN | DIASTOLIC BLOOD PRESSURE: 62 MMHG | SYSTOLIC BLOOD PRESSURE: 138 MMHG | WEIGHT: 155.88 LBS

## 2024-07-17 DIAGNOSIS — Z46.89 ENCOUNTER FOR PESSARY MAINTENANCE: Primary | ICD-10-CM

## 2024-07-17 PROCEDURE — 99212 OFFICE O/P EST SF 10 MIN: CPT | Mod: HCNC,S$GLB,, | Performed by: OBSTETRICS & GYNECOLOGY

## 2024-07-17 PROCEDURE — 99999 PR PBB SHADOW E&M-EST. PATIENT-LVL II: CPT | Mod: PBBFAC,HCNC,, | Performed by: OBSTETRICS & GYNECOLOGY

## 2024-07-17 PROCEDURE — 1126F AMNT PAIN NOTED NONE PRSNT: CPT | Mod: HCNC,CPTII,S$GLB, | Performed by: OBSTETRICS & GYNECOLOGY

## 2024-07-17 PROCEDURE — 1101F PT FALLS ASSESS-DOCD LE1/YR: CPT | Mod: HCNC,CPTII,S$GLB, | Performed by: OBSTETRICS & GYNECOLOGY

## 2024-07-17 PROCEDURE — 3288F FALL RISK ASSESSMENT DOCD: CPT | Mod: HCNC,CPTII,S$GLB, | Performed by: OBSTETRICS & GYNECOLOGY

## 2024-07-17 NOTE — PROGRESS NOTES
"  Subjective:       Patient ID: Tiffanie Barajas is a 88 y.o. female.    Chief Complaint:  Pessary Check      History of Present Illness  HPI  Pessary in place for vaginal wall prolapse   Doing well with no discharge and no bleeding   Advised that no removal was necessary at this time and she can follow up every 3 to 4 months     Health Maintenance   Topic Date Due    TETANUS VACCINE  10/11/2027    Lipid Panel  2029    Shingles Vaccine  Discontinued    DEXA Scan  Discontinued     GYN & OB History  Patient's last menstrual period was 1990.   Date of Last Pap: 2012    OB History    Para Term  AB Living   2 1       2   SAB IAB Ectopic Multiple Live Births         1 1      # Outcome Date GA Lbr Dwayne/2nd Weight Sex Type Anes PTL Lv   2 Para      Vag-Spont   EMILIANO   1A       Vag-Spont      1B       Vag-Spont          Review of Systems  Review of Systems        Objective:   /62   Ht 5' 6" (1.676 m)   Wt 70.7 kg (155 lb 13.8 oz)   LMP 1990   BMI 25.16 kg/m²    Physical Exam     Assessment:        1. Encounter for pessary maintenance                Plan:            Tiffanie was seen today for pessary check.    Diagnoses and all orders for this visit:    Encounter for pessary maintenance    Follow up with regular Gyn MD for maintenance     "

## 2024-10-10 ENCOUNTER — OFFICE VISIT (OUTPATIENT)
Dept: ORTHOPEDICS | Facility: CLINIC | Age: 89
End: 2024-10-10
Payer: MEDICARE

## 2024-10-10 VITALS — BODY MASS INDEX: 25.05 KG/M2 | WEIGHT: 155.88 LBS | HEIGHT: 66 IN

## 2024-10-10 DIAGNOSIS — M17.12 ARTHRITIS OF KNEE, LEFT: Primary | ICD-10-CM

## 2024-10-10 DIAGNOSIS — M65.332 TRIGGER FINGER, LEFT MIDDLE FINGER: ICD-10-CM

## 2024-10-10 DIAGNOSIS — M17.11 ARTHRITIS OF KNEE, RIGHT: ICD-10-CM

## 2024-10-10 DIAGNOSIS — M16.12 ARTHRITIS OF LEFT HIP: ICD-10-CM

## 2024-10-10 DIAGNOSIS — I73.9 PERIPHERAL ARTERIAL DISEASE: ICD-10-CM

## 2024-10-10 DIAGNOSIS — M21.162 ACQUIRED VARUS DEFORMITY KNEE, LEFT: ICD-10-CM

## 2024-10-10 DIAGNOSIS — M65.331 TRIGGER FINGER, RIGHT MIDDLE FINGER: ICD-10-CM

## 2024-10-10 DIAGNOSIS — M46.1 SACROILIITIS: ICD-10-CM

## 2024-10-10 DIAGNOSIS — M47.816 LUMBAR FACET ARTHROPATHY: ICD-10-CM

## 2024-10-10 DIAGNOSIS — M21.161 ACQUIRED VARUS DEFORMITY KNEE, RIGHT: ICD-10-CM

## 2024-10-10 PROCEDURE — 99999 PR PBB SHADOW E&M-EST. PATIENT-LVL III: CPT | Mod: PBBFAC,HCNC,, | Performed by: ORTHOPAEDIC SURGERY

## 2024-10-10 RX ORDER — METHOCARBAMOL 750 MG/1
750 TABLET, FILM COATED ORAL 3 TIMES DAILY PRN
Qty: 30 TABLET | Refills: 3 | Status: SHIPPED | OUTPATIENT
Start: 2024-10-10 | End: 2024-10-20

## 2024-10-10 RX ORDER — TRIAMCINOLONE ACETONIDE 40 MG/ML
80 INJECTION, SUSPENSION INTRA-ARTICULAR; INTRAMUSCULAR
Status: DISCONTINUED | OUTPATIENT
Start: 2024-10-10 | End: 2024-10-10 | Stop reason: HOSPADM

## 2024-10-10 RX ORDER — FUROSEMIDE 40 MG/1
40 TABLET ORAL
COMMUNITY
Start: 2024-06-07

## 2024-10-10 RX ORDER — CLOBETASOL PROPIONATE 0.5 MG/G
2 CREAM TOPICAL 2 TIMES DAILY PRN
COMMUNITY
Start: 2024-08-06

## 2024-10-10 RX ADMIN — TRIAMCINOLONE ACETONIDE 80 MG: 40 INJECTION, SUSPENSION INTRA-ARTICULAR; INTRAMUSCULAR at 09:10

## 2024-10-10 NOTE — PATIENT INSTRUCTIONS
Bilateral knees injected today with 80 mg of Kenalog 10/04/2024   You been hurting also in the right sacroiliac joint and you have an appointment coming up in several weeks in November and hopefully at that point we can inject the back   You also having trigger fingers that are hurting maybe we can inject does next time you come  Continue with methocarbamol 750 mg 3 times a day if needed to help with the spasms   You have gabapentin/Neurontin 100 mg 3 times a day that should help with the nerve pain but it takes a while for it to kick in usually around 3 months  I will see you back in a few weeks

## 2024-10-10 NOTE — PROGRESS NOTES
Subjective:     Patient ID: Tiffanie Barajas is a 89 y.o. female.    Chief Complaint: Pain of the Right Knee and Pain of the Left Knee    HPI:  84-year-old complaining of pain over the left sacroiliac joint radiating down to her knee.  Previous history of sciatica and a cyst in the back that was removed. Has history of arthritis of both of her knees received injection longtime ago more than a year (lubrication shot).  She is not having too much pain in the right knee unable to fully extend the left knee difficulty with shopping and walking.  Pain roughly 4/10.  Denies loss of bowel bladder control.  Walking distance is seems to hurt approximately 200 ft.  She is on blood thinners and unable to take NSAIDs.  She takes 1 little Tylenol occasionally and does not help.    01/27/2020.  Left knee injection of Depo-Medrol seems to have helped this patient.  Given 01/03/2020.  Just started physical therapy and that seems to help also.  The Flexeril did not seem to work at all.  Already been 5 times to physical therapy and she has at least 3 more weeks to go.  Very pleased so far with her results.  Patient cannot take NSAIDs due to being on blood thinners.  Pain level 0/10.  Does have occasional discomfort right greater trochanteric area    02/13/2020  Patient with bilateral knee arthrosis left worse than right and sacroiliac pain.  She just finished her physical therapy and that seems to have helped and now she is doing independent exercise program.  We did inject the knees with steroid that seems to help.  She is ambulating without any assistive devices today. He is here to have Synvisc-One injection into the left knee.    05/22/2020  Patient complaining today of left hip pain/she points over the lumbar area radiating to the posterior aspect of the knee.  She has history of lumbosacral arthritis.  As far as her left knee injection of Synvisc-One she is not having any pain with that.  She is very happy with that result.   She wants something done and 0 not want any prescriptions for any pills.  Denies loss of bowel bladder control or usage or any assistive devices to ambulate.  She is doing independent exercise as well as showing me that her  created exercise program for her.  I did recommend stationary bicycle and independent stretching exercises.  Denies loss of bowel bladder control and fever or chills or shortness of breath or chest pain    07/09/2020  Patient received trigger point injection of the lumbar spine 05/22/2020 with some good relief.  Now she is having more pain in the left knee.  She had received steroid injection 01/03/2020 and Synvisc-One in 02/13/2020.  She feels the knee is tight she tries to exercise it on her own.  Having sharp pain on the medial side.  Pain level is 3/10 but with gait it gets worst.  Denies any fever or chills and maintaining social distancing and wearing her own mask.  She would like some relief from the tightness and stiffness in the left knee but wants to avoid taking any medications besides a little Tylenol because she is on blood thinners and unable to take NSAIDs.    01/04/2020   Left knee pain and stiffness 6/10, low back pain / points on the left sacroiliac area also.  She is requesting injection in both areas.  Patient did have trigger point injection 05/22/2020 in the lumbar area which helped also had steroid injection in July 2020 which seems to have helped in the knee.  Also previous history of Synvisc-One injection 02/13/2020.  Tylenol does not seem to help.  Denies any fever or chills or shortness of breath or difficulty with chewing or swallowing loss of bowel bladder control blurry vision or double vision or loss sense smell or taste.  She is here with her significant other.  Asking about getting the COVID  Vaccine.  She is 85 years old and I told if her  She qualifies should be able to get it soon    02/08/2021  Right lumbar trigger point tenderness which was injected  awhile ago with relief on 05/22/2020 now it is hurting her a little bit more.  I did tell her since she is here today to receive viscosupplementation/approved only Monovisc not Synvisc this time we will hold off on trigger point injection.  Pain is 5/10 in the left knee.  The right knee is doing okay.  She is ambulating without any assistive devices.  There was a little discussion about difference is with me in Monovisc and Synvisc and put simply I told them it is like having a car with different brands.  Patient is maintaining her activity level.  She still did not get her COVID vaccine due to in availability of the vaccine.  Denies any fever or chills or shortness of breath or difficulty with chewing or swallowing or loss of bowel bladder control blurry vision double vision loss sense smell or taste.  She is ambulating without any assistive devices    04/01/2021  Low back pain which is severe we injected trigger point on 05/22/2020 with some relief.  Went over her x-rays in the electronic records today showing severe facet arthropathy and degenerative disc disease with large bone spur overgrowth.  That bothers her a lot with radiation down her legs a cannot sleep.  She is requesting may be something to help with the radicular symptoms and give her some sleep and rest as far as the Monovisc injection into the left knee that did not seem to help at all.  The steroid injection seems to work better for her and requested another injection.  She did go through therapy before.  She feels sciatica to the right leg as far as the right knee is concerned is hurting but not as much as the left knee  Monovisc injection 02/08/2021 into the left knee did not seem to help.  Voltaren gel she uses on occasion.  No fever no chills or shortness of breath or difficulty with chewing or swallowing loss of bowel bladder control blurry vision double vision or loss of sense smell or taste  Did did receive her COVID-19  vaccine    07/02/2021  Patient is having low back pain with sciatic type of pain radiating down both legs.  Today will obtaining x-rays of her spine as well as the pelvis and hips and the knees.  The knees and not hurting her as much and she is not requesting any injections.  We did try Monovisc on the left knee but did not help and the steroid seems to work for her.  I did mention in the future maybe she would need long-acting steroid like  zilretta.  She is here with her daughter in law.  She is having some hip pains.  Able to manage it.  Her pain is 5/10 ambulating slowly without any assistive devices.  Cannot take any NSAIDs by mouth since she is on aspirin and Plavix and home cardiac evaluation by cardiologist.  No fever no chills no shortness of breath or difficulty with chewing or swallowing loss of bowel bladder control or blurry vision double vision loss sense smell or taste  She did have diclofenac gel at home but has not been using it appropriately  She states she is using SALONPAS which seems to help the back  Last visit we gave her cyclobenzaprine she is not taking it      10/18/2021  Severe low back pain left side more than the right.  We did have x-rays on her from last visit that showed sacroiliac arthritic changes sacroiliitis.  We did give her sacroiliac injection more than a year ago with some relief.  She also have pain in the knees and in the back specially on the left side a worried if it is sciatica or not.  She does have x-rays of severe arthritis in the spine also.  She usually gets steroid injections which help her for a little while.  She had been tried on viscosupplementation once without success.  She had sustained a little fall on 10/07/2021 but no major injury.  She is ambulating without any assistive devices.  No fever no chills no shortness of breath or difficulty with chewing or swallowing  She is using diclofenac gel as well as Salonpas patches.    11/08/2021  Injection of the  trigger point in the lower part of her back helped for 10 days.  That seems to come back now and hurts her a little bit more than her knee.  As far as the right knee she is doing okay the left knee is the 1 that bothers her.  She has been losing some range of motion since last visit we see her.  Her pain is around 6/10 in the sitting position and goes up with walking.  At this point I did tell her I do not want to give her 2 injections and not knowing which 1 will give her a reaction.  We have her approved to receive the left knee Zilreta injection.  The pros and cons discussed in details.  No loss of bowel bladder control or fever or chills or shortness of breath or difficulty with chewing or swallowing loss of bowel bladder control or loss of sense smell or taste      12/02/2021  Patient stated left knee steroid injections/ Zilreta seems to have helped so far.  Not bother her as much.  What bothers her is her lower part of her back on the left side there is a trigger point that seems to be the area.  She her  marked with a pen.  She is ambulating slowly without any assistive devices.  She would like to have that trigger point injected despite the fact her pain in her knee is 3/10.  Overall it seems the steroid injection given last visit in the knee seems to work so far  No fever no chills no shortness of breath or difficulty with chewing or swallowing loss of bowel bladder control    02/7/22  Complaining over left greater trochanteric and right sacroiliac area pain.  The left knee still doing okay received zilretta injection in November 2021 and is still helping.  She is requesting injections over the areas that hurts the most.  She has fluctuating blood pressure I did tell her I will not give but total 80 mg Depo-Medrol and we will split it into 2 areas.  She is ambulating without assistive devices.  We did get her approved for the long-acting steroid injection into the left knee but she says ri it since  you doing okay.  ght now her knee is doing okay and that previous shot still working.  We can always get it approved if needed again.  The approval is until February 27, 2022 however we still do not have to give it if you still doing okay      03/28/2022  On 02/07/2022 in [place] today greater trochanter which seems to have helped however the trigger point/left sacroiliac area that did not seem to help.  Both injections were with Depo-Medrol.  She stated she was changed to nifedipine for blood pressure and that cause severe swelling in the legs.  She has an appointment April 15 to see the cardiologist.  I did tell her this could be angioedema.  As far as her knee is concerned just stiff right now and she is not having much of any pain in it at 0/10.  Her main concern is the lumbar area this seems to have quite a bit of pain in it.  She had marked where the pain is in order to get another injection.  Denies loss of bowel bladder control blurry vision double vision.  She is here with her .  She had tried different topicals without success  Vicks rub seems to be the 1 that works the most  Trigger point pain over the left sacroiliac area approximately 3/10    06/02/2022  Severe left knee pain.  Given zilretta in November 2021 and just wore off on her almost  4 weeks ago.  Her pain is severe in the knee and limiting motion..  As far as the left hip and sacroiliac area on the left side and trigger point she is around 2-3/10.  She still ambulating without assistive devices taking very small steps.  She wants an injection into the left knee.  She stated the long-acting steroid helped quite a bit when we give it last and it was in November.  She would like that repeated.  However she stated today the left knee is hurting a lot she wants an injection today too.  I see no reason why not..  Still having pain over the left sacroiliac area and greater trochanteric area but not severe    06/30/2022  We did inject her last  visit and that did not seem to help as much.  She would like to have the long-acting steroid injection which helped significantly last time.  She still having some hip pain only when she leans on it but not when she is walking.  Her back is doing little bit better.  She does take Tylenol.  Her pain in the knee is 8/10    01/23/2023   Patient is complaining of right calf burning and discomfort.  This is been going on for several weeks.  I have not seen her because she was taking care of her  received radiation treatment.  She is here with him.  She is alert oriented x3.  Now the right and the left knee both of them are hurting.  She is using a cane to get around.  Her pain is 8/10.  Usually most of her problem is the left knee and sacroiliac area but today the back is not bothering her as much as the right knee and right calf as well as left knee.  She is using a cane to get around.  Despite the fact she still having left sacroiliac and greater trochanteric discomfort the knees are coming 1st today.    No fever no chills no shortness of breath difficulty with chewing or swallowing loss of bowel bladder control   She has family and children that could take her to doctor's.    03/16/2023   She is having cramps in the back of her right thigh and she has severe arthritis unable to fully extend the knee.  The Flexeril/cyclobenzaprine did not seem to help.  Today decided to change her to methocarbamol went over it with her.  Her lower part of her back is hurting and she stated that the injection to both of her knees helped last time of Depo-Medrol.  She does have arthritis we did obtain ultrasound last time was negative for DVT in the lower extremity.  She is on Plavix and aspirin.  Her pain is around 10/10 right now and I think at this time we probably start as her morning stiffness and achiness in the shoulder and her knees  She is 87 she does not want undergo surgery at this time  She is hurting more over the  right greater trochanteric slightly posterior aspect in the piriformis fossa.      04/24/2023   Right hip bursitis injected 03/16/2023 and helped until a week ago.  I did place her on prednisone 5 mg to take on a bad day as well as methocarbamol as a muscle relaxant.  She did take those for 10 days and made things better.  We went over it electronic record and reviewed her x-rays in the system again.  We reviewed the x-rays of the lumbar spine the pelvis and the knees.  I reviewed the electronic records she received cortisone injection on 01/23/2023 in both of her knees Depo-Medrol.  And does seems to have helped but now they wore off.    Both of her knees are what is causing her more pain as well as numbness and tingling in both of her legs which is something new she never complained about before.  I did tell her she does have a pinched nerve in the back but I do not want to place her on anymore medication like gabapentin which could pleased with her head at her age of 87.  She is very smart alert oriented.  She is worried about taking too much prednisone makes her hungry and gained weight.  No fever no chills no shortness of breath or difficulty with chewing swallowing loss of bowel bladder control   She uses a cane to walk around and she tells take small steps but able to do it.    As far as the sacroiliac joint is not bothering her at this time    09/18/2023   Bilateral knee severe pain.  She missed her appointment in June because she was not having any pain and then a week or 10 days later she started with the pain and could not make another appointment until now.  She is using a cane now to get around.  She got stuck in traffic because of a major accident and we told her even if she shows a plate that we will see her then we accommodated her.  She felt very appreciative.  Her pain in the knees is 10/10.  She is always worried about getting another appointment in 3 months and if he is doing okay she did not  want to waste might time yet it takes another 3 months to get another appointment.  I did tell her will give her to appointments 1 in 3 months 1 in 4 months to bypass that issue.  An even if she shows up early I will even see her than 2.  No fever no chills no shortness of breath   She has what psoriatic changes in her hands and was told the psoriasis.  I did tell her that psoriasis could cause severe arthritis not the other way around    03/22/2024   She is complaining of hip pain and she points over the iliac crest bilaterally over the superior aspect.  She is ambulating with a cane.  She takes small steps.  She said she is having also pain behind her knees and we know it is severe arthritis.  She has not at a point that she needs any injections today.  She asked about burning pains in the legs and I did tell her that is usually nerve issues.  There are certain things you can do like gabapentin or Lyrica.  She has never been on any of does.  We always very careful about giving her anything that could cause gastrointestinal liver problems  We did go over her medications with her and we went over her new x-rays of her knees today.  She did have a little fall on 03/12/2024  She was upset about giving her an appointment with the primary care physician and that physician after she has been in the room for more than an hour nobody showed up.  Then the she was told the doctor is not there and then a nurse practitioner showed up after she had to walk looking for people.  She was left in the room and she was upset about it.  She said she wants to tell someone about it and I told her to call the main number and asked for patient relations  Overall she states she gives everybody good rapport however she was really upset about that episode    Injections of Depo-Medrol into the knees on last visit 01/18/2024 seems to have helped      06/27/2024   New complain of locking bilateral 3rd digits that she needs to extend them by  pulling on them.  This is new at this time we examined her and we recommended that she sees Dr. Dick which he might give her steroid injection in the trigger fingers.    As far as the knees are concerned today we will try different steroid.  She said her pain is 9/10 but she is managing.  She wants to avoid anything that could cause gastrointestinal injury.  She is doing well ambulating slow steps at 88.  She is mentally fully competent and discussing different social subjects.  No fever no chills no chest pain no shortness of breath  Her children or step daughters drive them to the doctor's    10/10/2024   Bilateral knee pain last injections of Kenalog seems to work helped for 8 weeks.  Low back pain right sacroiliac pain  Left 3rd and 4th trigger fingers with pain  Overall pain 8 out of went over the gabapentin and methocarbamol with the.  She is on 100 mg 3 times a day she is not thinking it does anything however I did tell her she has to be on it for 3 months before starts kicking in.  As far as her hands is concerned we can do injections as well as far as the sacroiliitis we can also give her a trigger point injection.  Her knees are hurting more than the backs.  Discussed her taking Tylenol around noon time.  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 both eyes     Hyperlipidemia     Insomnia 4/6/2021    Low back derangement syndrome 12/11/2017    Melanoma     Pneumonia due to other staphylococcus      Past Surgical History:   Procedure Laterality Date    ADENOIDECTOMY      CHOLECYSTECTOMY  1973    SPINE  SURGERY  2/6/13    L4-L5  cyst removed andfor sciatica    TONSILLECTOMY  1945     Family History   Problem Relation Name Age of Onset    Arthritis Mother      Hypertension Mother      Stroke Mother      Glaucoma Mother      Heart disease Father  56        fatal MI    Diabetes Brother      Cancer Son patrick (twin) 56        melanoma with mets to lung and bone    Arthritis Sister      Hyperlipidemia Neg Hx       Social History     Socioeconomic History    Marital status:      Spouse name: fabio    Number of children: 3   Tobacco Use    Smoking status: Never    Smokeless tobacco: Never   Substance and Sexual Activity    Alcohol use: No     Alcohol/week: 0.0 standard drinks of alcohol    Drug use: No    Sexual activity: Not Currently   Social History Narrative    . Decaf coffee only. No living will or advanced directive-copy of information given.      Medication List with Changes/Refills   New Medications    METHOCARBAMOL (ROBAXIN) 750 MG TAB    Take 1 tablet (750 mg total) by mouth 3 (three) times daily as needed (spasm).   Current Medications    ACETAMINOPHEN (TYLENOL) 650 MG TBSR    Take 1 tablet (650 mg total) by mouth every 8 (eight) hours.    ASCORBIC ACID, VITAMIN C, 500 MG CHEW    Take by mouth once daily.    ASPIRIN (ECOTRIN) 81 MG EC TABLET    Take 1 tablet by mouth Daily.    ATORVASTATIN (LIPITOR) 40 MG TABLET    Take 1 tablet by mouth every evening.    BETAMETHASONE VALERATE 0.1% (VALISONE) 0.1 % OINT    Apply topically 2 (two) times daily.    CITALOPRAM (CELEXA) 20 MG TABLET    Take 1 tablet (20 mg total) by mouth once daily.    CLOBETASOL (TEMOVATE) 0.05 % CREAM    Apply 2 g topically 2 (two) times daily as needed.    CLONIDINE 0.2 MG/24 HR TD PTWK (CATAPRES) 0.2 MG/24 HR    1 patch every 7 days.    CLOPIDOGREL (PLAVIX) 75 MG TABLET    Take 75 mg by mouth once daily.    COLCHICINE (COLCRYS) 0.6 MG TABLET    Take 0.6 mg by mouth daily as needed.    ERGOCALCIFEROL (ERGOCALCIFEROL) 50,000 UNIT  CAP    Take by mouth once daily. Patient takes 5,000 units daily    FUROSEMIDE (LASIX) 40 MG TABLET    Take 40 mg by mouth.    GABAPENTIN (NEURONTIN) 100 MG CAPSULE    Take 1 capsule (100 mg total) by mouth 3 (three) times daily.    IRBESARTAN (AVAPRO) 150 MG TABLET    Take 150 mg by mouth.    ISOSORBIDE MONONITRATE (IMDUR) 60 MG 24 HR TABLET        MULTIVIT,IRON,MINERALS/LUTEIN (CENTRUM SILVER ULTRA WOMEN'S ORAL)    Take by mouth once daily.    NITROGLYCERIN (NITROSTAT) 0.4 MG SL TABLET    DISSOLVE ONE TABLET UNDER THE TONGUE EVERY 5 MINUTES AS NEEDED FOR CHEST PAIN. DO NOT EXCEED A TOTAL OF 3 DOSES IN 15 MINUTES    TURMERIC ROOT EXTRACT 500 MG CAP    Take by mouth once daily.     Review of patient's allergies indicates:   Allergen Reactions    Hydrocodone-acetaminophen Nausea And Vomiting     Other reaction(s): Vomiting    Diclofenac Nausea And Vomiting and Other (See Comments)     Review of Systems   Constitutional: Negative for decreased appetite.   HENT: Negative for tinnitus.    Eyes: Negative for double vision.   Cardiovascular: Negative for chest pain.   Respiratory: Negative for wheezing.    Hematologic/Lymphatic: Negative for bleeding problem.   Skin: Negative for dry skin.   Musculoskeletal: Positive for arthritis, back pain, joint pain, muscle weakness and stiffness. Negative for gout and neck pain.   Gastrointestinal: Negative for abdominal pain.   Genitourinary: Negative for bladder incontinence.   Neurological: Positive for numbness. Negative for paresthesias and sensory change.   Psychiatric/Behavioral: Negative for altered mental status.       Objective:   Body mass index is 25.16 kg/m².  There were no vitals filed for this visit.       General    Constitutional: She is oriented to person, place, and time. She appears well-developed.   HENT:   Head: Atraumatic.   Eyes: EOM are normal.   Cardiovascular: Normal rate.    Pulmonary/Chest: Effort normal.   Neurological: She is alert and oriented to  person, place, and time.   Psychiatric: Judgment normal.         Ambulating with a cane   Cervical spine rotation is functional but  limited due to stiffness  Bilateral hand 3rd fingers triggering with tenderness at the volar metacarpophalangeal joint  Lumbar with loss of lordosis and tenderness around the L4-5 and mostly over the left sacroiliac joint which is the area of severe tenderness and paraspinal.  Forward bending barely to proximal tibia lateral bending to mid thigh.    Pelvis is level.  There is tenderness over the anterior superior iliac crest on both sides    Passive hip motion within normal limits.  Hip flexors and abductors as well as quads and hamstrings and ankle extensors and flexors are slightly weak at 5-/5.  Tenderness over the greater trochanter over the right and left hip   Right knee range of motion 8-110.  Slight crepitus to AP compression on the patella in on medial joint.  Pain medially and anteriorly and slightly laterally.  No defect in the patella or quadriceps tendon Very mild swelling.  Collaterals and cruciate stable.  Left knee with approximately 10 ° of flexion contracture and flexion barely to 70°.  Moderate swelling.  Collaterals and cruciate stable. Crepitus with motion.  Pain in medially to palpation.  Also tender to put was patient over the patella with severe crepitus  Calves are soft nontender on the left slightly tender on the right  Slight varicosities around the calves  DP 1+  Skin is warm to touch no obvious lesions      Relevant imaging results reviewed and interpreted by me, discussed with the patient and / or family today     X-ray 03/22/2024 bilateral knees showing in the popliteal area calcified vessels, she has severe arthritis and marginal osteophytes and loss of joint space bilateral knees with some varus deformity  Ultrasound 01/26/2023 of lower extremity was negative for DVT/patient on Plavix and aspirin  X-ray 01/23/2023 bilateral knees with severe osteopenic  bone.  There is loss of medial joint space bilaterally and marginal osteophytic changes.  There is no fracture seen consistent with arthritis    X-ray bilateral knees with left knee complete loss of medial joint space with osteophytic changes and sclerosis.  Same on the right knee but not as bad consistent with bilateral end-stage arthrosis.  07/02/2021  X-ray of the pelvis showing some degenerative changes on the left hip with good joint space left.  Right hip no joint space loss consistent with some arthrosis of the  hip.  Mild chronic degenerative changes in the sacroiliac joints 07/02/2021  X-ray of the lumbar spine in the electronic records multilevel facet arthropathy and osteophytes.  Anterior multilevel spurring.  Degenerative disc disease 07/02/2021  Assessment:     Encounter Diagnoses   Name Primary?    Arthritis of knee, left Yes    Acquired varus deformity knee, left     Arthritis of knee, right     Acquired varus deformity knee, right     Sacroiliitis     Arthritis of left hip     Peripheral arterial disease     Lumbar facet arthropathy     Trigger finger, left middle finger     Trigger finger, right middle finger         Plan:   Arthritis of knee, left  -     methocarbamoL (ROBAXIN) 750 MG Tab; Take 1 tablet (750 mg total) by mouth 3 (three) times daily as needed (spasm).  Dispense: 30 tablet; Refill: 3  -     Large Joint Aspiration/Injection: bilateral knee    Acquired varus deformity knee, left    Arthritis of knee, right  -     methocarbamoL (ROBAXIN) 750 MG Tab; Take 1 tablet (750 mg total) by mouth 3 (three) times daily as needed (spasm).  Dispense: 30 tablet; Refill: 3  -     Large Joint Aspiration/Injection: bilateral knee    Acquired varus deformity knee, right    Sacroiliitis    Arthritis of left hip  -     methocarbamoL (ROBAXIN) 750 MG Tab; Take 1 tablet (750 mg total) by mouth 3 (three) times daily as needed (spasm).  Dispense: 30 tablet; Refill: 3    Peripheral arterial disease    Lumbar  facet arthropathy  -     methocarbamoL (ROBAXIN) 750 MG Tab; Take 1 tablet (750 mg total) by mouth 3 (three) times daily as needed (spasm).  Dispense: 30 tablet; Refill: 3    Trigger finger, left middle finger    Trigger finger, right middle finger         Patient Instructions   Bilateral knees injected today with 80 mg of Kenalog 10/04/2024   You been hurting also in the right sacroiliac joint and you have an appointment coming up in several weeks in November and hopefully at that point we can inject the back   You also having trigger fingers that are hurting maybe we can inject does next time you come  Continue with methocarbamol 750 mg 3 times a day if needed to help with the spasms   You have gabapentin/Neurontin 100 mg 3 times a day that should help with the nerve pain but it takes a while for it to kick in usually around 3 months  I will see you back in a few weeks   I went and reviewed all her x-rays in the lumbar spine showing multilevel facet arthropathy and some degenerative disc disease.  I also reviewed her pelvis film showing very mild degenerative radiation of her hips.  The x-rays on her knees showing severe arthritic condition.  Now she is complaining of numbness and tingling in the legs and I did tell her could be secondary to lumbar issues and at her age I do not want to add any medications that will make her get confused like gout gabapentin.  She does not have diabetes and most likely her problem is secondary to the lumbar pinched nerves.  She does get cramps in the back of her legs and we did put her on methocarbamol to take as needed.  We also discussed prednisone that she takes only if needed.  We did discuss the pros and cons of steroid injections into the knees.  She can have does once every 10-12 weeks.  I do not want a place her on NSAIDs she is on Plavix and aspirin and I think prednisone is ideal for her at her age.    01/18/2024   Patient is having frequent falls minimum 2 over the last  week or so and just hit her knee a little bit.  She has think difficulty getting up and needed help.  She is 88 years of age and numerous arthritic conditions.  She is fully alert and oriented in doing really well mentally.  At this time I did tell her that I must have the insurance provide you with a Rollator.  It is a walker with a seat that you need to use instead of the cane.  The injections in the knees seems to help but they are not lasting long enough  Proceeded injecting both of her knees today 01/28/2024 each with 80 mg Depo-Medrol mixed with 5 cc 1% lidocaine 01/18/2024   Ice the knees the next few days   You may take Tylenol   As far as the back is concerned the knees was hurting more today in the will be too much steroid if we inject the trigger point in the back  She can not be on NSAIDs she is on Plavix and aspirin   You turn in 3 months sooner if needed  Disclaimer:is note was prepared using a voice recognition system and is likely to have sound alike errors within the text.

## 2024-10-10 NOTE — PROCEDURES
Large Joint Aspiration/Injection: bilateral knee    Date/Time: 10/10/2024 9:20 AM    Performed by: Ranjit Kelly MD  Authorized by: Ranjit Kelly MD    Consent Done?:  Yes (Verbal)  Indications:  Arthritis  Site marked: the procedure site was marked    Timeout: prior to procedure the correct patient, procedure, and site was verified      Local anesthesia used?: Yes    Local anesthetic:  Lidocaine 1% without epinephrine    Details:  Needle Size:  22 G  Ultrasonic Guidance for needle placement?: No    Approach:  Anterolateral  Location:  Knee  Laterality:  Bilateral  Site:  Bilateral knee  Medications (Right):  80 mg triamcinolone acetonide 40 mg/mL  Medications (Left):  80 mg triamcinolone acetonide 40 mg/mL  Patient tolerance:  Patient tolerated the procedure well with no immediate complications

## 2024-11-07 ENCOUNTER — OFFICE VISIT (OUTPATIENT)
Dept: ORTHOPEDICS | Facility: CLINIC | Age: 89
End: 2024-11-07
Payer: MEDICARE

## 2024-11-07 VITALS — BODY MASS INDEX: 25.05 KG/M2 | WEIGHT: 155.88 LBS | HEIGHT: 66 IN

## 2024-11-07 DIAGNOSIS — M21.161 ACQUIRED VARUS DEFORMITY KNEE, RIGHT: ICD-10-CM

## 2024-11-07 DIAGNOSIS — M21.162 ACQUIRED VARUS DEFORMITY KNEE, LEFT: ICD-10-CM

## 2024-11-07 DIAGNOSIS — M17.11 ARTHRITIS OF KNEE, RIGHT: ICD-10-CM

## 2024-11-07 DIAGNOSIS — M65.331 TRIGGER FINGER, RIGHT MIDDLE FINGER: ICD-10-CM

## 2024-11-07 DIAGNOSIS — I73.9 PERIPHERAL ARTERIAL DISEASE: ICD-10-CM

## 2024-11-07 DIAGNOSIS — M65.332 TRIGGER FINGER, LEFT MIDDLE FINGER: ICD-10-CM

## 2024-11-07 DIAGNOSIS — M47.816 LUMBAR FACET ARTHROPATHY: ICD-10-CM

## 2024-11-07 DIAGNOSIS — M17.12 ARTHRITIS OF KNEE, LEFT: ICD-10-CM

## 2024-11-07 DIAGNOSIS — M46.1 SACROILIITIS: Primary | ICD-10-CM

## 2024-11-07 DIAGNOSIS — M16.12 ARTHRITIS OF LEFT HIP: ICD-10-CM

## 2024-11-07 PROCEDURE — 99999 PR PBB SHADOW E&M-EST. PATIENT-LVL IV: CPT | Mod: PBBFAC,HCNC,, | Performed by: ORTHOPAEDIC SURGERY

## 2024-11-07 RX ORDER — TRIAMCINOLONE ACETONIDE 40 MG/ML
80 INJECTION, SUSPENSION INTRA-ARTICULAR; INTRAMUSCULAR
Status: DISCONTINUED | OUTPATIENT
Start: 2024-11-07 | End: 2024-11-07 | Stop reason: HOSPADM

## 2024-11-07 RX ADMIN — TRIAMCINOLONE ACETONIDE 80 MG: 40 INJECTION, SUSPENSION INTRA-ARTICULAR; INTRAMUSCULAR at 09:11

## 2024-11-07 NOTE — PROGRESS NOTES
Subjective:     Patient ID: Tiffanie Barajas is a 89 y.o. female.    Chief Complaint: Pain of the Left Knee and Pain of the Right Knee    HPI:  84-year-old complaining of pain over the left sacroiliac joint radiating down to her knee.  Previous history of sciatica and a cyst in the back that was removed. Has history of arthritis of both of her knees received injection longtime ago more than a year (lubrication shot).  She is not having too much pain in the right knee unable to fully extend the left knee difficulty with shopping and walking.  Pain roughly 4/10.  Denies loss of bowel bladder control.  Walking distance is seems to hurt approximately 200 ft.  She is on blood thinners and unable to take NSAIDs.  She takes 1 little Tylenol occasionally and does not help.    01/27/2020.  Left knee injection of Depo-Medrol seems to have helped this patient.  Given 01/03/2020.  Just started physical therapy and that seems to help also.  The Flexeril did not seem to work at all.  Already been 5 times to physical therapy and she has at least 3 more weeks to go.  Very pleased so far with her results.  Patient cannot take NSAIDs due to being on blood thinners.  Pain level 0/10.  Does have occasional discomfort right greater trochanteric area    02/13/2020  Patient with bilateral knee arthrosis left worse than right and sacroiliac pain.  She just finished her physical therapy and that seems to have helped and now she is doing independent exercise program.  We did inject the knees with steroid that seems to help.  She is ambulating without any assistive devices today. He is here to have Synvisc-One injection into the left knee.    05/22/2020  Patient complaining today of left hip pain/she points over the lumbar area radiating to the posterior aspect of the knee.  She has history of lumbosacral arthritis.  As far as her left knee injection of Synvisc-One she is not having any pain with that.  She is very happy with that result.   She wants something done and 0 not want any prescriptions for any pills.  Denies loss of bowel bladder control or usage or any assistive devices to ambulate.  She is doing independent exercise as well as showing me that her  created exercise program for her.  I did recommend stationary bicycle and independent stretching exercises.  Denies loss of bowel bladder control and fever or chills or shortness of breath or chest pain    07/09/2020  Patient received trigger point injection of the lumbar spine 05/22/2020 with some good relief.  Now she is having more pain in the left knee.  She had received steroid injection 01/03/2020 and Synvisc-One in 02/13/2020.  She feels the knee is tight she tries to exercise it on her own.  Having sharp pain on the medial side.  Pain level is 3/10 but with gait it gets worst.  Denies any fever or chills and maintaining social distancing and wearing her own mask.  She would like some relief from the tightness and stiffness in the left knee but wants to avoid taking any medications besides a little Tylenol because she is on blood thinners and unable to take NSAIDs.    01/04/2020   Left knee pain and stiffness 6/10, low back pain / points on the left sacroiliac area also.  She is requesting injection in both areas.  Patient did have trigger point injection 05/22/2020 in the lumbar area which helped also had steroid injection in July 2020 which seems to have helped in the knee.  Also previous history of Synvisc-One injection 02/13/2020.  Tylenol does not seem to help.  Denies any fever or chills or shortness of breath or difficulty with chewing or swallowing loss of bowel bladder control blurry vision or double vision or loss sense smell or taste.  She is here with her significant other.  Asking about getting the COVID  Vaccine.  She is 85 years old and I told if her  She qualifies should be able to get it soon    02/08/2021  Right lumbar trigger point tenderness which was injected  awhile ago with relief on 05/22/2020 now it is hurting her a little bit more.  I did tell her since she is here today to receive viscosupplementation/approved only Monovisc not Synvisc this time we will hold off on trigger point injection.  Pain is 5/10 in the left knee.  The right knee is doing okay.  She is ambulating without any assistive devices.  There was a little discussion about difference is with me in Monovisc and Synvisc and put simply I told them it is like having a car with different brands.  Patient is maintaining her activity level.  She still did not get her COVID vaccine due to in availability of the vaccine.  Denies any fever or chills or shortness of breath or difficulty with chewing or swallowing or loss of bowel bladder control blurry vision double vision loss sense smell or taste.  She is ambulating without any assistive devices    04/01/2021  Low back pain which is severe we injected trigger point on 05/22/2020 with some relief.  Went over her x-rays in the electronic records today showing severe facet arthropathy and degenerative disc disease with large bone spur overgrowth.  That bothers her a lot with radiation down her legs a cannot sleep.  She is requesting may be something to help with the radicular symptoms and give her some sleep and rest as far as the Monovisc injection into the left knee that did not seem to help at all.  The steroid injection seems to work better for her and requested another injection.  She did go through therapy before.  She feels sciatica to the right leg as far as the right knee is concerned is hurting but not as much as the left knee  Monovisc injection 02/08/2021 into the left knee did not seem to help.  Voltaren gel she uses on occasion.  No fever no chills or shortness of breath or difficulty with chewing or swallowing loss of bowel bladder control blurry vision double vision or loss of sense smell or taste  Did did receive her COVID-19  vaccine    07/02/2021  Patient is having low back pain with sciatic type of pain radiating down both legs.  Today will obtaining x-rays of her spine as well as the pelvis and hips and the knees.  The knees and not hurting her as much and she is not requesting any injections.  We did try Monovisc on the left knee but did not help and the steroid seems to work for her.  I did mention in the future maybe she would need long-acting steroid like  zilretta.  She is here with her daughter in law.  She is having some hip pains.  Able to manage it.  Her pain is 5/10 ambulating slowly without any assistive devices.  Cannot take any NSAIDs by mouth since she is on aspirin and Plavix and home cardiac evaluation by cardiologist.  No fever no chills no shortness of breath or difficulty with chewing or swallowing loss of bowel bladder control or blurry vision double vision loss sense smell or taste  She did have diclofenac gel at home but has not been using it appropriately  She states she is using SALONPAS which seems to help the back  Last visit we gave her cyclobenzaprine she is not taking it      10/18/2021  Severe low back pain left side more than the right.  We did have x-rays on her from last visit that showed sacroiliac arthritic changes sacroiliitis.  We did give her sacroiliac injection more than a year ago with some relief.  She also have pain in the knees and in the back specially on the left side a worried if it is sciatica or not.  She does have x-rays of severe arthritis in the spine also.  She usually gets steroid injections which help her for a little while.  She had been tried on viscosupplementation once without success.  She had sustained a little fall on 10/07/2021 but no major injury.  She is ambulating without any assistive devices.  No fever no chills no shortness of breath or difficulty with chewing or swallowing  She is using diclofenac gel as well as Salonpas patches.    11/08/2021  Injection of the  trigger point in the lower part of her back helped for 10 days.  That seems to come back now and hurts her a little bit more than her knee.  As far as the right knee she is doing okay the left knee is the 1 that bothers her.  She has been losing some range of motion since last visit we see her.  Her pain is around 6/10 in the sitting position and goes up with walking.  At this point I did tell her I do not want to give her 2 injections and not knowing which 1 will give her a reaction.  We have her approved to receive the left knee Zilreta injection.  The pros and cons discussed in details.  No loss of bowel bladder control or fever or chills or shortness of breath or difficulty with chewing or swallowing loss of bowel bladder control or loss of sense smell or taste      12/02/2021  Patient stated left knee steroid injections/ Zilreta seems to have helped so far.  Not bother her as much.  What bothers her is her lower part of her back on the left side there is a trigger point that seems to be the area.  She her  marked with a pen.  She is ambulating slowly without any assistive devices.  She would like to have that trigger point injected despite the fact her pain in her knee is 3/10.  Overall it seems the steroid injection given last visit in the knee seems to work so far  No fever no chills no shortness of breath or difficulty with chewing or swallowing loss of bowel bladder control    02/7/22  Complaining over left greater trochanteric and right sacroiliac area pain.  The left knee still doing okay received zilretta injection in November 2021 and is still helping.  She is requesting injections over the areas that hurts the most.  She has fluctuating blood pressure I did tell her I will not give but total 80 mg Depo-Medrol and we will split it into 2 areas.  She is ambulating without assistive devices.  We did get her approved for the long-acting steroid injection into the left knee but she says ri it since  you doing okay.  ght now her knee is doing okay and that previous shot still working.  We can always get it approved if needed again.  The approval is until February 27, 2022 however we still do not have to give it if you still doing okay      03/28/2022  On 02/07/2022 in [place] today greater trochanter which seems to have helped however the trigger point/left sacroiliac area that did not seem to help.  Both injections were with Depo-Medrol.  She stated she was changed to nifedipine for blood pressure and that cause severe swelling in the legs.  She has an appointment April 15 to see the cardiologist.  I did tell her this could be angioedema.  As far as her knee is concerned just stiff right now and she is not having much of any pain in it at 0/10.  Her main concern is the lumbar area this seems to have quite a bit of pain in it.  She had marked where the pain is in order to get another injection.  Denies loss of bowel bladder control blurry vision double vision.  She is here with her .  She had tried different topicals without success  Vicks rub seems to be the 1 that works the most  Trigger point pain over the left sacroiliac area approximately 3/10    06/02/2022  Severe left knee pain.  Given zilretta in November 2021 and just wore off on her almost  4 weeks ago.  Her pain is severe in the knee and limiting motion..  As far as the left hip and sacroiliac area on the left side and trigger point she is around 2-3/10.  She still ambulating without assistive devices taking very small steps.  She wants an injection into the left knee.  She stated the long-acting steroid helped quite a bit when we give it last and it was in November.  She would like that repeated.  However she stated today the left knee is hurting a lot she wants an injection today too.  I see no reason why not..  Still having pain over the left sacroiliac area and greater trochanteric area but not severe    06/30/2022  We did inject her last  visit and that did not seem to help as much.  She would like to have the long-acting steroid injection which helped significantly last time.  She still having some hip pain only when she leans on it but not when she is walking.  Her back is doing little bit better.  She does take Tylenol.  Her pain in the knee is 8/10    01/23/2023   Patient is complaining of right calf burning and discomfort.  This is been going on for several weeks.  I have not seen her because she was taking care of her  received radiation treatment.  She is here with him.  She is alert oriented x3.  Now the right and the left knee both of them are hurting.  She is using a cane to get around.  Her pain is 8/10.  Usually most of her problem is the left knee and sacroiliac area but today the back is not bothering her as much as the right knee and right calf as well as left knee.  She is using a cane to get around.  Despite the fact she still having left sacroiliac and greater trochanteric discomfort the knees are coming 1st today.    No fever no chills no shortness of breath difficulty with chewing or swallowing loss of bowel bladder control   She has family and children that could take her to doctor's.    03/16/2023   She is having cramps in the back of her right thigh and she has severe arthritis unable to fully extend the knee.  The Flexeril/cyclobenzaprine did not seem to help.  Today decided to change her to methocarbamol went over it with her.  Her lower part of her back is hurting and she stated that the injection to both of her knees helped last time of Depo-Medrol.  She does have arthritis we did obtain ultrasound last time was negative for DVT in the lower extremity.  She is on Plavix and aspirin.  Her pain is around 10/10 right now and I think at this time we probably start as her morning stiffness and achiness in the shoulder and her knees  She is 87 she does not want undergo surgery at this time  She is hurting more over the  right greater trochanteric slightly posterior aspect in the piriformis fossa.      04/24/2023   Right hip bursitis injected 03/16/2023 and helped until a week ago.  I did place her on prednisone 5 mg to take on a bad day as well as methocarbamol as a muscle relaxant.  She did take those for 10 days and made things better.  We went over it electronic record and reviewed her x-rays in the system again.  We reviewed the x-rays of the lumbar spine the pelvis and the knees.  I reviewed the electronic records she received cortisone injection on 01/23/2023 in both of her knees Depo-Medrol.  And does seems to have helped but now they wore off.    Both of her knees are what is causing her more pain as well as numbness and tingling in both of her legs which is something new she never complained about before.  I did tell her she does have a pinched nerve in the back but I do not want to place her on anymore medication like gabapentin which could pleased with her head at her age of 87.  She is very smart alert oriented.  She is worried about taking too much prednisone makes her hungry and gained weight.  No fever no chills no shortness of breath or difficulty with chewing swallowing loss of bowel bladder control   She uses a cane to walk around and she tells take small steps but able to do it.    As far as the sacroiliac joint is not bothering her at this time    09/18/2023   Bilateral knee severe pain.  She missed her appointment in June because she was not having any pain and then a week or 10 days later she started with the pain and could not make another appointment until now.  She is using a cane now to get around.  She got stuck in traffic because of a major accident and we told her even if she shows a plate that we will see her then we accommodated her.  She felt very appreciative.  Her pain in the knees is 10/10.  She is always worried about getting another appointment in 3 months and if he is doing okay she did not  want to waste might time yet it takes another 3 months to get another appointment.  I did tell her will give her to appointments 1 in 3 months 1 in 4 months to bypass that issue.  An even if she shows up early I will even see her than 2.  No fever no chills no shortness of breath   She has what psoriatic changes in her hands and was told the psoriasis.  I did tell her that psoriasis could cause severe arthritis not the other way around    03/22/2024   She is complaining of hip pain and she points over the iliac crest bilaterally over the superior aspect.  She is ambulating with a cane.  She takes small steps.  She said she is having also pain behind her knees and we know it is severe arthritis.  She has not at a point that she needs any injections today.  She asked about burning pains in the legs and I did tell her that is usually nerve issues.  There are certain things you can do like gabapentin or Lyrica.  She has never been on any of does.  We always very careful about giving her anything that could cause gastrointestinal liver problems  We did go over her medications with her and we went over her new x-rays of her knees today.  She did have a little fall on 03/12/2024  She was upset about giving her an appointment with the primary care physician and that physician after she has been in the room for more than an hour nobody showed up.  Then the she was told the doctor is not there and then a nurse practitioner showed up after she had to walk looking for people.  She was left in the room and she was upset about it.  She said she wants to tell someone about it and I told her to call the main number and asked for patient relations  Overall she states she gives everybody good rapport however she was really upset about that episode    Injections of Depo-Medrol into the knees on last visit 01/18/2024 seems to have helped      06/27/2024   New complain of locking bilateral 3rd digits that she needs to extend them by  pulling on them.  This is new at this time we examined her and we recommended that she sees Dr. Dick which he might give her steroid injection in the trigger fingers.    As far as the knees are concerned today we will try different steroid.  She said her pain is 9/10 but she is managing.  She wants to avoid anything that could cause gastrointestinal injury.  She is doing well ambulating slow steps at 88.  She is mentally fully competent and discussing different social subjects.  No fever no chills no chest pain no shortness of breath  Her children or step daughters drive them to the doctor's    10/10/2024   Bilateral knee pain last injections of Kenalog seems to work helped for 8 weeks.  Low back pain right sacroiliac pain  Left 3rd and 4th trigger fingers with pain  Overall pain 8 out of went over the gabapentin and methocarbamol with the.  She is on 100 mg 3 times a day she is not thinking it does anything however I did tell her she has to be on it for 3 months before starts kicking in.  As far as her hands is concerned we can do injections as well as far as the sacroiliitis we can also give her a trigger point injection.  Her knees are hurting more than the backs.  Discussed her taking Tylenol around noon time.  We are staying away from nonsteroidal anti-inflammatories at her age    11/07/2024   Four weeks status post Kenalog injections and she is still doing well with the knees.  What is bothering her as the low back in the right sacroiliac area.  She wanted an injection.  Kenalog seems to work better since we tried every other steroid.  She is still walking taking small steps.  Not using any assistive devices today.  Pain in the knees is 0 pain in the right SI area is around 3.    No fever no chills no shortness of her breath.  Past Medical History:   Diagnosis Date    Acute pancreatitis without infection or necrosis 12/5/2021    Anxiety     Arthritis     Atherosclerosis of native coronary artery of native  heart with angina pectoris 8/8/2019    Cancer     Degenerative disc disease     Depression     Essential hypertension 10/26/2021    GERD (gastroesophageal reflux disease)     Glaucoma suspect of both eyes     Hyperlipidemia     Insomnia 4/6/2021    Low back derangement syndrome 12/11/2017    Melanoma     Pneumonia due to other staphylococcus      Past Surgical History:   Procedure Laterality Date    ADENOIDECTOMY      CHOLECYSTECTOMY  1973    SPINE SURGERY  2/6/13    L4-L5  cyst removed andfor sciatica    TONSILLECTOMY  1945     Family History   Problem Relation Name Age of Onset    Arthritis Mother      Hypertension Mother      Stroke Mother      Glaucoma Mother      Heart disease Father  56        fatal MI    Diabetes Brother      Cancer Son patrick (twin) 56        melanoma with mets to lung and bone    Arthritis Sister      Hyperlipidemia Neg Hx       Social History     Socioeconomic History    Marital status:      Spouse name: fabio    Number of children: 3   Tobacco Use    Smoking status: Never    Smokeless tobacco: Never   Substance and Sexual Activity    Alcohol use: No     Alcohol/week: 0.0 standard drinks of alcohol    Drug use: No    Sexual activity: Not Currently   Social History Narrative    . Decaf coffee only. No living will or advanced directive-copy of information given.      Medication List with Changes/Refills   Current Medications    ACETAMINOPHEN (TYLENOL) 650 MG TBSR    Take 1 tablet (650 mg total) by mouth every 8 (eight) hours.    ASCORBIC ACID, VITAMIN C, 500 MG CHEW    Take by mouth once daily.    ASPIRIN (ECOTRIN) 81 MG EC TABLET    Take 1 tablet by mouth Daily.    ATORVASTATIN (LIPITOR) 40 MG TABLET    Take 1 tablet by mouth every evening.    BETAMETHASONE VALERATE 0.1% (VALISONE) 0.1 % OINT    Apply topically 2 (two) times daily.    CITALOPRAM (CELEXA) 20 MG TABLET    Take 1 tablet (20 mg total) by mouth once daily.    CLOBETASOL (TEMOVATE) 0.05 % CREAM    Apply 2 g  topically 2 (two) times daily as needed.    CLONIDINE 0.2 MG/24 HR TD PTWK (CATAPRES) 0.2 MG/24 HR    1 patch every 7 days.    CLOPIDOGREL (PLAVIX) 75 MG TABLET    Take 75 mg by mouth once daily.    COLCHICINE (COLCRYS) 0.6 MG TABLET    Take 0.6 mg by mouth daily as needed.    ERGOCALCIFEROL (ERGOCALCIFEROL) 50,000 UNIT CAP    Take by mouth once daily. Patient takes 5,000 units daily    FUROSEMIDE (LASIX) 40 MG TABLET    Take 40 mg by mouth.    GABAPENTIN (NEURONTIN) 100 MG CAPSULE    Take 1 capsule (100 mg total) by mouth 3 (three) times daily.    IRBESARTAN (AVAPRO) 150 MG TABLET    Take 150 mg by mouth.    ISOSORBIDE MONONITRATE (IMDUR) 60 MG 24 HR TABLET        MULTIVIT,IRON,MINERALS/LUTEIN (CENTRUM SILVER ULTRA WOMEN'S ORAL)    Take by mouth once daily.    NITROGLYCERIN (NITROSTAT) 0.4 MG SL TABLET    DISSOLVE ONE TABLET UNDER THE TONGUE EVERY 5 MINUTES AS NEEDED FOR CHEST PAIN. DO NOT EXCEED A TOTAL OF 3 DOSES IN 15 MINUTES    TURMERIC ROOT EXTRACT 500 MG CAP    Take by mouth once daily.     Review of patient's allergies indicates:   Allergen Reactions    Hydrocodone-acetaminophen Nausea And Vomiting     Other reaction(s): Vomiting    Diclofenac Nausea And Vomiting and Other (See Comments)     Review of Systems   Constitutional: Negative for decreased appetite.   HENT: Negative for tinnitus.    Eyes: Negative for double vision.   Cardiovascular: Negative for chest pain.   Respiratory: Negative for wheezing.    Hematologic/Lymphatic: Negative for bleeding problem.   Skin: Negative for dry skin.   Musculoskeletal: Positive for arthritis, back pain, joint pain, muscle weakness and stiffness. Negative for gout and neck pain.   Gastrointestinal: Negative for abdominal pain.   Genitourinary: Negative for bladder incontinence.   Neurological: Positive for numbness. Negative for paresthesias and sensory change.   Psychiatric/Behavioral: Negative for altered mental status.       Objective:   Body mass index is 25.16  kg/m².  There were no vitals filed for this visit.       General    Constitutional: She is oriented to person, place, and time. She appears well-developed.   HENT:   Head: Atraumatic.   Eyes: EOM are normal.   Cardiovascular: Normal rate.    Pulmonary/Chest: Effort normal.   Neurological: She is alert and oriented to person, place, and time.   Psychiatric: Judgment normal.         Ambulating with a cane   Cervical spine rotation is functional but  limited due to stiffness  Bilateral hand 3rd fingers triggering with tenderness at the volar metacarpophalangeal joint  Lumbar with loss of lordosis and tenderness around the L4-5 and mostly over the l right sacroiliac joint which is the area of severe tenderness and paraspinal.  Mild tenderness over the left sacroiliac area/trigger point.  Forward bending barely to proximal tibia lateral bending to mid thigh.    Pelvis is level.  There is tenderness over the anterior superior iliac crest on both sides    Passive hip motion within normal limits.  Hip flexors and abductors as well as quads and hamstrings and ankle extensors and flexors are slightly weak at 5-/5.  Tenderness over the greater trochanter over the right and left hip   Right knee range of motion 8-110.  Slight crepitus to AP compression on the patella in on medial joint.  Pain medially and anteriorly and slightly laterally.  No defect in the patella or quadriceps tendon Very mild swelling.  Collaterals and cruciate stable.  Left knee with approximately 10 ° of flexion contracture and flexion barely to 70°.  Moderate swelling.  Collaterals and cruciate stable. Crepitus with motion.  Pain in medially to palpation.  Also tender to put was patient over the patella with severe crepitus  Calves are soft nontender on the left slightly tender on the right  Slight varicosities around the calves  DP 1+  Skin is warm to touch no obvious lesions      Relevant imaging results reviewed and interpreted by me, discussed with  the patient and / or family today     X-ray 03/22/2024 bilateral knees showing in the popliteal area calcified vessels, she has severe arthritis and marginal osteophytes and loss of joint space bilateral knees with some varus deformity  Ultrasound 01/26/2023 of lower extremity was negative for DVT/patient on Plavix and aspirin  X-ray 01/23/2023 bilateral knees with severe osteopenic bone.  There is loss of medial joint space bilaterally and marginal osteophytic changes.  There is no fracture seen consistent with arthritis    X-ray bilateral knees with left knee complete loss of medial joint space with osteophytic changes and sclerosis.  Same on the right knee but not as bad consistent with bilateral end-stage arthrosis.  07/02/2021  X-ray of the pelvis showing some degenerative changes on the left hip with good joint space left.  Right hip no joint space loss consistent with some arthrosis of the  hip.  Mild chronic degenerative changes in the sacroiliac joints 07/02/2021  X-ray of the lumbar spine in the electronic records multilevel facet arthropathy and osteophytes.  Anterior multilevel spurring.  Degenerative disc disease 07/02/2021  Assessment:     Encounter Diagnoses   Name Primary?    Arthritis of knee, left     Acquired varus deformity knee, left     Arthritis of knee, right     Acquired varus deformity knee, right     Sacroiliitis Yes    Arthritis of left hip     Peripheral arterial disease     Lumbar facet arthropathy     Trigger finger, left middle finger     Trigger finger, right middle finger         Plan:   Sacroiliitis  -     Trigger Point Injection    Arthritis of knee, left    Acquired varus deformity knee, left    Arthritis of knee, right    Acquired varus deformity knee, right    Arthritis of left hip    Peripheral arterial disease    Lumbar facet arthropathy    Trigger finger, left middle finger    Trigger finger, right middle finger         Patient Instructions   Right trigger point over the  sacroiliac area seems to be the area that is tender the most   We injected the right trigger point close to the sacroiliac joint with 80 mg Kenalog mixed with 5 cc 1% lidocaine after prepping with alcohol 11/07/2024   Your knees are doing good so far we will leave things alone since the Kenalog seems to have helped you not hurting too much   Continue being active   See you back in 6-8 weeks    Previous discussion   I went and reviewed all her x-rays in the lumbar spine showing multilevel facet arthropathy and some degenerative disc disease.  I also reviewed her pelvis film showing very mild degenerative radiation of her hips.  The x-rays on her knees showing severe arthritic condition.  Now she is complaining of numbness and tingling in the legs and I did tell her could be secondary to lumbar issues and at her age I do not want to add any medications that will make her get confused like gout gabapentin.  She does not have diabetes and most likely her problem is secondary to the lumbar pinched nerves.  She does get cramps in the back of her legs and we did put her on methocarbamol to take as needed.  We also discussed prednisone that she takes only if needed.  We did discuss the pros and cons of steroid injections into the knees.  She can have does once every 10-12 weeks.  I do not want a place her on NSAIDs she is on Plavix and aspirin and I think prednisone is ideal for her at her age.    01/18/2024   Patient is having frequent falls minimum 2 over the last week or so and just hit her knee a little bit.  She has think difficulty getting up and needed help.  She is 88 years of age and numerous arthritic conditions.  She is fully alert and oriented in doing really well mentally.  At this time I did tell her that I must have the insurance provide you with a Rollator.  It is a walker with a seat that you need to use instead of the cane.  The injections in the knees seems to help but they are not lasting long  enough  Proceeded injecting both of her knees today 01/28/2024 each with 80 mg Depo-Medrol mixed with 5 cc 1% lidocaine 01/18/2024   Ice the knees the next few days   You may take Tylenol   As far as the back is concerned the knees was hurting more today in the will be too much steroid if we inject the trigger point in the back  She can not be on NSAIDs she is on Plavix and aspirin   You turn in 3 months sooner if needed  Disclaimer:is note was prepared using a voice recognition system and is likely to have sound alike errors within the text.

## 2024-11-07 NOTE — PATIENT INSTRUCTIONS
Right trigger point over the sacroiliac area seems to be the area that is tender the most   We injected the right trigger point close to the sacroiliac joint with 80 mg Kenalog mixed with 5 cc 1% lidocaine after prepping with alcohol 11/07/2024   Your knees are doing good so far we will leave things alone since the Kenalog seems to have helped you not hurting too much   Continue being active   See you back in 6-8 weeks

## 2024-11-07 NOTE — PROCEDURES
Trigger Point Injection    Performed by: Ranjit Kelly MD  Authorized by: Ranjit Kelly MD      Consent Done?:  Yes (Verbal)    Pre-Procedure:   Indications:  Pain   Timeout: prior to procedure the correct patient, procedure, and site was verified      Local anesthesia used?: Yes    Anesthesia:  Local infiltration  Local anesthetic:  Lidocaine 1% without epinephrine  Medications: 80 mg triamcinolone acetonide 40 mg/mL  Sacral:  Right

## 2024-11-18 ENCOUNTER — OFFICE VISIT (OUTPATIENT)
Dept: OBSTETRICS AND GYNECOLOGY | Facility: CLINIC | Age: 89
End: 2024-11-18
Payer: MEDICARE

## 2024-11-18 VITALS
DIASTOLIC BLOOD PRESSURE: 66 MMHG | HEIGHT: 66 IN | BODY MASS INDEX: 26.68 KG/M2 | WEIGHT: 166 LBS | SYSTOLIC BLOOD PRESSURE: 128 MMHG

## 2024-11-18 DIAGNOSIS — Z46.89 ENCOUNTER FOR PESSARY MAINTENANCE: Primary | ICD-10-CM

## 2024-11-18 DIAGNOSIS — N81.10 BADEN-WALKER GRADE 2 CYSTOCELE: ICD-10-CM

## 2024-11-18 PROCEDURE — 1159F MED LIST DOCD IN RCRD: CPT | Mod: HCNC,CPTII,S$GLB, | Performed by: OBSTETRICS & GYNECOLOGY

## 2024-11-18 PROCEDURE — 99213 OFFICE O/P EST LOW 20 MIN: CPT | Mod: HCNC,S$GLB,, | Performed by: OBSTETRICS & GYNECOLOGY

## 2024-11-18 PROCEDURE — 1126F AMNT PAIN NOTED NONE PRSNT: CPT | Mod: HCNC,CPTII,S$GLB, | Performed by: OBSTETRICS & GYNECOLOGY

## 2024-11-18 PROCEDURE — 99999 PR PBB SHADOW E&M-EST. PATIENT-LVL IV: CPT | Mod: PBBFAC,HCNC,, | Performed by: OBSTETRICS & GYNECOLOGY

## 2024-11-18 NOTE — PROGRESS NOTES
Subjective     Patient ID: Tiffanie Barajas is a 89 y.o. female.    Chief Complaint:  Pessary Check      History of Present Illness  HPI  Presents for pessary check.  Wears a #4 fenestrated ring pessary for symptomatic cystocele and rectocele.  Does well with her pessary.  No pain.  Pt struggles with constipation.  Does not take any stool softeners.  Started eating more grapes.  When she has a hard bowel movement, she notices some vaginal bleeding.  Also has some urinary urgency, but may not be able to void then will leak spontaneously.  No dysuria or hematuria.      GYN & OB History  Patient's last menstrual period was 1990.   Date of Last Pap: 2012    OB History    Para Term  AB Living   2 1       2   SAB IAB Ectopic Multiple Live Births         1 1      # Outcome Date GA Lbr Dwayne/2nd Weight Sex Type Anes PTL Lv   2 Para      Vag-Spont   EMILIANO   1A       Vag-Spont      1B       Vag-Spont          Review of Systems  Review of Systems       Objective   Physical Exam:   Constitutional: She is oriented to person, place, and time. She appears well-developed and well-nourished. No distress.             Abdominal: Soft. She exhibits no distension and no mass. There is no abdominal tenderness. There is no rebound and no guarding. Hernia confirmed negative in the right inguinal area and confirmed negative in the left inguinal area.     Genitourinary:    Vagina normal.      Pelvic exam was performed with patient supine.   There is no rash, tenderness, lesion or injury on the right labia. There is no rash, tenderness, lesion or injury on the left labia. Right adnexum displays no mass, no tenderness and no fullness. Left adnexum displays no mass, no tenderness and no fullness. There is rectocele (grade 1) and cystocele (grade 2) in the vagina. No erythema, vaginal discharge, tenderness, bleeding or prolapse of vaginal walls in the vagina.    No foreign body in the vagina.      No signs of  injury in the vagina.   Cervix exhibits no motion tenderness, no discharge and no friability. Uterus is not deviated, not enlarged, not fixed and not tender.    Genitourinary Comments: Pessary removed without difficulty, washed with soap and water, then replaced                 Neurological: She is alert and oriented to person, place, and time.     Psychiatric: She has a normal mood and affect.            Assessment and Plan     1. Encounter for pessary maintenance    2. Davidson-Walker grade 2 cystocele             Plan:  Tiffanie was seen today for pessary check.    Diagnoses and all orders for this visit:    Encounter for pessary maintenance    Davidson-Walker grade 2 cystocele     Pt declined giving urine sample today.  I explained that constipation can contribute to symptoms of urinary urgency, so I recommend increasing fiber and water intake.  Advised 1 capful of Miralax mixed in at least 8 oz of water 2x/week.    RTC 4 months for pessary check.

## 2024-12-04 DIAGNOSIS — F33.42 RECURRENT MAJOR DEPRESSIVE DISORDER, IN FULL REMISSION: ICD-10-CM

## 2024-12-04 RX ORDER — CITALOPRAM 20 MG/1
20 TABLET, FILM COATED ORAL
Qty: 90 TABLET | Refills: 1 | Status: SHIPPED | OUTPATIENT
Start: 2024-12-04

## 2025-02-11 ENCOUNTER — LAB VISIT (OUTPATIENT)
Dept: LAB | Facility: HOSPITAL | Age: OVER 89
End: 2025-02-11
Attending: FAMILY MEDICINE
Payer: MEDICARE

## 2025-02-11 DIAGNOSIS — E78.2 MIXED HYPERLIPIDEMIA: ICD-10-CM

## 2025-02-11 DIAGNOSIS — I10 ESSENTIAL HYPERTENSION: ICD-10-CM

## 2025-02-11 LAB
ALBUMIN SERPL BCP-MCNC: 3.8 G/DL (ref 3.5–5.2)
ALP SERPL-CCNC: 75 U/L (ref 40–150)
ALT SERPL W/O P-5'-P-CCNC: 9 U/L (ref 10–44)
ANION GAP SERPL CALC-SCNC: 16 MMOL/L (ref 8–16)
AST SERPL-CCNC: 17 U/L (ref 10–40)
BASOPHILS # BLD AUTO: 0.04 K/UL (ref 0–0.2)
BASOPHILS NFR BLD: 0.5 % (ref 0–1.9)
BILIRUB SERPL-MCNC: 0.5 MG/DL (ref 0.1–1)
BUN SERPL-MCNC: 14 MG/DL (ref 8–23)
CALCIUM SERPL-MCNC: 9.7 MG/DL (ref 8.7–10.5)
CHLORIDE SERPL-SCNC: 104 MMOL/L (ref 95–110)
CHOLEST SERPL-MCNC: 87 MG/DL (ref 120–199)
CHOLEST/HDLC SERPL: 2.1 {RATIO} (ref 2–5)
CO2 SERPL-SCNC: 22 MMOL/L (ref 23–29)
CREAT SERPL-MCNC: 0.6 MG/DL (ref 0.5–1.4)
DIFFERENTIAL METHOD BLD: ABNORMAL
EOSINOPHIL # BLD AUTO: 0.4 K/UL (ref 0–0.5)
EOSINOPHIL NFR BLD: 5 % (ref 0–8)
ERYTHROCYTE [DISTWIDTH] IN BLOOD BY AUTOMATED COUNT: 12.7 % (ref 11.5–14.5)
EST. GFR  (NO RACE VARIABLE): >60 ML/MIN/1.73 M^2
GLUCOSE SERPL-MCNC: 91 MG/DL (ref 70–110)
HCT VFR BLD AUTO: 38.3 % (ref 37–48.5)
HDLC SERPL-MCNC: 41 MG/DL (ref 40–75)
HDLC SERPL: 47.1 % (ref 20–50)
HGB BLD-MCNC: 11.7 G/DL (ref 12–16)
IMM GRANULOCYTES # BLD AUTO: 0.02 K/UL (ref 0–0.04)
IMM GRANULOCYTES NFR BLD AUTO: 0.2 % (ref 0–0.5)
LDLC SERPL CALC-MCNC: 24.6 MG/DL (ref 63–159)
LYMPHOCYTES # BLD AUTO: 2.4 K/UL (ref 1–4.8)
LYMPHOCYTES NFR BLD: 26.8 % (ref 18–48)
MCH RBC QN AUTO: 30.2 PG (ref 27–31)
MCHC RBC AUTO-ENTMCNC: 30.5 G/DL (ref 32–36)
MCV RBC AUTO: 99 FL (ref 82–98)
MONOCYTES # BLD AUTO: 0.7 K/UL (ref 0.3–1)
MONOCYTES NFR BLD: 8.3 % (ref 4–15)
NEUTROPHILS # BLD AUTO: 5.2 K/UL (ref 1.8–7.7)
NEUTROPHILS NFR BLD: 59.2 % (ref 38–73)
NONHDLC SERPL-MCNC: 46 MG/DL
NRBC BLD-RTO: 0 /100 WBC
PLATELET # BLD AUTO: 287 K/UL (ref 150–450)
PMV BLD AUTO: 10.7 FL (ref 9.2–12.9)
POTASSIUM SERPL-SCNC: 4.5 MMOL/L (ref 3.5–5.1)
PROT SERPL-MCNC: 6.4 G/DL (ref 6–8.4)
RBC # BLD AUTO: 3.87 M/UL (ref 4–5.4)
SODIUM SERPL-SCNC: 142 MMOL/L (ref 136–145)
TRIGL SERPL-MCNC: 107 MG/DL (ref 30–150)
TSH SERPL DL<=0.005 MIU/L-ACNC: 3 UIU/ML (ref 0.4–4)
WBC # BLD AUTO: 8.82 K/UL (ref 3.9–12.7)

## 2025-02-11 PROCEDURE — 80053 COMPREHEN METABOLIC PANEL: CPT | Mod: HCNC | Performed by: FAMILY MEDICINE

## 2025-02-11 PROCEDURE — 84443 ASSAY THYROID STIM HORMONE: CPT | Mod: HCNC | Performed by: FAMILY MEDICINE

## 2025-02-11 PROCEDURE — 80061 LIPID PANEL: CPT | Mod: HCNC | Performed by: FAMILY MEDICINE

## 2025-02-11 PROCEDURE — 85025 COMPLETE CBC W/AUTO DIFF WBC: CPT | Mod: HCNC | Performed by: FAMILY MEDICINE

## 2025-02-11 PROCEDURE — 36415 COLL VENOUS BLD VENIPUNCTURE: CPT | Mod: HCNC | Performed by: FAMILY MEDICINE

## 2025-02-18 ENCOUNTER — OFFICE VISIT (OUTPATIENT)
Dept: INTERNAL MEDICINE | Facility: CLINIC | Age: OVER 89
End: 2025-02-18
Payer: MEDICARE

## 2025-02-18 VITALS
BODY MASS INDEX: 26.65 KG/M2 | HEIGHT: 66 IN | OXYGEN SATURATION: 95 % | WEIGHT: 165.81 LBS | SYSTOLIC BLOOD PRESSURE: 124 MMHG | DIASTOLIC BLOOD PRESSURE: 60 MMHG | HEART RATE: 72 BPM

## 2025-02-18 DIAGNOSIS — K21.9 GASTROESOPHAGEAL REFLUX DISEASE WITHOUT ESOPHAGITIS: ICD-10-CM

## 2025-02-18 DIAGNOSIS — M85.88 OSTEOPENIA OF SPINE: ICD-10-CM

## 2025-02-18 DIAGNOSIS — I10 ESSENTIAL HYPERTENSION: ICD-10-CM

## 2025-02-18 DIAGNOSIS — I25.119 ATHEROSCLEROSIS OF NATIVE CORONARY ARTERY OF NATIVE HEART WITH ANGINA PECTORIS: ICD-10-CM

## 2025-02-18 DIAGNOSIS — D63.8 ANEMIA IN OTHER CHRONIC DISEASES CLASSIFIED ELSEWHERE: ICD-10-CM

## 2025-02-18 DIAGNOSIS — E78.2 MIXED HYPERLIPIDEMIA: ICD-10-CM

## 2025-02-18 DIAGNOSIS — F33.42 RECURRENT MAJOR DEPRESSIVE DISORDER, IN FULL REMISSION: ICD-10-CM

## 2025-02-18 DIAGNOSIS — G47.00 INSOMNIA, UNSPECIFIED TYPE: ICD-10-CM

## 2025-02-18 DIAGNOSIS — Z00.00 ROUTINE GENERAL MEDICAL EXAMINATION AT A HEALTH CARE FACILITY: Primary | ICD-10-CM

## 2025-02-18 DIAGNOSIS — K59.09 CONSTIPATION, CHRONIC: ICD-10-CM

## 2025-02-18 DIAGNOSIS — Z65.8 PSYCHOSOCIAL STRESSORS: ICD-10-CM

## 2025-02-18 PROCEDURE — 99999 PR PBB SHADOW E&M-EST. PATIENT-LVL IV: CPT | Mod: PBBFAC,HCNC,, | Performed by: PHYSICIAN ASSISTANT

## 2025-02-18 RX ORDER — CITALOPRAM 20 MG/1
20 TABLET, FILM COATED ORAL DAILY
Qty: 90 TABLET | Refills: 3 | Status: SHIPPED | OUTPATIENT
Start: 2025-02-18 | End: 2026-02-18

## 2025-02-18 RX ORDER — FAMOTIDINE 20 MG/1
20 TABLET, FILM COATED ORAL 2 TIMES DAILY PRN
Qty: 180 TABLET | Refills: 3 | Status: SHIPPED | OUTPATIENT
Start: 2025-02-18 | End: 2026-02-18

## 2025-02-18 NOTE — PATIENT INSTRUCTIONS
You can get your RSV vaccine at the pharmacy.        The bivalent covid booster is now available. You can visit [facility] in our primary care building by the front entrance to get this vaccine.     Pharmacy hours:  Monday-Friday 8am-5:30pm

## 2025-02-18 NOTE — PROGRESS NOTES
Subjective:       Patient ID: Tiffanie Barajas is a 89 y.o. female.    Chief Complaint: Annual Exam    CHIEF COMPLAINT:  - Tiffanie presents for an annual wellness visit and to discuss recent lab results.    HPI:  - Tiffanie reports occasional heartburn, with a history of this condition. She mentions anxiety, described as a lack of patience. She notes chronic swelling in both ankles, which she attributes to her blood pressure medication. She expresses concern about a sensation in her right ear, describing it as feeling like there is something present. She sees a cardiologist, Dr. Martinez, and has had a few recent echocardiograms.  - She denies any significant changes in her health.     MEDICATIONS:  - Citalopram 20 mg daily, for depression/anxiety, taken for 10 years  - Blood thinners  - Heart medicine  - Discontinued Pantoprazole (Protonix) for heartburn due to side effect of raising blood pressure to 200 points    MEDICAL HISTORY:  - Anemia: Mild  - Heartburn  - Depression/Anxiety    TEST RESULTS:  - Hemoglobin: 11.7, indicating mild anemia (normal is 12 and above)  - Cholesterol: Within normal limits  - Thyroid: Within normal limits  - Kidney function: Within normal limits  - Liver function: Within normal limits    IMAGING:  - Echocardiogram: Recently completed, results within normal limits           Review of Systems   Constitutional:  Negative for chills, fatigue, fever and unexpected weight change.   HENT:  Negative for congestion, dental problem, ear pain, hearing loss, rhinorrhea and trouble swallowing.    Eyes:  Negative for pain and visual disturbance.   Respiratory:  Negative for cough and shortness of breath.    Cardiovascular:  Negative for chest pain, palpitations and leg swelling.   Gastrointestinal:  Negative for abdominal distention, abdominal pain, blood in stool, constipation, diarrhea, nausea and vomiting.        Heartburn   Genitourinary:  Negative for difficulty urinating and pelvic pain.  "  Musculoskeletal:  Negative for arthralgias and myalgias.   Skin:  Negative for rash.   Neurological:  Negative for dizziness, weakness, numbness and headaches.   Hematological:  Negative for adenopathy. Does not bruise/bleed easily.   Psychiatric/Behavioral:  Positive for dysphoric mood. Negative for sleep disturbance. The patient is not nervous/anxious.        Objective:   Laboratory Reviewed ({Yes)    Lab Results   Component Value Date     02/11/2025    K 4.5 02/11/2025     02/11/2025    CO2 22 (L) 02/11/2025    GLU 91 02/11/2025    BUN 14 02/11/2025    CREATININE 0.6 02/11/2025    CALCIUM 9.7 02/11/2025    PROT 6.4 02/11/2025    ALBUMIN 3.8 02/11/2025    BILITOT 0.5 02/11/2025    ALKPHOS 75 02/11/2025    AST 17 02/11/2025    ALT 9 (L) 02/11/2025    EGFRNORACEVR >60.0 02/11/2025    ANIONGAP 16 02/11/2025       Lab Results   Component Value Date    CHOL 87 (L) 02/11/2025    TRIG 107 02/11/2025    HDL 41 02/11/2025    LDLCALC 24.6 (L) 02/11/2025       Lab Results   Component Value Date    WBC 8.82 02/11/2025    RBC 3.87 (L) 02/11/2025    HGB 11.7 (L) 02/11/2025    HCT 38.3 02/11/2025    MCV 99 (H) 02/11/2025     02/11/2025    GRAN 5.2 02/11/2025    GRAN 59.2 02/11/2025    LYMPH 2.4 02/11/2025    LYMPH 26.8 02/11/2025    MONO 0.7 02/11/2025    MONO 8.3 02/11/2025    EOSINOPHIL 5.0 02/11/2025    BASOPHIL 0.5 02/11/2025       Lab Results   Component Value Date    TSH 3.000 02/11/2025    FREET4 0.87 05/03/2023       Lab Results   Component Value Date    HGBA1C 5.4 11/12/2014       No results found for: "LABMICR", "CREATRANDUR", "MICALBCREAT"    No results found for: "[id number]"      Physical Exam  Vitals reviewed.   Constitutional:       General: She is not in acute distress.     Appearance: Normal appearance. She is well-developed and normal weight. She is not ill-appearing or toxic-appearing.   HENT:      Head: Normocephalic and atraumatic.      Right Ear: Tympanic membrane, ear canal and " external ear normal.      Left Ear: Tympanic membrane, ear canal and external ear normal.      Nose: Nose normal.      Mouth/Throat:      Mouth: Mucous membranes are dry.      Pharynx: Oropharynx is clear.   Eyes:      General: Lids are normal. No scleral icterus.     Extraocular Movements: Extraocular movements intact.      Conjunctiva/sclera: Conjunctivae normal.      Pupils: Pupils are equal, round, and reactive to light.   Cardiovascular:      Rate and Rhythm: Normal rate and regular rhythm.      Pulses: Normal pulses.      Heart sounds: Normal heart sounds. No murmur heard.     No friction rub. No gallop.   Pulmonary:      Effort: Pulmonary effort is normal. No respiratory distress.      Breath sounds: Normal breath sounds. No stridor. No decreased breath sounds, wheezing, rhonchi or rales.   Abdominal:      General: Abdomen is flat. Bowel sounds are normal. There is no distension.      Palpations: Abdomen is soft. There is no mass.      Tenderness: There is no abdominal tenderness. There is no guarding or rebound.      Hernia: No hernia is present.   Musculoskeletal:         General: Normal range of motion.      Cervical back: Normal range of motion and neck supple. No tenderness.      Right lower leg: Edema present.      Left lower leg: Edema present.      Comments: 2+ BL LE edema   Lymphadenopathy:      Cervical: No cervical adenopathy.   Skin:     General: Skin is warm and dry.   Neurological:      General: No focal deficit present.      Mental Status: She is alert and oriented to person, place, and time. Mental status is at baseline.      Cranial Nerves: No cranial nerve deficit.      Gait: Gait normal.   Psychiatric:         Mood and Affect: Mood and affect normal.         Behavior: Behavior normal.         Thought Content: Thought content normal.         Judgment: Judgment normal.         Assessment:       1. Routine general medical examination at a health care facility    2. Recurrent major depressive  disorder, in full remission    3. Atherosclerosis of native coronary artery of native heart with angina pectoris    4. Essential hypertension    5. Mixed hyperlipidemia    6. Constipation, chronic    7. Gastroesophageal reflux disease without esophagitis    8. Osteopenia of spine    9. Insomnia, unspecified type    10. Anemia in other chronic diseases classified elsewhere    11. Psychosocial stressors        Plan:   1. Routine general medical examination at a health care facility  -     Comprehensive Metabolic Panel; Future; Expected date: 08/18/2025    2. Recurrent major depressive disorder, in full remission  -     citalopram (CELEXA) 20 MG tablet; Take 1 tablet (20 mg total) by mouth once daily.  Dispense: 90 tablet; Refill: 3    3. Atherosclerosis of native coronary artery of native heart with angina pectoris  Overview:  Followed by Dr. Turner, Cardiology, continue with current treatment plan      4. Essential hypertension  -     Comprehensive Metabolic Panel; Future; Expected date: 08/18/2025    5. Mixed hyperlipidemia  -     Lipid Panel; Future; Expected date: 08/18/2025    6. Constipation, chronic    7. Gastroesophageal reflux disease without esophagitis  -     famotidine (PEPCID) 20 MG tablet; Take 1 tablet (20 mg total) by mouth 2 (two) times daily as needed for Heartburn.  Dispense: 180 tablet; Refill: 3    8. Osteopenia of spine  Overview:  DEXA 9/2016, declines future DEXA scans      9. Insomnia, unspecified type    10. Anemia in other chronic diseases classified elsewhere  -     CBC Auto Differential; Future; Expected date: 08/18/2025    11. Psychosocial stressors          Assessment & Plan    IMPRESSION:  - Reviewed lab results: stable mild anemia (hemoglobin 11.7 g/dL), normal thyroid function and cholesterol levels, no abnormalities in kidney and liver function tests  - Evaluated recent echocardiogram results  - Noted ankle swelling  - Considered history of heartburn and previous adverse reaction to  Pantoprazole    DEPRESSION:   Continued citalopram 20 mg daily, maintaining current dosage due to age-related maximum dose restrictions.   Evaluated the patient's report of issues with patience, possibly indicating ongoing depression symptoms.   Considered increasing the dosage but noted age-related restrictions.   Ordered a medication refill for citalopram.   Instructed the patient to continue the current 20mg dosage of citalopram due to age restrictions.   Noted that the patient has been taking citalopram for 10 years with no change in dosage.    ANEMIA:   Explained the patient's mild anemia status and its current insignificance.   Noted the patient's hemoglobin level of 11.7, indicating mild anemia.   Ordered lab work for 6 months from now, to be reviewed by Dr. Merrill.   Planned to monitor anemia with follow-up lab work in 6 months.    ANKLE SWELLING:   Evaluated the patient's report of swollen ankles.   Confirmed swollen ankles during physical exam.   Reviewed medication list but did not find a blood pressure medication that typically causes ankle swelling.    CARDIOVASCULAR CONDITION:   Continued current anticoagulant regimen.   Noted the patient's history of heart problems and ongoing care with a cardiologist.   Reviewed and acknowledged recent echocardiogram results.    HEARTBURN:   Started Pepcid for occasional heartburn, to be taken as needed with onset of symptoms.    RSV VACCINATION:   Discussed RSV vaccine availability and its potential benefits in reducing hospitalizations.    FOLLOW UP:   Follow up in 6 months with Dr. Merrill.   Alternate future appointments between current provider and Dr. Merrill.           Health Maintenance reviewed/updated.      Check-out note:  Please follow up in 6 months for 6 mo follow up exam In-person with Dr. Merrill.  Labs: Fasting lab PTA  Additional orders: Print AVS      This note was generated with the assistance of ambient listening technology. Verbal consent was  obtained by the patient and accompanying visitor(s) for the recording of patient appointment to facilitate this note. I attest to having reviewed and edited the generated note for accuracy, though some syntax or spelling errors may persist. Please contact the author of this note for any clarification.

## 2025-02-22 DIAGNOSIS — Z00.00 ENCOUNTER FOR MEDICARE ANNUAL WELLNESS EXAM: ICD-10-CM

## 2025-03-24 ENCOUNTER — OFFICE VISIT (OUTPATIENT)
Dept: OBSTETRICS AND GYNECOLOGY | Facility: CLINIC | Age: OVER 89
End: 2025-03-24
Payer: MEDICARE

## 2025-03-24 VITALS
DIASTOLIC BLOOD PRESSURE: 60 MMHG | WEIGHT: 169.06 LBS | HEIGHT: 66 IN | SYSTOLIC BLOOD PRESSURE: 122 MMHG | BODY MASS INDEX: 27.17 KG/M2

## 2025-03-24 DIAGNOSIS — Z46.89 ENCOUNTER FOR PESSARY MAINTENANCE: Primary | ICD-10-CM

## 2025-03-24 DIAGNOSIS — N81.10 BADEN-WALKER GRADE 2 CYSTOCELE: ICD-10-CM

## 2025-03-24 PROCEDURE — 1101F PT FALLS ASSESS-DOCD LE1/YR: CPT | Mod: HCNC,CPTII,S$GLB, | Performed by: OBSTETRICS & GYNECOLOGY

## 2025-03-24 PROCEDURE — 3288F FALL RISK ASSESSMENT DOCD: CPT | Mod: HCNC,CPTII,S$GLB, | Performed by: OBSTETRICS & GYNECOLOGY

## 2025-03-24 PROCEDURE — 1126F AMNT PAIN NOTED NONE PRSNT: CPT | Mod: HCNC,CPTII,S$GLB, | Performed by: OBSTETRICS & GYNECOLOGY

## 2025-03-24 PROCEDURE — 99999 PR PBB SHADOW E&M-EST. PATIENT-LVL III: CPT | Mod: PBBFAC,HCNC,, | Performed by: OBSTETRICS & GYNECOLOGY

## 2025-03-24 PROCEDURE — 99213 OFFICE O/P EST LOW 20 MIN: CPT | Mod: HCNC,S$GLB,, | Performed by: OBSTETRICS & GYNECOLOGY

## 2025-03-24 PROCEDURE — 1159F MED LIST DOCD IN RCRD: CPT | Mod: HCNC,CPTII,S$GLB, | Performed by: OBSTETRICS & GYNECOLOGY

## 2025-03-24 RX ORDER — IRBESARTAN 300 MG/1
300 TABLET ORAL
COMMUNITY

## 2025-03-24 RX ORDER — NITROGLYCERIN 40 MG/1
1 PATCH TRANSDERMAL
COMMUNITY

## 2025-03-24 NOTE — PROGRESS NOTES
Subjective     Patient ID: Tiffanie Barajas is a 89 y.o. female.    Chief Complaint:  Pessary Check      History of Present Illness  HPI  Presents for pessary check.  Wears a #4 fenestrated ring pessary for symptomatic cystocele and rectocele.  Does great with her pessary.  Occasionally, she'll have the urge to urinate, but by the time she makes it to the bathroom, she won't need to void anymore.  She otherwise voids without any problem.  No urinary incontinence.    GYN & OB History  Patient's last menstrual period was 1990.   Date of Last Pap: 2012    OB History    Para Term  AB Living   2 1    2   SAB IAB Ectopic Multiple Live Births      1 1      # Outcome Date GA Lbr Dwayne/2nd Weight Sex Type Anes PTL Lv   2 Para      Vag-Spont   EMILIANO   1A       Vag-Spont      1B       Vag-Spont          Review of Systems  Review of Systems       Objective   Physical Exam:   Constitutional: She is oriented to person, place, and time. She appears well-developed and well-nourished. No distress.             Abdominal: Soft. She exhibits no distension and no mass. There is no abdominal tenderness. There is no rebound and no guarding. Hernia confirmed negative in the right inguinal area and confirmed negative in the left inguinal area.     Genitourinary:    Vagina normal.      Pelvic exam was performed with patient supine.   There is no rash, tenderness, lesion or injury on the right labia. There is no rash, tenderness, lesion or injury on the left labia. Right adnexum displays no mass, no tenderness and no fullness. Left adnexum displays no mass, no tenderness and no fullness. There is rectocele (grade 1) and cystocele (grade 2) in the vagina. No erythema, vaginal discharge, tenderness, bleeding or prolapse of vaginal walls in the vagina.    No foreign body in the vagina.      No signs of injury in the vagina.   Cervix exhibits no motion tenderness, no discharge and no friability. Uterus is not  deviated, not enlarged, not fixed and not tender.    Genitourinary Comments: Pessary removed without difficulty, washed with soap and water, and replaced with a good fit                 Neurological: She is alert and oriented to person, place, and time.     Psychiatric: She has a normal mood and affect.            Assessment and Plan     1. Encounter for pessary maintenance    2. Richardson-Walker grade 2 cystocele             Plan:  Tiffanie was seen today for pessary check.    Diagnoses and all orders for this visit:    Encounter for pessary maintenance    Richardson-Walker grade 2 cystocele     Doing well with pessary.  Pt likely having some intermittent bladder spasms.  States it's not bothersome enough at this time to warrant any meds.    RTC 4 months for pessary check.

## 2025-03-27 ENCOUNTER — OFFICE VISIT (OUTPATIENT)
Dept: ORTHOPEDICS | Facility: CLINIC | Age: OVER 89
End: 2025-03-27
Payer: MEDICARE

## 2025-03-27 VITALS — HEIGHT: 66 IN | WEIGHT: 169.06 LBS | BODY MASS INDEX: 27.17 KG/M2

## 2025-03-27 DIAGNOSIS — M65.332 TRIGGER FINGER, LEFT MIDDLE FINGER: ICD-10-CM

## 2025-03-27 DIAGNOSIS — M21.161 ACQUIRED VARUS DEFORMITY KNEE, RIGHT: ICD-10-CM

## 2025-03-27 DIAGNOSIS — M21.162 ACQUIRED VARUS DEFORMITY KNEE, LEFT: ICD-10-CM

## 2025-03-27 DIAGNOSIS — M17.11 ARTHRITIS OF KNEE, RIGHT: ICD-10-CM

## 2025-03-27 DIAGNOSIS — M46.1 SACROILIITIS: ICD-10-CM

## 2025-03-27 DIAGNOSIS — I73.9 PERIPHERAL ARTERIAL DISEASE: ICD-10-CM

## 2025-03-27 DIAGNOSIS — M16.12 ARTHRITIS OF LEFT HIP: ICD-10-CM

## 2025-03-27 DIAGNOSIS — M47.816 LUMBAR FACET ARTHROPATHY: ICD-10-CM

## 2025-03-27 DIAGNOSIS — M17.12 ARTHRITIS OF KNEE, LEFT: Primary | ICD-10-CM

## 2025-03-27 DIAGNOSIS — M65.331 TRIGGER FINGER, RIGHT MIDDLE FINGER: ICD-10-CM

## 2025-03-27 PROCEDURE — 99999 PR PBB SHADOW E&M-EST. PATIENT-LVL III: CPT | Mod: PBBFAC,HCNC,, | Performed by: ORTHOPAEDIC SURGERY

## 2025-03-27 RX ORDER — TRIAMCINOLONE ACETONIDE 40 MG/ML
80 INJECTION, SUSPENSION INTRA-ARTICULAR; INTRAMUSCULAR
Status: DISCONTINUED | OUTPATIENT
Start: 2025-03-27 | End: 2025-03-27 | Stop reason: HOSPADM

## 2025-03-27 RX ADMIN — TRIAMCINOLONE ACETONIDE 80 MG: 40 INJECTION, SUSPENSION INTRA-ARTICULAR; INTRAMUSCULAR at 10:03

## 2025-03-27 NOTE — PATIENT INSTRUCTIONS
You having quite a bit of pain in both of her knees  You also have pain going from her back down to the back of your right knee  You having numbness and tingling in your hands and feet which is consistent with neuropathy  You could not tolerate gabapentin it causes some eye issues so you stop taking it  Injection both knees performed today each with 80 mg of Kenalog mixed with 5 cc 1% lidocaine 03/27/2028   Ice your knees the next few days if they bother you  Take Tylenol as needed not to exceed 650 mg 3 times a day   See you back in 8 weeks

## 2025-03-27 NOTE — PROCEDURES
Large Joint Aspiration/Injection: bilateral knee    Date/Time: 3/27/2025 10:20 AM    Performed by: Ranjit Kelly MD  Authorized by: Ranjit Kelly MD    Consent Done?:  Yes (Verbal)  Indications:  Arthritis  Site marked: the procedure site was marked    Timeout: prior to procedure the correct patient, procedure, and site was verified      Local anesthesia used?: Yes    Local anesthetic:  Lidocaine 1% without epinephrine    Details:  Needle Size:  22 G  Ultrasonic Guidance for needle placement?: No    Approach:  Anterolateral  Location:  Knee  Laterality:  Bilateral  Site:  Bilateral knee  Medications (Right):  80 mg triamcinolone acetonide 40 mg/mL  Medications (Left):  80 mg triamcinolone acetonide 40 mg/mL  Patient tolerance:  Patient tolerated the procedure well with no immediate complications

## 2025-03-27 NOTE — PROGRESS NOTES
Subjective:     Patient ID: Tiffanie Barajas is a 89 y.o. female.    Chief Complaint: Pain of the Right Knee and Pain of the Left Knee    HPI:  84-year-old complaining of pain over the left sacroiliac joint radiating down to her knee.  Previous history of sciatica and a cyst in the back that was removed. Has history of arthritis of both of her knees received injection longtime ago more than a year (lubrication shot).  She is not having too much pain in the right knee unable to fully extend the left knee difficulty with shopping and walking.  Pain roughly 4/10.  Denies loss of bowel bladder control.  Walking distance is seems to hurt approximately 200 ft.  She is on blood thinners and unable to take NSAIDs.  She takes 1 little Tylenol occasionally and does not help.    01/27/2020.  Left knee injection of Depo-Medrol seems to have helped this patient.  Given 01/03/2020.  Just started physical therapy and that seems to help also.  The Flexeril did not seem to work at all.  Already been 5 times to physical therapy and she has at least 3 more weeks to go.  Very pleased so far with her results.  Patient cannot take NSAIDs due to being on blood thinners.  Pain level 0/10.  Does have occasional discomfort right greater trochanteric area    02/13/2020  Patient with bilateral knee arthrosis left worse than right and sacroiliac pain.  She just finished her physical therapy and that seems to have helped and now she is doing independent exercise program.  We did inject the knees with steroid that seems to help.  She is ambulating without any assistive devices today. He is here to have Synvisc-One injection into the left knee.    05/22/2020  Patient complaining today of left hip pain/she points over the lumbar area radiating to the posterior aspect of the knee.  She has history of lumbosacral arthritis.  As far as her left knee injection of Synvisc-One she is not having any pain with that.  She is very happy with that result.   She wants something done and 0 not want any prescriptions for any pills.  Denies loss of bowel bladder control or usage or any assistive devices to ambulate.  She is doing independent exercise as well as showing me that her  created exercise program for her.  I did recommend stationary bicycle and independent stretching exercises.  Denies loss of bowel bladder control and fever or chills or shortness of breath or chest pain    07/09/2020  Patient received trigger point injection of the lumbar spine 05/22/2020 with some good relief.  Now she is having more pain in the left knee.  She had received steroid injection 01/03/2020 and Synvisc-One in 02/13/2020.  She feels the knee is tight she tries to exercise it on her own.  Having sharp pain on the medial side.  Pain level is 3/10 but with gait it gets worst.  Denies any fever or chills and maintaining social distancing and wearing her own mask.  She would like some relief from the tightness and stiffness in the left knee but wants to avoid taking any medications besides a little Tylenol because she is on blood thinners and unable to take NSAIDs.    01/04/2020   Left knee pain and stiffness 6/10, low back pain / points on the left sacroiliac area also.  She is requesting injection in both areas.  Patient did have trigger point injection 05/22/2020 in the lumbar area which helped also had steroid injection in July 2020 which seems to have helped in the knee.  Also previous history of Synvisc-One injection 02/13/2020.  Tylenol does not seem to help.  Denies any fever or chills or shortness of breath or difficulty with chewing or swallowing loss of bowel bladder control blurry vision or double vision or loss sense smell or taste.  She is here with her significant other.  Asking about getting the COVID  Vaccine.  She is 85 years old and I told if her  She qualifies should be able to get it soon    02/08/2021  Right lumbar trigger point tenderness which was injected  awhile ago with relief on 05/22/2020 now it is hurting her a little bit more.  I did tell her since she is here today to receive viscosupplementation/approved only Monovisc not Synvisc this time we will hold off on trigger point injection.  Pain is 5/10 in the left knee.  The right knee is doing okay.  She is ambulating without any assistive devices.  There was a little discussion about difference is with me in Monovisc and Synvisc and put simply I told them it is like having a car with different brands.  Patient is maintaining her activity level.  She still did not get her COVID vaccine due to in availability of the vaccine.  Denies any fever or chills or shortness of breath or difficulty with chewing or swallowing or loss of bowel bladder control blurry vision double vision loss sense smell or taste.  She is ambulating without any assistive devices    04/01/2021  Low back pain which is severe we injected trigger point on 05/22/2020 with some relief.  Went over her x-rays in the electronic records today showing severe facet arthropathy and degenerative disc disease with large bone spur overgrowth.  That bothers her a lot with radiation down her legs a cannot sleep.  She is requesting may be something to help with the radicular symptoms and give her some sleep and rest as far as the Monovisc injection into the left knee that did not seem to help at all.  The steroid injection seems to work better for her and requested another injection.  She did go through therapy before.  She feels sciatica to the right leg as far as the right knee is concerned is hurting but not as much as the left knee  Monovisc injection 02/08/2021 into the left knee did not seem to help.  Voltaren gel she uses on occasion.  No fever no chills or shortness of breath or difficulty with chewing or swallowing loss of bowel bladder control blurry vision double vision or loss of sense smell or taste  Did did receive her COVID-19  vaccine    07/02/2021  Patient is having low back pain with sciatic type of pain radiating down both legs.  Today will obtaining x-rays of her spine as well as the pelvis and hips and the knees.  The knees and not hurting her as much and she is not requesting any injections.  We did try Monovisc on the left knee but did not help and the steroid seems to work for her.  I did mention in the future maybe she would need long-acting steroid like  zilretta.  She is here with her daughter in law.  She is having some hip pains.  Able to manage it.  Her pain is 5/10 ambulating slowly without any assistive devices.  Cannot take any NSAIDs by mouth since she is on aspirin and Plavix and home cardiac evaluation by cardiologist.  No fever no chills no shortness of breath or difficulty with chewing or swallowing loss of bowel bladder control or blurry vision double vision loss sense smell or taste  She did have diclofenac gel at home but has not been using it appropriately  She states she is using SALONPAS which seems to help the back  Last visit we gave her cyclobenzaprine she is not taking it      10/18/2021  Severe low back pain left side more than the right.  We did have x-rays on her from last visit that showed sacroiliac arthritic changes sacroiliitis.  We did give her sacroiliac injection more than a year ago with some relief.  She also have pain in the knees and in the back specially on the left side a worried if it is sciatica or not.  She does have x-rays of severe arthritis in the spine also.  She usually gets steroid injections which help her for a little while.  She had been tried on viscosupplementation once without success.  She had sustained a little fall on 10/07/2021 but no major injury.  She is ambulating without any assistive devices.  No fever no chills no shortness of breath or difficulty with chewing or swallowing  She is using diclofenac gel as well as Salonpas patches.    11/08/2021  Injection of the  trigger point in the lower part of her back helped for 10 days.  That seems to come back now and hurts her a little bit more than her knee.  As far as the right knee she is doing okay the left knee is the 1 that bothers her.  She has been losing some range of motion since last visit we see her.  Her pain is around 6/10 in the sitting position and goes up with walking.  At this point I did tell her I do not want to give her 2 injections and not knowing which 1 will give her a reaction.  We have her approved to receive the left knee Zilreta injection.  The pros and cons discussed in details.  No loss of bowel bladder control or fever or chills or shortness of breath or difficulty with chewing or swallowing loss of bowel bladder control or loss of sense smell or taste      12/02/2021  Patient stated left knee steroid injections/ Zilreta seems to have helped so far.  Not bother her as much.  What bothers her is her lower part of her back on the left side there is a trigger point that seems to be the area.  She her  marked with a pen.  She is ambulating slowly without any assistive devices.  She would like to have that trigger point injected despite the fact her pain in her knee is 3/10.  Overall it seems the steroid injection given last visit in the knee seems to work so far  No fever no chills no shortness of breath or difficulty with chewing or swallowing loss of bowel bladder control    02/7/22  Complaining over left greater trochanteric and right sacroiliac area pain.  The left knee still doing okay received zilretta injection in November 2021 and is still helping.  She is requesting injections over the areas that hurts the most.  She has fluctuating blood pressure I did tell her I will not give but total 80 mg Depo-Medrol and we will split it into 2 areas.  She is ambulating without assistive devices.  We did get her approved for the long-acting steroid injection into the left knee but she says ri it since  you doing okay.  ght now her knee is doing okay and that previous shot still working.  We can always get it approved if needed again.  The approval is until February 27, 2022 however we still do not have to give it if you still doing okay      03/28/2022  On 02/07/2022 in [place] today greater trochanter which seems to have helped however the trigger point/left sacroiliac area that did not seem to help.  Both injections were with Depo-Medrol.  She stated she was changed to nifedipine for blood pressure and that cause severe swelling in the legs.  She has an appointment April 15 to see the cardiologist.  I did tell her this could be angioedema.  As far as her knee is concerned just stiff right now and she is not having much of any pain in it at 0/10.  Her main concern is the lumbar area this seems to have quite a bit of pain in it.  She had marked where the pain is in order to get another injection.  Denies loss of bowel bladder control blurry vision double vision.  She is here with her .  She had tried different topicals without success  Vicks rub seems to be the 1 that works the most  Trigger point pain over the left sacroiliac area approximately 3/10    06/02/2022  Severe left knee pain.  Given zilretta in November 2021 and just wore off on her almost  4 weeks ago.  Her pain is severe in the knee and limiting motion..  As far as the left hip and sacroiliac area on the left side and trigger point she is around 2-3/10.  She still ambulating without assistive devices taking very small steps.  She wants an injection into the left knee.  She stated the long-acting steroid helped quite a bit when we give it last and it was in November.  She would like that repeated.  However she stated today the left knee is hurting a lot she wants an injection today too.  I see no reason why not..  Still having pain over the left sacroiliac area and greater trochanteric area but not severe    06/30/2022  We did inject her last  visit and that did not seem to help as much.  She would like to have the long-acting steroid injection which helped significantly last time.  She still having some hip pain only when she leans on it but not when she is walking.  Her back is doing little bit better.  She does take Tylenol.  Her pain in the knee is 8/10    01/23/2023   Patient is complaining of right calf burning and discomfort.  This is been going on for several weeks.  I have not seen her because she was taking care of her  received radiation treatment.  She is here with him.  She is alert oriented x3.  Now the right and the left knee both of them are hurting.  She is using a cane to get around.  Her pain is 8/10.  Usually most of her problem is the left knee and sacroiliac area but today the back is not bothering her as much as the right knee and right calf as well as left knee.  She is using a cane to get around.  Despite the fact she still having left sacroiliac and greater trochanteric discomfort the knees are coming 1st today.    No fever no chills no shortness of breath difficulty with chewing or swallowing loss of bowel bladder control   She has family and children that could take her to doctor's.    03/16/2023   She is having cramps in the back of her right thigh and she has severe arthritis unable to fully extend the knee.  The Flexeril/cyclobenzaprine did not seem to help.  Today decided to change her to methocarbamol went over it with her.  Her lower part of her back is hurting and she stated that the injection to both of her knees helped last time of Depo-Medrol.  She does have arthritis we did obtain ultrasound last time was negative for DVT in the lower extremity.  She is on Plavix and aspirin.  Her pain is around 10/10 right now and I think at this time we probably start as her morning stiffness and achiness in the shoulder and her knees  She is 87 she does not want undergo surgery at this time  She is hurting more over the  right greater trochanteric slightly posterior aspect in the piriformis fossa.      04/24/2023   Right hip bursitis injected 03/16/2023 and helped until a week ago.  I did place her on prednisone 5 mg to take on a bad day as well as methocarbamol as a muscle relaxant.  She did take those for 10 days and made things better.  We went over it electronic record and reviewed her x-rays in the system again.  We reviewed the x-rays of the lumbar spine the pelvis and the knees.  I reviewed the electronic records she received cortisone injection on 01/23/2023 in both of her knees Depo-Medrol.  And does seems to have helped but now they wore off.    Both of her knees are what is causing her more pain as well as numbness and tingling in both of her legs which is something new she never complained about before.  I did tell her she does have a pinched nerve in the back but I do not want to place her on anymore medication like gabapentin which could pleased with her head at her age of 87.  She is very smart alert oriented.  She is worried about taking too much prednisone makes her hungry and gained weight.  No fever no chills no shortness of breath or difficulty with chewing swallowing loss of bowel bladder control   She uses a cane to walk around and she tells take small steps but able to do it.    As far as the sacroiliac joint is not bothering her at this time    09/18/2023   Bilateral knee severe pain.  She missed her appointment in June because she was not having any pain and then a week or 10 days later she started with the pain and could not make another appointment until now.  She is using a cane now to get around.  She got stuck in traffic because of a major accident and we told her even if she shows a plate that we will see her then we accommodated her.  She felt very appreciative.  Her pain in the knees is 10/10.  She is always worried about getting another appointment in 3 months and if he is doing okay she did not  want to waste might time yet it takes another 3 months to get another appointment.  I did tell her will give her to appointments 1 in 3 months 1 in 4 months to bypass that issue.  An even if she shows up early I will even see her than 2.  No fever no chills no shortness of breath   She has what psoriatic changes in her hands and was told the psoriasis.  I did tell her that psoriasis could cause severe arthritis not the other way around    03/22/2024   She is complaining of hip pain and she points over the iliac crest bilaterally over the superior aspect.  She is ambulating with a cane.  She takes small steps.  She said she is having also pain behind her knees and we know it is severe arthritis.  She has not at a point that she needs any injections today.  She asked about burning pains in the legs and I did tell her that is usually nerve issues.  There are certain things you can do like gabapentin or Lyrica.  She has never been on any of does.  We always very careful about giving her anything that could cause gastrointestinal liver problems  We did go over her medications with her and we went over her new x-rays of her knees today.  She did have a little fall on 03/12/2024  She was upset about giving her an appointment with the primary care physician and that physician after she has been in the room for more than an hour nobody showed up.  Then the she was told the doctor is not there and then a nurse practitioner showed up after she had to walk looking for people.  She was left in the room and she was upset about it.  She said she wants to tell someone about it and I told her to call the main number and asked for patient relations  Overall she states she gives everybody good rapport however she was really upset about that episode    Injections of Depo-Medrol into the knees on last visit 01/18/2024 seems to have helped      06/27/2024   New complain of locking bilateral 3rd digits that she needs to extend them by  pulling on them.  This is new at this time we examined her and we recommended that she sees Dr. Dick which he might give her steroid injection in the trigger fingers.    As far as the knees are concerned today we will try different steroid.  She said her pain is 9/10 but she is managing.  She wants to avoid anything that could cause gastrointestinal injury.  She is doing well ambulating slow steps at 88.  She is mentally fully competent and discussing different social subjects.  No fever no chills no chest pain no shortness of breath  Her children or step daughters drive them to the doctor's    10/10/2024   Bilateral knee pain last injections of Kenalog seems to work helped for 8 weeks.  Low back pain right sacroiliac pain  Left 3rd and 4th trigger fingers with pain  Overall pain 8 out of went over the gabapentin and methocarbamol with the.  She is on 100 mg 3 times a day she is not thinking it does anything however I did tell her she has to be on it for 3 months before starts kicking in.  As far as her hands is concerned we can do injections as well as far as the sacroiliitis we can also give her a trigger point injection.  Her knees are hurting more than the backs.  Discussed her taking Tylenol around noon time.  We are staying away from nonsteroidal anti-inflammatories at her age    11/07/2024   Four weeks status post Kenalog injections and she is still doing well with the knees.  What is bothering her as the low back in the right sacroiliac area.  She wanted an injection.  Kenalog seems to work better since we tried every other steroid.  She is still walking taking small steps.  Not using any assistive devices today.  Pain in the knees is 0 pain in the right SI area is around 3.    No fever no chills no shortness of her breath.    03/27/2025   Bilateral knee pains.  She stated the Kenalog injections seems to work better for her.  She did receive SI joint injection last time she was here.  She likes to be seen  every 8 weeks.  Now the pain starting in the back on the right SI radiates down to the back of her knee.  Both knees are hurting we know they are severely arthritic.  We are avoiding a lot of medications.  She is complaining of numbness in her fingers as well in her feet and she stated she might have neuropathy I did tell her the test for it is in using nerve testing is a little bit painful and she is not interested in having pins stuck in her legs.  We had her on gabapentin and she said it affected her eyes so she stopped taking it and I did tell her that is what helps with the Neurontin     She celebrate his 70 year anniversary this week.  She is using her cane to walk  Past Medical History:   Diagnosis Date    Acute pancreatitis without infection or necrosis 12/5/2021    Anemia in other chronic diseases classified elsewhere 2/18/2025    Anxiety     Arthritis     Atherosclerosis of native coronary artery of native heart with angina pectoris 8/8/2019    Cancer     Degenerative disc disease     Depression     Essential hypertension 10/26/2021    GERD (gastroesophageal reflux disease)     Glaucoma suspect of both eyes     Hyperlipidemia     Insomnia 4/6/2021    Low back derangement syndrome 12/11/2017    Melanoma     Pneumonia due to other staphylococcus      Past Surgical History:   Procedure Laterality Date    ADENOIDECTOMY      CHOLECYSTECTOMY  1973    SPINE SURGERY  2/6/13    L4-L5  cyst removed andfor sciatica    TONSILLECTOMY  1945     Family History   Problem Relation Name Age of Onset    Arthritis Mother      Hypertension Mother      Stroke Mother      Glaucoma Mother      Heart disease Father  56        fatal MI    Diabetes Brother      Cancer Son patrick (twin) 56        melanoma with mets to lung and bone    Arthritis Sister      Hyperlipidemia Neg Hx       Social History     Socioeconomic History    Marital status:      Spouse name: fabio    Number of children: 3   Tobacco Use    Smoking status:  Never    Smokeless tobacco: Never   Substance and Sexual Activity    Alcohol use: No     Alcohol/week: 0.0 standard drinks of alcohol    Drug use: No    Sexual activity: Not Currently   Social History Narrative    . Decaf coffee only. No living will or advanced directive-copy of information given.      Social Drivers of Health     Food Insecurity: No Food Insecurity (1/12/2025)    [facility]    Hunger Vital Sign     Worried About Running Out of Food in the Last Year: Never true     Ran Out of Food in the Last Year: Never true   Transportation Needs: No Transportation Needs (1/12/2025)    [facility]    PRAPARE - Transportation     Lack of Transportation (Medical): No     Lack of Transportation (Non-Medical): No   Housing Stability: Low Risk  (1/12/2025)    [facility]    Housing Stability Vital Sign     Unable to Pay for Housing in the Last Year: No     Number of Times Moved in the Last Year: 0     Homeless in the Last Year: No     Medication List with Changes/Refills   Current Medications    ACETAMINOPHEN (TYLENOL) 650 MG TBSR    Take 1 tablet (650 mg total) by mouth every 8 (eight) hours.    ASCORBIC ACID, VITAMIN C, 500 MG CHEW    Take by mouth once daily.    ASPIRIN (ECOTRIN) 81 MG EC TABLET    Take 1 tablet by mouth Daily.    ATORVASTATIN (LIPITOR) 40 MG TABLET    Take 1 tablet by mouth every evening.    BETAMETHASONE VALERATE 0.1% (VALISONE) 0.1 % OINT    Apply topically 2 (two) times daily.    CITALOPRAM (CELEXA) 20 MG TABLET    Take 1 tablet (20 mg total) by mouth once daily.    CLOBETASOL (TEMOVATE) 0.05 % CREAM    Apply 2 g topically 2 (two) times daily as needed.    CLONIDINE 0.2 MG/24 HR TD PTWK (CATAPRES) 0.2 MG/24 HR    1 patch every 7  days.    CLOPIDOGREL (PLAVIX) 75 MG TABLET    Take 75 mg by mouth once daily.    COLCHICINE (COLCRYS) 0.6 MG TABLET    Take 0.6 mg by mouth daily as needed.    ERGOCALCIFEROL (ERGOCALCIFEROL) 50,000 UNIT CAP    Take by mouth once daily. Patient takes 5,000 units daily    FAMOTIDINE (PEPCID) 20 MG TABLET    Take 1 tablet (20 mg total) by mouth 2 (two) times daily as needed for Heartburn.    FUROSEMIDE (LASIX) 40 MG TABLET    Take 40 mg by mouth.    IRBESARTAN (AVAPRO) 300 MG TABLET    Take 300 mg by mouth.    ISOSORBIDE MONONITRATE (IMDUR) 60 MG 24 HR TABLET        MULTIVIT,IRON,MINERALS/LUTEIN (CENTRUM SILVER ULTRA WOMEN'S ORAL)    Take by mouth once daily.    NITROGLYCERIN 0.2 MG/HR TD PT24 (NITRODUR) 0.2 MG/HR    1 patch.    TURMERIC ROOT EXTRACT 500 MG CAP    Take by mouth once daily.   Discontinued Medications    GABAPENTIN (NEURONTIN) 100 MG CAPSULE    Take 1 capsule (100 mg total) by mouth 3 (three) times daily.     Review of patient's allergies indicates:   Allergen Reactions    Hydrocodone-acetaminophen Nausea And Vomiting     Other reaction(s): Vomiting    Diclofenac Nausea And Vomiting and Other (See Comments)     Review of Systems   Constitutional: Negative for decreased appetite.   HENT: Negative for tinnitus.    Eyes: Negative for double vision.   Cardiovascular: Negative for chest pain.   Respiratory: Negative for wheezing.    Hematologic/Lymphatic: Negative for bleeding problem.   Skin: Negative for dry skin.   Musculoskeletal: Positive for arthritis, back pain, joint pain, muscle weakness and stiffness. Negative for gout and neck pain.   Gastrointestinal: Negative for abdominal pain.   Genitourinary: Negative for bladder incontinence.   Neurological: Positive for numbness. Negative for paresthesias and sensory change.   Psychiatric/Behavioral: Negative for altered mental status.       Objective:   Body mass index is 27.29 kg/m².  There were no vitals filed for this visit.        General    Constitutional: She is oriented to person, place, and time. She appears well-developed.   HENT:   Head: Atraumatic.   Eyes: EOM are normal.   Cardiovascular: Normal rate.    Pulmonary/Chest: Effort normal.   Neurological: She is alert and oriented to person, place, and time.   Psychiatric: Judgment normal.         Ambulating with a cane   Cervical spine rotation is functional but  limited due to stiffness  Bilateral hand 3rd fingers triggering with tenderness at the volar metacarpophalangeal joint  Lumbar with loss of lordosis and tenderness around the L4-5 and mostly over the l right sacroiliac joint which is the area of severe tenderness and paraspinal.  Mild tenderness over the left sacroiliac area/trigger point.  Forward bending barely to proximal tibia lateral bending to mid thigh.    Pelvis is level.  There is tenderness over the anterior superior iliac crest on both sides    Passive hip motion within normal limits.  Hip flexors and abductors as well as quads and hamstrings and ankle extensors and flexors are slightly weak at 5-/5.  Tenderness over the greater trochanter over the right and left hip   Right knee range of motion 8-110.  Slight crepitus to AP compression on the patella in on medial joint.  Pain medially and anteriorly and slightly laterally.  No defect in the patella or quadriceps tendon Very mild swelling.  Collaterals and cruciate stable.  Left knee with approximately 10 ° of flexion contracture and flexion barely to 70°.  Moderate swelling.  Collaterals and cruciate stable. Crepitus with motion.  Pain in medially to palpation.  Also tender to put was patient over the patella with severe crepitus  Calves are soft nontender on the left slightly tender on the right  Slight varicosities around the calves  DP 1+  Skin is warm to touch no obvious lesions      Relevant imaging results reviewed and interpreted by me, discussed with the patient and / or family today     X-ray  03/22/2024 bilateral knees showing in the popliteal area calcified vessels, she has severe arthritis and marginal osteophytes and loss of joint space bilateral knees with some varus deformity  Ultrasound 01/26/2023 of lower extremity was negative for DVT/patient on Plavix and aspirin  X-ray 01/23/2023 bilateral knees with severe osteopenic bone.  There is loss of medial joint space bilaterally and marginal osteophytic changes.  There is no fracture seen consistent with arthritis    X-ray bilateral knees with left knee complete loss of medial joint space with osteophytic changes and sclerosis.  Same on the right knee but not as bad consistent with bilateral end-stage arthrosis.  07/02/2021  X-ray of the pelvis showing some degenerative changes on the left hip with good joint space left.  Right hip no joint space loss consistent with some arthrosis of the  hip.  Mild chronic degenerative changes in the sacroiliac joints 07/02/2021  X-ray of the lumbar spine in the electronic records multilevel facet arthropathy and osteophytes.  Anterior multilevel spurring.  Degenerative disc disease 07/02/2021  Assessment:     Encounter Diagnoses   Name Primary?    Arthritis of knee, left Yes    Acquired varus deformity knee, left     Arthritis of knee, right     Acquired varus deformity knee, right     Sacroiliitis     Arthritis of left hip     Peripheral arterial disease     Lumbar facet arthropathy     Trigger finger, left middle finger     Trigger finger, right middle finger         Plan:   Arthritis of knee, left  -     Large Joint Aspiration/Injection: bilateral knee    Acquired varus deformity knee, left    Arthritis of knee, right  -     Large Joint Aspiration/Injection: bilateral knee    Acquired varus deformity knee, right    Sacroiliitis    Arthritis of left hip    Peripheral arterial disease    Lumbar facet arthropathy    Trigger finger, left middle finger    Trigger finger, right middle finger         Patient  Instructions   You having quite a bit of pain in both of her knees  You also have pain going from her back down to the back of your right knee  You having numbness and tingling in your hands and feet which is consistent with neuropathy  You could not tolerate gabapentin it causes some eye issues so you stop taking it  Injection both knees performed today each with 80 mg of Kenalog mixed with 5 cc 1% lidocaine 03/27/2028   Ice your knees the next few days if they bother you  Take Tylenol as needed not to exceed 650 mg 3 times a day   See you back in 8 weeks    Previous discussion   I went and reviewed all her x-rays in the lumbar spine showing multilevel facet arthropathy and some degenerative disc disease.  I also reviewed her pelvis film showing very mild degenerative radiation of her hips.  The x-rays on her knees showing severe arthritic condition.  Now she is complaining of numbness and tingling in the legs and I did tell her could be secondary to lumbar issues and at her age I do not want to add any medications that will make her get confused like gout gabapentin.  She does not have diabetes and most likely her problem is secondary to the lumbar pinched nerves.  She does get cramps in the back of her legs and we did put her on methocarbamol to take as needed.  We also discussed prednisone that she takes only if needed.  We did discuss the pros and cons of steroid injections into the knees.  She can have does once every 10-12 weeks.  I do not want a place her on NSAIDs she is on Plavix and aspirin and I think prednisone is ideal for her at her age.    01/18/2024   Patient is having frequent falls minimum 2 over the last week or so and just hit her knee a little bit.  She has think difficulty getting up and needed help.  She is 88 years of age and numerous arthritic conditions.  She is fully alert and oriented in doing really well mentally.  At this time I did tell her that I must have the insurance provide you  with a Rollator.  It is a walker with a seat that you need to use instead of the cane.  The injections in the knees seems to help but they are not lasting long enough  Proceeded injecting both of her knees today 01/28/2024 each with 80 mg Depo-Medrol mixed with 5 cc 1% lidocaine 01/18/2024   Ice the knees the next few days   You may take Tylenol   As far as the back is concerned the knees was hurting more today in the will be too much steroid if we inject the trigger point in the back  She can not be on NSAIDs she is on Plavix and aspirin   You turn in 3 months sooner if needed  Disclaimer:is note was prepared using a voice recognition system and is likely to have sound alike errors within the text.

## 2025-05-21 ENCOUNTER — TELEPHONE (OUTPATIENT)
Dept: ORTHOPEDICS | Facility: CLINIC | Age: OVER 89
End: 2025-05-21
Payer: MEDICARE

## 2025-05-21 NOTE — TELEPHONE ENCOUNTER
----- Message from Arline sent at 5/21/2025 11:45 AM CDT -----  Contact: Tiffanie  :.OctavioType:  Patient Request Call BackWhkirsten Called:Senait the patient know what this is regarding?:Rescheduling 5/23 appointment with Dr. Samson the patient rather a call back or a response [facility] Call [facility] Call Back Number:[phone] Information:

## 2025-06-06 ENCOUNTER — OFFICE VISIT (OUTPATIENT)
Dept: ORTHOPEDICS | Facility: CLINIC | Age: OVER 89
End: 2025-06-06
Payer: MEDICARE

## 2025-06-06 VITALS — HEIGHT: 66 IN | BODY MASS INDEX: 27.17 KG/M2 | WEIGHT: 169.06 LBS

## 2025-06-06 DIAGNOSIS — I73.9 PERIPHERAL ARTERIAL DISEASE: ICD-10-CM

## 2025-06-06 DIAGNOSIS — M17.11 ARTHRITIS OF KNEE, RIGHT: ICD-10-CM

## 2025-06-06 DIAGNOSIS — M17.12 ARTHRITIS OF KNEE, LEFT: Primary | ICD-10-CM

## 2025-06-06 DIAGNOSIS — M47.816 LUMBAR FACET ARTHROPATHY: ICD-10-CM

## 2025-06-06 DIAGNOSIS — M46.1 SACROILIITIS: ICD-10-CM

## 2025-06-06 DIAGNOSIS — M16.12 ARTHRITIS OF LEFT HIP: ICD-10-CM

## 2025-06-06 DIAGNOSIS — M21.161 ACQUIRED VARUS DEFORMITY KNEE, RIGHT: ICD-10-CM

## 2025-06-06 DIAGNOSIS — M21.162 ACQUIRED VARUS DEFORMITY KNEE, LEFT: ICD-10-CM

## 2025-06-06 PROCEDURE — 99999 PR PBB SHADOW E&M-EST. PATIENT-LVL III: CPT | Mod: PBBFAC,HCNC,, | Performed by: ORTHOPAEDIC SURGERY

## 2025-06-06 RX ORDER — METHYLPREDNISOLONE ACETATE 80 MG/ML
80 INJECTION, SUSPENSION INTRA-ARTICULAR; INTRALESIONAL; INTRAMUSCULAR; SOFT TISSUE
Status: DISCONTINUED | OUTPATIENT
Start: 2025-06-06 | End: 2025-06-06 | Stop reason: HOSPADM

## 2025-06-06 RX ADMIN — METHYLPREDNISOLONE ACETATE 80 MG: 80 INJECTION, SUSPENSION INTRA-ARTICULAR; INTRALESIONAL; INTRAMUSCULAR; SOFT TISSUE at 11:06

## 2025-07-16 ENCOUNTER — OFFICE VISIT (OUTPATIENT)
Dept: OBSTETRICS AND GYNECOLOGY | Facility: CLINIC | Age: OVER 89
End: 2025-07-16
Payer: MEDICARE

## 2025-07-16 VITALS
HEIGHT: 66 IN | BODY MASS INDEX: 26.86 KG/M2 | SYSTOLIC BLOOD PRESSURE: 122 MMHG | WEIGHT: 167.13 LBS | DIASTOLIC BLOOD PRESSURE: 60 MMHG

## 2025-07-16 DIAGNOSIS — Z46.89 ENCOUNTER FOR PESSARY MAINTENANCE: Primary | ICD-10-CM

## 2025-07-16 DIAGNOSIS — B37.31 VULVAL CANDIDIASIS: ICD-10-CM

## 2025-07-16 DIAGNOSIS — N81.10 BADEN-WALKER GRADE 2 CYSTOCELE: ICD-10-CM

## 2025-07-16 PROCEDURE — 99213 OFFICE O/P EST LOW 20 MIN: CPT | Mod: HCNC,S$GLB,, | Performed by: OBSTETRICS & GYNECOLOGY

## 2025-07-16 PROCEDURE — 1126F AMNT PAIN NOTED NONE PRSNT: CPT | Mod: CPTII,HCNC,S$GLB, | Performed by: OBSTETRICS & GYNECOLOGY

## 2025-07-16 PROCEDURE — 99999 PR PBB SHADOW E&M-EST. PATIENT-LVL III: CPT | Mod: PBBFAC,HCNC,, | Performed by: OBSTETRICS & GYNECOLOGY

## 2025-07-16 PROCEDURE — 3288F FALL RISK ASSESSMENT DOCD: CPT | Mod: CPTII,HCNC,S$GLB, | Performed by: OBSTETRICS & GYNECOLOGY

## 2025-07-16 PROCEDURE — 1159F MED LIST DOCD IN RCRD: CPT | Mod: CPTII,HCNC,S$GLB, | Performed by: OBSTETRICS & GYNECOLOGY

## 2025-07-16 PROCEDURE — 1101F PT FALLS ASSESS-DOCD LE1/YR: CPT | Mod: CPTII,HCNC,S$GLB, | Performed by: OBSTETRICS & GYNECOLOGY

## 2025-07-16 RX ORDER — CLOTRIMAZOLE AND BETAMETHASONE DIPROPIONATE 10; .64 MG/G; MG/G
CREAM TOPICAL 2 TIMES DAILY
Qty: 45 G | Refills: 0 | Status: SHIPPED | OUTPATIENT
Start: 2025-07-16 | End: 2025-07-26

## 2025-07-16 NOTE — PROGRESS NOTES
Subjective     Patient ID: Tiffanie Barajas is a 89 y.o. female.    Chief Complaint:  Pessary Check      History of Present Illness  HPI  Presents for pessary check.  Wears a #4 fenestrated ring pessary for cystocele and rectocele.  Does well with it in place.  Has some burning and itching on the vulva.  Applying vaseline.    GYN & OB History  Patient's last menstrual period was 1990.   Date of Last Pap: 2012    OB History    Para Term  AB Living   2 1    2   SAB IAB Ectopic Multiple Live Births      1 1      # Outcome Date GA Lbr Dwayne/2nd Weight Sex Type Anes PTL Lv   2 Para      Vag-Spont   EMILIANO   1A       Vag-Spont      1B       Vag-Spont          Review of Systems  Review of Systems       Objective   Physical Exam:   Constitutional: She is oriented to person, place, and time. She appears well-developed and well-nourished. No distress.             Abdominal: Soft. She exhibits no distension and no mass. There is no abdominal tenderness. There is no rebound and no guarding. Hernia confirmed negative in the right inguinal area and confirmed negative in the left inguinal area.     Genitourinary:    Vagina normal.      Pelvic exam was performed with patient supine.   There is rash (maculopapular rash on BL labia majora and inguinal folds w/satellite lesions c/w yeast) on the right labia. There is no tenderness, lesion or injury on the right labia. There is rash on the left labia. There is no tenderness, lesion or injury on the left labia. Right adnexum displays no mass, no tenderness and no fullness. Left adnexum displays no mass, no tenderness and no fullness. There is rectocele (grade 1) and cystocele (grade 2) in the vagina. No erythema, vaginal discharge, tenderness, bleeding or prolapse of vaginal walls in the vagina.    No foreign body in the vagina.      No signs of injury in the vagina.   Cervix exhibits no motion tenderness, no discharge and no friability. Uterus is not  deviated, not enlarged, not fixed and not tender.    Genitourinary Comments: Pessary removed without difficulty.  Washed with soap and water, then replaced without difficulty                 Neurological: She is alert and oriented to person, place, and time.     Psychiatric: She has a normal mood and affect.            Assessment and Plan     1. Encounter for pessary maintenance    2. Ballinger-Walker grade 2 cystocele    3. Vulval candidiasis             Plan:  Tiffanie was seen today for pessary check.    Diagnoses and all orders for this visit:    Encounter for pessary maintenance    Ballinger-Walker grade 2 cystocele    Vulval candidiasis  -     clotrimazole-betamethasone 1-0.05% (LOTRISONE) cream; Apply topically 2 (two) times daily. for 10 days     Okay to do pessary checks q 6 months.  RTC 6 months.

## 2025-07-24 ENCOUNTER — OFFICE VISIT (OUTPATIENT)
Dept: ORTHOPEDICS | Facility: CLINIC | Age: OVER 89
End: 2025-07-24
Payer: MEDICARE

## 2025-07-24 VITALS — BODY MASS INDEX: 26.84 KG/M2 | WEIGHT: 167 LBS | HEIGHT: 66 IN

## 2025-07-24 DIAGNOSIS — M46.1 SACROILIITIS: ICD-10-CM

## 2025-07-24 DIAGNOSIS — M65.331 TRIGGER FINGER, RIGHT MIDDLE FINGER: ICD-10-CM

## 2025-07-24 DIAGNOSIS — M21.161 ACQUIRED VARUS DEFORMITY KNEE, RIGHT: ICD-10-CM

## 2025-07-24 DIAGNOSIS — R29.6 FREQUENT FALLS: ICD-10-CM

## 2025-07-24 DIAGNOSIS — M21.162 ACQUIRED VARUS DEFORMITY KNEE, LEFT: ICD-10-CM

## 2025-07-24 DIAGNOSIS — M16.12 ARTHRITIS OF LEFT HIP: ICD-10-CM

## 2025-07-24 DIAGNOSIS — M47.816 LUMBAR FACET ARTHROPATHY: ICD-10-CM

## 2025-07-24 DIAGNOSIS — M17.12 ARTHRITIS OF KNEE, LEFT: Primary | ICD-10-CM

## 2025-07-24 DIAGNOSIS — I73.9 PERIPHERAL ARTERIAL DISEASE: ICD-10-CM

## 2025-07-24 DIAGNOSIS — M65.332 TRIGGER FINGER, LEFT MIDDLE FINGER: ICD-10-CM

## 2025-07-24 DIAGNOSIS — M17.11 ARTHRITIS OF KNEE, RIGHT: ICD-10-CM

## 2025-07-24 PROCEDURE — 99999 PR PBB SHADOW E&M-EST. PATIENT-LVL IV: CPT | Mod: PBBFAC,HCNC,, | Performed by: ORTHOPAEDIC SURGERY

## 2025-07-24 RX ORDER — TRIAMCINOLONE ACETONIDE 40 MG/ML
80 INJECTION, SUSPENSION INTRA-ARTICULAR; INTRAMUSCULAR
Status: DISCONTINUED | OUTPATIENT
Start: 2025-07-24 | End: 2025-07-24 | Stop reason: HOSPADM

## 2025-07-24 RX ORDER — LIDOCAINE 50 MG/G
1 PATCH TOPICAL DAILY
Qty: 15 PATCH | Refills: 6 | Status: SHIPPED | OUTPATIENT
Start: 2025-07-24

## 2025-07-24 RX ADMIN — TRIAMCINOLONE ACETONIDE 80 MG: 40 INJECTION, SUSPENSION INTRA-ARTICULAR; INTRAMUSCULAR at 10:07

## 2025-07-24 NOTE — PATIENT INSTRUCTIONS
The Depo-Medrol did not seem to help you we gave it to you last time   Both of her knees are hurting today  We went back to giving you Kenalog because that helped the most we injected both of her knees today each knee with 80 mg Kenalog mixed with 5 cc 1% lidocaine 07/24/2025   We will give you a lidocaine patches to try you may cut 1 patch today apply it where it hurts the most  I will see you back in 6 weeks/september

## 2025-07-24 NOTE — PROGRESS NOTES
Subjective:     Patient ID: Tiffanie Barajas is a 89 y.o. female.    Chief Complaint: Pain of the Left Knee and Pain of the Right Knee    HPI:  84-year-old complaining of pain over the left sacroiliac joint radiating down to her knee.  Previous history of sciatica and a cyst in the back that was removed. Has history of arthritis of both of her knees received injection longtime ago more than a year (lubrication shot).  She is not having too much pain in the right knee unable to fully extend the left knee difficulty with shopping and walking.  Pain roughly 4/10.  Denies loss of bowel bladder control.  Walking distance is seems to hurt approximately 200 ft.  She is on blood thinners and unable to take NSAIDs.  She takes 1 little Tylenol occasionally and does not help.    01/27/2020.  Left knee injection of Depo-Medrol seems to have helped this patient.  Given 01/03/2020.  Just started physical therapy and that seems to help also.  The Flexeril did not seem to work at all.  Already been 5 times to physical therapy and she has at least 3 more weeks to go.  Very pleased so far with her results.  Patient cannot take NSAIDs due to being on blood thinners.  Pain level 0/10.  Does have occasional discomfort right greater trochanteric area    02/13/2020  Patient with bilateral knee arthrosis left worse than right and sacroiliac pain.  She just finished her physical therapy and that seems to have helped and now she is doing independent exercise program.  We did inject the knees with steroid that seems to help.  She is ambulating without any assistive devices today. He is here to have Synvisc-One injection into the left knee.    05/22/2020  Patient complaining today of left hip pain/she points over the lumbar area radiating to the posterior aspect of the knee.  She has history of lumbosacral arthritis.  As far as her left knee injection of Synvisc-One she is not having any pain with that.  She is very happy with that result.   She wants something done and 0 not want any prescriptions for any pills.  Denies loss of bowel bladder control or usage or any assistive devices to ambulate.  She is doing independent exercise as well as showing me that her  created exercise program for her.  I did recommend stationary bicycle and independent stretching exercises.  Denies loss of bowel bladder control and fever or chills or shortness of breath or chest pain    07/09/2020  Patient received trigger point injection of the lumbar spine 05/22/2020 with some good relief.  Now she is having more pain in the left knee.  She had received steroid injection 01/03/2020 and Synvisc-One in 02/13/2020.  She feels the knee is tight she tries to exercise it on her own.  Having sharp pain on the medial side.  Pain level is 3/10 but with gait it gets worst.  Denies any fever or chills and maintaining social distancing and wearing her own mask.  She would like some relief from the tightness and stiffness in the left knee but wants to avoid taking any medications besides a little Tylenol because she is on blood thinners and unable to take NSAIDs.    01/04/2020   Left knee pain and stiffness 6/10, low back pain / points on the left sacroiliac area also.  She is requesting injection in both areas.  Patient did have trigger point injection 05/22/2020 in the lumbar area which helped also had steroid injection in July 2020 which seems to have helped in the knee.  Also previous history of Synvisc-One injection 02/13/2020.  Tylenol does not seem to help.  Denies any fever or chills or shortness of breath or difficulty with chewing or swallowing loss of bowel bladder control blurry vision or double vision or loss sense smell or taste.  She is here with her significant other.  Asking about getting the COVID  Vaccine.  She is 85 years old and I told if her  She qualifies should be able to get it soon    02/08/2021  Right lumbar trigger point tenderness which was injected  awhile ago with relief on 05/22/2020 now it is hurting her a little bit more.  I did tell her since she is here today to receive viscosupplementation/approved only Monovisc not Synvisc this time we will hold off on trigger point injection.  Pain is 5/10 in the left knee.  The right knee is doing okay.  She is ambulating without any assistive devices.  There was a little discussion about difference is with me in Monovisc and Synvisc and put simply I told them it is like having a car with different brands.  Patient is maintaining her activity level.  She still did not get her COVID vaccine due to in availability of the vaccine.  Denies any fever or chills or shortness of breath or difficulty with chewing or swallowing or loss of bowel bladder control blurry vision double vision loss sense smell or taste.  She is ambulating without any assistive devices    04/01/2021  Low back pain which is severe we injected trigger point on 05/22/2020 with some relief.  Went over her x-rays in the electronic records today showing severe facet arthropathy and degenerative disc disease with large bone spur overgrowth.  That bothers her a lot with radiation down her legs a cannot sleep.  She is requesting may be something to help with the radicular symptoms and give her some sleep and rest as far as the Monovisc injection into the left knee that did not seem to help at all.  The steroid injection seems to work better for her and requested another injection.  She did go through therapy before.  She feels sciatica to the right leg as far as the right knee is concerned is hurting but not as much as the left knee  Monovisc injection 02/08/2021 into the left knee did not seem to help.  Voltaren gel she uses on occasion.  No fever no chills or shortness of breath or difficulty with chewing or swallowing loss of bowel bladder control blurry vision double vision or loss of sense smell or taste  Did did receive her COVID-19  vaccine    07/02/2021  Patient is having low back pain with sciatic type of pain radiating down both legs.  Today will obtaining x-rays of her spine as well as the pelvis and hips and the knees.  The knees and not hurting her as much and she is not requesting any injections.  We did try Monovisc on the left knee but did not help and the steroid seems to work for her.  I did mention in the future maybe she would need long-acting steroid like  zilretta.  She is here with her daughter in law.  She is having some hip pains.  Able to manage it.  Her pain is 5/10 ambulating slowly without any assistive devices.  Cannot take any NSAIDs by mouth since she is on aspirin and Plavix and home cardiac evaluation by cardiologist.  No fever no chills no shortness of breath or difficulty with chewing or swallowing loss of bowel bladder control or blurry vision double vision loss sense smell or taste  She did have diclofenac gel at home but has not been using it appropriately  She states she is using SALONPAS which seems to help the back  Last visit we gave her cyclobenzaprine she is not taking it      10/18/2021  Severe low back pain left side more than the right.  We did have x-rays on her from last visit that showed sacroiliac arthritic changes sacroiliitis.  We did give her sacroiliac injection more than a year ago with some relief.  She also have pain in the knees and in the back specially on the left side a worried if it is sciatica or not.  She does have x-rays of severe arthritis in the spine also.  She usually gets steroid injections which help her for a little while.  She had been tried on viscosupplementation once without success.  She had sustained a little fall on 10/07/2021 but no major injury.  She is ambulating without any assistive devices.  No fever no chills no shortness of breath or difficulty with chewing or swallowing  She is using diclofenac gel as well as Salonpas patches.    11/08/2021  Injection of the  trigger point in the lower part of her back helped for 10 days.  That seems to come back now and hurts her a little bit more than her knee.  As far as the right knee she is doing okay the left knee is the 1 that bothers her.  She has been losing some range of motion since last visit we see her.  Her pain is around 6/10 in the sitting position and goes up with walking.  At this point I did tell her I do not want to give her 2 injections and not knowing which 1 will give her a reaction.  We have her approved to receive the left knee Zilreta injection.  The pros and cons discussed in details.  No loss of bowel bladder control or fever or chills or shortness of breath or difficulty with chewing or swallowing loss of bowel bladder control or loss of sense smell or taste      12/02/2021  Patient stated left knee steroid injections/ Zilreta seems to have helped so far.  Not bother her as much.  What bothers her is her lower part of her back on the left side there is a trigger point that seems to be the area.  She her  marked with a pen.  She is ambulating slowly without any assistive devices.  She would like to have that trigger point injected despite the fact her pain in her knee is 3/10.  Overall it seems the steroid injection given last visit in the knee seems to work so far  No fever no chills no shortness of breath or difficulty with chewing or swallowing loss of bowel bladder control    02/7/22  Complaining over left greater trochanteric and right sacroiliac area pain.  The left knee still doing okay received zilretta injection in November 2021 and is still helping.  She is requesting injections over the areas that hurts the most.  She has fluctuating blood pressure I did tell her I will not give but total 80 mg Depo-Medrol and we will split it into 2 areas.  She is ambulating without assistive devices.  We did get her approved for the long-acting steroid injection into the left knee but she says ri it since  you doing okay.  ght now her knee is doing okay and that previous shot still working.  We can always get it approved if needed again.  The approval is until February 27, 2022 however we still do not have to give it if you still doing okay      03/28/2022  On 02/07/2022 in [place] today greater trochanter which seems to have helped however the trigger point/left sacroiliac area that did not seem to help.  Both injections were with Depo-Medrol.  She stated she was changed to nifedipine for blood pressure and that cause severe swelling in the legs.  She has an appointment April 15 to see the cardiologist.  I did tell her this could be angioedema.  As far as her knee is concerned just stiff right now and she is not having much of any pain in it at 0/10.  Her main concern is the lumbar area this seems to have quite a bit of pain in it.  She had marked where the pain is in order to get another injection.  Denies loss of bowel bladder control blurry vision double vision.  She is here with her .  She had tried different topicals without success  Vicks rub seems to be the 1 that works the most  Trigger point pain over the left sacroiliac area approximately 3/10    06/02/2022  Severe left knee pain.  Given zilretta in November 2021 and just wore off on her almost  4 weeks ago.  Her pain is severe in the knee and limiting motion..  As far as the left hip and sacroiliac area on the left side and trigger point she is around 2-3/10.  She still ambulating without assistive devices taking very small steps.  She wants an injection into the left knee.  She stated the long-acting steroid helped quite a bit when we give it last and it was in November.  She would like that repeated.  However she stated today the left knee is hurting a lot she wants an injection today too.  I see no reason why not..  Still having pain over the left sacroiliac area and greater trochanteric area but not severe    06/30/2022  We did inject her last  visit and that did not seem to help as much.  She would like to have the long-acting steroid injection which helped significantly last time.  She still having some hip pain only when she leans on it but not when she is walking.  Her back is doing little bit better.  She does take Tylenol.  Her pain in the knee is 8/10    01/23/2023   Patient is complaining of right calf burning and discomfort.  This is been going on for several weeks.  I have not seen her because she was taking care of her  received radiation treatment.  She is here with him.  She is alert oriented x3.  Now the right and the left knee both of them are hurting.  She is using a cane to get around.  Her pain is 8/10.  Usually most of her problem is the left knee and sacroiliac area but today the back is not bothering her as much as the right knee and right calf as well as left knee.  She is using a cane to get around.  Despite the fact she still having left sacroiliac and greater trochanteric discomfort the knees are coming 1st today.    No fever no chills no shortness of breath difficulty with chewing or swallowing loss of bowel bladder control   She has family and children that could take her to doctor's.    03/16/2023   She is having cramps in the back of her right thigh and she has severe arthritis unable to fully extend the knee.  The Flexeril/cyclobenzaprine did not seem to help.  Today decided to change her to methocarbamol went over it with her.  Her lower part of her back is hurting and she stated that the injection to both of her knees helped last time of Depo-Medrol.  She does have arthritis we did obtain ultrasound last time was negative for DVT in the lower extremity.  She is on Plavix and aspirin.  Her pain is around 10/10 right now and I think at this time we probably start as her morning stiffness and achiness in the shoulder and her knees  She is 87 she does not want undergo surgery at this time  She is hurting more over the  right greater trochanteric slightly posterior aspect in the piriformis fossa.      04/24/2023   Right hip bursitis injected 03/16/2023 and helped until a week ago.  I did place her on prednisone 5 mg to take on a bad day as well as methocarbamol as a muscle relaxant.  She did take those for 10 days and made things better.  We went over it electronic record and reviewed her x-rays in the system again.  We reviewed the x-rays of the lumbar spine the pelvis and the knees.  I reviewed the electronic records she received cortisone injection on 01/23/2023 in both of her knees Depo-Medrol.  And does seems to have helped but now they wore off.    Both of her knees are what is causing her more pain as well as numbness and tingling in both of her legs which is something new she never complained about before.  I did tell her she does have a pinched nerve in the back but I do not want to place her on anymore medication like gabapentin which could pleased with her head at her age of 87.  She is very smart alert oriented.  She is worried about taking too much prednisone makes her hungry and gained weight.  No fever no chills no shortness of breath or difficulty with chewing swallowing loss of bowel bladder control   She uses a cane to walk around and she tells take small steps but able to do it.    As far as the sacroiliac joint is not bothering her at this time    09/18/2023   Bilateral knee severe pain.  She missed her appointment in June because she was not having any pain and then a week or 10 days later she started with the pain and could not make another appointment until now.  She is using a cane now to get around.  She got stuck in traffic because of a major accident and we told her even if she shows a plate that we will see her then we accommodated her.  She felt very appreciative.  Her pain in the knees is 10/10.  She is always worried about getting another appointment in 3 months and if he is doing okay she did not  want to waste might time yet it takes another 3 months to get another appointment.  I did tell her will give her to appointments 1 in 3 months 1 in 4 months to bypass that issue.  An even if she shows up early I will even see her than 2.  No fever no chills no shortness of breath   She has what psoriatic changes in her hands and was told the psoriasis.  I did tell her that psoriasis could cause severe arthritis not the other way around    03/22/2024   She is complaining of hip pain and she points over the iliac crest bilaterally over the superior aspect.  She is ambulating with a cane.  She takes small steps.  She said she is having also pain behind her knees and we know it is severe arthritis.  She has not at a point that she needs any injections today.  She asked about burning pains in the legs and I did tell her that is usually nerve issues.  There are certain things you can do like gabapentin or Lyrica.  She has never been on any of does.  We always very careful about giving her anything that could cause gastrointestinal liver problems  We did go over her medications with her and we went over her new x-rays of her knees today.  She did have a little fall on 03/12/2024  She was upset about giving her an appointment with the primary care physician and that physician after she has been in the room for more than an hour nobody showed up.  Then the she was told the doctor is not there and then a nurse practitioner showed up after she had to walk looking for people.  She was left in the room and she was upset about it.  She said she wants to tell someone about it and I told her to call the main number and asked for patient relations  Overall she states she gives everybody good rapport however she was really upset about that episode    Injections of Depo-Medrol into the knees on last visit 01/18/2024 seems to have helped      06/27/2024   New complain of locking bilateral 3rd digits that she needs to extend them by  pulling on them.  This is new at this time we examined her and we recommended that she sees Dr. Dick which he might give her steroid injection in the trigger fingers.    As far as the knees are concerned today we will try different steroid.  She said her pain is 9/10 but she is managing.  She wants to avoid anything that could cause gastrointestinal injury.  She is doing well ambulating slow steps at 88.  She is mentally fully competent and discussing different social subjects.  No fever no chills no chest pain no shortness of breath  Her children or step daughters drive them to the doctor's    10/10/2024   Bilateral knee pain last injections of Kenalog seems to work helped for 8 weeks.  Low back pain right sacroiliac pain  Left 3rd and 4th trigger fingers with pain  Overall pain 8 out of went over the gabapentin and methocarbamol with the.  She is on 100 mg 3 times a day she is not thinking it does anything however I did tell her she has to be on it for 3 months before starts kicking in.  As far as her hands is concerned we can do injections as well as far as the sacroiliitis we can also give her a trigger point injection.  Her knees are hurting more than the backs.  Discussed her taking Tylenol around noon time.  We are staying away from nonsteroidal anti-inflammatories at her age    11/07/2024   Four weeks status post Kenalog injections and she is still doing well with the knees.  What is bothering her as the low back in the right sacroiliac area.  She wanted an injection.  Kenalog seems to work better since we tried every other steroid.  She is still walking taking small steps.  Not using any assistive devices today.  Pain in the knees is 0 pain in the right SI area is around 3.    No fever no chills no shortness of her breath.    03/27/2025   Bilateral knee pains.  She stated the Kenalog injections seems to work better for her.  She did receive SI joint injection last time she was here.  She likes to be seen  every 8 weeks.  Now the pain starting in the back on the right SI radiates down to the back of her knee.  Both knees are hurting we know they are severely arthritic.  We are avoiding a lot of medications.  She is complaining of numbness in her fingers as well in her feet and she stated she might have neuropathy I did tell her the test for it is in using nerve testing is a little bit painful and she is not interested in having pins stuck in her legs.  We had her on gabapentin and she said it affected her eyes so she stopped taking it and I did tell her that is what helps with the Neurontin     She celebrate his 70 year anniversary this week.  She is using her cane to walk      06/06/2025   Bilateral knee arthritis.  She stated the right knee is doing extremely well she did receive bilateral knee Kenalog 03/27/2025.  What bothers her today is the left knee occasionally 8/10.  She does have back issues also and was given last year a trigger point injection in the lumbar spine.  She said the back is doing okay overall she is much better.  She does use her cane to get around.  She thinks that she could use another injection into the left knee today.  No fever no chills no shortness breath no difficulty with chewing swallowing loss of bowel bladder control    07/24/2025   Bilateral knee severe arthritis and flexion contracture also she has low back issues and sacroiliitis.  Last injection of Depo-Medrol 06/06/2025 in the right knee did not seem to help.  She stated the medication given before used to last a little bit longer was Kenalog which was given in both knees on 03/27/2025.  We injected right trigger point in the lower part of her back on 11/07/2024 and that is still doing well.  She is still using her cane and she is still alert oriented x3 and very small answering appropriately and asking questions  Past Medical History:   Diagnosis Date    Acute pancreatitis without infection or necrosis 12/5/2021    Anemia in  other chronic diseases classified elsewhere 2/18/2025    Anxiety     Arthritis     Atherosclerosis of native coronary artery of native heart with angina pectoris 8/8/2019    Cancer     Degenerative disc disease     Depression     Essential hypertension 10/26/2021    GERD (gastroesophageal reflux disease)     Glaucoma suspect of both eyes     Hyperlipidemia     Insomnia 4/6/2021    Low back derangement syndrome 12/11/2017    Melanoma     Pneumonia due to other staphylococcus      Past Surgical History:   Procedure Laterality Date    ADENOIDECTOMY      CHOLECYSTECTOMY  1973    SPINE SURGERY  2/6/13    L4-L5  cyst removed andfor sciatica    TONSILLECTOMY  1945     Family History   Problem Relation Name Age of Onset    Arthritis Mother      Hypertension Mother      Stroke Mother      Glaucoma Mother      Heart disease Father  56        fatal MI    Diabetes Brother      Cancer Son patrick (twin) 56        melanoma with mets to lung and bone    Arthritis Sister      Hyperlipidemia Neg Hx       Social History     Socioeconomic History    Marital status:      Spouse name: fabio    Number of children: 3   Tobacco Use    Smoking status: Never    Smokeless tobacco: Never   Substance and Sexual Activity    Alcohol use: No     Alcohol/week: 0.0 standard drinks of alcohol    Drug use: No    Sexual activity: Not Currently   Social History Narrative    . Dec[employer] coffee only. No living will or advanced directive-copy of information given.      Social Drivers of Health     Food Insecurity: No Food Insecurity (1/12/2025)    Received from [facility] and Its Subsidiaries and Affiliates    Hunger Vital Sign     Worried About Running Out of Food in the Last Year: Never true     Ran Out of Food in the Last Year: Never true   Transportation Needs: No Transportation Needs (1/12/2025)    [facility] and Its Subsidiaries and Affiliates     PRAPARE - Transportation     Lack of Transportation (Medical): No     Lack of Transportation (Non-Medical): No   Housing Stability: Low Risk  (1/12/2025)    Received from [facility] and Its Subsidiaries and Affiliates    Housing Stability Vital Sign     Unable to Pay for Housing in the Last Year: No     Number of Times Moved in the Last Year: 0     Homeless in the Last Year: No     Medication List with Changes/Refills   New Medications    LIDOCAINE (LIDODERM) 5 %    Place 1 patch onto the skin once daily. Remove & Discard patch within 12 hours or as directed by MD   Current Medications    ACETAMINOPHEN (TYLENOL) 650 MG TBSR    Take 1 tablet (650 mg total) by mouth every 8 (eight) hours.    ASCORBIC ACID, VITAMIN C, 500 MG CHEW    Take by mouth once daily.    ASPIRIN (ECOTRIN) 81 MG EC TABLET    Take 1 tablet by mouth Daily.    ATORVASTATIN (LIPITOR) 40 MG TABLET    Take 1 tablet by mouth every evening.    BETAMETHASONE VALERATE 0.1% (VALISONE) 0.1 % OINT    Apply topically 2 (two) times daily.    CITALOPRAM (CELEXA) 20 MG TABLET    Take 1 tablet (20 mg total) by mouth once daily.    CLOBETASOL (TEMOVATE) 0.05 % CREAM    Apply 2 g topically 2 (two) times daily as needed.    CLONIDINE 0.2 MG/24 HR TD PTWK (CATAPRES) 0.2 MG/24 HR    1 patch every 7 days.    CLOPIDOGREL (PLAVIX) 75 MG TABLET    Take 75 mg by mouth once daily.    CLOTRIMAZOLE-BETAMETHASONE 1-0.05% (LOTRISONE) CREAM    Apply topically 2 (two) times daily. for 10 days    COLCHICINE (COLCRYS) 0.6 MG TABLET    Take 0.6 mg by mouth daily as needed.    ERGOCALCIFEROL (ERGOCALCIFEROL) 50,000 UNIT CAP    Take by mouth once daily. Patient takes 5,000 units daily    FAMOTIDINE (PEPCID) 20 MG TABLET    Take 1 tablet (20 mg total) by mouth 2 (two) times daily as needed for Heartburn.    FUROSEMIDE (LASIX) 40 MG TABLET    Take 40 mg by mouth.    IRBESARTAN (AVAPRO) 300 MG TABLET    Take 300 mg by mouth.    ISOSORBIDE MONONITRATE  (IMDUR) 60 MG 24 HR TABLET        MULTIVIT,IRON,MINERALS/LUTEIN (CENTRUM SILVER ULTRA WOMEN'S ORAL)    Take by mouth once daily.    NITROGLYCERIN 0.2 MG/HR TD PT24 (NITRODUR) 0.2 MG/HR    1 patch.    TURMERIC ROOT EXTRACT 500 MG CAP    Take by mouth once daily.     Review of patient's allergies indicates:   Allergen Reactions    Hydrocodone-acetaminophen Nausea And Vomiting     Other reaction(s): Vomiting    Diclofenac Nausea And Vomiting and Other (See Comments)     Review of Systems   Constitutional: Negative for decreased appetite.   HENT: Negative for tinnitus.    Eyes: Negative for double vision.   Cardiovascular: Negative for chest pain.   Respiratory: Negative for wheezing.    Hematologic/Lymphatic: Negative for bleeding problem.   Skin: Negative for dry skin.   Musculoskeletal: Positive for arthritis, back pain, joint pain, muscle weakness and stiffness. Negative for gout and neck pain.   Gastrointestinal: Negative for abdominal pain.   Genitourinary: Negative for bladder incontinence.   Neurological: Positive for numbness. Negative for paresthesias and sensory change.   Psychiatric/Behavioral: Negative for altered mental status.       Objective:   Body mass index is 26.95 kg/m².  There were no vitals filed for this visit.       General    Constitutional: She is oriented to person, place, and time. She appears well-developed.   HENT:   Head: Atraumatic.   Eyes: EOM are normal.   Cardiovascular: Normal rate.    Pulmonary/Chest: Effort normal.   Neurological: She is alert and oriented to person, place, and time.   Psychiatric: Judgment normal.         Ambulating with a cane   Cervical spine rotation is functional but  limited due to stiffness  Bilateral hand 3rd fingers triggering with tenderness at the volar metacarpophalangeal joint  Lumbar with loss of lordosis and tenderness around the L4-5 and mostly over the l right sacroiliac joint which is the area of severe tenderness and paraspinal.  Mild tenderness  over the left sacroiliac area/trigger point.  Forward bending barely to proximal tibia lateral bending to mid thigh.    Pelvis is level.  There is tenderness over the anterior superior iliac crest on both sides    Passive hip motion within normal limits.  Hip flexors and abductors as well as quads and hamstrings and ankle extensors and flexors are slightly weak at 5-/5.  Tenderness over the greater trochanter over the right and left hip   Right knee range of motion 8-110.  Slight crepitus to AP compression on the patella in on medial joint.  Pain medially and anteriorly and slightly laterally.  No defect in the patella or quadriceps tendon Very mild swelling.  Collaterals and cruciate stable.  Left knee with approximately 10 ° of flexion contracture and flexion barely to 70°.  Moderate swelling.  Collaterals and cruciate stable. Crepitus with motion.  Pain in medially to palpation.  Also tender to put was patient over the patella with severe crepitus  Calves are soft nontender on the left slightly tender on the right  Slight varicosities around the calves  DP 1+  Skin is warm to touch no obvious lesions      Relevant imaging results reviewed and interpreted by me, discussed with the patient and / or family today     X-ray 03/22/2024 bilateral knees showing in the popliteal area calcified vessels, she has severe arthritis and marginal osteophytes and loss of joint space bilateral knees with some varus deformity  Ultrasound 01/26/2023 of lower extremity was negative for DVT/patient on Plavix and aspirin  X-ray 01/23/2023 bilateral knees with severe osteopenic bone.  There is loss of medial joint space bilaterally and marginal osteophytic changes.  There is no fracture seen consistent with arthritis    X-ray bilateral knees with left knee complete loss of medial joint space with osteophytic changes and sclerosis.  Same on the right knee but not as bad consistent with bilateral end-stage arthrosis.  07/02/2021  X-ray of  the pelvis showing some degenerative changes on the left hip with good joint space left.  Right hip no joint space loss consistent with some arthrosis of the  hip.  Mild chronic degenerative changes in the sacroiliac joints 07/02/2021  X-ray of the lumbar spine in the electronic records multilevel facet arthropathy and osteophytes.  Anterior multilevel spurring.  Degenerative disc disease 07/02/2021  Assessment:     Encounter Diagnoses   Name Primary?    Arthritis of knee, left Yes    Acquired varus deformity knee, left     Arthritis of knee, right     Acquired varus deformity knee, right     Sacroiliitis     Arthritis of left hip     Peripheral arterial disease     Lumbar facet arthropathy     Trigger finger, left middle finger     Trigger finger, right middle finger     Frequent falls         Plan:   Arthritis of knee, left  -     LIDOcaine (LIDODERM) 5 %; Place 1 patch onto the skin once daily. Remove & Discard patch within 12 hours or as directed by MD  Dispense: 15 patch; Refill: 6  -     Large Joint Aspiration/Injection: bilateral knee    Acquired varus deformity knee, left    Arthritis of knee, right  -     LIDOcaine (LIDODERM) 5 %; Place 1 patch onto the skin once daily. Remove & Discard patch within 12 hours or as directed by MD  Dispense: 15 patch; Refill: 6  -     Large Joint Aspiration/Injection: bilateral knee    Acquired varus deformity knee, right    Sacroiliitis  -     LIDOcaine (LIDODERM) 5 %; Place 1 patch onto the skin once daily. Remove & Discard patch within 12 hours or as directed by MD  Dispense: 15 patch; Refill: 6    Arthritis of left hip    Peripheral arterial disease    Lumbar facet arthropathy    Trigger finger, left middle finger    Trigger finger, right middle finger    Frequent falls         Patient Instructions   The Depo-Medrol did not seem to help you we gave it to you last time   Both of her knees are hurting today  We went back to giving you Kenalog because that helped the most we  injected both of her knees today each knee with 80 mg Kenalog mixed with 5 cc 1% lidocaine 07/24/2025   We will give you a lidocaine patches to try you may cut 1 patch today apply it where it hurts the most  I will see you back in 6 weeks/september    Previous discussion   I went and reviewed all her x-rays in the lumbar spine showing multilevel facet arthropathy and some degenerative disc disease.  I also reviewed her pelvis film showing very mild degenerative radiation of her hips.  The x-rays on her knees showing severe arthritic condition.  Now she is complaining of numbness and tingling in the legs and I did tell her could be secondary to lumbar issues and at her age I do not want to add any medications that will make her get confused like gout gabapentin.  She does not have diabetes and most likely her problem is secondary to the lumbar pinched nerves.  She does get cramps in the back of her legs and we did put her on methocarbamol to take as needed.  We also discussed prednisone that she takes only if needed.  We did discuss the pros and cons of steroid injections into the knees.  She can have does once every 10-12 weeks.  I do not want a place her on NSAIDs she is on Plavix and aspirin and I think prednisone is ideal for her at her age.    01/18/2024   Patient is having frequent falls minimum 2 over the last week or so and just hit her knee a little bit.  She has think difficulty getting up and needed help.  She is 88 years of age and numerous arthritic conditions.  She is fully alert and oriented in doing really well mentally.  At this time I did tell her that I must have the insurance provide you with a Rollator.  It is a walker with a seat that you need to use instead of the cane.  The injections in the knees seems to help but they are not lasting long enough  Proceeded injecting both of her knees today 01/28/2024 each with 80 mg Depo-Medrol mixed with 5 cc 1% lidocaine 01/18/2024   Ice the knees the next  few days   You may take Tylenol   As far as the back is concerned the knees was hurting more today in the will be too much steroid if we inject the trigger point in the back  She can not be on NSAIDs she is on Plavix and aspirin   You turn in 3 months sooner if needed  Disclaimer:is note was prepared using a voice recognition system and is likely to have sound alike errors within the text.

## 2025-07-24 NOTE — PROCEDURES
Large Joint Aspiration/Injection: bilateral knee    Date/Time: 7/24/2025 10:40 AM    Performed by: Ranjit Kelly MD  Authorized by: Ranjit Kelly MD    Consent Done?:  Yes (Verbal)  Indications:  Arthritis and pain  Site marked: the procedure site was marked    Timeout: prior to procedure the correct patient, procedure, and site was verified      Local anesthesia used?: Yes    Local anesthetic:  Lidocaine 1% without epinephrine    Details:  Needle Size:  21 G  Approach:  Anterolateral  Location:  Knee  Laterality:  Bilateral  Site:  Bilateral knee  Medications (Right):  80 mg triamcinolone acetonide 40 mg/mL  Medications (Left):  80 mg triamcinolone acetonide 40 mg/mL

## 2025-08-12 ENCOUNTER — LAB VISIT (OUTPATIENT)
Dept: LAB | Facility: HOSPITAL | Age: OVER 89
End: 2025-08-12
Attending: PHYSICIAN ASSISTANT
Payer: MEDICARE

## 2025-08-12 DIAGNOSIS — Z00.00 ROUTINE GENERAL MEDICAL EXAMINATION AT A HEALTH CARE FACILITY: ICD-10-CM

## 2025-08-12 DIAGNOSIS — D63.8 ANEMIA IN OTHER CHRONIC DISEASES CLASSIFIED ELSEWHERE: ICD-10-CM

## 2025-08-12 DIAGNOSIS — I10 ESSENTIAL HYPERTENSION: ICD-10-CM

## 2025-08-12 DIAGNOSIS — E78.2 MIXED HYPERLIPIDEMIA: ICD-10-CM

## 2025-08-12 LAB
ABSOLUTE EOSINOPHIL (OHS): 0.73 K/UL
ABSOLUTE MONOCYTE (OHS): 0.65 K/UL (ref 0.3–1)
ABSOLUTE NEUTROPHIL COUNT (OHS): 5.76 K/UL (ref 1.8–7.7)
ALBUMIN SERPL BCP-MCNC: 3.5 G/DL (ref 3.5–5.2)
ALP SERPL-CCNC: 73 UNIT/L (ref 40–150)
ALT SERPL W/O P-5'-P-CCNC: 12 UNIT/L (ref 0–55)
ANION GAP (OHS): 8 MMOL/L (ref 8–16)
AST SERPL-CCNC: 27 UNIT/L (ref 0–50)
BASOPHILS # BLD AUTO: 0.05 K/UL
BASOPHILS NFR BLD AUTO: 0.5 %
BILIRUB SERPL-MCNC: 0.4 MG/DL (ref 0.1–1)
BUN SERPL-MCNC: 11 MG/DL (ref 8–23)
CALCIUM SERPL-MCNC: 9 MG/DL (ref 8.7–10.5)
CHLORIDE SERPL-SCNC: 104 MMOL/L (ref 95–110)
CHOLEST SERPL-MCNC: 89 MG/DL (ref 120–199)
CHOLEST/HDLC SERPL: 1.8 {RATIO} (ref 2–5)
CO2 SERPL-SCNC: 27 MMOL/L (ref 23–29)
CREAT SERPL-MCNC: 0.6 MG/DL (ref 0.5–1.4)
ERYTHROCYTE [DISTWIDTH] IN BLOOD BY AUTOMATED COUNT: 14.4 % (ref 11.5–14.5)
GFR SERPLBLD CREATININE-BSD FMLA CKD-EPI: >60 ML/MIN/1.73/M2
GLUCOSE SERPL-MCNC: 94 MG/DL (ref 70–110)
HCT VFR BLD AUTO: 29.6 % (ref 37–48.5)
HDLC SERPL-MCNC: 50 MG/DL (ref 40–75)
HDLC SERPL: 56.2 % (ref 20–50)
HGB BLD-MCNC: 9 GM/DL (ref 12–16)
IMM GRANULOCYTES # BLD AUTO: 0.03 K/UL (ref 0–0.04)
IMM GRANULOCYTES NFR BLD AUTO: 0.3 % (ref 0–0.5)
LDLC SERPL CALC-MCNC: 28 MG/DL (ref 63–159)
LYMPHOCYTES # BLD AUTO: 2.1 K/UL (ref 1–4.8)
MCH RBC QN AUTO: 28 PG (ref 27–31)
MCHC RBC AUTO-ENTMCNC: 30.4 G/DL (ref 32–36)
MCV RBC AUTO: 92 FL (ref 82–98)
NONHDLC SERPL-MCNC: 39 MG/DL
NUCLEATED RBC (/100WBC) (OHS): 0 /100 WBC
PLATELET # BLD AUTO: 308 K/UL (ref 150–450)
PMV BLD AUTO: 9.4 FL (ref 9.2–12.9)
POTASSIUM SERPL-SCNC: 4.1 MMOL/L (ref 3.5–5.1)
PROT SERPL-MCNC: 6.4 GM/DL (ref 6–8.4)
RBC # BLD AUTO: 3.22 M/UL (ref 4–5.4)
RELATIVE EOSINOPHIL (OHS): 7.8 %
RELATIVE LYMPHOCYTE (OHS): 22.5 % (ref 18–48)
RELATIVE MONOCYTE (OHS): 7 % (ref 4–15)
RELATIVE NEUTROPHIL (OHS): 61.9 % (ref 38–73)
SODIUM SERPL-SCNC: 139 MMOL/L (ref 136–145)
TRIGL SERPL-MCNC: 55 MG/DL (ref 30–150)
WBC # BLD AUTO: 9.32 K/UL (ref 3.9–12.7)

## 2025-08-12 PROCEDURE — 85025 COMPLETE CBC W/AUTO DIFF WBC: CPT | Mod: HCNC

## 2025-08-12 PROCEDURE — 36415 COLL VENOUS BLD VENIPUNCTURE: CPT | Mod: HCNC

## 2025-08-12 PROCEDURE — 80061 LIPID PANEL: CPT | Mod: HCNC

## 2025-08-12 PROCEDURE — 80053 COMPREHEN METABOLIC PANEL: CPT | Mod: HCNC

## 2025-08-14 ENCOUNTER — TELEPHONE (OUTPATIENT)
Dept: PHARMACY | Facility: CLINIC | Age: OVER 89
End: 2025-08-14
Payer: MEDICARE

## 2025-08-19 ENCOUNTER — OFFICE VISIT (OUTPATIENT)
Dept: INTERNAL MEDICINE | Facility: CLINIC | Age: OVER 89
End: 2025-08-19
Payer: MEDICARE

## 2025-08-19 ENCOUNTER — LAB VISIT (OUTPATIENT)
Dept: LAB | Facility: HOSPITAL | Age: OVER 89
End: 2025-08-19
Attending: FAMILY MEDICINE
Payer: MEDICARE

## 2025-08-19 VITALS
DIASTOLIC BLOOD PRESSURE: 60 MMHG | WEIGHT: 160.06 LBS | SYSTOLIC BLOOD PRESSURE: 138 MMHG | HEART RATE: 84 BPM | OXYGEN SATURATION: 95 % | HEIGHT: 66 IN | BODY MASS INDEX: 25.72 KG/M2 | TEMPERATURE: 98 F

## 2025-08-19 DIAGNOSIS — D64.9 NORMOCYTIC ANEMIA: ICD-10-CM

## 2025-08-19 DIAGNOSIS — D64.9 NORMOCYTIC ANEMIA: Primary | ICD-10-CM

## 2025-08-19 DIAGNOSIS — K21.9 GASTROESOPHAGEAL REFLUX DISEASE WITHOUT ESOPHAGITIS: ICD-10-CM

## 2025-08-19 DIAGNOSIS — E78.2 MIXED HYPERLIPIDEMIA: ICD-10-CM

## 2025-08-19 DIAGNOSIS — K59.09 CONSTIPATION, CHRONIC: ICD-10-CM

## 2025-08-19 DIAGNOSIS — I10 ESSENTIAL HYPERTENSION: ICD-10-CM

## 2025-08-19 LAB
FERRITIN SERPL-MCNC: 24 NG/ML (ref 20–300)
IRON SATN MFR SERPL: 5 % (ref 20–50)
IRON SERPL-MCNC: 20 UG/DL (ref 30–160)
RETICS/RBC NFR AUTO: 3.4 % (ref 0.5–2.5)
TIBC SERPL-MCNC: 400 UG/DL (ref 250–450)
TRANSFERRIN SERPL-MCNC: 270 MG/DL (ref 200–375)

## 2025-08-19 PROCEDURE — 82728 ASSAY OF FERRITIN: CPT | Mod: HCNC

## 2025-08-19 PROCEDURE — 99999 PR PBB SHADOW E&M-EST. PATIENT-LVL V: CPT | Mod: PBBFAC,HCNC,, | Performed by: FAMILY MEDICINE

## 2025-08-19 PROCEDURE — 1101F PT FALLS ASSESS-DOCD LE1/YR: CPT | Mod: CPTII,HCNC,S$GLB, | Performed by: FAMILY MEDICINE

## 2025-08-19 PROCEDURE — 1159F MED LIST DOCD IN RCRD: CPT | Mod: CPTII,HCNC,S$GLB, | Performed by: FAMILY MEDICINE

## 2025-08-19 PROCEDURE — 3288F FALL RISK ASSESSMENT DOCD: CPT | Mod: CPTII,HCNC,S$GLB, | Performed by: FAMILY MEDICINE

## 2025-08-19 PROCEDURE — 99214 OFFICE O/P EST MOD 30 MIN: CPT | Mod: HCNC,S$GLB,, | Performed by: FAMILY MEDICINE

## 2025-08-19 PROCEDURE — G2211 COMPLEX E/M VISIT ADD ON: HCPCS | Mod: HCNC,S$GLB,, | Performed by: FAMILY MEDICINE

## 2025-08-19 PROCEDURE — 36415 COLL VENOUS BLD VENIPUNCTURE: CPT | Mod: HCNC

## 2025-08-19 PROCEDURE — 1126F AMNT PAIN NOTED NONE PRSNT: CPT | Mod: CPTII,HCNC,S$GLB, | Performed by: FAMILY MEDICINE

## 2025-08-19 PROCEDURE — 84466 ASSAY OF TRANSFERRIN: CPT | Mod: HCNC

## 2025-08-19 PROCEDURE — 85045 AUTOMATED RETICULOCYTE COUNT: CPT | Mod: HCNC

## 2025-08-19 RX ORDER — FAMOTIDINE 20 MG/1
20 TABLET, FILM COATED ORAL 2 TIMES DAILY
Qty: 180 TABLET | Refills: 2 | Status: SHIPPED | OUTPATIENT
Start: 2025-08-19

## 2025-08-19 RX ORDER — POLYETHYLENE GLYCOL 3350 17 G/17G
17 POWDER, FOR SOLUTION ORAL DAILY PRN
COMMUNITY
Start: 2025-08-19

## 2025-09-05 ENCOUNTER — TELEPHONE (OUTPATIENT)
Dept: INTERNAL MEDICINE | Facility: CLINIC | Age: OVER 89
End: 2025-09-05
Payer: MEDICARE

## 2025-09-05 DIAGNOSIS — D50.9 IRON DEFICIENCY ANEMIA, UNSPECIFIED IRON DEFICIENCY ANEMIA TYPE: Primary | ICD-10-CM

## 2025-09-05 RX ORDER — QUINIDINE GLUCONATE 324 MG
240 TABLET, EXTENDED RELEASE ORAL DAILY
COMMUNITY
Start: 2025-09-05